# Patient Record
Sex: FEMALE | Race: WHITE | NOT HISPANIC OR LATINO | Employment: FULL TIME | URBAN - METROPOLITAN AREA
[De-identification: names, ages, dates, MRNs, and addresses within clinical notes are randomized per-mention and may not be internally consistent; named-entity substitution may affect disease eponyms.]

---

## 2017-01-11 ENCOUNTER — TELEPHONE (OUTPATIENT)
Dept: INTERNAL MEDICINE | Facility: CLINIC | Age: 67
End: 2017-01-11

## 2017-01-11 NOTE — TELEPHONE ENCOUNTER
Spoke with pt she did not want blood work 2 times in one week so all blood work was moved to her appointment on Monday with lab. Pt was also reminded her blood work is fasting, pt verbalized understanding

## 2017-01-11 NOTE — TELEPHONE ENCOUNTER
----- Message from Vicki Duffy sent at 1/11/2017  9:21 AM CST -----  Contact: pt   Pt states that she has questions about her lab appt ... [phone] unitl noon/ after noon .[phone] (home)

## 2017-01-16 ENCOUNTER — LAB VISIT (OUTPATIENT)
Dept: LAB | Facility: HOSPITAL | Age: 67
End: 2017-01-16
Attending: NURSE PRACTITIONER
Payer: MEDICARE

## 2017-01-16 DIAGNOSIS — Z29.9 PREVENTIVE MEASURE: ICD-10-CM

## 2017-01-16 DIAGNOSIS — E55.9 VITAMIN D DEFICIENCY DISEASE: ICD-10-CM

## 2017-01-16 DIAGNOSIS — B19.20 HEPATITIS C VIRUS INFECTION WITHOUT HEPATIC COMA, UNSPECIFIED CHRONICITY: ICD-10-CM

## 2017-01-16 DIAGNOSIS — E03.9 ACQUIRED HYPOTHYROIDISM: ICD-10-CM

## 2017-01-16 LAB
25(OH)D3+25(OH)D2 SERPL-MCNC: 41 NG/ML
ALBUMIN SERPL BCP-MCNC: 4.2 G/DL
ALP SERPL-CCNC: 25 U/L
ALT SERPL W/O P-5'-P-CCNC: 13 U/L
ANION GAP SERPL CALC-SCNC: 8 MMOL/L
AST SERPL-CCNC: 20 U/L
BASOPHILS # BLD AUTO: 0.03 K/UL
BASOPHILS NFR BLD: 0.7 %
BILIRUB SERPL-MCNC: 0.7 MG/DL
BUN SERPL-MCNC: 14 MG/DL
CALCIUM SERPL-MCNC: 9.8 MG/DL
CHLORIDE SERPL-SCNC: 96 MMOL/L
CHOLEST/HDLC SERPL: 2.5 {RATIO}
CO2 SERPL-SCNC: 29 MMOL/L
CREAT SERPL-MCNC: 0.9 MG/DL
DIFFERENTIAL METHOD: ABNORMAL
EOSINOPHIL # BLD AUTO: 0.1 K/UL
EOSINOPHIL NFR BLD: 2.5 %
ERYTHROCYTE [DISTWIDTH] IN BLOOD BY AUTOMATED COUNT: 13.1 %
EST. GFR  (AFRICAN AMERICAN): >60 ML/MIN/1.73 M^2
EST. GFR  (NON AFRICAN AMERICAN): >60 ML/MIN/1.73 M^2
GLUCOSE SERPL-MCNC: 79 MG/DL
HCT VFR BLD AUTO: 42.7 %
HDL/CHOLESTEROL RATIO: 40.5 %
HDLC SERPL-MCNC: 185 MG/DL
HDLC SERPL-MCNC: 75 MG/DL
HGB BLD-MCNC: 14.8 G/DL
LDLC SERPL CALC-MCNC: 98.8 MG/DL
LYMPHOCYTES # BLD AUTO: 1.8 K/UL
LYMPHOCYTES NFR BLD: 43.3 %
MCH RBC QN AUTO: 33.1 PG
MCHC RBC AUTO-ENTMCNC: 34.7 %
MCV RBC AUTO: 96 FL
MONOCYTES # BLD AUTO: 0.5 K/UL
MONOCYTES NFR BLD: 13.3 %
NEUTROPHILS # BLD AUTO: 1.6 K/UL
NEUTROPHILS NFR BLD: 40.2 %
NONHDLC SERPL-MCNC: 110 MG/DL
PLATELET # BLD AUTO: 172 K/UL
PMV BLD AUTO: 10.9 FL
POTASSIUM SERPL-SCNC: 5.5 MMOL/L
PROT SERPL-MCNC: 7.9 G/DL
RBC # BLD AUTO: 4.47 M/UL
SODIUM SERPL-SCNC: 133 MMOL/L
TRIGL SERPL-MCNC: 56 MG/DL
TSH SERPL DL<=0.005 MIU/L-ACNC: 1.82 UIU/ML
WBC # BLD AUTO: 4.06 K/UL

## 2017-01-16 PROCEDURE — 36415 COLL VENOUS BLD VENIPUNCTURE: CPT | Mod: PO

## 2017-01-16 PROCEDURE — 80061 LIPID PANEL: CPT

## 2017-01-16 PROCEDURE — 80053 COMPREHEN METABOLIC PANEL: CPT

## 2017-01-16 PROCEDURE — 85025 COMPLETE CBC W/AUTO DIFF WBC: CPT

## 2017-01-16 PROCEDURE — 84443 ASSAY THYROID STIM HORMONE: CPT

## 2017-01-16 PROCEDURE — 87522 HEPATITIS C REVRS TRNSCRPJ: CPT

## 2017-01-16 PROCEDURE — 82306 VITAMIN D 25 HYDROXY: CPT

## 2017-01-19 ENCOUNTER — PATIENT OUTREACH (OUTPATIENT)
Dept: ADMINISTRATIVE | Facility: HOSPITAL | Age: 67
End: 2017-01-19
Payer: MEDICARE

## 2017-01-19 DIAGNOSIS — M89.9 BONE DISORDER: Primary | ICD-10-CM

## 2017-01-19 LAB
HCV LOG: <1.08 LOG (10) IU/ML
HCV RNA QUANT PCR: <12 IU/ML
HCV, QUALITATIVE: NOT DETECTED IU/ML

## 2017-01-19 NOTE — LETTER
January 19, 2017    Maggie GARCIA Arjun  [address] [facility] [address]             [facility]  [address]  [facility] [address]  Phone: [phone] Dear Ms. Díaz:  [facility] is committed to your overall health.  To help you get the most out of each of your visits, we will review your information to make sure you are up to date on all of your recommended tests and/or procedures.      Adeola Jones MD has found that you may be due for    Colonoscopy     Influenza Vaccine     DEXA SCAN      If you have had any of the above done at another facility, please bring the records or information with you so that your record at [facility] will be complete.    If you are currently taking medication, please bring it with you to your appointment for review.    We will be happy to assist you with scheduling any necessary appointments or you may contact the [facility] appointment desk at [phone] to schedule at your convenience.     Thank you for choosing [facility] for your healthcare needs,      ALEXANDR Acevedo  Care Coordination Department  [facility]

## 2017-01-20 ENCOUNTER — HOSPITAL ENCOUNTER (OUTPATIENT)
Dept: RADIOLOGY | Facility: HOSPITAL | Age: 67
Discharge: HOME | End: 2017-01-20
Attending: INTERNAL MEDICINE
Payer: MEDICARE

## 2017-01-20 DIAGNOSIS — E03.9 ACQUIRED HYPOTHYROIDISM: ICD-10-CM

## 2017-01-20 DIAGNOSIS — Z29.9 PREVENTIVE MEASURE: ICD-10-CM

## 2017-01-20 DIAGNOSIS — Z12.31 ENCOUNTER FOR SCREENING MAMMOGRAM FOR MALIGNANT NEOPLASM OF BREAST: ICD-10-CM

## 2017-01-20 DIAGNOSIS — E55.9 VITAMIN D DEFICIENCY DISEASE: ICD-10-CM

## 2017-01-20 PROCEDURE — 77067 SCR MAMMO BI INCL CAD: CPT | Mod: 26,,, | Performed by: RADIOLOGY

## 2017-01-20 PROCEDURE — 77067 SCR MAMMO BI INCL CAD: CPT | Mod: TC

## 2017-01-20 PROCEDURE — 77063 BREAST TOMOSYNTHESIS BI: CPT | Mod: 26,,, | Performed by: RADIOLOGY

## 2017-01-27 ENCOUNTER — OFFICE VISIT (OUTPATIENT)
Dept: INTERNAL MEDICINE | Facility: CLINIC | Age: 67
End: 2017-01-27
Payer: MEDICARE

## 2017-01-27 VITALS
SYSTOLIC BLOOD PRESSURE: 146 MMHG | DIASTOLIC BLOOD PRESSURE: 82 MMHG | OXYGEN SATURATION: 98 % | WEIGHT: 149.94 LBS | TEMPERATURE: 97 F | BODY MASS INDEX: 22.72 KG/M2 | HEIGHT: 68 IN | HEART RATE: 84 BPM

## 2017-01-27 DIAGNOSIS — E55.9 VITAMIN D DEFICIENCY DISEASE: Primary | ICD-10-CM

## 2017-01-27 DIAGNOSIS — G25.81 RESTLESS LEGS SYNDROME (RLS): ICD-10-CM

## 2017-01-27 DIAGNOSIS — B17.10 ACUTE HEPATITIS C VIRUS INFECTION WITHOUT HEPATIC COMA: ICD-10-CM

## 2017-01-27 DIAGNOSIS — M81.0 OP (OSTEOPOROSIS): ICD-10-CM

## 2017-01-27 DIAGNOSIS — E03.9 ACQUIRED HYPOTHYROIDISM: ICD-10-CM

## 2017-01-27 DIAGNOSIS — E87.1 HYPONATREMIA: ICD-10-CM

## 2017-01-27 DIAGNOSIS — I10 ESSENTIAL HYPERTENSION: ICD-10-CM

## 2017-01-27 DIAGNOSIS — J30.1 SEASONAL ALLERGIC RHINITIS DUE TO POLLEN: ICD-10-CM

## 2017-01-27 DIAGNOSIS — Z00.00 ENCOUNTER FOR PREVENTIVE HEALTH EXAMINATION: ICD-10-CM

## 2017-01-27 PROCEDURE — 99213 OFFICE O/P EST LOW 20 MIN: CPT | Mod: PBBFAC,PO | Performed by: INTERNAL MEDICINE

## 2017-01-27 PROCEDURE — 99214 OFFICE O/P EST MOD 30 MIN: CPT | Mod: S$PBB,,, | Performed by: INTERNAL MEDICINE

## 2017-01-27 PROCEDURE — 99999 PR PBB SHADOW E&M-EST. PATIENT-LVL III: CPT | Mod: PBBFAC,,, | Performed by: INTERNAL MEDICINE

## 2017-01-27 PROCEDURE — G0008 ADMIN INFLUENZA VIRUS VAC: HCPCS | Mod: PBBFAC,PO | Performed by: INTERNAL MEDICINE

## 2017-01-27 RX ORDER — LISINOPRIL 10 MG/1
10 TABLET ORAL DAILY
Qty: 30 TABLET | Refills: 11 | Status: SHIPPED | OUTPATIENT
Start: 2017-01-27 | End: 2017-03-07

## 2017-01-27 NOTE — PROGRESS NOTES
"Subjective:       Patient ID: Maggie Díaz is a 66 y.o. female.    Chief Complaint: Annual Exam    HPI Comments: Here for f/u medical problems and preventive exam.  Energy good.  Walks daily.  6 mo ago, treated for HCV.  No f/c/sw/cough.  No cp/sob/palp.  BMs and urine normal.  Taking vit D and fosamax.    HM: 1/17 fluvax, 3/15 [id number], 2/14 [id number], 1/10 TDaP, 8/12 zoster, 2/15 BMD rep 2y, Currently refusing Cscope, 3/16 OB neg x 2, 1/17 MMG refuses yearly.        Review of Systems   Constitutional: Negative for appetite change, chills, diaphoresis and fever.   HENT: Negative for congestion, ear pain, rhinorrhea, sinus pressure and sore throat.    Respiratory: Negative for cough, chest tightness and shortness of breath.    Cardiovascular: Negative for chest pain and palpitations.   Gastrointestinal: Negative for blood in stool, constipation, diarrhea, nausea and vomiting.   Genitourinary: Negative for dysuria, frequency, hematuria, menstrual problem, urgency and vaginal discharge.   Musculoskeletal: Negative for arthralgias.   Skin: Negative for rash.   Neurological: Negative for dizziness and headaches.   Psychiatric/Behavioral: Negative for sleep disturbance. The patient is not nervous/anxious.        Objective:     Visit Vitals    BP (!) 146/82 (BP Location: Right arm)    Pulse 84    Temp 96.5 °F (35.8 °C) (Tympanic)    Ht 5' 8" (1.727 m)    Wt 68 kg (149 lb 14.6 oz)    SpO2 98%    BMI 22.79 kg/m2       Physical Exam   Constitutional: She is oriented to person, place, and time. She appears well-developed and well-nourished.   HENT:   Right Ear: External ear normal. Tympanic membrane is not injected.   Left Ear: External ear normal. Tympanic membrane is not injected.   Mouth/Throat: Oropharynx is clear and moist.   Eyes: Conjunctivae are normal.   Neck: Normal range of motion. Neck supple. No thyromegaly present.   Cardiovascular: Normal rate, regular rhythm and intact distal pulses.  Exam reveals no " gallop and no friction rub.    No murmur heard.  Pulmonary/Chest: Effort normal and breath sounds normal. She has no wheezes. She has no rales. Right breast exhibits no mass, no nipple discharge, no skin change and no tenderness. Left breast exhibits no mass, no nipple discharge, no skin change and no tenderness.   Abdominal: Soft. Bowel sounds are normal. She exhibits no mass. There is no tenderness.   Musculoskeletal: She exhibits no edema.   Lymphadenopathy:     She has no cervical adenopathy.        Right axillary: No lateral adenopathy present.        Left axillary: No lateral adenopathy present.  Neurological: She is alert and oriented to person, place, and time.   Skin: Skin is warm. No rash noted.   Psychiatric: She has a normal mood and affect.       Results for orders placed or performed in visit on 01/16/17   HEPATITIS C RNA, QUANTITATIVE, PCR   Result Value Ref Range    HCV Log <1.08 <1.08 Log (10) IU/mL    HCV, Qualitative Not detected Not detected IU/mL    HCV RNA Quant PCR <12 <12 IU/mL   Comprehensive metabolic panel   Result Value Ref Range    Sodium 133 (L) 136 - 145 mmol/L    Potassium 5.5 (H) 3.5 - 5.1 mmol/L    Chloride 96 95 - 110 mmol/L    CO2 29 23 - 29 mmol/L    Glucose 79 70 - 110 mg/dL    BUN, Bld 14 8 - 23 mg/dL    Creatinine 0.9 0.5 - 1.4 mg/dL    Calcium 9.8 8.7 - 10.5 mg/dL    Total Protein 7.9 6.0 - 8.4 g/dL    Albumin 4.2 3.5 - 5.2 g/dL    Total Bilirubin 0.7 0.1 - 1.0 mg/dL    Alkaline Phosphatase 25 (L) 55 - 135 U/L    AST 20 10 - 40 U/L    ALT 13 10 - 44 U/L    Anion Gap 8 8 - 16 mmol/L    eGFR if African American >60.0 >60 mL/min/1.73 m^2    eGFR if non African American >60.0 >60 mL/min/1.73 m^2   Lipid panel   Result Value Ref Range    Cholesterol 185 120 - 199 mg/dL    Triglycerides 56 30 - 150 mg/dL    HDL 75 40 - 75 mg/dL    LDL Cholesterol 98.8 63.0 - 159.0 mg/dL    HDL/Chol Ratio 40.5 20.0 - 50.0 %    Total Cholesterol/HDL Ratio 2.5 2.0 - 5.0    Non-HDL Cholesterol 110  mg/dL   CBC auto differential   Result Value Ref Range    WBC 4.06 3.90 - 12.70 K/uL    RBC 4.47 4.00 - 5.40 M/uL    Hemoglobin 14.8 12.0 - 16.0 g/dL    Hematocrit 42.7 37.0 - 48.5 %    MCV 96 82 - 98 fL    MCH 33.1 (H) 27.0 - 31.0 pg    MCHC 34.7 32.0 - 36.0 %    RDW 13.1 11.5 - 14.5 %    Platelets 172 150 - 350 K/uL    MPV 10.9 9.2 - 12.9 fL    Gran # 1.6 (L) 1.8 - 7.7 K/uL    Lymph # 1.8 1.0 - 4.8 K/uL    Mono # 0.5 0.3 - 1.0 K/uL    Eos # 0.1 0.0 - 0.5 K/uL    Baso # 0.03 0.00 - 0.20 K/uL    Gran% 40.2 38.0 - 73.0 %    Lymph% 43.3 18.0 - 48.0 %    Mono% 13.3 4.0 - 15.0 %    Eosinophil% 2.5 0.0 - 8.0 %    Basophil% 0.7 0.0 - 1.9 %    Differential Method Automated    TSH   Result Value Ref Range    TSH 1.820 0.400 - 4.000 uIU/mL   Vitamin D   Result Value Ref Range    Vit D, 25-Hydroxy 41 30 - 96 ng/mL       Assessment:       1. Vitamin D deficiency disease    2. Restless legs syndrome (RLS)    3. OP (osteoporosis)    4. Acute hepatitis C virus infection without hepatic coma    5. Essential hypertension    6. Seasonal allergic rhinitis due to pollen    7. Acquired hypothyroidism    8. Encounter for preventive health examination    9. Hyponatremia        Plan:       Maggie was seen today for annual exam.    Diagnoses and all orders for this visit:    Vitamin D deficiency disease-stable on supplement.    Restless legs syndrome (RLS)- stable on rx.    OP (osteoporosis)- on fosamax.  recheck in 6mo.  -     DXA Bone Density Spine And Hip_Axial Skeleton; Future    Acute hepatitis C virus infection without hepatic coma- s/p treatment.    Essential hypertension- stop hctz, check BMP in 3d and start ace-i if K normal.  RTC 2 weeks with Lilibeth with BMP prior.    Seasonal allergic rhinitis due to pollen    Acquired hypothyroidism- stable on rx.    Encounter for preventive health examination- utd.   fluvax now.    RTC 6mo with BPs and BMD prior.    WORK PHONE # [phone].

## 2017-01-27 NOTE — MR AVS SNAPSHOT
[facility]  [address] [facility] [place] [address]  Phone: [phone]  Fax: [phone]                  Maggie Díaz   2017 9:20 AM   Office Visit    Description:  Female : 1950   Provider:  Adeola Berumen MD   Department:  [facility] - Internal Medicine           Reason for Visit     Annual Exam           Diagnoses this Visit        Comments    Vitamin D deficiency disease    -  Primary     Restless legs syndrome (RLS)         OP (osteoporosis)         Acute hepatitis C virus infection without hepatic coma         Essential hypertension         Seasonal allergic rhinitis due to pollen         Acquired hypothyroidism         Encounter for preventive health examination         Hyponatremia                To Do List           Future Appointments        Provider Department Dept Phone    2017 10:15 AM LAB[facility]ATORY, [facility] [phone]    2017 10:40 AM LAB[facility]ATORY, [facility] [phone]    2017 9:00 AM SHEILA Roman [facility] [phone]    3/30/2017 3:30 PM YAN Starkey OD [facility] - Ophthalmology [phone]    2017 9:30 AM [facility] BMD1 [facility] [phone]      Goals (5 Years of Data)     NO alcohol intake during and at least 3 months after therapy.      Notes - Note created  3/3/2016  9:27 AM by Nahid Santiago MD      Discharge Instructions for Hepatitis C  You have been diagnosed with hepatitis C, which is an inflammation of the liver caused by a viral infection. Hepatitis C can get worse and damage your liver without your knowing it. Stay in regular contact with your doctor and healthcare team. They can watch your condition and tell you about any new research and types of treatment for hepatitis C. Here's what you can do to stay healthier and prevent its spread.  Home care  · Avoid putting stress on your liver.  ¨ Dont drink alcohol. Alcohol consumption increases  the risk of developing cirrhosis.  ¨ Ask your doctor about which medications, including over-the-counter ones, you should not take.  ¨ Lose weight if you are overweight, especially if you have fatty liver.  · Eat a balanced diet.   · Take medications prescribed by your doctor to try to get rid of the virus.    ¨ To help your liver work better, you may be given injections of a medication called alpha interferon, or you may be given oral medications.  ¨ In most cases, you will take ribavirin (antiviral medication) by mouth twice a day.   Prevention  · Cover all skin breaks and sores by yourself. If you need help, the person treating you should wear latex gloves.  · Use condoms during sex if you have multiple sexual partners.  · Dont donate blood, plasma, sperm, body organs, or other body tissue.  · Dont share needles.  · Dont share razors, toothbrushes, manicure tools, or other personal items.  Follow-up care  Make a follow-up appointment as directed by our staff.  When to seek medical care  Call your doctor right away if you have any of the following:  · Flulike problems (fatigue, nausea, vomiting, diarrhea, or sore muscles and joints)  · Swelling in your abdomen or tenderness in the upper right abdomen  · Yellowing of your skin or eyes (jaundice)  · Itching  · Dark urine  · Black, tarry, or red stools or vomiting blood  · Confusion or trouble concentrating  · Trouble sleeping  · Painful red rash on your legs  · Nerve damage  · Mental confusion  · Kidney problems   © 3531-7076 [employer]. [address] [place] [address], [place] [address]. All rights reserved. This information is not intended as a substitute for professional medical care. Always follow your healthcare professional's instructions.              Follow-Up and Disposition     Return in about 6 months (around 7/27/2017).    Follow-up and Disposition History       These Medications        Disp Refills Start End    lisinopril 10 MG tablet 30  tablet 11 1/27/2017 1/27/2018    Take 1 tablet (10 mg total) by mouth once daily. - Oral    Pharmacy: [facility] [address]  [place], [place] - [address]  #: [phone]         [facility]     [facility] On [facility] Nurse Care Line - 24/7 Assistance  Registered nurses in the [facility] Center provide clinical advisement, health education, appointment booking, and other advisory services.  Call for this free service at [phone].             Medications           Message regarding Medications     Verify the changes and/or additions to your medication regime listed below are the same as discussed with your clinician today.  If any of these changes or additions are incorrect, please notify your healthcare provider.        START taking these NEW medications        Refills    lisinopril 10 MG tablet 11    Sig: Take 1 tablet (10 mg total) by mouth once daily.    Class: Normal    Route: Oral      STOP taking these medications     hydrochlorothiazide (HYDRODIURIL) 25 MG tablet take 1 tablet by mouth once daily           Verify that the below list of medications is an accurate representation of the medications you are currently taking.  If none reported, the list may be blank. If incorrect, please contact your healthcare provider. Carry this list with you in case of emergency.           Current Medications     alendronate (FOSAMAX) 70 MG tablet TAKE 1 TABLET ONCE WEEKLY IN THE MORNING WITH A FULL GLASS OF WATER ON AN EMPTY STOMACH. DO NOT CONSUME FOOD OR LIE DOWN FOR AT LEAST 30 MIN    calcium carbonate 220 mg capsule Take 250 mg by mouth 2 (two) times daily with meals.    clonazePAM (KLONOPIN) 0.5 MG tablet take 1 to 2 tablets by mouth every evening if needed    levothyroxine (SYNTHROID) 50 MCG tablet take 1 tablet by mouth once daily    loratadine (CLARITIN) 10 mg tablet Take 10 mg by mouth once daily.    vitamin D 1000 units Tab Take 185 mg by mouth once daily.    lisinopril 10 MG tablet Take 1  "tablet (10 mg total) by mouth once daily.           Clinical Reference Information           Vital Signs - Last Recorded  Most recent update: 1/27/2017  9:29 AM by Omar Olson MA    BP Pulse Temp Ht Wt SpO2    (!) 146/82 (BP Location: Right arm) 84 96.5 °F (35.8 °C) (Tympanic) 5' 8" (1.727 m) 68 kg (149 lb 14.6 oz) 98%    BMI                22.79 kg/m2          Blood Pressure          Most Recent Value    BP  (!)  146/82      Allergies as of 1/27/2017     Bactrim [Sulfamethoxazole-trimethoprim]    Latex, Natural Rubber    Penicillins      Immunizations Administered on Date of Encounter - 1/27/2017     Name Date Dose VIS Date Route    Influenza - High Dose 1/27/2017 0.5 mL 8/7/2015 Intramuscular      Orders Placed During Today's Visit      Normal Orders This Visit    Influenza - High Dose (65+) (PF) (IM)     Future Labs/Procedures Expected by Expires    Basic metabolic panel  1/27/2017 1/27/2018    Basic metabolic panel  1/27/2017 1/27/2018    DXA Bone Density Spine And Hip_Axial Skeleton  1/27/2017 1/27/2018      [facility] Sign-Up     Activating your [facility] account is as easy as 1-2-3!     1) Visit [email or web address], select Sign Up Now, enter this activation code and your date of birth, then select Next.  [email or web address]  Expires: 3/13/2017  9:48 AM      2) Create a username and password to use when you visit [facility] in the future and select a security question in case you lose your password and select Next.    3) Enter your e-mail address and click Sign Up!    Additional Information  If you have questions, please e-mail [email or web address] or call [phone] to talk to our [facility] staff. Remember, [facility] is NOT to be used for urgent needs. For medical emergencies, dial 911.         "

## 2017-01-30 ENCOUNTER — TELEPHONE (OUTPATIENT)
Dept: INTERNAL MEDICINE | Facility: CLINIC | Age: 67
End: 2017-01-30

## 2017-01-30 ENCOUNTER — LAB VISIT (OUTPATIENT)
Dept: LAB | Facility: HOSPITAL | Age: 67
End: 2017-01-30
Attending: INTERNAL MEDICINE
Payer: MEDICARE

## 2017-01-30 DIAGNOSIS — I10 ESSENTIAL HYPERTENSION: ICD-10-CM

## 2017-01-30 DIAGNOSIS — E87.1 HYPONATREMIA: ICD-10-CM

## 2017-01-30 LAB
ANION GAP SERPL CALC-SCNC: 8 MMOL/L
BUN SERPL-MCNC: 10 MG/DL
CALCIUM SERPL-MCNC: 9.5 MG/DL
CHLORIDE SERPL-SCNC: 102 MMOL/L
CO2 SERPL-SCNC: 30 MMOL/L
CREAT SERPL-MCNC: 0.8 MG/DL
EST. GFR  (AFRICAN AMERICAN): >60 ML/MIN/1.73 M^2
EST. GFR  (NON AFRICAN AMERICAN): >60 ML/MIN/1.73 M^2
GLUCOSE SERPL-MCNC: 62 MG/DL
POTASSIUM SERPL-SCNC: 5.3 MMOL/L
SODIUM SERPL-SCNC: 140 MMOL/L

## 2017-01-30 PROCEDURE — 80048 BASIC METABOLIC PNL TOTAL CA: CPT

## 2017-01-30 PROCEDURE — 36415 COLL VENOUS BLD VENIPUNCTURE: CPT | Mod: PO

## 2017-01-30 NOTE — TELEPHONE ENCOUNTER
----- Message from Nelida Kim sent at 1/30/2017  3:39 PM CST -----  Contact: pt  Pt returning call to nurse...please call pt back at [phone]///thx jw

## 2017-01-30 NOTE — TELEPHONE ENCOUNTER
----- Message from Nelida Kim sent at 1/30/2017  3:39 PM CST -----  Contact: pt  Pt returning call to nurse...please call pt back at [phone]///thx jw

## 2017-01-31 ENCOUNTER — TELEPHONE (OUTPATIENT)
Dept: INTERNAL MEDICINE | Facility: CLINIC | Age: 67
End: 2017-01-31

## 2017-01-31 DIAGNOSIS — I10 ESSENTIAL HYPERTENSION: Primary | ICD-10-CM

## 2017-01-31 NOTE — TELEPHONE ENCOUNTER
Informed pt sodium level is now good, potassium is better but still a little high- may be falsely elevated.  Scheduled pt to come back tomorrow for redraw, she cannot come until tomorrow.  Pt states that she has not taken the Hctz since Saturday and has not filled the Lisinopril yet.  She would like to know when she is supposed to start that and get back on the Hctz.  Please advise./rpr

## 2017-01-31 NOTE — TELEPHONE ENCOUNTER
Please inform pt sodium level is now good, potassium is better but still a little high- may be falsely elevated.  Please come back for repeat blood draw.  Call work phone # at the end of my last note.  SM

## 2017-02-01 ENCOUNTER — LAB VISIT (OUTPATIENT)
Dept: LAB | Facility: HOSPITAL | Age: 67
End: 2017-02-01
Attending: INTERNAL MEDICINE
Payer: MEDICARE

## 2017-02-01 DIAGNOSIS — I10 ESSENTIAL HYPERTENSION: ICD-10-CM

## 2017-02-01 LAB
ANION GAP SERPL CALC-SCNC: 9 MMOL/L
BUN SERPL-MCNC: 13 MG/DL
CALCIUM SERPL-MCNC: 9.5 MG/DL
CHLORIDE SERPL-SCNC: 103 MMOL/L
CO2 SERPL-SCNC: 28 MMOL/L
CREAT SERPL-MCNC: 0.8 MG/DL
EST. GFR  (AFRICAN AMERICAN): >60 ML/MIN/1.73 M^2
EST. GFR  (NON AFRICAN AMERICAN): >60 ML/MIN/1.73 M^2
GLUCOSE SERPL-MCNC: 100 MG/DL
POTASSIUM SERPL-SCNC: 4.7 MMOL/L
SODIUM SERPL-SCNC: 140 MMOL/L

## 2017-02-01 PROCEDURE — 36415 COLL VENOUS BLD VENIPUNCTURE: CPT | Mod: PO

## 2017-02-01 PROCEDURE — 80048 BASIC METABOLIC PNL TOTAL CA: CPT

## 2017-02-02 ENCOUNTER — TELEPHONE (OUTPATIENT)
Dept: INTERNAL MEDICINE | Facility: CLINIC | Age: 67
End: 2017-02-02

## 2017-02-02 NOTE — TELEPHONE ENCOUNTER
Attempted to contact pt at work number as instructed she has already left work . Called home phone pt not there, instructed the patients  to have her give us a call at earliest convenience. Verbalized understanding.

## 2017-02-02 NOTE — TELEPHONE ENCOUNTER
----- Message from Marie Rodrigues sent at 2/2/2017  3:52 PM CST -----  Is returning nurse call. Please call back at [phone]. Thanks//cdb

## 2017-02-02 NOTE — TELEPHONE ENCOUNTER
Spoke with pt advised patient of lab results and to start Lisinopril. Pt would like to know if you wanted her to still have 2/6/17 labs drawn or to wait. Please advise.

## 2017-02-02 NOTE — TELEPHONE ENCOUNTER
Please call pt at work phone # at end of my last note, potassium level is good and she should start new BP med lisinopril.  SM

## 2017-02-07 ENCOUNTER — TELEPHONE (OUTPATIENT)
Dept: INTERNAL MEDICINE | Facility: CLINIC | Age: 67
End: 2017-02-07

## 2017-02-07 NOTE — TELEPHONE ENCOUNTER
----- Message from Raven Solo sent at 2/7/2017  4:19 PM CST -----  Call pt at  [phone]//pt call yesterday and still waiting to hear from someone//jaquelin nava

## 2017-02-07 NOTE — TELEPHONE ENCOUNTER
Pt states that she is having side effects from the Lisinopril.  She took it Sat & Sun and experienced rapid heart beats and /58.  She has not taken anything since.  Her BP this am was 123/73.  She would like to be advised if she should be taking anything./rpr

## 2017-02-07 NOTE — TELEPHONE ENCOUNTER
Instructed pt to stop taking med, monitor BP x 1 mo and f/u with SHEILA Padgett.  Notify Dr. Berumen if BPs are running high prior to 1 mo f/u.  Pt verbalized understanding./rpr

## 2017-02-21 ENCOUNTER — PATIENT OUTREACH (OUTPATIENT)
Dept: ADMINISTRATIVE | Facility: HOSPITAL | Age: 67
End: 2017-02-21
Payer: MEDICARE

## 2017-02-21 NOTE — LETTER
February 21, 2017    Maggie GARCIA Arjun   [facility] [address]             [facility]  [address]  [facility] [address]  Phone: [phone] Dear Ms. Díaz:    [facility] is committed to your overall health.  To help you get the most out of each of your visits, we will review your information to make sure you are up to date on all of your recommended tests and/or procedures.      Adeola Jones MD has found that you may be due for    Colonoscopy     DEXA SCAN         If you have had any of the above done at another facility, please bring the records or information with you so that your record at [facility] will be complete.    If you are currently taking medication, please bring it with you to your appointment for review.    We will be happy to assist you with scheduling any necessary appointments or you may contact the [facility] appointment desk at [phone] to schedule at your convenience.     Thank you for choosing [facility] for your healthcare needs,        Bobbye, LPN  Care Coordination Department  [facility]

## 2017-03-03 RX ORDER — CLONAZEPAM 0.5 MG/1
TABLET ORAL
Qty: 60 TABLET | Refills: 2 | Status: SHIPPED | OUTPATIENT
Start: 2017-03-03 | End: 2017-05-31 | Stop reason: SDUPTHER

## 2017-03-07 ENCOUNTER — OFFICE VISIT (OUTPATIENT)
Dept: INTERNAL MEDICINE | Facility: CLINIC | Age: 67
End: 2017-03-07
Payer: MEDICARE

## 2017-03-07 VITALS
HEART RATE: 99 BPM | SYSTOLIC BLOOD PRESSURE: 132 MMHG | HEIGHT: 68 IN | WEIGHT: 149.5 LBS | BODY MASS INDEX: 22.66 KG/M2 | DIASTOLIC BLOOD PRESSURE: 78 MMHG | TEMPERATURE: 96 F | OXYGEN SATURATION: 97 %

## 2017-03-07 DIAGNOSIS — I10 ESSENTIAL HYPERTENSION: Primary | ICD-10-CM

## 2017-03-07 DIAGNOSIS — J30.1 SEASONAL ALLERGIC RHINITIS DUE TO POLLEN: ICD-10-CM

## 2017-03-07 PROCEDURE — 99213 OFFICE O/P EST LOW 20 MIN: CPT | Mod: PBBFAC,PO | Performed by: PHYSICIAN ASSISTANT

## 2017-03-07 PROCEDURE — 99213 OFFICE O/P EST LOW 20 MIN: CPT | Mod: S$PBB,,, | Performed by: PHYSICIAN ASSISTANT

## 2017-03-07 PROCEDURE — 99999 PR PBB SHADOW E&M-EST. PATIENT-LVL III: CPT | Mod: PBBFAC,,, | Performed by: PHYSICIAN ASSISTANT

## 2017-03-07 RX ORDER — CETIRIZINE HYDROCHLORIDE 10 MG/1
10 TABLET ORAL DAILY
COMMUNITY
End: 2017-07-28

## 2017-03-07 NOTE — PROGRESS NOTES
Subjective:       Patient ID: Maggie Díaz is a 66 y.o. female.    Chief Complaint: Follow-up    HPI Comments: 66 year old female presents to clinic for f/u from last PCP Dr. Berumen visit (1/27/17). HCTZ was stopped and lisinopril was started for pt; however pt had low BP and tachycardia on the lisinopril. Lisinopril was then discontinued by PCP on 2/7/17 and pt has been monitoring BP since that time. Pt has checked her BP 1-2 times daily for the last month and average BP has been 117/72. She reports BP is a bit higher today due to a hectic workday. She also reports her allergies (nasal congestion) began today due to the pollen outside. She just bought Zyrtec to take for this. She reports checking her BP machine with one at a pharmacy, and they read similar. She reports no CP, SOB, exertional sxs, fever, chills, or other medical complaints.    Past Medical History:  No date: Hematuria, microscopic      Comment: negative CT  No date: Hepatitis C      Comment: slow progression/ Dr. Otoole  No date: Hypertension  No date: Migraines  No date: OP (osteoporosis)      Comment: on drug holiday.  No date: Osteoarthritis  No date: Restless legs syndrome (RLS)  No date: Vitamin D deficiency disease        Review of Systems   Constitutional: Negative for chills and fever.   HENT: Positive for congestion. Negative for ear pain and sore throat.    Respiratory: Negative for cough and shortness of breath.    Cardiovascular: Negative for chest pain, palpitations and leg swelling.   Gastrointestinal: Negative for nausea and vomiting.   Skin: Negative for rash.   Neurological: Negative for dizziness, weakness, numbness and headaches.   Psychiatric/Behavioral: Negative for confusion.       Objective:      Physical Exam   Constitutional: She is oriented to person, place, and time. She appears well-developed and well-nourished. No distress.   HENT:   Head: Normocephalic and atraumatic.   Mouth/Throat: Oropharynx is clear and  moist. No oropharyngeal exudate.   Eyes: EOM are normal. No scleral icterus.   Neck: Neck supple.   Cardiovascular: Normal rate and regular rhythm.    Pulmonary/Chest: Effort normal and breath sounds normal. No respiratory distress. She has no decreased breath sounds. She has no wheezes. She has no rhonchi. She has no rales.   Musculoskeletal: Normal range of motion. She exhibits no edema.   Lymphadenopathy:     She has no cervical adenopathy.   Neurological: She is alert and oriented to person, place, and time. No cranial nerve deficit.   Skin: Skin is warm and dry. No rash noted.   Psychiatric: She has a normal mood and affect. Her speech is normal and behavior is normal. Thought content normal.       Assessment:       1. Essential hypertension    2. Seasonal allergic rhinitis due to pollen        Plan:         1. Continue to monitor BP. Healthy diet and regular exercise.  2. Consider Flonase or Nasacort daily. Zyrtec PRN allergies. Monitor for infection.  3. F/u with PCP in 4 months as already scheduled for health management. RTC sooner if BP worsens or other medical concern. ER if severe sxs or severe BP readings occur.

## 2017-03-22 RX ORDER — LEVOTHYROXINE SODIUM 50 UG/1
TABLET ORAL
Qty: 30 TABLET | Refills: 11 | Status: SHIPPED | OUTPATIENT
Start: 2017-03-22 | End: 2018-03-15 | Stop reason: SDUPTHER

## 2017-04-04 ENCOUNTER — OFFICE VISIT (OUTPATIENT)
Dept: OPHTHALMOLOGY | Facility: CLINIC | Age: 67
End: 2017-04-04
Payer: MEDICARE

## 2017-04-04 DIAGNOSIS — Z13.5 GLAUCOMA SCREENING: ICD-10-CM

## 2017-04-04 DIAGNOSIS — H52.7 REFRACTIVE ERROR: ICD-10-CM

## 2017-04-04 PROCEDURE — 92015 DETERMINE REFRACTIVE STATE: CPT | Mod: ,,, | Performed by: OPTOMETRIST

## 2017-04-04 PROCEDURE — 99999 PR PBB SHADOW E&M-EST. PATIENT-LVL II: CPT | Mod: PBBFAC,,, | Performed by: OPTOMETRIST

## 2017-04-04 PROCEDURE — 92014 COMPRE OPH EXAM EST PT 1/>: CPT | Mod: S$PBB,,, | Performed by: OPTOMETRIST

## 2017-04-04 PROCEDURE — 99212 OFFICE O/P EST SF 10 MIN: CPT | Mod: PBBFAC,PO | Performed by: OPTOMETRIST

## 2017-04-04 NOTE — MR AVS SNAPSHOT
[facility]  [address] [facility] [address] [place] [address]  Phone: [phone]  Fax: [phone]                  Maggie Díaz   2017 3:30 PM   Office Visit    Description:  Female : 1950   Provider:  YAN Starkey OD   Department:  [facility]           Reason for Visit     Eye Exam           Diagnoses this Visit        Comments    Cataract, nuclear, bilateral    -  Primary     Glaucoma screening         Refractive error                To Do List           Future Appointments        Provider Department Dept Phone    2017 9:30 AM [facility]1 [facility] [phone]    2017 9:40 AM Adeola Berumen MD [facility] [phone]      Goals (5 Years of Data)     NO alcohol intake during and at least 3 months after therapy.      Notes - Note created  3/3/2016  9:27 AM by Nahid Santiago MD      Discharge Instructions for Hepatitis C  You have been diagnosed with hepatitis C, which is an inflammation of the liver caused by a viral infection. Hepatitis C can get worse and damage your liver without your knowing it. Stay in regular contact with your doctor and healthcare team. They can watch your condition and tell you about any new research and types of treatment for hepatitis C. Here's what you can do to stay healthier and prevent its spread.  Home care  · Avoid putting stress on your liver.  ¨ Dont drink alcohol. Alcohol consumption increases the risk of developing cirrhosis.  ¨ Ask your doctor about which medications, including over-the-counter ones, you should not take.  ¨ Lose weight if you are overweight, especially if you have fatty liver.  · Eat a balanced diet.   · Take medications prescribed by your doctor to try to get rid of the virus.    ¨ To help your liver work better, you may be given injections of a medication called alpha interferon, or you may be given oral medications.  ¨ In most cases, you will take ribavirin (antiviral  medication) by mouth twice a day.   Prevention  · Cover all skin breaks and sores by yourself. If you need help, the person treating you should wear latex gloves.  · Use condoms during sex if you have multiple sexual partners.  · Dont donate blood, plasma, sperm, body organs, or other body tissue.  · Dont share needles.  · Dont share razors, toothbrushes, manicure tools, or other personal items.  Follow-up care  Make a follow-up appointment as directed by our staff.  When to seek medical care  Call your doctor right away if you have any of the following:  · Flulike problems (fatigue, nausea, vomiting, diarrhea, or sore muscles and joints)  · Swelling in your abdomen or tenderness in the upper right abdomen  · Yellowing of your skin or eyes (jaundice)  · Itching  · Dark urine  · Black, tarry, or red stools or vomiting blood  · Confusion or trouble concentrating  · Trouble sleeping  · Painful red rash on your legs  · Nerve damage  · Mental confusion  · Kidney problems   © 8170-4868 [employer]. [address] [place] [address]. All rights reserved. This information is not intended as a substitute for professional medical care. Always follow your healthcare professional's instructions.              Follow-Up and Disposition     Return in about 1 year (around 4/4/2018).      [facility]     [facility] Nurse Care [facility] - 24/7 Assistance  Unless otherwise directed by your provider, please contact [facility], our nurse care line that is available for 24/7 assistance.     Registered nurses in the [facility] provide: appointment scheduling, clinical advisement, health education, and other advisory services.  Call: [phone] (toll free)               Medications           Message regarding Medications     Verify the changes and/or additions to your medication regime listed below are the same as discussed with your clinician today.  If any of these changes or additions  are incorrect, please notify your healthcare provider.             Verify that the below list of medications is an accurate representation of the medications you are currently taking.  If none reported, the list may be blank. If incorrect, please contact your healthcare provider. Carry this list with you in case of emergency.           Current Medications     alendronate (FOSAMAX) 70 MG tablet TAKE 1 TABLET ONCE WEEKLY IN THE MORNING WITH A FULL GLASS OF WATER ON AN EMPTY STOMACH. DO NOT CONSUME FOOD OR LIE DOWN FOR AT LEAST 30 MIN    calcium carbonate 220 mg capsule Take 250 mg by mouth 2 (two) times daily with meals.    cetirizine (ZYRTEC) 10 MG tablet Take 10 mg by mouth once daily.    clonazePAM (KLONOPIN) 0.5 MG tablet take 1-2 tablets by mouth every evening if needed    levothyroxine (SYNTHROID) 50 MCG tablet take 1 tablet by mouth once daily    vitamin D 1000 units Tab Take 185 mg by mouth once daily.           Clinical Reference Information           Allergies as of 4/4/2017     Bactrim [Sulfamethoxazole-trimethoprim]    Latex, Natural Rubber    Penicillins      Immunizations Administered on Date of Encounter - 4/4/2017     None      [facility] Sign-Up     Activating your [facility] account is as easy as 1-2-3!     1) Visit [email or web address], select Sign Up Now, enter this activation code and your date of birth, then select Next.  [email or web address]  Expires: 6/1/2017  5:40 PM      2) Create a username and password to use when you visit [facility] in the future and select a security question in case you lose your password and select Next.    3) Enter your e-mail address and click Sign Up!    Additional Information  If you have questions, please e-mail [email or web address] or call [phone] to talk to our [facility] staff. Remember, [facility] is NOT to be used for urgent needs. For medical emergencies, dial 911.         Language Assistance Services     ATTENTION: Language assistance services are available, free  of charge. Please call [phone].      ATENCIÓN: Si habla español, tiene a kraft disposición servicios gratuitos de asistencia lingüística. Llame al [phone].     CHÚ Ý: N?u b?n nói Ti?ng Vi?t, có các d?ch v? h? tr? ngôn ng? mi?n phí dành cho b?n. G?i s? [phone].         [facility] complies with applicable Federal civil rights laws and does not discriminate on the basis of race, color, national origin, age, disability, or sex.

## 2017-04-04 NOTE — PROGRESS NOTES
HPI     Last [facility] exam 03/24/2016  Cataract, nuclear, bilateral  Screening for glaucoma  RE         Last edited by Brock Duffy MA on 4/4/2017  3:30 PM.         Assessment /Plan     For exam results, see Encounter Report.    Cataract, nuclear, bilateral    Glaucoma screening    Refractive error      Mild NS cataracts OU = discussed and will follow.  OH OK OU otherwise.  Spec Rx given.  RTC one year.

## 2017-04-18 RX ORDER — ALENDRONATE SODIUM 70 MG/1
TABLET ORAL
Qty: 4 TABLET | Refills: 11 | Status: SHIPPED | OUTPATIENT
Start: 2017-04-18 | End: 2018-04-16 | Stop reason: SDUPTHER

## 2017-04-26 ENCOUNTER — OFFICE VISIT (OUTPATIENT)
Dept: INTERNAL MEDICINE | Facility: CLINIC | Age: 67
End: 2017-04-26
Payer: MEDICARE

## 2017-04-26 VITALS
DIASTOLIC BLOOD PRESSURE: 84 MMHG | TEMPERATURE: 96 F | BODY MASS INDEX: 22.82 KG/M2 | HEART RATE: 75 BPM | OXYGEN SATURATION: 98 % | WEIGHT: 150.56 LBS | HEIGHT: 68 IN | SYSTOLIC BLOOD PRESSURE: 128 MMHG

## 2017-04-26 DIAGNOSIS — R42 DIZZINESS: Primary | ICD-10-CM

## 2017-04-26 PROCEDURE — 99999 PR PBB SHADOW E&M-EST. PATIENT-LVL IV: CPT | Mod: PBBFAC,,, | Performed by: PHYSICIAN ASSISTANT

## 2017-04-26 PROCEDURE — 99213 OFFICE O/P EST LOW 20 MIN: CPT | Mod: S$PBB,,, | Performed by: PHYSICIAN ASSISTANT

## 2017-04-26 PROCEDURE — 99214 OFFICE O/P EST MOD 30 MIN: CPT | Mod: PBBFAC,PO | Performed by: PHYSICIAN ASSISTANT

## 2017-04-26 RX ORDER — MECLIZINE HYDROCHLORIDE 25 MG/1
25 TABLET ORAL 3 TIMES DAILY PRN
Qty: 15 TABLET | Refills: 0 | Status: SHIPPED | OUTPATIENT
Start: 2017-04-26 | End: 2017-07-28

## 2017-04-26 NOTE — PROGRESS NOTES
Subjective:      Patient ID: Maggie Díaz is a 66 y.o. female.    Chief Complaint: Dizziness (room spinning)    Dizziness:   Chronicity:  New  Onset:  Today  Progression since onset:  Waxing and waning  Dizziness characteristics: world is spinning.no hearing loss, no ear congestion, no ear pain, no fever, no headaches, no tinnitus, no nausea, no vomiting, no diaphoresis, no aural fullness, no weakness, no visual disturbances, no light-headedness, no syncope, no palpitations, no panic, no facial weakness, no slurred speech, no numbness in extremities and no chest pain.  Aggravated by:  Bending, getting up and position changesno strokes, no cardiac surgery, no neurologic disease, no head trauma, no balance testing, no ear trauma, no ear surgery, no head trauma, no ear infections, no anxiety, no ear tubes, no environmental allergies, no MRI head and no CT head.      Review of Systems   Constitutional: Negative for activity change, appetite change, chills, diaphoresis, fatigue, fever and unexpected weight change.   HENT: Negative.  Negative for congestion, ear pain, hearing loss, postnasal drip, rhinorrhea, sore throat, tinnitus, trouble swallowing and voice change.    Eyes: Negative.  Negative for visual disturbance.   Respiratory: Negative.  Negative for cough, choking, chest tightness and shortness of breath.    Cardiovascular: Negative for chest pain, palpitations, leg swelling and syncope.   Gastrointestinal: Negative for abdominal distention, abdominal pain, blood in stool, constipation, diarrhea, nausea and vomiting.   Endocrine: Negative for cold intolerance, heat intolerance, polydipsia and polyuria.   Genitourinary: Negative.  Negative for difficulty urinating and frequency.   Musculoskeletal: Negative for arthralgias, back pain, gait problem, joint swelling and myalgias.   Skin: Negative for color change, pallor, rash and wound.   Allergic/Immunologic: Negative for environmental allergies.  "  Neurological: Positive for dizziness. Negative for tremors, weakness, light-headedness, numbness and headaches.   Hematological: Negative for adenopathy.   Psychiatric/Behavioral: Negative for behavioral problems, confusion, self-injury, sleep disturbance and suicidal ideas. The patient is not nervous/anxious.      Objective:   /84 (BP Location: Left arm, Patient Position: Sitting)  Pulse 75  Temp 96.2 °F (35.7 °C) (Tympanic)   Ht 5' 8" (1.727 m)  Wt 68.3 kg (150 lb 9.2 oz)  SpO2 98%  BMI 22.89 kg/m2    Physical Exam   Constitutional: She is oriented to person, place, and time. She appears well-developed and well-nourished.   HENT:   Head: Normocephalic and atraumatic.   Right Ear: Hearing, tympanic membrane, external ear and ear canal normal.   Left Ear: Hearing, tympanic membrane, external ear and ear canal normal.   Nose: Nose normal. No mucosal edema or rhinorrhea. Right sinus exhibits no maxillary sinus tenderness and no frontal sinus tenderness. Left sinus exhibits no maxillary sinus tenderness and no frontal sinus tenderness.   Mouth/Throat: Uvula is midline, oropharynx is clear and moist and mucous membranes are normal. No tonsillar exudate.   Eyes: Conjunctivae and EOM are normal. Pupils are equal, round, and reactive to light.   Neck: Normal range of motion. Neck supple.   Cardiovascular: Normal rate, regular rhythm and normal heart sounds.  Exam reveals no gallop and no friction rub.    No murmur heard.  Pulmonary/Chest: Effort normal and breath sounds normal. No respiratory distress. She has no wheezes. She has no rales. She exhibits no tenderness.   Abdominal: Soft. She exhibits no distension. There is no tenderness.   Musculoskeletal: Normal range of motion.   Lymphadenopathy:     She has no cervical adenopathy.   Neurological: She is alert and oriented to person, place, and time. She has normal strength. She exhibits normal muscle tone. She displays a negative Romberg sign. Coordination " and gait normal.   Skin: Skin is warm and dry.   Psychiatric: She has a normal mood and affect. Her speech is normal and behavior is normal. Judgment and thought content normal. She is not actively hallucinating. Cognition and memory are normal. She is attentive.   Vitals reviewed.      Assessment:     1. Dizziness      Plan:   Dizziness  -     meclizine (ANTIVERT) 25 mg tablet; Take 1 tablet (25 mg total) by mouth 3 (three) times daily as needed.  Dispense: 15 tablet; Refill: 0  -     Ambulatory referral to ENT    -Educational handout on dizziness was handed to the patient and discussed in detail.  -reduce salt  -discontinue meclizine 3 days prior to appointment with ENT    Return if symptoms worsen or fail to improve.

## 2017-04-26 NOTE — PATIENT INSTRUCTIONS
Dizziness (Vertigo) and Balance Problems: Staying Safe     Replace burned-out lightbulbs to keep your home safe and well lit.   Falls or accidents can lead to pain, broken bones, and fear of future falls. Protect yourself and others by preparing for episodes. Simple steps can help you stay safe at home and wherever you go.  Lighting  Keep all areas well lit. This helps your eyes send the right signals to the brain. It also makes you less likely to trip and fall. If bright lights make symptoms worse, dim the lights or lie in a dark room until the dizziness passes. Then turn the lights back to their normal level.  Tips:  · Keep a flashlight by the bed.  · Place nightlights in bathrooms and hallways.  · Replace burned-out bulbs, or have someone replace them for you.  Preventing falls  To reduce your risk of falling:  · Get out of bed or up from a chair slowly.  · Wear low-heeled shoes that fit properly and have slip-resistant soles.  · Remove throw rugs. Clear clutter from walkways.  · Use handrails on stairs. Have handrails installed or adjusted if needed.  · Install grab bars in the bathroom. Don't use towel racks for balance.  · Use a shower stool. Also put adhesive strips in the shower or on the tub floor.  Going out  With a little time and preparation, you can get around safely.  Tips:  · Bring a cane or walking aid if needed.  · Give yourself plenty of time in case you start to get dizzy.  · Ask your healthcare provider what type of exercise is safe for your condition.  · Be patient. If an activity such as walking through a crowded shop causes you stress, you may not be ready for it yet.  Driving  If you become dizzy or disoriented while driving, you could hurt yourself and others. That's why it's best to not drive until symptoms have gone away. In some cases, your license may be temporarily held until it's safe for you to drive again.  For safety:  · Ask a friend to drive for you.  · Take public  transportation.  · Walk to stores and other places when you can.  Asking for help  Don't be afraid to ask for help running errands, cooking meals, and doing exercise. Whether it's a friend, loved one, neighbor, or stranger on the street, a little help can make a world of difference.   Date Last Reviewed: 11/1/2016  © 1780-7806 "[employer]. [address] [place] [address]. All rights reserved. This information is not intended as a substitute for professional medical care. Always follow your healthcare professional's instructions.        Managing Dizziness (Vertigo) with Medicines    Although medicines can't cure your problem, they can help control symptoms. Your doctor may prescribe medicines for a few weeks and then taper them off. Always take your medicine as prescribed. Never share your medicine with others.  Contact your healthcare provider right away if you have side effects from your medicines.   How medicines can help  · Treat infection or inflammation. If you have an infection caused by bacteria, your doctor can prescribe antibiotics.  · Limit conflicting balance signals. These medicines are often in pill form.  · Ease nausea. Suppositories, pills, or shots can reduce vomiting.  · Reduce pressure in the canals. Diuretics can be used to treat Meniere's disease. These medicines help your body get rid of extra fluid.  · Ease other symptoms. Other medicines can help ease depression and anxiety caused by living with dizziness or fainting.  Date Last Reviewed: 11/1/2016 © 2000-2016 "[employer]. [address] [place] [address]. All rights reserved. This information is not intended as a substitute for professional medical care. Always follow your healthcare professional's instructions.        Inner Ear Problems: Causes of Dizziness (Vertigo)  Benign positional vertigo (BPV)    This is the most common cause of vertigo. BPV is also called benign positional paroxysmal  vertigo (BPPV). It happens when crystals in the ear canals shift into the wrong place. Vertigo usually occurs when you move your head in a certain way. This can happen when turning in bed, bending, or looking up. Because BPV comes on quickly, you should think about if you are safe to drive or do other tasks that need your full attention.  BPV:  · Causes vertigo that last for seconds. Vertigo can occur several times a day, depending on body position.  · Doesnt cause hearing loss  · Often goes away on its own. But it but may go away sooner with treatment.  Infection or inflammation    Sometimes the semicircular canals swell and send incorrect balance signals. This problem may be caused by a viral infection. Depending on the cause, your hearing can be affected (labyrinthitis). Or your hearing can remain normal (neuronitis).  Infection or inflammation:  · Causes vertigo that lasts for hours or days. The first episode is usually the worst.  · Can cause hearing loss  · Often goes away on its own. But it may go away sooner with treatment.  You may need vestibular rehabilitation if you have balance problems that don't go away.  Menieres disease    This condition is uncommon. It happens when there is too much fluid in the ear canals. This causes increased pressure and swelling. It affects balance and hearing signals.  Menieres disease may:  · Cause vertigo that last for hours  · Cause hearing problems that come and go. The problems are usually in one ear and get worse over time.  · Cause buzzing or ringing in the ears (tinnitus)  · Cause a feeling of fullness or pressure in the ear  · Cause any of these symptoms: vertigo, hearing loss, tinnitus, or ear fullness to last a lifetime  Date Last Reviewed: 11/1/2016  © 8070-6391 [employer]. [address] [place] [address], [place] [address]. All rights reserved. This information is not intended as a substitute for professional medical care. Always follow your healthcare  professional's instructions.

## 2017-04-26 NOTE — MR AVS SNAPSHOT
[facility]  [address] [facility] [place] [address]  Phone: [phone]  Fax: [phone]                  Maggie Díaz   2017 11:20 AM   Office Visit    Description:  Female : 1950   Provider:  Elda Cr PA-C   Department:  [facility] - Internal Medicine           Reason for Visit     Dizziness           Diagnoses this Visit        Comments    Dizziness    -  Primary            To Do List           Future Appointments        Provider Department Dept Phone    2017 12:00 PM AKIRA Gonzalez [facility] - Audiology [phone]    2017 1:00 PM Makayla Baeza PA-C [facility] - [phone]    2017 9:30 AM [facility] BMD1 [facility] [phone]    2017 9:40 AM Adeola Berumen MD [facility] [phone]      Goals (5 Years of Data)     NO alcohol intake during and at least 3 months after therapy.      Notes - Note created  3/3/2016  9:27 AM by Nahid Santiago MD      Discharge Instructions for Hepatitis C  You have been diagnosed with hepatitis C, which is an inflammation of the liver caused by a viral infection. Hepatitis C can get worse and damage your liver without your knowing it. Stay in regular contact with your doctor and healthcare team. They can watch your condition and tell you about any new research and types of treatment for hepatitis C. Here's what you can do to stay healthier and prevent its spread.  Home care  · Avoid putting stress on your liver.  ¨ Dont drink alcohol. Alcohol consumption increases the risk of developing cirrhosis.  ¨ Ask your doctor about which medications, including over-the-counter ones, you should not take.  ¨ Lose weight if you are overweight, especially if you have fatty liver.  · Eat a balanced diet.   · Take medications prescribed by your doctor to try to get rid of the virus.    ¨ To help your liver work better, you may be given injections of a medication called alpha interferon,  or you may be given oral medications.  ¨ In most cases, you will take ribavirin (antiviral medication) by mouth twice a day.   Prevention  · Cover all skin breaks and sores by yourself. If you need help, the person treating you should wear latex gloves.  · Use condoms during sex if you have multiple sexual partners.  · Dont donate blood, plasma, sperm, body organs, or other body tissue.  · Dont share needles.  · Dont share razors, toothbrushes, manicure tools, or other personal items.  Follow-up care  Make a follow-up appointment as directed by our staff.  When to seek medical care  Call your doctor right away if you have any of the following:  · Flulike problems (fatigue, nausea, vomiting, diarrhea, or sore muscles and joints)  · Swelling in your abdomen or tenderness in the upper right abdomen  · Yellowing of your skin or eyes (jaundice)  · Itching  · Dark urine  · Black, tarry, or red stools or vomiting blood  · Confusion or trouble concentrating  · Trouble sleeping  · Painful red rash on your legs  · Nerve damage  · Mental confusion  · Kidney problems   © 5890-5527 [employer]. [address] [place] [address]. All rights reserved. This information is not intended as a substitute for professional medical care. Always follow your healthcare professional's instructions.              Follow-Up and Disposition     Return if symptoms worsen or fail to improve.       These Medications        Disp Refills Start End    meclizine (ANTIVERT) 25 mg tablet 15 tablet 0 4/26/2017     Take 1 tablet (25 mg total) by mouth 3 (three) times daily as needed. - Oral    Pharmacy: [id number]  [place] - [address]  #: [phone]         [facility]     [facility] Nurse Care [facility] - 24/7 Assistance  Unless otherwise directed by your provider, please contact [facility], our nurse care line that is available for 24/7 assistance.     Registered nurses in the  "[facility] provide: appointment scheduling, clinical advisement, health education, and other advisory services.  Call: [phone] (toll free)               Medications           Message regarding Medications     Verify the changes and/or additions to your medication regime listed below are the same as discussed with your clinician today.  If any of these changes or additions are incorrect, please notify your healthcare provider.        START taking these NEW medications        Refills    meclizine (ANTIVERT) 25 mg tablet 0    Sig: Take 1 tablet (25 mg total) by mouth 3 (three) times daily as needed.    Class: Normal    Route: Oral           Verify that the below list of medications is an accurate representation of the medications you are currently taking.  If none reported, the list may be blank. If incorrect, please contact your healthcare provider. Carry this list with you in case of emergency.           Current Medications     alendronate (FOSAMAX) 70 MG tablet take 1 tablet by mouth every morning every week with A FULL GLASS OF WATER ON AN EMPTY STOMACH, DO NOT CONSUME FOOD OR LIE DOWN FOR AT LEAST    calcium carbonate 220 mg capsule Take 250 mg by mouth 2 (two) times daily with meals.    cetirizine (ZYRTEC) 10 MG tablet Take 10 mg by mouth once daily.    clonazePAM (KLONOPIN) 0.5 MG tablet take 1-2 tablets by mouth every evening if needed    levothyroxine (SYNTHROID) 50 MCG tablet take 1 tablet by mouth once daily    vitamin D 1000 units Tab Take 185 mg by mouth once daily.    meclizine (ANTIVERT) 25 mg tablet Take 1 tablet (25 mg total) by mouth 3 (three) times daily as needed.           Clinical Reference Information           Your Vitals Were     BP Pulse Temp Height Weight SpO2    128/84 (BP Location: Left arm, Patient Position: Sitting) 75 96.2 °F (35.7 °C) (Tympanic) 5' 8" (1.727 m) 68.3 kg (150 lb 9.2 oz) 98%    BMI                22.89 kg/m2          Blood Pressure          Most Recent " Value    BP  128/84      Allergies as of 4/26/2017     Bactrim [Sulfamethoxazole-trimethoprim]    Latex, Natural Rubber    Penicillins      Immunizations Administered on Date of Encounter - 4/26/2017     None      Orders Placed During Today's Visit      Normal Orders This Visit    Ambulatory referral to ENT       [facility] Sign-Up     Activating your [facility] account is as easy as 1-2-3!     1) Visit [email or web address], select Sign Up Now, enter this activation code and your date of birth, then select Next.  [email or web address]  Expires: 6/1/2017  5:40 PM      2) Create a username and password to use when you visit [facility] in the future and select a security question in case you lose your password and select Next.    3) Enter your e-mail address and click Sign Up!    Additional Information  If you have questions, please e-mail [email or web address] or call [phone] to talk to our [facility] staff. Remember, [facility] is NOT to be used for urgent needs. For medical emergencies, dial 911.         Instructions      Dizziness (Vertigo) and Balance Problems: Staying Safe     Replace burned-out lightbulbs to keep your home safe and well lit.   Falls or accidents can lead to pain, broken bones, and fear of future falls. Protect yourself and others by preparing for episodes. Simple steps can help you stay safe at home and wherever you go.  Lighting  Keep all areas well lit. This helps your eyes send the right signals to the brain. It also makes you less likely to trip and fall. If bright lights make symptoms worse, dim the lights or lie in a dark room until the dizziness passes. Then turn the lights back to their normal level.  Tips:  · Keep a flashlight by the bed.  · Place nightlights in bathrooms and hallways.  · Replace burned-out bulbs, or have someone replace them for you.  Preventing falls  To reduce your risk of falling:  · Get out of bed or up from a chair slowly.  · Wear low-heeled shoes that fit properly and have  slip-resistant soles.  · Remove throw rugs. Clear clutter from walkways.  · Use handrails on stairs. Have handrails installed or adjusted if needed.  · Install grab bars in the bathroom. Don't use towel racks for balance.  · Use a shower stool. Also put adhesive strips in the shower or on the tub floor.  Going out  With a little time and preparation, you can get around safely.  Tips:  · Bring a cane or walking aid if needed.  · Give yourself plenty of time in case you start to get dizzy.  · Ask your healthcare provider what type of exercise is safe for your condition.  · Be patient. If an activity such as walking through a crowded shop causes you stress, you may not be ready for it yet.  Driving  If you become dizzy or disoriented while driving, you could hurt yourself and others. That's why it's best to not drive until symptoms have gone away. In some cases, your license may be temporarily held until it's safe for you to drive again.  For safety:  · Ask a friend to drive for you.  · Take public transportation.  · Walk to stores and other places when you can.  Asking for help  Don't be afraid to ask for help running errands, cooking meals, and doing exercise. Whether it's a friend, loved one, neighbor, or stranger on the street, a little help can make a world of difference.   Date Last Reviewed: 11/1/2016  © 5286-8858 [employer]. [address] [place] [address], [place] [id number]. All rights reserved. This information is not intended as a substitute for professional medical care. Always follow your healthcare professional's instructions.        Managing Dizziness (Vertigo) with Medicines    Although medicines can't cure your problem, they can help control symptoms. Your doctor may prescribe medicines for a few weeks and then taper them off. Always take your medicine as prescribed. Never share your medicine with others.  Contact your healthcare provider right away if you have side effects from your medicines.    How medicines can help  · Treat infection or inflammation. If you have an infection caused by bacteria, your doctor can prescribe antibiotics.  · Limit conflicting balance signals. These medicines are often in pill form.  · Ease nausea. Suppositories, pills, or shots can reduce vomiting.  · Reduce pressure in the canals. Diuretics can be used to treat Meniere's disease. These medicines help your body get rid of extra fluid.  · Ease other symptoms. Other medicines can help ease depression and anxiety caused by living with dizziness or fainting.  Date Last Reviewed: 11/1/2016 © 2000-2016 [employer]. [address] [place] [address]. All rights reserved. This information is not intended as a substitute for professional medical care. Always follow your healthcare professional's instructions.        Inner Ear Problems: Causes of Dizziness (Vertigo)  Benign positional vertigo (BPV)    This is the most common cause of vertigo. BPV is also called benign positional paroxysmal vertigo (BPPV). It happens when crystals in the ear canals shift into the wrong place. Vertigo usually occurs when you move your head in a certain way. This can happen when turning in bed, bending, or looking up. Because BPV comes on quickly, you should think about if you are safe to drive or do other tasks that need your full attention.  BPV:  · Causes vertigo that last for seconds. Vertigo can occur several times a day, depending on body position.  · Doesnt cause hearing loss  · Often goes away on its own. But it but may go away sooner with treatment.  Infection or inflammation    Sometimes the semicircular canals swell and send incorrect balance signals. This problem may be caused by a viral infection. Depending on the cause, your hearing can be affected (labyrinthitis). Or your hearing can remain normal (neuronitis).  Infection or inflammation:  · Causes vertigo that lasts for hours or days. The first episode is usually  the worst.  · Can cause hearing loss  · Often goes away on its own. But it may go away sooner with treatment.  You may need vestibular rehabilitation if you have balance problems that don't go away.  Menieres disease    This condition is uncommon. It happens when there is too much fluid in the ear canals. This causes increased pressure and swelling. It affects balance and hearing signals.  Menieres disease may:  · Cause vertigo that last for hours  · Cause hearing problems that come and go. The problems are usually in one ear and get worse over time.  · Cause buzzing or ringing in the ears (tinnitus)  · Cause a feeling of fullness or pressure in the ear  · Cause any of these symptoms: vertigo, hearing loss, tinnitus, or ear fullness to last a lifetime  Date Last Reviewed: 11/1/2016  © 2551-9574 [employer]. [address] [place] [address]. All rights reserved. This information is not intended as a substitute for professional medical care. Always follow your healthcare professional's instructions.             Language Assistance Services     ATTENTION: Language assistance services are available, free of charge. Please call [phone].      ATENCIÓN: Si habla joseph, tiene a kraft disposición servicios gratuitos de asistencia lingüística. Llame al [phone].     HOWARD Ý: N?u b?n nói Ti?ng Vi?t, có các d?ch v? h? tr? ngôn ng? mi?n phí dành cho b?n. G?i s? [phone].         [facility] complies with applicable Federal civil rights laws and does not discriminate on the basis of race, color, national origin, age, disability, or sex.

## 2017-05-31 RX ORDER — CLONAZEPAM 0.5 MG/1
TABLET ORAL
Qty: 60 TABLET | Refills: 2 | Status: SHIPPED | OUTPATIENT
Start: 2017-05-31 | End: 2017-07-28 | Stop reason: SDUPTHER

## 2017-06-30 ENCOUNTER — APPOINTMENT (OUTPATIENT)
Dept: RADIOLOGY | Facility: CLINIC | Age: 67
End: 2017-06-30
Attending: INTERNAL MEDICINE
Payer: MEDICARE

## 2017-06-30 DIAGNOSIS — M81.0 OP (OSTEOPOROSIS): ICD-10-CM

## 2017-06-30 PROCEDURE — 77080 DXA BONE DENSITY AXIAL: CPT | Mod: TC,PO

## 2017-06-30 PROCEDURE — 77080 DXA BONE DENSITY AXIAL: CPT | Mod: 26,,, | Performed by: INTERNAL MEDICINE

## 2017-07-28 ENCOUNTER — OFFICE VISIT (OUTPATIENT)
Dept: INTERNAL MEDICINE | Facility: CLINIC | Age: 67
End: 2017-07-28
Payer: MEDICARE

## 2017-07-28 VITALS
WEIGHT: 146.81 LBS | SYSTOLIC BLOOD PRESSURE: 135 MMHG | TEMPERATURE: 96 F | DIASTOLIC BLOOD PRESSURE: 80 MMHG | BODY MASS INDEX: 22.25 KG/M2 | HEART RATE: 80 BPM | HEIGHT: 68 IN | OXYGEN SATURATION: 97 %

## 2017-07-28 DIAGNOSIS — E03.9 ACQUIRED HYPOTHYROIDISM: Primary | ICD-10-CM

## 2017-07-28 DIAGNOSIS — E55.9 VITAMIN D DEFICIENCY DISEASE: ICD-10-CM

## 2017-07-28 DIAGNOSIS — G25.81 RESTLESS LEGS SYNDROME (RLS): ICD-10-CM

## 2017-07-28 DIAGNOSIS — Z29.9 PREVENTIVE MEASURE: ICD-10-CM

## 2017-07-28 DIAGNOSIS — Z12.31 ENCOUNTER FOR SCREENING MAMMOGRAM FOR MALIGNANT NEOPLASM OF BREAST: ICD-10-CM

## 2017-07-28 DIAGNOSIS — I10 ESSENTIAL HYPERTENSION: ICD-10-CM

## 2017-07-28 DIAGNOSIS — B17.10 ACUTE HEPATITIS C VIRUS INFECTION WITHOUT HEPATIC COMA: ICD-10-CM

## 2017-07-28 DIAGNOSIS — M81.0 AGE-RELATED OSTEOPOROSIS WITHOUT CURRENT PATHOLOGICAL FRACTURE: ICD-10-CM

## 2017-07-28 PROCEDURE — 1159F MED LIST DOCD IN RCRD: CPT | Mod: ,,, | Performed by: INTERNAL MEDICINE

## 2017-07-28 PROCEDURE — 99214 OFFICE O/P EST MOD 30 MIN: CPT | Mod: S$PBB,,, | Performed by: INTERNAL MEDICINE

## 2017-07-28 PROCEDURE — 99213 OFFICE O/P EST LOW 20 MIN: CPT | Mod: PBBFAC,PO | Performed by: INTERNAL MEDICINE

## 2017-07-28 PROCEDURE — 1126F AMNT PAIN NOTED NONE PRSNT: CPT | Mod: ,,, | Performed by: INTERNAL MEDICINE

## 2017-07-28 PROCEDURE — 99999 PR PBB SHADOW E&M-EST. PATIENT-LVL III: CPT | Mod: PBBFAC,,, | Performed by: INTERNAL MEDICINE

## 2017-07-28 RX ORDER — CLONAZEPAM 0.5 MG/1
TABLET ORAL
Qty: 60 TABLET | Refills: 5 | Status: SHIPPED | OUTPATIENT
Start: 2017-07-28 | End: 2018-01-28 | Stop reason: SDUPTHER

## 2017-07-28 NOTE — PROGRESS NOTES
"Subjective:       Patient ID: Maggie Díaz is a 67 y.o. female.    Chief Complaint: No chief complaint on file.    Here for follow up of medical problems.  Taking vit D and fosamax regularly.  Walking daily for exercise.  No f/c/sw/cough.  Sleeping well, RLS doing ok.  Occas restless leg sx during the day.  No cp/sob/palp.  BMs normal.  6/17 BMD stable to improved, rec cont rx.     Updated/ annual due 1/18:  HM: 1/17 fluvax, 3/15 [id number], 2/14 [id number], 1/10 TDaP, 8/12 zoster, 6/17 BMD rep 2y, Currently refusing Cscope, 3/16 OB neg x 2, 1/17 MMG.          Review of Systems   Constitutional: Negative for chills, diaphoresis and fever.   Respiratory: Negative for cough and shortness of breath.    Cardiovascular: Negative for chest pain, palpitations and leg swelling.   Gastrointestinal: Negative for blood in stool, constipation, diarrhea, nausea and vomiting.   Genitourinary: Negative for dysuria, frequency and hematuria.   Psychiatric/Behavioral: The patient is not nervous/anxious.        Objective:   /80   Pulse 80   Temp 96.1 °F (35.6 °C) (Tympanic)   Ht 5' 8" (1.727 m)   Wt 66.6 kg (146 lb 13.2 oz)   SpO2 97%   BMI 22.32 kg/m²     Physical Exam   Constitutional: She is oriented to person, place, and time. She appears well-developed.   HENT:   Mouth/Throat: Oropharynx is clear and moist.   Neck: Neck supple. Carotid bruit is not present. No thyroid mass present.   Cardiovascular: Normal rate, regular rhythm and intact distal pulses.  Exam reveals no gallop and no friction rub.    No murmur heard.  Pulmonary/Chest: Effort normal and breath sounds normal. She has no wheezes. She has no rales.   Abdominal: Soft. Bowel sounds are normal. She exhibits no mass. There is no hepatosplenomegaly. There is no tenderness.   Musculoskeletal: She exhibits no edema.   Lymphadenopathy:     She has no cervical adenopathy.   Neurological: She is alert and oriented to person, place, and time.   Psychiatric: She has a " normal mood and affect.       Assessment:       1. Acquired hypothyroidism    2. Essential hypertension    3. Acute hepatitis C virus infection without hepatic coma    4. Age-related osteoporosis without current pathological fracture    5. Restless legs syndrome (RLS)    6. Vitamin D deficiency disease    7. Preventive measure    8. Encounter for screening mammogram for malignant neoplasm of breast         Plan:       Diagnoses and all orders for this visit:    Acquired hypothyroidism- clin stable, recheck 6mo.  -     TSH; Future    Essential hypertension- stable, cont to monitor.    Acute hepatitis C virus infection without hepatic coma    Age-related osteoporosis without current pathological fracture- improving, cont bisphos.    Restless legs syndrome (RLS)- doing well on rx, cont.  -     clonazePAM (KLONOPIN) 0.5 MG tablet; take 1 to 2 tablets by mouth every evening if needed    Vitamin D deficiency disease- cont, check lab.  -     Vitamin D; Future    Preventive measure- due 1/18:  -     Comprehensive metabolic panel; Future  -     Lipid panel; Future  -     CBC auto differential; Future  -     TSH; Future  -     Vitamin D; Future  -     Mammo Digital Screening Bilat with CAD; Future  -     Occult Blood Stool, CA Screen; Future    RTC 6 mo.

## 2017-08-08 ENCOUNTER — OFFICE VISIT (OUTPATIENT)
Dept: INTERNAL MEDICINE | Facility: CLINIC | Age: 67
End: 2017-08-08
Payer: MEDICARE

## 2017-08-08 VITALS
BODY MASS INDEX: 22.22 KG/M2 | WEIGHT: 146.63 LBS | SYSTOLIC BLOOD PRESSURE: 138 MMHG | DIASTOLIC BLOOD PRESSURE: 78 MMHG | HEART RATE: 84 BPM | OXYGEN SATURATION: 96 % | HEIGHT: 68 IN

## 2017-08-08 DIAGNOSIS — Z00.00 ENCOUNTER FOR PREVENTIVE HEALTH EXAMINATION: Primary | ICD-10-CM

## 2017-08-08 PROCEDURE — 99999 PR PBB SHADOW E&M-EST. PATIENT-LVL IV: CPT | Mod: PBBFAC,,, | Performed by: PHYSICIAN ASSISTANT

## 2017-08-08 PROCEDURE — G0439 PPPS, SUBSEQ VISIT: HCPCS | Mod: ,,, | Performed by: PHYSICIAN ASSISTANT

## 2017-08-08 PROCEDURE — 99214 OFFICE O/P EST MOD 30 MIN: CPT | Mod: PBBFAC,PO | Performed by: PHYSICIAN ASSISTANT

## 2017-08-08 NOTE — PROGRESS NOTES
"Maggie Díaz presented for an initial Medicare AWV today. The following components were reviewed and updated:    · Medical history  · Family History  · Social history  · Allergies and Current Medications  · Health Risk Assessment  · Health Maintenance  · Care Team    **See Completed Assessments for Annual Wellness visit with in the encounter summary    The following assessments were completed:  · Depression Screening  · Cognitive function Screening  · Timed Get Up Test  · Whisper Test    Vitals:    08/08/17 1538   BP: 138/78   BP Location: Left arm   Patient Position: Sitting   BP Method: Manual   Pulse: 84   SpO2: 96%   Weight: 66.5 kg (146 lb 9.7 oz)   Height: 5' 8" (1.727 m)     Body mass index is 22.29 kg/m².  Waist Measurements: 35 in]        Diagnoses and health risks identified today and associated recommendations/orders:  1. Encounter for preventive health examination  Completed today. Pt reports doing well overall. No specific questions or concerns today. Continue follow PCP and subspecialties.    Provided Maggie with a 5-10 year written screening schedule and personal prevention plan. Recommendations were developed using the USPSTF age appropriate recommendations. Education, counseling, and referrals were provided as needed.  After Visit Summary printed and given to patient which includes a list of additional screenings\tests needed.  Continue to follow with your PCP as scheduled 2/2/18 or sooner if necessary.      Clarke Roth PA-C  "

## 2017-08-08 NOTE — PATIENT INSTRUCTIONS
Counseling and Referral of Other Preventative  (Italic type indicates deductible and co-insurance are waived)    Patient Name: Maggie Díaz  Today's Date: 8/8/2017      SERVICE LIMITATIONS RECOMMENDATION    Vaccines    · Pneumococcal (once after 65)    · Influenza (annually)    · Hepatitis B (if medium/high risk)    · Prevnar 13      Hepatitis B medium/high risk factors:       - End-stage renal disease       - Hemophiliacs who received Factor VII or         IX concentrates       - Clients of institutions for the mentally             retarded       - Persons who live in the same house as          a HepB carrier       - Homosexual men       - Illicit injectable drug abusers     Pneumococcal: done 2/2014     Influenza: done 1/2017     Hepatitis B: follow PCP     Prevnar 13: done 3/2015    Mammogram (biennial age 50-74)  Annually (age 40 or over)  Scheduled, see appointments    Pap (up to age 70 and after 70 if unknown history or abnormal study last 10 years)    N/A     The USPSTF recommends screening for cervical cancer in women age 21 to 65 years with cytology (Pap smear) every 3 years.     Colorectal cancer screening (to age 75)    · Fecal occult blood test (annual)  · Flexible sigmoidoscopy (5y)  · Screening colonoscopy (10y)  · Barium enema   Pt reports never had. Has had stool sample. Follow PCP    Diabetes self-management training (no USPSTF recommendations)  Requires referral by treating physician for patient with diabetes or renal disease. 10 hours of initial DSMT sessions of no less than 30 minutes each in a continuous 12-month period. 2 hours of follow-up DSMT in subsequent years.  N/A    Bone mass measurements (age 65 & older, biennial)  Requires diagnosis related to osteoporosis or estrogen deficiency. Biennial benefit unless patient has history of long-term glucocorticoid  Last done 6/2017, recommend to repeat every 2  years    Glaucoma screening (no USPSTF recommendation)  Diabetes mellitus, family  history   , age 50 or over    American, age 65 or over Done April 2017.MENDY Adam.    Medical nutrition therapy for diabetes or renal disease (no recommended schedule)  Requires referral by treating physician for patient with diabetes or renal disease or kidney transplant within the past 3 years.  Can be provided in same year as diabetes self-management training (DSMT), and CMS recommends medical nutrition therapy take place after DSMT. Up to 3 hours for initial year and 2 hours in subsequent years.  N/A    Cardiovascular screening blood tests (every 5 years)  · Fasting lipid panel  Order as a panel if possible  Done this year, repeat every year    Diabetes screening tests (at least every 3 years, Medicare covers annually or at 6-month intervals for prediabetic patients)  · Fasting blood sugar (FBS) or glucose tolerance test (GTT)  Patient must be diagnosed with one of the following:       - Hypertension       - Dyslipidemia       - Obesity (BMI 30kg/m2)       - Previous elevated impaired FBS or GTT       ... or any two of the following:       - Overweight (BMI 25 but <30)       - Family history of diabetes       - Age 65 or older       - History of gestational diabetes or birth of baby weighing more than 9 pounds  follow PCP    HIV screening (annually for increased risk patients)  · HIV-1 and HIV-2 by EIA, or ROSA, rapid antibody test or oral mucosa transudate  Patients must be at increased risk for HIV infection per USPSTF guidelines or pregnant. Tests covered annually for patient at increased risk or as requested by the patient. Pregnant patients may receive up to 3 tests during pregnancy.  Risks discussed, screening is not recommended    Smoking cessation counseling (up to 8 sessions per year)  Patients must be asymptomatic of tobacco-related conditions to receive as a preventative service.  Non-smoker    Subsequent annual wellness visit  At least 12 months since last AWV  Return in  one year     The following information is provided to all patients.  This information is to help you find resources for any of the problems found today that may be affecting your health:                Living healthy guide: www.[facility].[place].[facility]      Understanding Diabetes: www.diabetes.org      Eating healthy: www.cdc.gov/healthyweight      CDC home safety checklist: www.cdc.gov/steadi/patient.html      Agency on Aging: www.goea.[place].[facility]      Alcoholics anonymous (AA): www.aa.org      Physical Activity: www.parth.nih.gov/ig3grwd      Tobacco use: www.quitwithusla.org

## 2017-12-02 ENCOUNTER — OFFICE VISIT (OUTPATIENT)
Dept: URGENT CARE | Facility: CLINIC | Age: 67
End: 2017-12-02
Payer: MEDICARE

## 2017-12-02 VITALS
DIASTOLIC BLOOD PRESSURE: 66 MMHG | WEIGHT: 138.88 LBS | HEIGHT: 68 IN | SYSTOLIC BLOOD PRESSURE: 127 MMHG | BODY MASS INDEX: 21.05 KG/M2 | TEMPERATURE: 99 F

## 2017-12-02 DIAGNOSIS — S29.011A MUSCLE STRAIN OF CHEST WALL, INITIAL ENCOUNTER: Primary | ICD-10-CM

## 2017-12-02 PROCEDURE — 1125F AMNT PAIN NOTED PAIN PRSNT: CPT | Mod: ,,, | Performed by: PHYSICIAN ASSISTANT

## 2017-12-02 PROCEDURE — 99999 PR PBB SHADOW E&M-EST. PATIENT-LVL III: CPT | Mod: PBBFAC,,, | Performed by: PHYSICIAN ASSISTANT

## 2017-12-02 PROCEDURE — 1159F MED LIST DOCD IN RCRD: CPT | Mod: ,,, | Performed by: PHYSICIAN ASSISTANT

## 2017-12-02 PROCEDURE — 99213 OFFICE O/P EST LOW 20 MIN: CPT | Mod: PBBFAC,PO | Performed by: PHYSICIAN ASSISTANT

## 2017-12-02 PROCEDURE — 99213 OFFICE O/P EST LOW 20 MIN: CPT | Mod: S$PBB,,, | Performed by: PHYSICIAN ASSISTANT

## 2017-12-02 RX ORDER — TIZANIDINE 4 MG/1
4 TABLET ORAL EVERY 6 HOURS PRN
Qty: 40 TABLET | Refills: 0 | Status: SHIPPED | OUTPATIENT
Start: 2017-12-02 | End: 2017-12-12

## 2017-12-02 RX ORDER — IBUPROFEN 800 MG/1
800 TABLET ORAL 3 TIMES DAILY
Qty: 90 TABLET | Refills: 0 | Status: SHIPPED | OUTPATIENT
Start: 2017-12-02 | End: 2018-07-20

## 2017-12-02 NOTE — PROGRESS NOTES
Subjective:      Patient ID: Maggie Díaz is a 67 y.o. female.    Chief Complaint: Shoulder Pain    Mrs. Boston is a very pleasant 67-year-old  female presenting to the clinic with complaint of right upper chest wall pain and discomfort following an injury.    History of present illness reveals the patient was in her usual state of health until walking her dog approximately 5 days ago when her dog weighing 72 pounds pulled and jerked trying to get away and it was a repetitive injury trying to walk her dog.  Subsequently she started feeling pain in the shoulder which dissipated shortly thereafter.  However the pain in her upper chest progressively got worse.  She became concerned and made an appointment for evaluation.    The pain is located in the pectoralis muscle on the right side and extends out to the armpit.  She does not have any discoloration bruise or lesion.  The pain is associated with range of motion, deep breathing, palpation of the chest, and laughing.        Review of Systems   Constitutional: Negative.    HENT: Negative.    Eyes: Negative.    Respiratory: Negative.    Cardiovascular: Negative.    Gastrointestinal: Negative.    Endocrine: Negative.    Genitourinary: Negative.    Musculoskeletal: Negative.    Skin: Negative.    Allergic/Immunologic: Negative.    Neurological: Negative.    Hematological: Negative.    Psychiatric/Behavioral: Negative.         Review of patient's allergies indicates:   Allergen Reactions    Bactrim [sulfamethoxazole-trimethoprim]      hallucination    Latex, natural rubber Swelling    Penicillins Shortness Of Breath       Past Medical History:   Diagnosis Date    Hematuria, microscopic     negative CT    Hepatitis C     slow progression/ Dr. Otoole    Hypertension     Migraines     OP (osteoporosis)     on drug holiday.    Osteoarthritis     Restless legs syndrome (RLS)     Vitamin D deficiency disease        Objective:   /66   Temp 98.5 °F  "(36.9 °C)   Ht 5' 8" (1.727 m)   Wt 63 kg (138 lb 14.2 oz)   BMI 21.12 kg/m²     Physical Exam   Constitutional: She is oriented to person, place, and time.   Musculoskeletal: Normal range of motion. She exhibits tenderness (to palpation of the right pectoralis mus). She exhibits no edema or deformity.   Neurological: She is alert and oriented to person, place, and time.   Psychiatric: She has a normal mood and affect.     Assessment:     1. Muscle strain of chest wall, initial encounter      Plan:     Maggie Díaz is seen today for   1. Muscle strain of chest wall, initial encounter      We have discussed the etiology and treatment options associated with the diagnosis as well as alternatives. She has elected the following treatments.     Muscle strain of chest wall, initial encounter  -     ibuprofen (ADVIL,MOTRIN) 800 MG tablet; Take 1 tablet (800 mg total) by mouth 3 (three) times daily.  Dispense: 90 tablet; Refill: 0  -     tiZANidine (ZANAFLEX) 4 MG tablet; Take 1 tablet (4 mg total) by mouth every 6 (six) hours as needed (for muscle spasms).  Dispense: 40 tablet; Refill: 0      The patient was explained the above plan and given opportunity to ask questions. She understands, chooses and consents to this plan and accepts all the risks, which include but are not limited to the risks mentioned above. She understands the alternative of having no testing, interventions or treatments at this time. She left content and without further questions.     "

## 2018-01-26 ENCOUNTER — LAB VISIT (OUTPATIENT)
Dept: LAB | Facility: HOSPITAL | Age: 68
End: 2018-01-26
Attending: INTERNAL MEDICINE
Payer: MEDICARE

## 2018-01-26 DIAGNOSIS — E55.9 VITAMIN D DEFICIENCY DISEASE: ICD-10-CM

## 2018-01-26 DIAGNOSIS — E03.9 ACQUIRED HYPOTHYROIDISM: ICD-10-CM

## 2018-01-26 DIAGNOSIS — Z29.9 PREVENTIVE MEASURE: ICD-10-CM

## 2018-01-26 DIAGNOSIS — I10 ESSENTIAL HYPERTENSION: ICD-10-CM

## 2018-01-26 LAB
25(OH)D3+25(OH)D2 SERPL-MCNC: 34 NG/ML
ALBUMIN SERPL BCP-MCNC: 4.2 G/DL
ALP SERPL-CCNC: 30 U/L
ALT SERPL W/O P-5'-P-CCNC: 9 U/L
ANION GAP SERPL CALC-SCNC: 9 MMOL/L
ANION GAP SERPL CALC-SCNC: 9 MMOL/L
AST SERPL-CCNC: 17 U/L
BASOPHILS # BLD AUTO: 0.05 K/UL
BASOPHILS NFR BLD: 1.2 %
BILIRUB SERPL-MCNC: 0.8 MG/DL
BUN SERPL-MCNC: 8 MG/DL
BUN SERPL-MCNC: 8 MG/DL
CALCIUM SERPL-MCNC: 9.8 MG/DL
CALCIUM SERPL-MCNC: 9.8 MG/DL
CHLORIDE SERPL-SCNC: 103 MMOL/L
CHLORIDE SERPL-SCNC: 103 MMOL/L
CHOLEST SERPL-MCNC: 183 MG/DL
CHOLEST/HDLC SERPL: 2.8 {RATIO}
CO2 SERPL-SCNC: 30 MMOL/L
CO2 SERPL-SCNC: 30 MMOL/L
CREAT SERPL-MCNC: 0.9 MG/DL
CREAT SERPL-MCNC: 0.9 MG/DL
DIFFERENTIAL METHOD: ABNORMAL
EOSINOPHIL # BLD AUTO: 0.1 K/UL
EOSINOPHIL NFR BLD: 2.9 %
ERYTHROCYTE [DISTWIDTH] IN BLOOD BY AUTOMATED COUNT: 13 %
EST. GFR  (AFRICAN AMERICAN): >60 ML/MIN/1.73 M^2
EST. GFR  (AFRICAN AMERICAN): >60 ML/MIN/1.73 M^2
EST. GFR  (NON AFRICAN AMERICAN): >60 ML/MIN/1.73 M^2
EST. GFR  (NON AFRICAN AMERICAN): >60 ML/MIN/1.73 M^2
GLUCOSE SERPL-MCNC: 73 MG/DL
GLUCOSE SERPL-MCNC: 73 MG/DL
HCT VFR BLD AUTO: 43.2 %
HDLC SERPL-MCNC: 66 MG/DL
HDLC SERPL: 36.1 %
HGB BLD-MCNC: 14.4 G/DL
IMM GRANULOCYTES # BLD AUTO: 0.01 K/UL
IMM GRANULOCYTES NFR BLD AUTO: 0.2 %
LDLC SERPL CALC-MCNC: 102.6 MG/DL
LYMPHOCYTES # BLD AUTO: 1.8 K/UL
LYMPHOCYTES NFR BLD: 44.9 %
MCH RBC QN AUTO: 32.5 PG
MCHC RBC AUTO-ENTMCNC: 33.3 G/DL
MCV RBC AUTO: 98 FL
MONOCYTES # BLD AUTO: 0.4 K/UL
MONOCYTES NFR BLD: 10.5 %
NEUTROPHILS # BLD AUTO: 1.6 K/UL
NEUTROPHILS NFR BLD: 40.3 %
NONHDLC SERPL-MCNC: 117 MG/DL
NRBC BLD-RTO: 0 /100 WBC
PLATELET # BLD AUTO: 152 K/UL
PMV BLD AUTO: 9.8 FL
POTASSIUM SERPL-SCNC: 4.4 MMOL/L
POTASSIUM SERPL-SCNC: 4.4 MMOL/L
PROT SERPL-MCNC: 7.8 G/DL
RBC # BLD AUTO: 4.43 M/UL
SODIUM SERPL-SCNC: 142 MMOL/L
SODIUM SERPL-SCNC: 142 MMOL/L
TRIGL SERPL-MCNC: 72 MG/DL
TSH SERPL DL<=0.005 MIU/L-ACNC: 2.25 UIU/ML
WBC # BLD AUTO: 4.08 K/UL

## 2018-01-26 PROCEDURE — 85025 COMPLETE CBC W/AUTO DIFF WBC: CPT

## 2018-01-26 PROCEDURE — 84443 ASSAY THYROID STIM HORMONE: CPT

## 2018-01-26 PROCEDURE — 80053 COMPREHEN METABOLIC PANEL: CPT

## 2018-01-26 PROCEDURE — 82306 VITAMIN D 25 HYDROXY: CPT

## 2018-01-26 PROCEDURE — 36415 COLL VENOUS BLD VENIPUNCTURE: CPT | Mod: PO

## 2018-01-26 PROCEDURE — 80061 LIPID PANEL: CPT

## 2018-01-28 DIAGNOSIS — G25.81 RESTLESS LEGS SYNDROME (RLS): ICD-10-CM

## 2018-01-30 RX ORDER — CLONAZEPAM 0.5 MG/1
TABLET ORAL
Qty: 60 TABLET | Refills: 5 | Status: SHIPPED | OUTPATIENT
Start: 2018-01-30 | End: 2018-07-22 | Stop reason: SDUPTHER

## 2018-02-02 ENCOUNTER — OFFICE VISIT (OUTPATIENT)
Dept: INTERNAL MEDICINE | Facility: CLINIC | Age: 68
End: 2018-02-02
Payer: MEDICARE

## 2018-02-02 VITALS
HEART RATE: 58 BPM | SYSTOLIC BLOOD PRESSURE: 130 MMHG | WEIGHT: 132.25 LBS | HEIGHT: 68 IN | DIASTOLIC BLOOD PRESSURE: 84 MMHG | OXYGEN SATURATION: 97 % | BODY MASS INDEX: 20.04 KG/M2 | TEMPERATURE: 97 F

## 2018-02-02 DIAGNOSIS — E03.9 ACQUIRED HYPOTHYROIDISM: Primary | ICD-10-CM

## 2018-02-02 DIAGNOSIS — I10 ESSENTIAL HYPERTENSION: ICD-10-CM

## 2018-02-02 DIAGNOSIS — Z00.00 ENCOUNTER FOR PREVENTIVE HEALTH EXAMINATION: ICD-10-CM

## 2018-02-02 DIAGNOSIS — M81.0 AGE-RELATED OSTEOPOROSIS WITHOUT CURRENT PATHOLOGICAL FRACTURE: ICD-10-CM

## 2018-02-02 DIAGNOSIS — Z86.19 HISTORY OF HEPATITIS C: ICD-10-CM

## 2018-02-02 DIAGNOSIS — E55.9 VITAMIN D DEFICIENCY DISEASE: ICD-10-CM

## 2018-02-02 DIAGNOSIS — G25.81 RESTLESS LEGS SYNDROME (RLS): ICD-10-CM

## 2018-02-02 PROCEDURE — 99999 PR PBB SHADOW E&M-EST. PATIENT-LVL III: CPT | Mod: PBBFAC,,, | Performed by: INTERNAL MEDICINE

## 2018-02-02 PROCEDURE — 99213 OFFICE O/P EST LOW 20 MIN: CPT | Mod: PBBFAC,PO | Performed by: INTERNAL MEDICINE

## 2018-02-02 PROCEDURE — 1126F AMNT PAIN NOTED NONE PRSNT: CPT | Mod: ,,, | Performed by: INTERNAL MEDICINE

## 2018-02-02 PROCEDURE — 1159F MED LIST DOCD IN RCRD: CPT | Mod: ,,, | Performed by: INTERNAL MEDICINE

## 2018-02-02 PROCEDURE — 99214 OFFICE O/P EST MOD 30 MIN: CPT | Mod: S$PBB,,, | Performed by: INTERNAL MEDICINE

## 2018-02-02 NOTE — PROGRESS NOTES
"Subjective:       Patient ID: Maggie Díaz is a 67 y.o. female.    Chief Complaint: Annual Exam    Here for f/u medical problems and preventive exam.  No f/c/sw/cough.  No cp/sob/palp.  BMs normal.  Urine normal.  Exercising regularly with walking.  Taking vit D.    HM: 2/18 today fluvax, 3/15 [id number], 2/14 [id number], 1/10 TDaP, 8/12 zoster, 6/17 BMD rep 2y, Currently refusing Cscope, 3/16 OB neg x 2, 2/18 sched MMG, will do pelvic in 6mo.          Review of Systems   Constitutional: Negative for appetite change, chills, diaphoresis and fever.   HENT: Negative for congestion, ear pain, rhinorrhea, sinus pressure and sore throat.    Respiratory: Negative for cough, chest tightness and shortness of breath.    Cardiovascular: Negative for chest pain and palpitations.   Gastrointestinal: Negative for blood in stool, constipation, diarrhea, nausea and vomiting.   Genitourinary: Negative for dysuria, frequency, hematuria, menstrual problem, urgency and vaginal discharge.   Musculoskeletal: Negative for arthralgias.   Skin: Negative for rash.   Neurological: Negative for dizziness and headaches.   Psychiatric/Behavioral: Negative for sleep disturbance. The patient is not nervous/anxious.        Objective:   /84 (BP Location: Right arm, Patient Position: Sitting)   Pulse (!) 58   Temp 96.7 °F (35.9 °C) (Tympanic)   Ht 5' 8" (1.727 m)   Wt 60 kg (132 lb 4.4 oz)   SpO2 97%   BMI 20.11 kg/m²     Physical Exam   Constitutional: She is oriented to person, place, and time. She appears well-developed and well-nourished.   HENT:   Right Ear: External ear normal. Tympanic membrane is not injected.   Left Ear: External ear normal. Tympanic membrane is not injected.   Mouth/Throat: Oropharynx is clear and moist.   Eyes: Conjunctivae are normal.   Neck: Normal range of motion. Neck supple. No thyromegaly present.   Cardiovascular: Normal rate, regular rhythm and intact distal pulses.  Exam reveals no gallop and no " friction rub.    No murmur heard.  Pulmonary/Chest: Effort normal and breath sounds normal. She has no wheezes. She has no rales.   Abdominal: Soft. Bowel sounds are normal. She exhibits no mass. There is no tenderness.   Musculoskeletal: She exhibits no edema.   Lymphadenopathy:     She has no cervical adenopathy.   Neurological: She is alert and oriented to person, place, and time.   Skin: Skin is warm. No rash noted.   Psychiatric: She has a normal mood and affect.           Results for orders placed or performed in visit on 01/26/18   Basic metabolic panel   Result Value Ref Range    Sodium 142 136 - 145 mmol/L    Potassium 4.4 3.5 - 5.1 mmol/L    Chloride 103 95 - 110 mmol/L    CO2 30 (H) 23 - 29 mmol/L    Glucose 73 70 - 110 mg/dL    BUN, Bld 8 8 - 23 mg/dL    Creatinine 0.9 0.5 - 1.4 mg/dL    Calcium 9.8 8.7 - 10.5 mg/dL    Anion Gap 9 8 - 16 mmol/L    eGFR if African American >60.0 >60 mL/min/1.73 m^2    eGFR if non African American >60.0 >60 mL/min/1.73 m^2   Comprehensive metabolic panel   Result Value Ref Range    Sodium 142 136 - 145 mmol/L    Potassium 4.4 3.5 - 5.1 mmol/L    Chloride 103 95 - 110 mmol/L    CO2 30 (H) 23 - 29 mmol/L    Glucose 73 70 - 110 mg/dL    BUN, Bld 8 8 - 23 mg/dL    Creatinine 0.9 0.5 - 1.4 mg/dL    Calcium 9.8 8.7 - 10.5 mg/dL    Total Protein 7.8 6.0 - 8.4 g/dL    Albumin 4.2 3.5 - 5.2 g/dL    Total Bilirubin 0.8 0.1 - 1.0 mg/dL    Alkaline Phosphatase 30 (L) 55 - 135 U/L    AST 17 10 - 40 U/L    ALT 9 (L) 10 - 44 U/L    Anion Gap 9 8 - 16 mmol/L    eGFR if African American >60.0 >60 mL/min/1.73 m^2    eGFR if non African American >60.0 >60 mL/min/1.73 m^2   Lipid panel   Result Value Ref Range    Cholesterol 183 120 - 199 mg/dL    Triglycerides 72 30 - 150 mg/dL    HDL 66 40 - 75 mg/dL    LDL Cholesterol 102.6 63.0 - 159.0 mg/dL    HDL/Chol Ratio 36.1 20.0 - 50.0 %    Total Cholesterol/HDL Ratio 2.8 2.0 - 5.0    Non-HDL Cholesterol 117 mg/dL   CBC auto differential   Result  Value Ref Range    WBC 4.08 3.90 - 12.70 K/uL    RBC 4.43 4.00 - 5.40 M/uL    Hemoglobin 14.4 12.0 - 16.0 g/dL    Hematocrit 43.2 37.0 - 48.5 %    MCV 98 82 - 98 fL    MCH 32.5 (H) 27.0 - 31.0 pg    MCHC 33.3 32.0 - 36.0 g/dL    RDW 13.0 11.5 - 14.5 %    Platelets 152 150 - 350 K/uL    MPV 9.8 9.2 - 12.9 fL    Immature Granulocytes 0.2 0.0 - 0.5 %    Gran # (ANC) 1.6 (L) 1.8 - 7.7 K/uL    Immature Grans (Abs) 0.01 0.00 - 0.04 K/uL    Lymph # 1.8 1.0 - 4.8 K/uL    Mono # 0.4 0.3 - 1.0 K/uL    Eos # 0.1 0.0 - 0.5 K/uL    Baso # 0.05 0.00 - 0.20 K/uL    nRBC 0 0 /100 WBC    Gran% 40.3 38.0 - 73.0 %    Lymph% 44.9 18.0 - 48.0 %    Mono% 10.5 4.0 - 15.0 %    Eosinophil% 2.9 0.0 - 8.0 %    Basophil% 1.2 0.0 - 1.9 %    Differential Method Automated    TSH   Result Value Ref Range    TSH 2.248 0.400 - 4.000 uIU/mL   Vitamin D   Result Value Ref Range    Vit D, 25-Hydroxy 34 30 - 96 ng/mL       Assessment:       1. Acquired hypothyroidism    2. Age-related osteoporosis without current pathological fracture    3. Essential hypertension    4. History of hepatitis C    5. Restless legs syndrome (RLS)    6. Vitamin D deficiency disease    7. Encounter for preventive health examination        Plan:       Maggie was seen today for annual exam.    Diagnoses and all orders for this visit:    Acquired hypothyroidism- Clinically stable, continue present treatment.    Age-related osteoporosis without current pathological fracture- on bisphos.    Essential hypertension- Clinically stable, continue present treatment.    History of hepatitis C    Restless legs syndrome (RLS)- cont rx.    Vitamin D deficiency disease- stable on supplement.    Encounter for preventive health examination- utd.  Pelvic/female next visit.  RTC 6 mo.  mmg next mo.  Fluvax, FITKIT.  10yr vasc risk 7.8%, cont exercise.  -     Fecal Immunochemical Test (iFOBT); Future

## 2018-02-14 ENCOUNTER — HOSPITAL ENCOUNTER (OUTPATIENT)
Dept: RADIOLOGY | Facility: HOSPITAL | Age: 68
Discharge: HOME | End: 2018-02-14
Attending: INTERNAL MEDICINE
Payer: MEDICARE

## 2018-02-14 VITALS — HEIGHT: 68 IN | BODY MASS INDEX: 20 KG/M2 | WEIGHT: 132 LBS

## 2018-02-14 DIAGNOSIS — Z12.31 ENCOUNTER FOR SCREENING MAMMOGRAM FOR MALIGNANT NEOPLASM OF BREAST: ICD-10-CM

## 2018-02-14 DIAGNOSIS — Z29.9 PREVENTIVE MEASURE: ICD-10-CM

## 2018-02-14 PROCEDURE — 77067 SCR MAMMO BI INCL CAD: CPT | Mod: TC,PO

## 2018-02-14 PROCEDURE — 77063 BREAST TOMOSYNTHESIS BI: CPT | Mod: 26,,, | Performed by: RADIOLOGY

## 2018-02-14 PROCEDURE — 77067 SCR MAMMO BI INCL CAD: CPT | Mod: 26,,, | Performed by: RADIOLOGY

## 2018-02-26 ENCOUNTER — LAB VISIT (OUTPATIENT)
Dept: LAB | Facility: HOSPITAL | Age: 68
End: 2018-02-26
Attending: INTERNAL MEDICINE
Payer: MEDICARE

## 2018-02-26 DIAGNOSIS — Z00.00 ENCOUNTER FOR PREVENTIVE HEALTH EXAMINATION: ICD-10-CM

## 2018-02-26 LAB — HEMOCCULT STL QL IA: NEGATIVE

## 2018-02-26 PROCEDURE — 82274 ASSAY TEST FOR BLOOD FECAL: CPT

## 2018-03-15 RX ORDER — LEVOTHYROXINE SODIUM 50 UG/1
TABLET ORAL
Qty: 30 TABLET | Refills: 11 | Status: SHIPPED | OUTPATIENT
Start: 2018-03-15 | End: 2019-03-28 | Stop reason: SDUPTHER

## 2018-04-16 RX ORDER — ALENDRONATE SODIUM 70 MG/1
TABLET ORAL
Qty: 4 TABLET | Refills: 11 | Status: SHIPPED | OUTPATIENT
Start: 2018-04-16 | End: 2019-04-23

## 2018-05-07 NOTE — PROGRESS NOTES
"Subjective:      Patient ID: Maggie Díaz is a 67 y.o. female.    Chief Complaint: Abdominal Pain and Constipation      HPI  Here for 2 weeks constipation and abdominal pressure "perianal".  Lots of stress x 2 months.  Now says will allow a cscope.  No f/c/sw/cough.  No n/v.  No cp/sob/palp.  BM today formed, but "incomplete", sometimes "rabbit pellets."  No blood.  Has tried taking stool softener a couple times, not signif help..  Has lost 6#, says due to scared to eat much and "clog herself up since her bowels aren't moving."    Updated/ annual due 2/19:  HM: 2/18 fluvax, 3/15 [id number], 2/14 [id number], 1/10 TDaP, 8/12 zoster, 6/17 BMD rep 2y, has been refusing Cscope, 3/16 OB neg x 2, 2/18 sched MMG, will do pelvic in 6mo.       Review of Systems   Constitutional: Negative for chills, diaphoresis and fever.   Respiratory: Negative for cough and shortness of breath.    Cardiovascular: Negative for chest pain, palpitations and leg swelling.   Gastrointestinal: Negative for blood in stool, constipation, diarrhea, nausea and vomiting.   Genitourinary: Negative for dysuria, frequency and hematuria.   Psychiatric/Behavioral: The patient is not nervous/anxious.          Objective:   /80 (BP Location: Right arm, Patient Position: Sitting)   Pulse 100   Temp 97.5 °F (36.4 °C) (Tympanic)   Ht 5' 8" (1.727 m)   Wt 57.4 kg (126 lb 8.7 oz)   SpO2 96%   BMI 19.24 kg/m²     Physical Exam   Constitutional: She is oriented to person, place, and time. She appears well-developed.   HENT:   Mouth/Throat: Oropharynx is clear and moist.   Neck: Neck supple. Carotid bruit is not present. No thyroid mass present.   Cardiovascular: Normal rate, regular rhythm and intact distal pulses.  Exam reveals no gallop and no friction rub.    No murmur heard.  Pulmonary/Chest: Effort normal and breath sounds normal. She has no wheezes. She has no rales.   Abdominal: Soft. Bowel sounds are normal. She exhibits no mass. There is no " hepatosplenomegaly. There is no tenderness.   Musculoskeletal: She exhibits no edema.   Lymphadenopathy:     She has no cervical adenopathy.   Neurological: She is alert and oriented to person, place, and time.   Psychiatric: She has a normal mood and affect.           Assessment:       1. Constipation, unspecified constipation type    2. Acquired hypothyroidism    3. Essential hypertension    4. Weight loss          Plan:     Constipation x 2 weeks- if not better may CT abd/pelvis.  RTC 2 weeks.  -     X-Ray Abdomen AP 1 View; Future; Expected date: 05/10/2018  -     TSH; Future; Expected date: 05/10/2018  -     CBC auto differential; Future; Expected date: 05/10/2018  -     Comprehensive metabolic panel; Future; Expected date: 05/10/2018  -     Case request GI: COLONOSCOPY  -     sodium,potassium,mag sulfates (SUPREP BOWEL PREP KIT) 17.5-3.13-1.6 gram SolR; Take 1 kit by mouth daily 2 hours after breakfast.  Dispense: 1 Bottle; Refill: 0    Acquired hypothyroidism- r/o hypothyroid.    Essential hypertension- stable on rx.    Weight loss- will follow.

## 2018-05-10 ENCOUNTER — OFFICE VISIT (OUTPATIENT)
Dept: INTERNAL MEDICINE | Facility: CLINIC | Age: 68
End: 2018-05-10
Payer: MEDICARE

## 2018-05-10 ENCOUNTER — HOSPITAL ENCOUNTER (OUTPATIENT)
Dept: RADIOLOGY | Facility: HOSPITAL | Age: 68
Discharge: HOME | End: 2018-05-10
Attending: INTERNAL MEDICINE
Payer: MEDICARE

## 2018-05-10 VITALS
DIASTOLIC BLOOD PRESSURE: 80 MMHG | WEIGHT: 126.56 LBS | OXYGEN SATURATION: 96 % | BODY MASS INDEX: 19.18 KG/M2 | TEMPERATURE: 98 F | HEIGHT: 68 IN | HEART RATE: 100 BPM | SYSTOLIC BLOOD PRESSURE: 126 MMHG

## 2018-05-10 DIAGNOSIS — I10 ESSENTIAL HYPERTENSION: ICD-10-CM

## 2018-05-10 DIAGNOSIS — R63.4 WEIGHT LOSS: ICD-10-CM

## 2018-05-10 DIAGNOSIS — R10.84 GENERALIZED ABDOMINAL PAIN: ICD-10-CM

## 2018-05-10 DIAGNOSIS — K59.00 CONSTIPATION, UNSPECIFIED CONSTIPATION TYPE: Primary | ICD-10-CM

## 2018-05-10 DIAGNOSIS — K59.00 CONSTIPATION, UNSPECIFIED CONSTIPATION TYPE: ICD-10-CM

## 2018-05-10 DIAGNOSIS — R63.4 UNINTENTIONAL WEIGHT LOSS: ICD-10-CM

## 2018-05-10 DIAGNOSIS — E03.9 ACQUIRED HYPOTHYROIDISM: ICD-10-CM

## 2018-05-10 PROCEDURE — 99999 PR PBB SHADOW E&M-EST. PATIENT-LVL III: CPT | Mod: PBBFAC,,, | Performed by: INTERNAL MEDICINE

## 2018-05-10 PROCEDURE — 99214 OFFICE O/P EST MOD 30 MIN: CPT | Mod: S$PBB,,, | Performed by: INTERNAL MEDICINE

## 2018-05-10 PROCEDURE — 99213 OFFICE O/P EST LOW 20 MIN: CPT | Mod: PBBFAC,PO,25 | Performed by: INTERNAL MEDICINE

## 2018-05-10 PROCEDURE — 74018 RADEX ABDOMEN 1 VIEW: CPT | Mod: 26,,, | Performed by: RADIOLOGY

## 2018-05-10 PROCEDURE — 74018 RADEX ABDOMEN 1 VIEW: CPT | Mod: TC,FY,PO

## 2018-05-10 RX ORDER — SODIUM, POTASSIUM,MAG SULFATES 17.5-3.13G
1 SOLUTION, RECONSTITUTED, ORAL ORAL
Qty: 1 BOTTLE | Refills: 0 | Status: SHIPPED | OUTPATIENT
Start: 2018-05-10 | End: 2018-07-20

## 2018-05-10 RX ORDER — LISINOPRIL 10 MG/1
TABLET ORAL
COMMUNITY
End: 2018-05-10

## 2018-05-11 ENCOUNTER — TELEPHONE (OUTPATIENT)
Dept: INTERNAL MEDICINE | Facility: CLINIC | Age: 68
End: 2018-05-11

## 2018-05-11 DIAGNOSIS — K59.00 CONSTIPATION, UNSPECIFIED CONSTIPATION TYPE: Primary | ICD-10-CM

## 2018-05-11 DIAGNOSIS — R10.32 LLQ PAIN: ICD-10-CM

## 2018-05-11 NOTE — TELEPHONE ENCOUNTER
----- Message from Vangie Ferrell sent at 5/11/2018  2:52 PM CDT -----  Contact: pt  She's calling in regards to missed call pls call pt back at [phone] (home) [phone] (work)

## 2018-05-11 NOTE — TELEPHONE ENCOUNTER
Scheduled CT.  Does pt still need to buy laxative that was suggested if she had a lot of stool?  Please advise./rpr

## 2018-05-11 NOTE — TELEPHONE ENCOUNTER
----- Message from Adeola Berumen MD sent at 5/10/2018  2:51 PM CDT -----  Please inform pt xray is ok, she is not full of stool.  I would like her to have a CT of the abdomen.  Please schedule in next couple of days.  SM

## 2018-05-14 ENCOUNTER — DOCUMENTATION ONLY (OUTPATIENT)
Dept: ENDOSCOPY | Facility: HOSPITAL | Age: 68
End: 2018-05-14

## 2018-05-14 NOTE — PROGRESS NOTES
05/14/18 Per [employer], Person pressed touch tone key to speak with an endoscopy .Spoke with pt , stated he will have her call us back to schedule.

## 2018-05-16 ENCOUNTER — TELEPHONE (OUTPATIENT)
Dept: RADIOLOGY | Facility: HOSPITAL | Age: 68
End: 2018-05-16

## 2018-05-17 ENCOUNTER — HOSPITAL ENCOUNTER (OUTPATIENT)
Dept: RADIOLOGY | Facility: HOSPITAL | Age: 68
Discharge: HOME | End: 2018-05-17
Attending: INTERNAL MEDICINE
Payer: MEDICARE

## 2018-05-17 DIAGNOSIS — N83.8 OVARIAN MASS, RIGHT: Primary | ICD-10-CM

## 2018-05-17 DIAGNOSIS — R10.32 LLQ PAIN: ICD-10-CM

## 2018-05-17 DIAGNOSIS — K59.00 CONSTIPATION, UNSPECIFIED CONSTIPATION TYPE: ICD-10-CM

## 2018-05-17 PROCEDURE — 25500020 PHARM REV CODE 255: Mod: PO | Performed by: INTERNAL MEDICINE

## 2018-05-17 PROCEDURE — 74178 CT ABD&PLV WO CNTR FLWD CNTR: CPT | Mod: 26,,, | Performed by: RADIOLOGY

## 2018-05-17 PROCEDURE — 74178 CT ABD&PLV WO CNTR FLWD CNTR: CPT | Mod: TC,PO

## 2018-05-17 RX ADMIN — IOHEXOL 30 ML: 350 INJECTION, SOLUTION INTRAVENOUS at 08:05

## 2018-05-17 RX ADMIN — IOHEXOL 75 ML: 350 INJECTION, SOLUTION INTRAVENOUS at 10:05

## 2018-05-18 ENCOUNTER — TELEPHONE (OUTPATIENT)
Dept: INTERNAL MEDICINE | Facility: CLINIC | Age: 68
End: 2018-05-18

## 2018-05-18 NOTE — TELEPHONE ENCOUNTER
----- Message from Adeola Berumen MD sent at 5/17/2018  3:18 PM CDT -----  Please try to contact pt to inform CT shows nothing worrisome but there is a cyst on the right ovary, so I want to order an u/s of the pelvic to make sure all is normal.  Please schedule.  SM

## 2018-05-21 ENCOUNTER — TELEPHONE (OUTPATIENT)
Dept: INTERNAL MEDICINE | Facility: CLINIC | Age: 68
End: 2018-05-21

## 2018-05-21 DIAGNOSIS — N83.209 CYST OF OVARY, UNSPECIFIED LATERALITY: Primary | ICD-10-CM

## 2018-05-21 NOTE — TELEPHONE ENCOUNTER
Informed pt CT shows nothing worrisome but there is a right ovary cyst so Dr. Berumen would like to get a pelvic u/s to make sure all is normal.  Scheduled u/s.  Instructed pt to drink 4 8oz glasses of water 1 hour prior to exam and not to urinate.  Pt verbalized understanding./rpr

## 2018-05-28 ENCOUNTER — TELEPHONE (OUTPATIENT)
Dept: RADIOLOGY | Facility: HOSPITAL | Age: 68
End: 2018-05-28

## 2018-05-29 ENCOUNTER — HOSPITAL ENCOUNTER (OUTPATIENT)
Dept: RADIOLOGY | Facility: HOSPITAL | Age: 68
Discharge: HOME | End: 2018-05-29
Attending: INTERNAL MEDICINE
Payer: MEDICARE

## 2018-05-29 ENCOUNTER — TELEPHONE (OUTPATIENT)
Dept: INTERNAL MEDICINE | Facility: CLINIC | Age: 68
End: 2018-05-29

## 2018-05-29 DIAGNOSIS — N83.209 CYST OF OVARY, UNSPECIFIED LATERALITY: ICD-10-CM

## 2018-05-29 PROCEDURE — 76830 TRANSVAGINAL US NON-OB: CPT | Mod: TC,PO

## 2018-05-29 PROCEDURE — 76830 TRANSVAGINAL US NON-OB: CPT | Mod: 26,,, | Performed by: RADIOLOGY

## 2018-05-29 PROCEDURE — 76856 US EXAM PELVIC COMPLETE: CPT | Mod: 26,,, | Performed by: RADIOLOGY

## 2018-05-29 NOTE — TELEPHONE ENCOUNTER
----- Message from Adeola Berumen MD sent at 5/29/2018  2:58 PM CDT -----  Please inform pt or  that the u/s shows a simple cyst with no worrisome findings.  SM

## 2018-06-04 ENCOUNTER — TELEPHONE (OUTPATIENT)
Dept: INTERNAL MEDICINE | Facility: CLINIC | Age: 68
End: 2018-06-04

## 2018-06-04 NOTE — TELEPHONE ENCOUNTER
Reviewed u/s results w/ pt, pt voiced understanding. Pt c/o feeling like her body is not yet 100%, approx at 75% not, having at least 1 BM daily. Pt denies N/V/D. Pt wanting to know if there is anything further to be done, and does she need to see Dr. Berumen sooner than 08/03/18?

## 2018-06-04 NOTE — TELEPHONE ENCOUNTER
----- Message from Dina Brower sent at 6/4/2018  8:33 AM CDT -----  Contact: Patient  Patient called to speak with the nurse; she has been having an ongoing problem. Also, she is looking for her test results. She can be contacted at [phone].    Thanks,  Dina

## 2018-06-06 ENCOUNTER — TELEPHONE (OUTPATIENT)
Dept: INTERNAL MEDICINE | Facility: CLINIC | Age: 68
End: 2018-06-06

## 2018-06-06 NOTE — TELEPHONE ENCOUNTER
Called pt to schedule colonoscopy.  Pt wants to know if it is still necessary since she is feeling about 75% better and FIT was negative.  Next available slot for colonoscopy will not be until at least late July.  Please advise./rpr

## 2018-06-06 NOTE — TELEPHONE ENCOUNTER
----- Message from Cony Tam sent at 6/6/2018  9:31 AM CDT -----  Contact: Pt  Pt stated she's returning nurses call and is requesting a callback.....[phone].

## 2018-06-06 NOTE — TELEPHONE ENCOUNTER
Attempted to contact pt.  The only numbers provided are work numbers.  I was informed that she is off for the summer.  Her coworker is going to contact her and tell her to contact Dr. Berumen's office./sandrar

## 2018-06-11 ENCOUNTER — TELEPHONE (OUTPATIENT)
Dept: INTERNAL MEDICINE | Facility: CLINIC | Age: 68
End: 2018-06-11

## 2018-06-11 NOTE — TELEPHONE ENCOUNTER
----- Message from Héctor Fernandez sent at 6/11/2018 10:48 AM CDT -----  Contact: pt  Call pt at [phone]   Pt was returning a missed call

## 2018-06-14 ENCOUNTER — TELEPHONE (OUTPATIENT)
Dept: INTERNAL MEDICINE | Facility: CLINIC | Age: 68
End: 2018-06-14

## 2018-06-14 NOTE — TELEPHONE ENCOUNTER
----- Message from Alejandra Akhtar sent at 6/14/2018 10:31 AM CDT -----  Contact: Maggie [phone]  Patient is requesting a returned call from Ines.

## 2018-06-29 ENCOUNTER — TELEPHONE (OUTPATIENT)
Dept: INTERNAL MEDICINE | Facility: CLINIC | Age: 68
End: 2018-06-29

## 2018-06-29 DIAGNOSIS — Z12.11 SCREEN FOR COLON CANCER: Primary | ICD-10-CM

## 2018-06-29 NOTE — TELEPHONE ENCOUNTER
Pt called to reschedule colonoscopy.  When I cancelled it, it removed the order.  Would you please reorder and send back to me?/rpr

## 2018-06-29 NOTE — TELEPHONE ENCOUNTER
----- Message from Deandra Red sent at 6/29/2018  9:11 AM CDT -----  Contact: pt  Please call pt @ [phone] regarding procedure on 8/3, pt have some questions for nurse before procedure.

## 2018-07-17 ENCOUNTER — OFFICE VISIT (OUTPATIENT)
Dept: OPHTHALMOLOGY | Facility: CLINIC | Age: 68
End: 2018-07-17
Payer: MEDICARE

## 2018-07-17 DIAGNOSIS — H25.13 CATARACT, NUCLEAR SCLEROTIC SENILE, BILATERAL: Primary | ICD-10-CM

## 2018-07-17 DIAGNOSIS — H52.4 PRESBYOPIA: ICD-10-CM

## 2018-07-17 DIAGNOSIS — Z13.5 GLAUCOMA SCREENING: ICD-10-CM

## 2018-07-17 DIAGNOSIS — H52.203 HYPEROPIC ASTIGMATISM OF BOTH EYES: ICD-10-CM

## 2018-07-17 PROCEDURE — 99999 PR PBB SHADOW E&M-EST. PATIENT-LVL II: CPT | Mod: PBBFAC,,, | Performed by: OPTOMETRIST

## 2018-07-17 PROCEDURE — 92015 DETERMINE REFRACTIVE STATE: CPT | Mod: ,,, | Performed by: OPTOMETRIST

## 2018-07-17 PROCEDURE — 92014 COMPRE OPH EXAM EST PT 1/>: CPT | Mod: S$PBB,,, | Performed by: OPTOMETRIST

## 2018-07-17 PROCEDURE — 99212 OFFICE O/P EST SF 10 MIN: CPT | Mod: PBBFAC,PO | Performed by: OPTOMETRIST

## 2018-07-17 NOTE — PROGRESS NOTES
HPI     Last [facility] exam 04/04/2017  Cataract, nuclear, bilateral  Screening for glaucoma  RE    Last edited by Brock Duffy MA on 7/17/2018  1:15 PM. (History)            Assessment /Plan     For exam results, see Encounter Report.    Cataract, nuclear sclerotic senile, bilateral    Glaucoma screening    Hyperopic astigmatism of both eyes    Presbyopia      Mild to moderate NS cataracts OU = discussed and will follow.  OH OK OU otherwise.  Spec Rx given.  RTC one year or prn.

## 2018-07-19 ENCOUNTER — TELEPHONE (OUTPATIENT)
Dept: INTERNAL MEDICINE | Facility: CLINIC | Age: 68
End: 2018-07-19

## 2018-07-19 NOTE — TELEPHONE ENCOUNTER
----- Message from Ena Baeza sent at 7/19/2018  3:38 PM CDT -----  Contact: pt  The pt request a call, states it is urgent but no additional info given, the pt can be reached at [phone]///[id number]

## 2018-07-20 ENCOUNTER — OFFICE VISIT (OUTPATIENT)
Dept: INTERNAL MEDICINE | Facility: CLINIC | Age: 68
End: 2018-07-20
Payer: MEDICARE

## 2018-07-20 ENCOUNTER — HOSPITAL ENCOUNTER (OUTPATIENT)
Dept: RADIOLOGY | Facility: HOSPITAL | Age: 68
Discharge: HOME | End: 2018-07-20
Attending: PHYSICIAN ASSISTANT
Payer: MEDICARE

## 2018-07-20 ENCOUNTER — PATIENT OUTREACH (OUTPATIENT)
Dept: ADMINISTRATIVE | Facility: HOSPITAL | Age: 68
End: 2018-07-20

## 2018-07-20 ENCOUNTER — DOCUMENTATION ONLY (OUTPATIENT)
Dept: INTERNAL MEDICINE | Facility: CLINIC | Age: 68
End: 2018-07-20

## 2018-07-20 ENCOUNTER — CLINICAL SUPPORT (OUTPATIENT)
Dept: CARDIOLOGY | Facility: CLINIC | Age: 68
End: 2018-07-20
Payer: MEDICARE

## 2018-07-20 VITALS
HEIGHT: 68 IN | HEART RATE: 84 BPM | TEMPERATURE: 99 F | SYSTOLIC BLOOD PRESSURE: 138 MMHG | WEIGHT: 121.5 LBS | BODY MASS INDEX: 18.41 KG/M2 | DIASTOLIC BLOOD PRESSURE: 80 MMHG | OXYGEN SATURATION: 97 %

## 2018-07-20 DIAGNOSIS — S52.502A CLOSED FRACTURE OF DISTAL END OF LEFT RADIUS, UNSPECIFIED FRACTURE MORPHOLOGY, INITIAL ENCOUNTER: Primary | ICD-10-CM

## 2018-07-20 DIAGNOSIS — S52.502A CLOSED FRACTURE OF DISTAL END OF LEFT RADIUS, UNSPECIFIED FRACTURE MORPHOLOGY, INITIAL ENCOUNTER: ICD-10-CM

## 2018-07-20 DIAGNOSIS — S63.005A: ICD-10-CM

## 2018-07-20 DIAGNOSIS — Z01.818 PREOP EXAM FOR INTERNAL MEDICINE: ICD-10-CM

## 2018-07-20 DIAGNOSIS — G25.81 RESTLESS LEGS SYNDROME (RLS): ICD-10-CM

## 2018-07-20 DIAGNOSIS — M81.0 AGE-RELATED OSTEOPOROSIS WITHOUT CURRENT PATHOLOGICAL FRACTURE: ICD-10-CM

## 2018-07-20 DIAGNOSIS — E03.9 ACQUIRED HYPOTHYROIDISM: ICD-10-CM

## 2018-07-20 DIAGNOSIS — I10 ESSENTIAL HYPERTENSION: ICD-10-CM

## 2018-07-20 DIAGNOSIS — Z86.19 HISTORY OF HEPATITIS C: ICD-10-CM

## 2018-07-20 DIAGNOSIS — E55.9 VITAMIN D DEFICIENCY DISEASE: ICD-10-CM

## 2018-07-20 PROCEDURE — 99999 PR PBB SHADOW E&M-EST. PATIENT-LVL IV: CPT | Mod: PBBFAC,,, | Performed by: PHYSICIAN ASSISTANT

## 2018-07-20 PROCEDURE — 93010 ELECTROCARDIOGRAM REPORT: CPT | Mod: S$PBB,,, | Performed by: INTERNAL MEDICINE

## 2018-07-20 PROCEDURE — 99214 OFFICE O/P EST MOD 30 MIN: CPT | Mod: PBBFAC,25,PO | Performed by: PHYSICIAN ASSISTANT

## 2018-07-20 PROCEDURE — 99214 OFFICE O/P EST MOD 30 MIN: CPT | Mod: S$PBB,,, | Performed by: PHYSICIAN ASSISTANT

## 2018-07-20 PROCEDURE — 93005 ELECTROCARDIOGRAM TRACING: CPT | Mod: PBBFAC,PO | Performed by: INTERNAL MEDICINE

## 2018-07-20 PROCEDURE — 71046 X-RAY EXAM CHEST 2 VIEWS: CPT | Mod: TC,FY,PO

## 2018-07-20 PROCEDURE — 71046 X-RAY EXAM CHEST 2 VIEWS: CPT | Mod: 26,,, | Performed by: RADIOLOGY

## 2018-07-20 RX ORDER — CLOBETASOL PROPIONATE 0.5 MG/G
CREAM TOPICAL
Refills: 0 | COMMUNITY
Start: 2018-07-12 | End: 2018-07-20

## 2018-07-20 RX ORDER — IBUPROFEN 600 MG/1
TABLET ORAL
Refills: 0 | COMMUNITY
Start: 2018-07-18 | End: 2018-08-20

## 2018-07-20 RX ORDER — HYDROCODONE BITARTRATE AND ACETAMINOPHEN 10; 325 MG/1; MG/1
1 TABLET ORAL EVERY 6 HOURS PRN
Refills: 0 | COMMUNITY
Start: 2018-07-18 | End: 2018-08-20

## 2018-07-20 RX ORDER — HYDROCORTISONE, IODOQUINOL 10; 10 MG/G; MG/G
CREAM TOPICAL
Refills: 0 | COMMUNITY
Start: 2018-05-18 | End: 2018-08-20

## 2018-07-20 NOTE — PROGRESS NOTES
"Subjective:       Patient ID: Maggie Díaz is a 67 y.o. female.    Chief Complaint: Pre-op Exam    67 year old female presents to clinic for pre-op exam. She is to have L wrist ORIF by Dr. Bunch at [facility] due to fractured radius. She reports feeling well otherwise and reports no CP, SOB, exertional sxs, fever, chills, sxs of infection, or other medical complaints. She denies any prior complications with anesthesia.      PCP: Dr. Berumen    Patient Active Problem List:     Vitamin D deficiency disease     Essential hypertension     History of hepatitis C     Age-related osteoporosis without current pathological fracture     Restless legs syndrome (RLS)     Allergic rhinitis     Acquired hypothyroidism      Review of Systems   Constitutional: Negative for chills and fever.   HENT: Negative for sore throat.    Respiratory: Negative for cough and shortness of breath.    Cardiovascular: Negative for chest pain, palpitations and leg swelling.   Gastrointestinal: Negative for abdominal pain, nausea and vomiting.   Skin: Negative for rash.   Neurological: Negative for dizziness, weakness, numbness and headaches.   Psychiatric/Behavioral: Negative for confusion.       Objective:       Vitals:    07/20/18 0832   BP: 138/80   BP Location: Right arm   Patient Position: Sitting   BP Method: Small (Manual)   Pulse: 84   Temp: 98.5 °F (36.9 °C)   TempSrc: Tympanic   SpO2: 97%   Weight: 55.1 kg (121 lb 7.6 oz)   Height: 5' 8" (1.727 m)     Physical Exam   Constitutional: She is oriented to person, place, and time. She appears well-developed and well-nourished. No distress.   HENT:   Head: Normocephalic and atraumatic.   Mouth/Throat: Oropharynx is clear and moist. No oropharyngeal exudate.   Eyes: EOM are normal. No scleral icterus.   Neck: Neck supple.   Cardiovascular: Normal rate and regular rhythm.    Pulmonary/Chest: Effort normal and breath sounds normal. No respiratory distress. She has no decreased breath " sounds. She has no wheezes. She has no rhonchi. She has no rales.   Musculoskeletal: Normal range of motion. She exhibits no edema.   L arm is in splint and sling    Neurological: She is alert and oriented to person, place, and time. No cranial nerve deficit.   Skin: Skin is warm and dry. No rash noted.   Psychiatric: She has a normal mood and affect. Her speech is normal and behavior is normal. Thought content normal.       Assessment:       1. Closed fracture of distal end of left radius, unspecified fracture morphology, initial encounter    2. Dislocation of distal end of left radius, initial encounter    3. Preop exam for internal medicine    4. Essential hypertension    5. Vitamin D deficiency disease    6. Acquired hypothyroidism    7. Age-related osteoporosis without current pathological fracture    8. History of hepatitis C    9. Restless legs syndrome (RLS)        Plan:         Maggie was seen today for pre-op exam.    Diagnoses and all orders for this visit:    Closed fracture of distal end of left radius, unspecified fracture morphology, initial encounter  -     EKG 12-lead; Future  -     X-Ray Chest PA And Lateral; Future  -     CBC auto differential; Future  -     Basic metabolic panel; Future    Dislocation of distal end of left radius, initial encounter; Preop exam for internal medicine  -     EKG 12-lead; Future  -     X-Ray Chest PA And Lateral; Future  -     CBC auto differential; Future  -     Basic metabolic panel; Future  Pre-op testing as above today with result review following. Will complete pre-op clearance after review of pending results.    Essential hypertension  Controlled. Monitor BP.    Vitamin D deficiency disease  Controlled. Pt taking vit D suppl.    Acquired hypothyroidism  Controlled. Pt taking Synthroid.     Age-related osteoporosis without current pathological fracture  Pt taking Fosamax and vit D suppl.    History of hepatitis C    Restless legs syndrome (RLS)    Follow-up  with your PCP as scheduled in 2 weeks for health management.    Addendum 7/20/18, 1:05PM:  Component      Latest Ref Rng & Units 7/20/2018   WBC      3.90 - 12.70 K/uL 5.66   RBC      4.00 - 5.40 M/uL 4.30   Hemoglobin      12.0 - 16.0 g/dL 14.4   Hematocrit      37.0 - 48.5 % 41.8   MCV      82 - 98 fL 97   MCH      27.0 - 31.0 pg 33.5 (H)   MCHC      32.0 - 36.0 g/dL 34.4   RDW      11.5 - 14.5 % 13.1   Platelets      150 - 350 K/uL 124 (L)   MPV      9.2 - 12.9 fL 9.9   Gran # (ANC)      1.8 - 7.7 K/uL 3.4   Lymph #      1.0 - 4.8 K/uL 1.5   Mono #      0.3 - 1.0 K/uL 0.7   Eos #      0.0 - 0.5 K/uL 0.1   Baso #      0.00 - 0.20 K/uL 0.03   Gran%      38.0 - 73.0 % 60.6   Lymph%      18.0 - 48.0 % 26.5   Mono%      4.0 - 15.0 % 11.5   Eosinophil%      0.0 - 8.0 % 0.9   Basophil%      0.0 - 1.9 % 0.5   Differential Method       Automated   Sodium      136 - 145 mmol/L 138   Potassium      3.5 - 5.1 mmol/L 4.2   Chloride      95 - 110 mmol/L 102   CO2      23 - 29 mmol/L 27   Glucose      70 - 110 mg/dL 94   BUN, Bld      8 - 23 mg/dL 6 (L)   Creatinine      0.5 - 1.4 mg/dL 0.7   Calcium      8.7 - 10.5 mg/dL 9.8   Anion Gap      8 - 16 mmol/L 9   eGFR if African American      >60 mL/min/1.73 m:2 >60   eGFR if non African American      >60 mL/min/1.73 m:2 >60     CXR 7/20/18:  COMPARISON: Report only January 31, 2012  FINDINGS:  Heart and pulmonary vasculature within normal limits.  Minimal hyperinflation without consolidation or effusion.  Trachea is midline and CP angles sharp.  Mild osteopenia and minimal spondylosis. IMPRESSION: No acute infiltrate.    EKG 7/20/18:  Vent. Rate : 060 BPM     Atrial Rate : 060 BPM     P-R Int : 136 ms          QRS Dur : 088 ms      QT Int : 400 ms       P-R-T Axes : 019 078 065 degrees     QTc Int : 400 ms  Normal sinus rhythm  Normal ECG  No previous ECGs available  Confirmed by MARILEE RAIN ([id number]) on 7/20/2018 1:07:32 PM    Case discussed with PCP Dr. Berumen. Pt is cleared for  upcoming surgery from general medical standpoint.

## 2018-07-22 DIAGNOSIS — G25.81 RESTLESS LEGS SYNDROME (RLS): ICD-10-CM

## 2018-07-23 NOTE — TELEPHONE ENCOUNTER
----- Message from Libby Keyes sent at 7/23/2018  8:10 AM CDT -----  Contact: Pt.   Pt is calling regarding getting clearance [facility]. Pt is stated that she is in pain.  ..[phone] (home)

## 2018-07-24 RX ORDER — CLONAZEPAM 0.5 MG/1
TABLET ORAL
Qty: 60 TABLET | Refills: 5 | Status: SHIPPED | OUTPATIENT
Start: 2018-07-24 | End: 2019-01-28 | Stop reason: SDUPTHER

## 2018-07-26 NOTE — PROGRESS NOTES
"Subjective:      Patient ID: Maggie Díaz is a 68 y.o. female.    Chief Complaint: Follow-up (6 month)      HPI  Here for follow up of medical problems.  Has lost 30# in past year, 10# in past 2 months.  Avoiding food "because I don't want to get constipated."  CT abd unremarkable.  Cscope scheduled in 3 weeks.  No f/c/sw/cough.  No cp/sob/palp.    3 weeks ago was walking dog and fell and fractured left radius, s/p plate placement and fracture repair with Dr. Bunch.  Now constipation is worse due to the narcotics needed for pain.  Max one HC per day.  Miralax does help some.  Had a normal BM this morning.  7/18 CXR negative.    Updated/ annual due 2/19:  HM: 2/18 fluvax, 3/15 awezbv42, 2/14 zfglof10, 7/18 TD, 1/10 TDaP, 8/12 zoster, 6/17 BMD rep 2y, has been refusing Cscope, 3/16 OB neg x 2, 2/18 MMG, 5/18 pelvic u/s neg.     Review of Systems   Constitutional: Negative for chills, diaphoresis and fever.   Respiratory: Negative for cough and shortness of breath.    Cardiovascular: Negative for chest pain, palpitations and leg swelling.   Gastrointestinal: Negative for blood in stool, constipation, diarrhea, nausea and vomiting.   Genitourinary: Negative for dysuria, frequency and hematuria.   Psychiatric/Behavioral: The patient is not nervous/anxious.          Objective:   /74 (BP Location: Right arm, Patient Position: Sitting)   Pulse 102   Temp 98.2 °F (36.8 °C) (Tympanic)   Ht 5' 8" (1.727 m)   Wt 53.4 kg (117 lb 11.6 oz)   SpO2 98%   BMI 17.90 kg/m²     Physical Exam   Constitutional: She is oriented to person, place, and time. She appears well-developed and well-nourished.   HENT:   Right Ear: External ear normal. Tympanic membrane is not injected.   Left Ear: External ear normal. Tympanic membrane is not injected.   Mouth/Throat: Oropharynx is clear and moist.   Eyes: Conjunctivae are normal.   Neck: Normal range of motion. Neck supple. No thyromegaly present.   Cardiovascular: Normal rate, " regular rhythm and intact distal pulses.  Exam reveals no gallop.    No murmur heard.  Pulmonary/Chest: Effort normal and breath sounds normal. She has no wheezes. She has no rales. Right breast exhibits no mass, no nipple discharge, no skin change and no tenderness. Left breast exhibits no mass, no nipple discharge, no skin change and no tenderness.   Abdominal: Soft. Bowel sounds are normal. She exhibits no mass. There is no tenderness.   Musculoskeletal: She exhibits no edema.   Lymphadenopathy:     She has no cervical adenopathy.     She has no axillary adenopathy.   Neurological: She is alert and oriented to person, place, and time.   Skin: Skin is warm. No rash noted.   Psychiatric: She has a normal mood and affect.     Results for DESIREE TAFOYA (MRN [id number]) as of 8/3/2018 09:23   Ref. Range 5/10/2018 12:00   TSH Latest Ref Range: 0.400 - 4.000 uIU/mL 1.165     Results for orders placed or performed in visit on 07/20/18   CBC auto differential   Result Value Ref Range    WBC 5.66 3.90 - 12.70 K/uL    RBC 4.30 4.00 - 5.40 M/uL    Hemoglobin 14.4 12.0 - 16.0 g/dL    Hematocrit 41.8 37.0 - 48.5 %    MCV 97 82 - 98 fL    MCH 33.5 (H) 27.0 - 31.0 pg    MCHC 34.4 32.0 - 36.0 g/dL    RDW 13.1 11.5 - 14.5 %    Platelets 124 (L) 150 - 350 K/uL    MPV 9.9 9.2 - 12.9 fL    Gran # (ANC) 3.4 1.8 - 7.7 K/uL    Lymph # 1.5 1.0 - 4.8 K/uL    Mono # 0.7 0.3 - 1.0 K/uL    Eos # 0.1 0.0 - 0.5 K/uL    Baso # 0.03 0.00 - 0.20 K/uL    Gran% 60.6 38.0 - 73.0 %    Lymph% 26.5 18.0 - 48.0 %    Mono% 11.5 4.0 - 15.0 %    Eosinophil% 0.9 0.0 - 8.0 %    Basophil% 0.5 0.0 - 1.9 %    Differential Method Automated    Basic metabolic panel   Result Value Ref Range    Sodium 138 136 - 145 mmol/L    Potassium 4.2 3.5 - 5.1 mmol/L    Chloride 102 95 - 110 mmol/L    CO2 27 23 - 29 mmol/L    Glucose 94 70 - 110 mg/dL    BUN, Bld 6 (L) 8 - 23 mg/dL    Creatinine 0.7 0.5 - 1.4 mg/dL    Calcium 9.8 8.7 - 10.5 mg/dL    Anion Gap 9 8 - 16 mmol/L     eGFR if African American >60 >60 mL/min/1.73 m^2    eGFR if non African American >60 >60 mL/min/1.73 m^2         Assessment:       1. Acquired hypothyroidism    2. Essential hypertension    3. Age-related osteoporosis without current pathological fracture    4. Constipation, unspecified constipation type    5. Weight loss    6. Closed displaced fracture of head of left radius, initial encounter    7. Unintentional weight loss          Plan:     Acquired hypothyroidism- recent lab normal.    Essential hypertension- off meds, resolved now.    Constipation, unspecified constipation type  -     Ambulatory referral to Gastroenterology  -     CT Abdomen Pelvis W Wo Contrast; Future; Expected date: 08/03/2018    Closed displaced fracture of head of left radius, initial encounter, Age-related osteoporosis without current pathological fracture- on fosamax.    Unintentional weight loss  -     CT Abdomen Pelvis W Wo Contrast; Future; Expected date: 08/03/2018  -     Ambulatory referral to Gastroenterology    RTC 6wk.

## 2018-08-03 ENCOUNTER — OFFICE VISIT (OUTPATIENT)
Dept: INTERNAL MEDICINE | Facility: CLINIC | Age: 68
End: 2018-08-03
Payer: MEDICARE

## 2018-08-03 VITALS
WEIGHT: 117.75 LBS | DIASTOLIC BLOOD PRESSURE: 74 MMHG | HEIGHT: 68 IN | TEMPERATURE: 98 F | SYSTOLIC BLOOD PRESSURE: 128 MMHG | HEART RATE: 102 BPM | OXYGEN SATURATION: 98 % | BODY MASS INDEX: 17.85 KG/M2

## 2018-08-03 DIAGNOSIS — M81.0 AGE-RELATED OSTEOPOROSIS WITHOUT CURRENT PATHOLOGICAL FRACTURE: ICD-10-CM

## 2018-08-03 DIAGNOSIS — S52.122A CLOSED DISPLACED FRACTURE OF HEAD OF LEFT RADIUS, INITIAL ENCOUNTER: ICD-10-CM

## 2018-08-03 DIAGNOSIS — R63.4 UNINTENTIONAL WEIGHT LOSS: ICD-10-CM

## 2018-08-03 DIAGNOSIS — R63.4 WEIGHT LOSS: ICD-10-CM

## 2018-08-03 DIAGNOSIS — E03.9 ACQUIRED HYPOTHYROIDISM: Primary | ICD-10-CM

## 2018-08-03 DIAGNOSIS — K59.00 CONSTIPATION, UNSPECIFIED CONSTIPATION TYPE: ICD-10-CM

## 2018-08-03 PROCEDURE — 99214 OFFICE O/P EST MOD 30 MIN: CPT | Mod: S$PBB,,, | Performed by: INTERNAL MEDICINE

## 2018-08-03 PROCEDURE — 99213 OFFICE O/P EST LOW 20 MIN: CPT | Mod: PBBFAC,PO | Performed by: INTERNAL MEDICINE

## 2018-08-03 PROCEDURE — 99999 PR PBB SHADOW E&M-EST. PATIENT-LVL III: CPT | Mod: PBBFAC,,, | Performed by: INTERNAL MEDICINE

## 2018-08-03 RX ORDER — OXYCODONE AND ACETAMINOPHEN 2.5; 325 MG/1; MG/1
1 TABLET ORAL EVERY 4 HOURS PRN
COMMUNITY
End: 2018-09-07

## 2018-08-06 ENCOUNTER — TELEPHONE (OUTPATIENT)
Dept: INTERNAL MEDICINE | Facility: CLINIC | Age: 68
End: 2018-08-06

## 2018-08-06 NOTE — TELEPHONE ENCOUNTER
Pt decided to just f/w w/Lilibeth in 1 mo and then decide if CT is still necessary.  Appt scheduled./rpr

## 2018-08-06 NOTE — TELEPHONE ENCOUNTER
It is due to the weight loss and abdominal pain prior to this fracture pain.  I would like the test.  If it is costly to her, she can delay and f/u with me or Lilibeth in one month to check weight.  SM

## 2018-08-06 NOTE — TELEPHONE ENCOUNTER
----- Message from Alta Anguiano sent at 8/6/2018 10:17 AM CDT -----  Contact: pt  Calling in regards to with questions about last appointment and please advise [phone]

## 2018-08-06 NOTE — TELEPHONE ENCOUNTER
Pt wanted to let you know that she is no longer experiencing constipation after using the Miralax.  She is currently having 2-3 BMs daily.  No pain or bloating.  She thinks the problem was from the pain meds and other meds she was taking.  She would like to know if she still needs to have abd CT this week.  She stated she will keep her GI and colonoscopy appt.  Please advise./sandrar

## 2018-08-07 ENCOUNTER — TELEPHONE (OUTPATIENT)
Dept: RADIOLOGY | Facility: HOSPITAL | Age: 68
End: 2018-08-07

## 2018-08-20 ENCOUNTER — OFFICE VISIT (OUTPATIENT)
Dept: GASTROENTEROLOGY | Facility: CLINIC | Age: 68
End: 2018-08-20
Payer: MEDICARE

## 2018-08-20 ENCOUNTER — LAB VISIT (OUTPATIENT)
Dept: LAB | Facility: HOSPITAL | Age: 68
End: 2018-08-20
Attending: NURSE PRACTITIONER
Payer: MEDICARE

## 2018-08-20 VITALS
SYSTOLIC BLOOD PRESSURE: 120 MMHG | BODY MASS INDEX: 17.72 KG/M2 | HEART RATE: 84 BPM | DIASTOLIC BLOOD PRESSURE: 84 MMHG | WEIGHT: 116.88 LBS | HEIGHT: 68 IN

## 2018-08-20 DIAGNOSIS — E03.9 HYPOTHYROIDISM, UNSPECIFIED TYPE: ICD-10-CM

## 2018-08-20 DIAGNOSIS — Z86.19 HX OF HEPATITIS C: Primary | ICD-10-CM

## 2018-08-20 DIAGNOSIS — R63.4 WEIGHT LOSS: ICD-10-CM

## 2018-08-20 DIAGNOSIS — K59.00 CONSTIPATION, UNSPECIFIED CONSTIPATION TYPE: ICD-10-CM

## 2018-08-20 DIAGNOSIS — Z86.19 HX OF HEPATITIS C: ICD-10-CM

## 2018-08-20 PROCEDURE — 84443 ASSAY THYROID STIM HORMONE: CPT

## 2018-08-20 PROCEDURE — 84481 FREE ASSAY (FT-3): CPT

## 2018-08-20 PROCEDURE — 99213 OFFICE O/P EST LOW 20 MIN: CPT | Mod: PBBFAC,PO | Performed by: NURSE PRACTITIONER

## 2018-08-20 PROCEDURE — 99999 PR PBB SHADOW E&M-EST. PATIENT-LVL III: CPT | Mod: PBBFAC,,, | Performed by: NURSE PRACTITIONER

## 2018-08-20 PROCEDURE — 99214 OFFICE O/P EST MOD 30 MIN: CPT | Mod: S$PBB,,, | Performed by: NURSE PRACTITIONER

## 2018-08-20 PROCEDURE — 87522 HEPATITIS C REVRS TRNSCRPJ: CPT

## 2018-08-20 PROCEDURE — 84439 ASSAY OF FREE THYROXINE: CPT

## 2018-08-20 RX ORDER — POLYETHYLENE GLYCOL 3350 17 G/17G
17 POWDER, FOR SOLUTION ORAL
COMMUNITY
End: 2021-12-07

## 2018-08-20 NOTE — LETTER
August 21, 2018      Adeola Berumen MD  [address] [place] [address]           [facility]  [address] [place] [address]  Phone: [phone]  Fax: [phone]          Patient: Maggie Díaz   MR Number: [id number]   YOB: 1950   Date of Visit: 8/20/2018       Dear Dr. Adeola Berumen:    Thank you for referring Maggie Díaz to me for evaluation. Attached you will find relevant portions of my assessment and plan of care.    If you have questions, please do not hesitate to call me. I look forward to following Maggie Díaz along with you.    Sincerely,    Bing Garza, [facility]    Enclosure  CC:  No Recipients    If you would like to receive this communication electronically, please contact [email or web address] or [phone] to request more information on [employer] Link access.    For providers and/or their staff who would like to refer a patient to [facility], please contact us through our one-stop-shop provider referral line, [facility], at [phone].    If you feel you have received this communication in error or would no longer like to receive these types of communications, please e-mail [email or web address]

## 2018-08-21 ENCOUNTER — ANESTHESIA EVENT (OUTPATIENT)
Dept: ENDOSCOPY | Facility: HOSPITAL | Age: 68
End: 2018-08-21
Payer: MEDICARE

## 2018-08-21 LAB
T3FREE SERPL-MCNC: 2.7 PG/ML
T4 FREE SERPL-MCNC: 1.32 NG/DL
TSH SERPL DL<=0.005 MIU/L-ACNC: 0.55 UIU/ML

## 2018-08-21 RX ORDER — SODIUM CHLORIDE 0.9 % (FLUSH) 0.9 %
3 SYRINGE (ML) INJECTION
Status: CANCELLED | OUTPATIENT
Start: 2018-08-21

## 2018-08-21 RX ORDER — SODIUM CHLORIDE, SODIUM LACTATE, POTASSIUM CHLORIDE, CALCIUM CHLORIDE 600; 310; 30; 20 MG/100ML; MG/100ML; MG/100ML; MG/100ML
INJECTION, SOLUTION INTRAVENOUS CONTINUOUS
Status: CANCELLED | OUTPATIENT
Start: 2018-08-21

## 2018-08-21 NOTE — PROGRESS NOTES
Clinic Follow Up:  [facility] Follow Up Note    Reason for Follow Up:  The primary encounter diagnosis was Hx of hepatitis C. Diagnoses of Hypothyroidism, unspecified type, Weight loss, and Constipation, unspecified constipation type were also pertinent to this visit.    PCP: Adeola Jones   [address] / [place] [address]    HPI:  This is a 68 y.o. female here with the above complaints.   Pt was recently seen by PCP for further evaluation of weight loss.   Workup thus far has included CT abd/pelvis and pelvic US, all of which are unremarkable.   During this period of weight loss, she has had a new onset of worsening constipation.  She has had intermittent issues in the past, but this is more severe.  She was started on Miralax daily and has had improvement.   She denies any nausea or vomiting.  No heartburn or indigestion.   She is scheduled for a colonoscopy   PMH includes HCV which was treated. Also hypothyroidism.  Last TSH was unremarkable.       Review of Systems   Constitutional: Positive for weight loss. Negative for chills, fever and malaise/fatigue.   Respiratory: Negative for cough.    Cardiovascular: Negative for chest pain.   Gastrointestinal:        Per HPI   Musculoskeletal: Negative for myalgias.   Skin: Negative for itching and rash.   Neurological: Negative for headaches.   Psychiatric/Behavioral: The patient is nervous/anxious.        Medical History:  Past Medical History:   Diagnosis Date    Hematuria, microscopic     negative CT    Hepatitis C     slow progression/ Dr. Otoole    Hypertension     Migraines     OP (osteoporosis)     on drug holiday.    Osteoarthritis     Radius fracture     Restless legs syndrome (RLS)     Vitamin D deficiency disease        Surgical History:   Past Surgical History:   Procedure Laterality Date     SECTION, CLASSIC      WRIST FRACTURE SURGERY      , Dr. Bunch.       Family History:   Family History  "  Adopted: Yes   Family history unknown: Yes       Social History:   Social History     Tobacco Use    Smoking status: Former Smoker     Packs/day: 0.50     Years: 10.00     Pack years: 5.00     Last attempt to quit: 3/3/1979     Years since quittin.4    Smokeless tobacco: Never Used   Substance Use Topics    Alcohol use: Yes     Alcohol/week: 0.0 oz     Comment: occasionally    Drug use: No       Allergies: Reviewed    Home Medications:  Current Outpatient Medications on File Prior to Visit   Medication Sig Dispense Refill    alendronate (FOSAMAX) 70 MG tablet TAKE 1 TABLET BY MOUTH EVERY MORNING EVERY WEEK WITH A FULL 4 tablet 11    calcium carbonate 220 mg capsule Take 250 mg by mouth 2 (two) times daily with meals.      clonazePAM (KLONOPIN) 0.5 MG tablet take 1 to 2 tablets by mouth every evening if needed 60 tablet 5    levothyroxine (SYNTHROID) 50 MCG tablet take 1 tablet by mouth once daily 30 tablet 11    oxyCODONE-acetaminophen (PERCOCET) 2.5-325 mg per tablet Take 1 tablet by mouth every 4 (four) hours as needed for Pain.      polyethylene glycol (GLYCOLAX) 17 gram/dose powder Take 17 g by mouth once daily.      vitamin D 1000 units Tab Take 185 mg by mouth once daily.       No current facility-administered medications on file prior to visit.        Physical Exam:  Vital Signs:  /84   Pulse 84   Ht 5' 8" (1.727 m)   Wt 53 kg (116 lb 13.5 oz)   BMI 17.77 kg/m²   Body mass index is 17.77 kg/m².  Physical Exam   Constitutional: She is oriented to person, place, and time. She appears well-developed and well-nourished.   HENT:   Head: Normocephalic.   Eyes: No scleral icterus.   Neck: Normal range of motion.   Cardiovascular: Normal rate and regular rhythm.   Pulmonary/Chest: Effort normal and breath sounds normal.   Abdominal: Soft. Bowel sounds are normal. She exhibits no distension. There is no tenderness.   Musculoskeletal: Normal range of motion.   Neurological: She is alert and " oriented to person, place, and time.   Skin: Skin is warm and dry.   Psychiatric: She has a normal mood and affect. Her speech is rapid and/or pressured.   Vitals reviewed.      Labs: Pertinent labs reviewed.      Assessment:  1. Hx of hepatitis C    2. Hypothyroidism, unspecified type    3. Weight loss    4. Constipation, unspecified constipation type        Recommendations:  - Unclear cause of new onset of constipation and weight loss  - Continue miralax daily  - Continue with plan for colonoscopy  - Will recheck thryoid function  - HCV quant to ensure continued SVR.     Hx of hepatitis C  -     HEPATITIS C RNA, QUANTITATIVE, PCR; Future; Expected date: 08/20/2018    Hypothyroidism, unspecified type  -     TSH; Future; Expected date: 08/20/2018  -     T4, free; Future; Expected date: 08/20/2018  -     T3, free; Future; Expected date: 08/20/2018    Weight loss  -     TSH; Future; Expected date: 08/20/2018  -     T4, free; Future; Expected date: 08/20/2018  -     T3, free; Future; Expected date: 08/20/2018    Constipation, unspecified constipation type          Return to Clinic:    Follow up to be determined by results of above.

## 2018-08-22 ENCOUNTER — ANESTHESIA (OUTPATIENT)
Dept: ENDOSCOPY | Facility: HOSPITAL | Age: 68
End: 2018-08-22
Payer: MEDICARE

## 2018-08-22 ENCOUNTER — HOSPITAL ENCOUNTER (OUTPATIENT)
Facility: HOSPITAL | Age: 68
Discharge: HOME | End: 2018-08-22
Attending: SURGERY | Admitting: SURGERY
Payer: MEDICARE

## 2018-08-22 DIAGNOSIS — Z12.11 SPECIAL SCREENING FOR MALIGNANT NEOPLASMS, COLON: Primary | ICD-10-CM

## 2018-08-22 PROCEDURE — 45378 DIAGNOSTIC COLONOSCOPY: CPT | Performed by: SURGERY

## 2018-08-22 PROCEDURE — 37000008 HC ANESTHESIA 1ST 15 MINUTES: Performed by: SURGERY

## 2018-08-22 PROCEDURE — 45378 DIAGNOSTIC COLONOSCOPY: CPT | Mod: ,,, | Performed by: SURGERY

## 2018-08-22 PROCEDURE — 25000003 PHARM REV CODE 250: Performed by: ANESTHESIOLOGY

## 2018-08-22 PROCEDURE — 63600175 PHARM REV CODE 636 W HCPCS: Performed by: NURSE ANESTHETIST, CERTIFIED REGISTERED

## 2018-08-22 PROCEDURE — 25000003 PHARM REV CODE 250: Performed by: NURSE ANESTHETIST, CERTIFIED REGISTERED

## 2018-08-22 PROCEDURE — 37000009 HC ANESTHESIA EA ADD 15 MINS: Performed by: SURGERY

## 2018-08-22 RX ORDER — PROPOFOL 10 MG/ML
VIAL (ML) INTRAVENOUS
Status: DISCONTINUED | OUTPATIENT
Start: 2018-08-22 | End: 2018-08-22

## 2018-08-22 RX ORDER — SODIUM CHLORIDE, SODIUM LACTATE, POTASSIUM CHLORIDE, CALCIUM CHLORIDE 600; 310; 30; 20 MG/100ML; MG/100ML; MG/100ML; MG/100ML
INJECTION, SOLUTION INTRAVENOUS CONTINUOUS
Status: DISCONTINUED | OUTPATIENT
Start: 2018-08-22 | End: 2018-08-22 | Stop reason: HOSPADM

## 2018-08-22 RX ORDER — SODIUM CHLORIDE, SODIUM LACTATE, POTASSIUM CHLORIDE, CALCIUM CHLORIDE 600; 310; 30; 20 MG/100ML; MG/100ML; MG/100ML; MG/100ML
INJECTION, SOLUTION INTRAVENOUS CONTINUOUS PRN
Status: DISCONTINUED | OUTPATIENT
Start: 2018-08-22 | End: 2018-08-22

## 2018-08-22 RX ORDER — LIDOCAINE HYDROCHLORIDE 20 MG/ML
INJECTION, SOLUTION EPIDURAL; INFILTRATION; INTRACAUDAL; PERINEURAL
Status: DISCONTINUED | OUTPATIENT
Start: 2018-08-22 | End: 2018-08-22

## 2018-08-22 RX ADMIN — SODIUM CHLORIDE, SODIUM LACTATE, POTASSIUM CHLORIDE, AND CALCIUM CHLORIDE: 600; 310; 30; 20 INJECTION, SOLUTION INTRAVENOUS at 09:08

## 2018-08-22 RX ADMIN — SODIUM CHLORIDE, SODIUM LACTATE, POTASSIUM CHLORIDE, AND CALCIUM CHLORIDE: .6; .31; .03; .02 INJECTION, SOLUTION INTRAVENOUS at 09:08

## 2018-08-22 RX ADMIN — PROPOFOL 20 MG: 10 INJECTION, EMULSION INTRAVENOUS at 10:08

## 2018-08-22 RX ADMIN — PROPOFOL 30 MG: 10 INJECTION, EMULSION INTRAVENOUS at 10:08

## 2018-08-22 RX ADMIN — PROPOFOL 70 MG: 10 INJECTION, EMULSION INTRAVENOUS at 09:08

## 2018-08-22 RX ADMIN — PROPOFOL 40 MG: 10 INJECTION, EMULSION INTRAVENOUS at 10:08

## 2018-08-22 RX ADMIN — LIDOCAINE HYDROCHLORIDE 40 MG: 20 INJECTION, SOLUTION EPIDURAL; INFILTRATION; INTRACAUDAL; PERINEURAL at 09:08

## 2018-08-22 NOTE — [employer] PATIENT INSTRUCTIONS
Discharge Summary/Instructions after an Endoscopic Procedure  Patient Name: Maggie Díaz  Patient MRN: [id number]  Patient YOB: 1950 Wednesday, August 22, 2018 Jose Royal MD  RESTRICTIONS:  During your procedure today, you received medications for sedation.  These   medications may affect your judgment, balance and coordination.  Therefore,   for 24 hours, you have the following restrictions:   - DO NOT drive a car, operate machinery, make legal/financial decisions,   sign important papers or drink alcohol.    ACTIVITY:  Today: no heavy lifting, straining or running due to procedural   sedation/anesthesia.  The following day: return to full activity including work.  DIET:  Eat and drink normally unless instructed otherwise.     TREATMENT FOR COMMON SIDE EFFECTS:  - Mild abdominal pain, nausea, belching, bloating or excessive gas:  rest,   eat lightly and use a heating pad.  - Sore Throat: treat with throat lozenges and/or gargle with warm salt   water.  - Because air was used during the procedure, expelling large amounts of air   from your rectum or belching is normal.  - If a bowel prep was taken, you may not have a bowel movement for 1-3 days.    This is normal.  SYMPTOMS TO WATCH FOR AND REPORT TO YOUR PHYSICIAN:  1. Abdominal pain or bloating, other than gas cramps.  2. Chest pain.  3. Back pain.  4. Signs of infection such as: chills or fever occurring within 24 hours   after the procedure.  5. Rectal bleeding, which would show as bright red, maroon, or black stools.   (A tablespoon of blood from the rectum is not serious, especially if   hemorrhoids are present.)  6. Vomiting.  7. Weakness or dizziness.  GO DIRECTLY TO THE NEAREST EMERGENCY ROOM IF YOU HAVE ANY OF THE FOLLOWING:      Difficulty breathing              Chills and/or fever over 101 F   Persistent vomiting and/or vomiting blood   Severe abdominal pain   Severe chest pain   Black, tarry stools   Bleeding- more than one  tablespoon   Any other symptom or condition that you feel may need urgent attention  Your doctor recommends these additional instructions:  If any biopsies were taken, your doctors clinic will contact you in 1 to 2   weeks with any results.  - Discharge patient to home (ambulatory).   - Patient has a contact number available for emergencies.  The signs and   symptoms of potential delayed complications were discussed with the   patient.  Return to normal activities tomorrow.  Written discharge   instructions were provided to the patient.   - Repeat colonoscopy in 10 years for surveillance.   - Return to primary care physician PRN.  For questions, problems or results please call your physician Jose Royal MD   at Work:  [phone]  If you have any questions about the above instructions, call the GI   department at [phone] or call the endoscopy unit at [phone]   from 7am until 3 pm.  [facility], EMERGENCY ROOM PHONE NUMBER:   [phone]  IF A COMPLICATION OR EMERGENCY SITUATION ARISES AND YOU ARE UNABLE TO REACH   YOUR PHYSICIAN - GO DIRECTLY TO THE EMERGENCY ROOM.  I have read or have had read to me these discharge instructions for my   procedure and have received a written copy.  I understand these   instructions and will follow-up with my physician if I have any questions.     __________________________________       _____________________________________  Nurse Signature                                          Patient/Designated   Responsible Party Signature  Jose Royal MD  8/22/2018 10:42:54 AM  This report has been verified and signed electronically.  [employer]

## 2018-08-22 NOTE — DISCHARGE SUMMARY
[facility]  Short Stay  Discharge Summary    Admit Date: 8/22/2018    Discharge Date and Time: 8/22/2018      Discharge Attending Physician: Jose Royal MD     Reason for Admission: Screening colonoscopy     Hospital Course (synopsis of major diagnoses, care, treatment, and services provided during the course of the hospital stay): Uneventful and full recovery    Final Diagnoses:    Principal Problem: Special screening for malignant neoplasms, colon   Secondary Diagnoses: Special screening for malignant neoplasms, colon    Procedures Performed: Procedure(s) (LRB):  COLONOSCOPY (N/A)      Discharged Condition: good    Disposition: Home or Self Care     Diet: regular.    Discharge Condition: Stable    Follow up/Patient Instructions:    Follow-up Information     Adeola Jones MD.    Specialty:  Internal Medicine  Why:  As needed  Contact information:  [address]  [place] [address] [phone]                   Medications:      Medication List      CONTINUE taking these medications    alendronate 70 MG tablet  Commonly known as:  FOSAMAX  TAKE 1 TABLET BY MOUTH EVERY MORNING EVERY WEEK WITH A FULL     calcium carbonate 220 mg capsule     clonazePAM 0.5 MG tablet  Commonly known as:  KLONOPIN  take 1 to 2 tablets by mouth every evening if needed     levothyroxine 50 MCG tablet  Commonly known as:  SYNTHROID  take 1 tablet by mouth once daily     oxyCODONE-acetaminophen 2.5-325 mg per tablet  Commonly known as:  PERCOCET     polyethylene glycol 17 gram/dose powder  Commonly known as:  GLYCOLAX     vitamin D 1000 units Tab          Discharge Procedure Orders   Diet general     Call MD for:  temperature >100.4     Call MD for:  persistent nausea and vomiting     Call MD for:  severe uncontrolled pain     Call MD for:  difficulty breathing, headache or visual disturbances     Call MD for:  redness, tenderness, or signs of infection (pain, swelling, redness, odor or green/yellow discharge around  incision site)     Call MD for:  hives     Call MD for:  persistent dizziness or light-headedness     No dressing needed       Jose Royal

## 2018-08-22 NOTE — H&P
Short Stay Endoscopy History and Physical    PCP - Adeola Jones MD    Procedure - screening colonoscopy  ASA - 2  Mallampati - per anesthesia  History of Anesthesia problems - no  Family history Anesthesia problems -  no     HPI:  This is a 68 y.o.female here for evaluation of : screening colonoscopy for significant weight loss.    Reflux - no  Dysphagia - no  Abdominal pain - no  Diarrhea - no  Anemia - no  GI bleeding - no  Nausea and vomiting-no  Early satiety-no  aversion to sight or smell of food-no    ROS:  Constitutional: No fevers, chills, No weight loss  ENT: No allergies  CV: No chest pain  Pulm: No cough, No shortness of breath  Ophtho: No vision changes  GI: see HPI  Derm: No rash  Heme: No lymphadenopathy, No bruising  MSK: No arthritis  : No dysuria, No hematuria  Endo: No hot or cold intolerance  Neuro: No syncope, No seizure  Psych: No anxiety, No depression    Medical History:  Past Medical History:   Diagnosis Date    Hematuria, microscopic     negative CT    Hepatitis C     slow progression/ Dr. Otoole    Hypertension     Migraines     OP (osteoporosis)     on drug holiday.    Osteoarthritis     Radius fracture     Restless legs syndrome (RLS)     Vitamin D deficiency disease        Surgical History:  Past Surgical History:   Procedure Laterality Date     SECTION, CLASSIC      WRIST FRACTURE SURGERY      , Dr. Bunch.       Family History:  Family History   Adopted: Yes   Family history unknown: Yes       Social History:  Social History     Socioeconomic History    Marital status:      Spouse name: Not on file    Number of children: Not on file    Years of education: Not on file    Highest education level: Not on file   Social Needs    Financial resource strain: Not on file    Food insecurity - worry: Not on file    Food insecurity - inability: Not on file    Transportation needs - medical: Not on file    Transportation needs - non-medical: Not  on file   Occupational History     Employer: [employer]   Tobacco Use    Smoking status: Current Some Day Smoker     Packs/day: 0.50     Years: 10.00     Pack years: 5.00     Last attempt to quit: 3/3/1979     Years since quittin.4    Smokeless tobacco: Never Used   Substance and Sexual Activity    Alcohol use: Yes     Alcohol/week: 0.0 oz     Comment: occasionally    Drug use: No    Sexual activity: Yes   Other Topics Concern    Not on file   Social History Narrative    Live with        Allergies:   Review of patient's allergies indicates:   Allergen Reactions    Bactrim [sulfamethoxazole-trimethoprim]      hallucination    Latex, natural rubber Swelling    Penicillins Shortness Of Breath       Medications:   No current facility-administered medications on file prior to encounter.      Current Outpatient Medications on File Prior to Encounter   Medication Sig Dispense Refill    alendronate (FOSAMAX) 70 MG tablet TAKE 1 TABLET BY MOUTH EVERY MORNING EVERY WEEK WITH A FULL 4 tablet 11    calcium carbonate 220 mg capsule Take 250 mg by mouth 2 (two) times daily with meals.      levothyroxine (SYNTHROID) 50 MCG tablet take 1 tablet by mouth once daily 30 tablet 11    vitamin D 1000 units Tab Take 185 mg by mouth once daily.         Objective Findings:    Vital Signs:  Vitals:    18 0927   BP: (!) 177/66   Pulse: 64   Resp: 16   Temp: 98.2 °F (36.8 °C)           Physical Exam:  General Appearance: Well appearing in no acute distress  Eyes:    No scleral icterus  ENT: Neck supple, Lips, mucosa, and tongue normal; teeth and gums normal  Lungs: CTA bilaterally in anterior and posterior fields, no wheezes, no crackles.  Heart:  Regular rate, S1, S2 normal, no murmurs heard.  Abdomen: Soft, non tender, non distended with normal bowel sounds. No hepatosplenomegaly, ascites, or mass.  Extremities: No clubbing, cyanosis or edema  Skin: No rash    Labs:  Reviewed    Plan:  I have explained  the risks and benefits of endoscopy procedures to the patient including but not limited to bleeding, perforation, infection, and death. The patient wishes to proceed.     Jose Royal

## 2018-08-22 NOTE — ANESTHESIA RELEASE NOTE
"Anesthesia Release from PACU Note    Patient: Maggie Díaz    Procedure(s) Performed: Procedure(s) (LRB):  COLONOSCOPY (N/A)    Anesthesia type: MAC    Post pain: Adequate analgesia    Post assessment: no apparent anesthetic complications and tolerated procedure well    Last Vitals:   Visit Vitals  /67 (BP Location: Left arm, Patient Position: Lying)   Pulse 65   Temp 36.8 °C (98.2 °F) (Oral)   Resp 16   Ht 5' 8" (1.727 m)   Wt 51.3 kg (113 lb)   SpO2 99%   Breastfeeding? No   BMI 17.18 kg/m²       Post vital signs: stable    Level of consciousness: awake, alert  and oriented    Nausea/Vomiting: no nausea/no vomiting    Complications: none    Airway Patency: patent    Respiratory: unassisted, spontaneous ventilation, room air    Cardiovascular: stable and blood pressure at baseline    Hydration: euvolemic  "

## 2018-08-22 NOTE — DISCHARGE INSTRUCTIONS
Hemorrhoids    Hemorrhoids are swollen and inflamed veins inside the rectum and near the anus. The rectum is the last several inches of the colon. The anus is the passage between the rectum and the outside of the body.  Causes  The veins can become swollen due to increased pressure in them. This is most often caused by:  · Chronic constipation or diarrhea  · Straining when having a bowel movement  · Sitting too long on the toilet  · A low-fiber diet  · Pregnancy  Symptoms  · Bleeding from the rectum (this may be noticeable after bowel movements)  · Lump near the anus  · Itching around the anus  · Pain around the anus  There are different types of hemorrhoids. Depending on the type you have and the severity, you may be able to treat yourself at home. In some cases, a procedure may be the best treatment option. Your healthcare provider can tell you more about this, if needed.  Home care  General care  · To get relief from pain or itching, try:  ¨ Topical products. Your healthcare provider may prescribe or recommend creams, ointments, or pads that can be applied to the hemorrhoid. Use these exactly as directed.  ¨ Medicines. Your healthcare provider may recommend stool softeners, suppositories, or laxatives to help manage constipation. Use these exactly as directed.  ¨ Sitz baths. A sitz bath involves sitting in a few inches of warm bath water. Be careful not to make the water so hot that you burn yourself--test it before sitting in it. Soak for about 10 to 15 minutes a few times a day. This may help relieve pain.  Tips to help prevent hemorrhoids  · Eat more fiber. Fiber adds bulk to stool and absorbs water as it moves through your colon. This makes stool softer and easier to pass.  ¨ Increase the fiber in your diet with more fiber-rich foods. These include fresh fruit, vegetables, and whole grains.  ¨ Take a fiber supplement or bulking agent, if advised to by your provider. These include products such as psyllium  or methylcellulose.  · Drink plenty of water, if directed to by your provider. This can help keep stool soft.  · Be more active. Frequent exercise aids digestion and helps prevent constipation. It may also help make bowel movements more regular.  · Dont strain during bowel movements. This can make hemorrhoids more likely. Also, dont sit on the toilet for long periods of time.  Follow-up care  Follow up with your healthcare provider, or as advised. If a culture or imaging tests were done, you will be notified of the results when they are ready. This may take a few days or longer.  When to seek medical advice  Call your healthcare provider right away if any of these occur:  · Increased bleeding from the rectum  · Increased pain around the rectum or anus  · Weakness or dizziness  Call 911  Call 911 or return to the emergency department right away if any of these occur:  · Trouble breathing or swallowing  · Fainting or loss of consciousness  · Unusually fast heart rate  · Vomiting blood  · Large amounts of blood in stool  Date Last Reviewed: 6/22/2015 © 2000-2017 [employer]. [address] [place] [address]. All rights reserved. This information is not intended as a substitute for professional medical care. Always follow your healthcare professional's instructions.          Diverticulosis    Diverticulosis means that small pouches have formed in the wall of your large intestine (colon). Most often, this problem causes no symptoms and is common as people age. But the pouches in the colon are at risk of becoming infected. When this happens, the condition is called diverticulitis. Although most people with diverticulosis never develop diverticulitis, it is still not uncommon. Rectal bleeding can also occur and in less common situations, a type of colon inflammation called colitis.  While most people do not have symptoms, some people with diverticulosis may have:  · Abdominal cramps and  pain  · Bloating  · Constipation  · Change in bowel habits  Causes  The exact cause of diverticulosis (and diverticulitis) has not been proved, but a few things are associated with the condition:  · Low-fiber diet  · Constipation  · Lack of exercise  Your healthcare provider will talk with you about how to manage your condition. Diet changes may be all that are needed to help control diverticulosis and prevent progression to diverticulitis. If you develop diverticulitis, you will likely need other treatments.  Home care  You may be told to take fiber supplements daily. Fiber adds bulk to the stool so that it passes through the colon more easily. Stool softeners may be recommended. You may also be given medications for pain relief. Be sure to take all medications as directed.  In the past, people were told to avoid corn, nuts, and seeds. This is no longer necessary.  Follow these guidelines when caring for yourself at home:  · Eat unprocessed foods that are high in fiber. Whole grains, fruits, and vegetables are good choices.  · Drink 6 to 8 glasses of water every day unless your healthcare provider has you limit how much fluid you should have.  · Watch for changes in your bowel movements. Tell your provider if you notice any changes.  · Begin an exercise program. Ask your provider how to get started. Generally, walking is the best.  · Get plenty of rest and sleep.  Follow-up care  Follow up with your healthcare provider, or as advised. Regular visits may be needed to check on your health. Sometimes special procedures such as colonoscopy, are needed after an episode of diverticulitis or blooding. Be sure to keep all your appointments.  If a stool sample was taken, or cultures were done, you should be told if they are positive, or if your treatment needs to be changed. You can call as directed for the results.  If X-rays were done, a radiologist will look at them. You will be told if there is a change in your  treatment.  If antibiotics were prescribed, be sure to finish them all.  When to seek medical advice  Call your healthcare provider right away if any of these occur:  · Fever of 100.4°F (38°C) or higher, or as directed by your healthcare provider  · Severe cramps in the lower left side of the abdomen or pain that is getting worse  · Tenderness in the lower left side of the abdomen or worsening pain throughout the abdomen  · Diarrhea or constipation that doesn't get better within 24 hours  · Nausea and vomiting  · Bleeding from the rectum  Call 911  Call emergency services if any of the following occur:  · Trouble breathing  · Confusion  · Very drowsy or trouble awakening  · Fainting or loss of consciousness  · Rapid heart rate  · Chest pain  Date Last Reviewed: 12/30/2015 © 2000-2017 [employer]. [address] [place] [address], [place] [address]. All rights reserved. This information is not intended as a substitute for professional medical care. Always follow your healthcare professional's instructions.

## 2018-08-22 NOTE — TRANSFER OF CARE
"Anesthesia Transfer of Care Note    Patient: Maggie Díaz    Procedure(s) Performed: Procedure(s) (LRB):  COLONOSCOPY (N/A)    Patient location: GI    Anesthesia Type: MAC    Transport from OR: Transported from OR on room air with adequate spontaneous ventilation    Post pain: adequate analgesia    Post assessment: no apparent anesthetic complications and tolerated procedure well    Post vital signs: stable    Level of consciousness: awake, alert and oriented    Nausea/Vomiting: no nausea/vomiting    Complications: none    Transfer of care protocol was followed      Last vitals:   Visit Vitals  /67 (BP Location: Left arm, Patient Position: Lying)   Pulse 65   Temp 36.8 °C (98.2 °F) (Oral)   Resp 16   Ht 5' 8" (1.727 m)   Wt 51.3 kg (113 lb)   SpO2 99%   Breastfeeding? No   BMI 17.18 kg/m²     "

## 2018-08-22 NOTE — ANESTHESIA POSTPROCEDURE EVALUATION
"Anesthesia Post Evaluation    Patient: Maggie Díaz    Procedure(s) Performed: Procedure(s) (LRB):  COLONOSCOPY (N/A)    Final Anesthesia Type: MAC  Patient location during evaluation: GI PACU  Patient participation: Yes- Able to Participate  Level of consciousness: awake and alert and oriented  Post-procedure vital signs: reviewed and stable  Pain management: adequate  Airway patency: patent  PONV status at discharge: No PONV  Anesthetic complications: no      Cardiovascular status: hemodynamically stable  Respiratory status: unassisted, spontaneous ventilation and room air  Hydration status: euvolemic  Follow-up not needed.        Visit Vitals  /67 (BP Location: Left arm, Patient Position: Lying)   Pulse 65   Temp 36.8 °C (98.2 °F) (Oral)   Resp 16   Ht 5' 8" (1.727 m)   Wt 51.3 kg (113 lb)   SpO2 99%   Breastfeeding? No   BMI 17.18 kg/m²       Pain/Roosevelt Score: Pain Assessment Performed: Yes (8/22/2018  9:26 AM)  Presence of Pain: denies (8/22/2018  9:26 AM)        "

## 2018-08-22 NOTE — ANESTHESIA PREPROCEDURE EVALUATION
08/22/2018  Maggie Díaz is a 68 y.o., female.    Anesthesia Evaluation    I have reviewed the Patient Summary Reports.    I have reviewed the Nursing Notes.   I have reviewed the Medications.     Review of Systems  Anesthesia Hx:  No problems with previous Anesthesia    Social:  Smoker, Social Alcohol Use 1-2 cigs per day   Hematology/Oncology:  Hematology Normal   Oncology Normal     EENT/Dental:EENT/Dental Normal   Cardiovascular:   Exercise tolerance: good Hypertension, well controlled    Pulmonary:  Pulmonary Normal    Renal/:  Renal/ Normal     Hepatic/GI:   Liver Disease, Hepatitis, C    Musculoskeletal:   Arthritis     Neurological:   Headaches    Endocrine:   Hypothyroidism    Dermatological:  Skin Normal    Psych:  Psychiatric Normal           Physical Exam  General:  Well nourished    Airway/Jaw/Neck:  Airway Findings: Mouth Opening: Normal Tongue: Normal  General Airway Assessment: Adult  Mallampati: I  TM Distance: Normal, at least 6 cm  Jaw/Neck Findings:  Neck ROM: Normal ROM      Dental:  Dental Findings: Upper Dentures, lower partial dentures   Chest/Lungs:  Chest/Lungs Findings: Clear to auscultation, Normal Respiratory Rate     Heart/Vascular:  Heart Findings: Rate: Normal  Rhythm: Regular Rhythm  Sounds: Normal     Abdomen:  Abdomen Findings:  Normal       Mental Status:  Mental Status Findings:  Cooperative, Alert and Oriented         Anesthesia Plan  Type of Anesthesia, risks & benefits discussed:  Anesthesia Type:  MAC  Patient's Preference:   Intra-op Monitoring Plan: standard ASA monitors  Intra-op Monitoring Plan Comments:   Post Op Pain Control Plan:   Post Op Pain Control Plan Comments:   Induction:   IV  Beta Blocker:  Patient is not currently on a Beta-Blocker (No further documentation required).       Informed Consent: Patient understands risks and agrees with Anesthesia  plan.  Questions answered. Anesthesia consent signed with patient.  ASA Score: 2     Day of Surgery Review of History & Physical: I have interviewed and examined the patient. I have reviewed the patient's H&P dated:  There are no significant changes.          Ready For Surgery From Anesthesia Perspective.

## 2018-08-23 VITALS
WEIGHT: 113 LBS | TEMPERATURE: 98 F | DIASTOLIC BLOOD PRESSURE: 65 MMHG | OXYGEN SATURATION: 98 % | HEIGHT: 68 IN | RESPIRATION RATE: 18 BRPM | HEART RATE: 75 BPM | SYSTOLIC BLOOD PRESSURE: 110 MMHG | BODY MASS INDEX: 17.13 KG/M2

## 2018-08-23 LAB
HCV LOG: <1.08 LOG (10) IU/ML
HCV RNA QUANT PCR: <12 IU/ML
HCV, QUALITATIVE: NOT DETECTED IU/ML

## 2018-08-27 ENCOUNTER — TELEPHONE (OUTPATIENT)
Dept: GASTROENTEROLOGY | Facility: CLINIC | Age: 68
End: 2018-08-27

## 2018-08-27 NOTE — TELEPHONE ENCOUNTER
----- Message from Ivy Hooker sent at 8/27/2018  2:58 PM CDT -----  Patient needs call back rg test results..[phone] (home) [phone] (work)

## 2018-09-07 ENCOUNTER — OFFICE VISIT (OUTPATIENT)
Dept: INTERNAL MEDICINE | Facility: CLINIC | Age: 68
End: 2018-09-07
Payer: MEDICARE

## 2018-09-07 VITALS
TEMPERATURE: 98 F | WEIGHT: 117.75 LBS | HEART RATE: 101 BPM | OXYGEN SATURATION: 95 % | SYSTOLIC BLOOD PRESSURE: 118 MMHG | BODY MASS INDEX: 17.85 KG/M2 | DIASTOLIC BLOOD PRESSURE: 68 MMHG | HEIGHT: 68 IN

## 2018-09-07 DIAGNOSIS — K59.00 CONSTIPATION, UNSPECIFIED CONSTIPATION TYPE: ICD-10-CM

## 2018-09-07 DIAGNOSIS — M81.0 AGE-RELATED OSTEOPOROSIS WITHOUT CURRENT PATHOLOGICAL FRACTURE: ICD-10-CM

## 2018-09-07 DIAGNOSIS — R63.4 WEIGHT LOSS: Primary | ICD-10-CM

## 2018-09-07 DIAGNOSIS — E03.9 ACQUIRED HYPOTHYROIDISM: ICD-10-CM

## 2018-09-07 DIAGNOSIS — Z86.19 HISTORY OF HEPATITIS C: ICD-10-CM

## 2018-09-07 DIAGNOSIS — E55.9 VITAMIN D DEFICIENCY DISEASE: ICD-10-CM

## 2018-09-07 PROCEDURE — 99213 OFFICE O/P EST LOW 20 MIN: CPT | Mod: S$PBB,,, | Performed by: PHYSICIAN ASSISTANT

## 2018-09-07 PROCEDURE — 99214 OFFICE O/P EST MOD 30 MIN: CPT | Mod: PBBFAC,PO | Performed by: PHYSICIAN ASSISTANT

## 2018-09-07 PROCEDURE — 99999 PR PBB SHADOW E&M-EST. PATIENT-LVL IV: CPT | Mod: PBBFAC,,, | Performed by: PHYSICIAN ASSISTANT

## 2018-09-07 NOTE — PROGRESS NOTES
Subjective:       Patient ID: Maggie Díaz is a 68 y.o. female.    Chief Complaint: Follow-up    68 year old female presents to clinic for one month f/u from last PCP visit for recheck of weight loss. As noted per last PCP note, pt has lost 30# in the past year. Pt reports she was not eating as much due to constipation. Pt was evaluated by GI and had Cscope 8/22/18 (hemorrhoids and diverticulosis, otherwise normal). Pt reports constipation has resolved with Miralax and pt is now eating more varieties of food. Pt postponed the abd/pelv CT scan recommended by PCP due to wanting to recheck weight today since constipation has resolved and appetite improved. Pt also had abd/pelv CT in May 2018. Weight is stable at 117 pounds since PCP visit last month. Pt reports going through a weight loss period years ago similar to this one and reports she does not know why it occurred but it resolved on its own. She reports having normal workup at that time as well. She reports no fever, chills, CP, SOB, urinary sxs, resp sxs, or other medical complaints.    PCP: Dr. Berumen    Patient Active Problem List:     Vitamin D deficiency disease     History of hepatitis C     Age-related osteoporosis without current pathological fracture     Restless legs syndrome (RLS)     Allergic rhinitis     Acquired hypothyroidism     Radius fracture     Special screening for malignant neoplasms, colon      Review of Systems   Constitutional: Positive for unexpected weight change. Negative for chills and fever.   HENT: Negative for trouble swallowing.    Respiratory: Negative for cough and shortness of breath.    Cardiovascular: Negative for chest pain, palpitations and leg swelling.   Gastrointestinal: Negative for abdominal pain, blood in stool, diarrhea, nausea and vomiting.   Genitourinary: Negative for difficulty urinating, dysuria, flank pain, frequency, hematuria and urgency.   Musculoskeletal: Negative for back pain.   Skin: Negative for  "rash.   Neurological: Negative for dizziness, weakness, numbness and headaches.   Psychiatric/Behavioral: Negative for confusion.       Objective:       Vitals:    09/07/18 0807   BP: 118/68   BP Location: Right arm   Patient Position: Sitting   BP Method: Small (Manual)   Pulse: 101   Temp: 97.5 °F (36.4 °C)   TempSrc: Tympanic   SpO2: 95%   Weight: 53.4 kg (117 lb 11.6 oz)   Height: 5' 8" (1.727 m)     Physical Exam   Constitutional: She is oriented to person, place, and time. She appears well-developed and well-nourished. No distress.   HENT:   Head: Normocephalic and atraumatic.   Eyes: EOM are normal. No scleral icterus.   Neck: Neck supple.   Cardiovascular: Normal rate and regular rhythm.   Pulmonary/Chest: Effort normal and breath sounds normal. No respiratory distress. She has no decreased breath sounds. She has no wheezes. She has no rhonchi. She has no rales.   Abdominal: Soft. Bowel sounds are normal. She exhibits no distension and no mass. There is no tenderness. There is no rigidity, no rebound, no guarding and no CVA tenderness.   Musculoskeletal: Normal range of motion. She exhibits no edema.   Neurological: She is alert and oriented to person, place, and time. No cranial nerve deficit.   Skin: Skin is warm and dry. No rash noted.   Psychiatric: She has a normal mood and affect. Her speech is normal and behavior is normal. Thought content normal.       Component      Latest Ref Rng & Units 8/20/2018 7/20/2018 5/10/2018   WBC      3.90 - 12.70 K/uL  5.66 4.76   RBC      4.00 - 5.40 M/uL  4.30 4.42   Hemoglobin      12.0 - 16.0 g/dL  14.4 14.3   Hematocrit      37.0 - 48.5 %  41.8 43.3   MCV      82 - 98 fL  97 98   MCH      27.0 - 31.0 pg  33.5 (H) 32.4 (H)   MCHC      32.0 - 36.0 g/dL  34.4 33.0   RDW      11.5 - 14.5 %  13.1 13.1   Platelets      150 - 350 K/uL  124 (L) 139 (L)   MPV      9.2 - 12.9 fL  9.9 10.3   Immature Granulocytes      0.0 - 0.5 %   0.2   Gran # (ANC)      1.8 - 7.7 K/uL  3.4 " "2.5   Immature Grans (Abs)      0.00 - 0.04 K/uL   0.01   Lymph #      1.0 - 4.8 K/uL  1.5 1.7   Mono #      0.3 - 1.0 K/uL  0.7 0.5   Eos #      0.0 - 0.5 K/uL  0.1 0.1   Baso #      0.00 - 0.20 K/uL  0.03 0.04   nRBC      0 /100 WBC   0   Gran%      38.0 - 73.0 %  60.6 51.9   Lymph%      18.0 - 48.0 %  26.5 34.9   Mono%      4.0 - 15.0 %  11.5 11.1   Eosinophil%      0.0 - 8.0 %  0.9 1.1   Basophil%      0.0 - 1.9 %  0.5 0.8   Differential Method        Automated Automated   Sodium      136 - 145 mmol/L  138 138   Potassium      3.5 - 5.1 mmol/L  4.2 3.8   Chloride      95 - 110 mmol/L  102 101   CO2      23 - 29 mmol/L  27 29   Glucose      70 - 110 mg/dL  94 104   BUN, Bld      8 - 23 mg/dL  6 (L) 10   Creatinine      0.5 - 1.4 mg/dL  0.7 0.9   Calcium      8.7 - 10.5 mg/dL  9.8 9.8   Total Protein      6.0 - 8.4 g/dL   7.7   Albumin      3.5 - 5.2 g/dL   4.6   Total Bilirubin      0.1 - 1.0 mg/dL   0.8   Alkaline Phosphatase      55 - 135 U/L   28 (L)   AST      10 - 40 U/L   21   ALT      10 - 44 U/L   12   Anion Gap      8 - 16 mmol/L  9 8   eGFR if African American      >60 mL/min/1.73 m:2  >60 >60.0   eGFR if non African American      >60 mL/min/1.73 m:2  >60 >60.0   HCV Log      <1.08 Log (10) IU/mL <1.08     HCV, Qualitative      Not detected IU/mL Not detected     HCV RNA Quant PCR      <12 IU/mL <12     TSH      0.400 - 4.000 uIU/mL 0.546     Free T4      0.71 - 1.51 ng/dL 1.32     T3, Free      2.3 - 4.2 pg/mL 2.7       Abd/pelv CT 5/17/18: "1. Diverticulosis without definite evidence of acute diverticulitis. 2. Possible 2.8 cm right adnexal cystic structure versus asymmetric enlargement of the right ovary. Consider further evaluation with pelvic ultrasound. 3. Otherwise no acute findings."    Pelvic U/S 5/29/18: "Simple right ovarian cyst.  No other significant findings."    CXR 7/20/18: "Heart and pulmonary vasculature within normal limits.  Minimal hyperinflation without consolidation or effusion. " " Trachea is midline and CP angles sharp.  Mild osteopenia and minimal spondylosis. IMPRESSION: No acute infiltrate."  Assessment:       1. Weight loss    2. Constipation, unspecified constipation type    3. Acquired hypothyroidism    4. History of hepatitis C    5. Age-related osteoporosis without current pathological fracture    6. Vitamin D deficiency disease        Plan:         Maggie was seen today for follow-up.    Diagnoses and all orders for this visit:    Weight loss  Stable. Pt declines repeat abd/pelv CT at this time. F/u with PCP as scheduled in one month for recheck. RTC sooner if new/worse sxs / weight change before then.    Constipation, unspecified constipation type  Well-controlled with Miralax. Continue and monitor sxs. Pt is UTD with colonoscopy.     Acquired hypothyroidism  Controlled on Synthroid.    History of hepatitis C  Recent labs by GI shows hep C virus not detected. F/u with GI as recommended.    Age-related osteoporosis without current pathological fracture  Pt taking Fosamax and vit D. F/u with PCP.    Vitamin D deficiency disease  Controlled on vit D suppl. Continue.    Follow-up with your PCP as scheduled in one month for health management.  "

## 2018-09-27 ENCOUNTER — OFFICE VISIT (OUTPATIENT)
Dept: INTERNAL MEDICINE | Facility: CLINIC | Age: 68
End: 2018-09-27
Payer: MEDICARE

## 2018-09-27 ENCOUNTER — HOSPITAL ENCOUNTER (OUTPATIENT)
Dept: RADIOLOGY | Facility: HOSPITAL | Age: 68
Discharge: HOME | End: 2018-09-27
Attending: PHYSICIAN ASSISTANT
Payer: MEDICARE

## 2018-09-27 VITALS
TEMPERATURE: 100 F | SYSTOLIC BLOOD PRESSURE: 102 MMHG | OXYGEN SATURATION: 94 % | WEIGHT: 117.06 LBS | HEIGHT: 68 IN | HEART RATE: 116 BPM | DIASTOLIC BLOOD PRESSURE: 56 MMHG | BODY MASS INDEX: 17.74 KG/M2

## 2018-09-27 DIAGNOSIS — M81.0 AGE-RELATED OSTEOPOROSIS WITHOUT CURRENT PATHOLOGICAL FRACTURE: ICD-10-CM

## 2018-09-27 DIAGNOSIS — R05.9 COUGH: ICD-10-CM

## 2018-09-27 DIAGNOSIS — Z86.19 HISTORY OF HEPATITIS C: ICD-10-CM

## 2018-09-27 DIAGNOSIS — R50.9 FEVER, UNSPECIFIED FEVER CAUSE: Primary | ICD-10-CM

## 2018-09-27 DIAGNOSIS — J01.90 ACUTE SINUSITIS, RECURRENCE NOT SPECIFIED, UNSPECIFIED LOCATION: ICD-10-CM

## 2018-09-27 DIAGNOSIS — E55.9 VITAMIN D DEFICIENCY DISEASE: ICD-10-CM

## 2018-09-27 DIAGNOSIS — R50.9 FEVER, UNSPECIFIED FEVER CAUSE: ICD-10-CM

## 2018-09-27 DIAGNOSIS — J18.9 PNEUMONIA DUE TO INFECTIOUS ORGANISM, UNSPECIFIED LATERALITY, UNSPECIFIED PART OF LUNG: Primary | ICD-10-CM

## 2018-09-27 DIAGNOSIS — F17.200 SMOKER: ICD-10-CM

## 2018-09-27 DIAGNOSIS — E03.9 ACQUIRED HYPOTHYROIDISM: ICD-10-CM

## 2018-09-27 PROCEDURE — 99214 OFFICE O/P EST MOD 30 MIN: CPT | Mod: PBBFAC,PO,25 | Performed by: PHYSICIAN ASSISTANT

## 2018-09-27 PROCEDURE — 99999 PR PBB SHADOW E&M-EST. PATIENT-LVL IV: CPT | Mod: PBBFAC,,, | Performed by: PHYSICIAN ASSISTANT

## 2018-09-27 PROCEDURE — 99214 OFFICE O/P EST MOD 30 MIN: CPT | Mod: S$PBB,,, | Performed by: PHYSICIAN ASSISTANT

## 2018-09-27 PROCEDURE — 71046 X-RAY EXAM CHEST 2 VIEWS: CPT | Mod: 26,,, | Performed by: RADIOLOGY

## 2018-09-27 PROCEDURE — 71046 X-RAY EXAM CHEST 2 VIEWS: CPT | Mod: TC,FY,PO

## 2018-09-27 RX ORDER — LEVOFLOXACIN 500 MG/1
500 TABLET, FILM COATED ORAL DAILY
Qty: 10 TABLET | Refills: 0 | Status: SHIPPED | OUTPATIENT
Start: 2018-09-27 | End: 2018-10-07

## 2018-09-27 NOTE — PROGRESS NOTES
Subjective:       Patient ID: Maggie Díaz is a 68 y.o. female.    Chief Complaint: No chief complaint on file.    68 year old female c/o onset of mild nasal congestion, rhinorrhea, and cough approx. 6 days ago. She reports taking Robitussin and Flonase with some relief. She reports feeling better 2 days ago, then sxs greatly worsened yesterday. She reports onset of subj fever with chills and body aches and more productive cough with green sputum with possibly trace of blood. She reports some sinus pressure at frontal region as well. She has taken ibuprofen and also a left-over Norco (from a surgery) last night with some relief of sxs. She reports no ear pain, sore throat, N/V, CP SOB, edema, rash, or other medical complaints. 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 She reports she smokes occasionally and  smokes around her as well.    PCP: Dr. Berumen    Patient Active Problem List:     Vitamin D deficiency disease     History of hepatitis C     Age-related osteoporosis without current pathological fracture     Restless legs syndrome (RLS)     Allergic rhinitis     Acquired hypothyroidism     Radius fracture     Special screening for malignant neoplasms, colon      Review of Systems   Constitutional: Positive for chills and fever.   HENT: Positive for rhinorrhea and sinus pressure. Negative for ear pain, sore throat and trouble swallowing.    Respiratory: Positive for cough. Negative for chest tightness, shortness of breath and wheezing.    Cardiovascular: Negative for chest pain, palpitations and leg swelling.   Gastrointestinal: Negative for abdominal pain, nausea and vomiting.   Musculoskeletal: Positive for myalgias.   Skin: Negative for rash.   Neurological: Positive for headaches. Negative for dizziness, weakness and numbness.   Psychiatric/Behavioral: Negative for confusion.       Objective:       Vitals:    09/27/18  "0829   BP: (!) 102/56   BP Location: Right arm   Patient Position: Sitting   BP Method: Small (Manual)   Pulse: (!) 116   Temp: (!) 100.4 °F (38 °C)   TempSrc: Tympanic   SpO2: (!) 94%   Weight: 53.1 kg (117 lb 1 oz)   Height: 5' 8" (1.727 m)     Physical Exam   Constitutional: She is oriented to person, place, and time. She appears well-developed and well-nourished. No distress.   Pt appears tired   HENT:   Head: Normocephalic and atraumatic.   Right Ear: Tympanic membrane and ear canal normal.   Left Ear: Tympanic membrane and ear canal normal.   Nose: Right sinus exhibits frontal sinus tenderness. Left sinus exhibits frontal sinus tenderness.   Mouth/Throat: Oropharynx is clear and moist. No oropharyngeal exudate.   Mild soft palate erythema   Eyes: EOM are normal. No scleral icterus.   Neck: Neck supple.   Cardiovascular: Normal rate and regular rhythm.   Pulmonary/Chest: Effort normal and breath sounds normal. No respiratory distress. She has no decreased breath sounds. She has no wheezes. She has no rhonchi. She has no rales.   Musculoskeletal: Normal range of motion. She exhibits no edema.   Lymphadenopathy:     She has no cervical adenopathy.   Neurological: She is alert and oriented to person, place, and time. No cranial nerve deficit.   Skin: Skin is warm and dry. No rash noted.   Psychiatric: She has a normal mood and affect. Her speech is normal and behavior is normal. Thought content normal.       Component      Latest Ref Rng & Units 8/20/2018 7/20/2018   WBC      3.90 - 12.70 K/uL  5.66   RBC      4.00 - 5.40 M/uL  4.30   Hemoglobin      12.0 - 16.0 g/dL  14.4   Hematocrit      37.0 - 48.5 %  41.8   MCV      82 - 98 fL  97   MCH      27.0 - 31.0 pg  33.5 (H)   MCHC      32.0 - 36.0 g/dL  34.4   RDW      11.5 - 14.5 %  13.1   Platelets      150 - 350 K/uL  124 (L)   MPV      9.2 - 12.9 fL  9.9   Gran # (ANC)      1.8 - 7.7 K/uL  3.4   Lymph #      1.0 - 4.8 K/uL  1.5   Mono #      0.3 - 1.0 K/uL  0.7 "   Eos #      0.0 - 0.5 K/uL  0.1   Baso #      0.00 - 0.20 K/uL  0.03   Gran%      38.0 - 73.0 %  60.6   Lymph%      18.0 - 48.0 %  26.5   Mono%      4.0 - 15.0 %  11.5   Eosinophil%      0.0 - 8.0 %  0.9   Basophil%      0.0 - 1.9 %  0.5   Differential Method        Automated   Sodium      136 - 145 mmol/L  138   Potassium      3.5 - 5.1 mmol/L  4.2   Chloride      95 - 110 mmol/L  102   CO2      23 - 29 mmol/L  27   Glucose      70 - 110 mg/dL  94   BUN, Bld      8 - 23 mg/dL  6 (L)   Creatinine      0.5 - 1.4 mg/dL  0.7   Calcium      8.7 - 10.5 mg/dL  9.8   Anion Gap      8 - 16 mmol/L  9   eGFR if African American      >60 mL/min/1.73 m:2  >60   eGFR if non African American      >60 mL/min/1.73 m:2  >60   HCV Log      <1.08 Log (10) IU/mL <1.08    HCV, Qualitative      Not detected IU/mL Not detected    HCV RNA Quant PCR      <12 IU/mL <12    TSH      0.400 - 4.000 uIU/mL 0.546    Free T4      0.71 - 1.51 ng/dL 1.32    T3, Free      2.3 - 4.2 pg/mL 2.7      Component      Latest Ref Rng & Units 1/26/2018   Vit D, 25-Hydroxy      30 - 96 ng/mL 34     Assessment:       1. Fever, unspecified fever cause    2. Cough    3. Acute sinusitis, recurrence not specified, unspecified location    4. Smoker    5. Acquired hypothyroidism    6. Vitamin D deficiency disease    7. History of hepatitis C    8. Age-related osteoporosis without current pathological fracture        Plan:         Diagnoses and all orders for this visit:    Fever, Cough, Acute sinusitis  -     X-Ray Chest PA And Lateral; Future  -     Influenza A & B by Molecular  Flu test and CXR today with result review following. Symptomatic care, hydration, and rest. Further recommendations to follow review of pending results. ER if severe sxs occur.    Acquired hypothyroidism  Controlled. Pt taking Synthroid.    Vitamin D deficiency disease  Controlled. Pt taking vit D suppl.    History of hepatitis C  F/u with GI as recommended.    Age-related osteoporosis  without current pathological fracture  Most recent DEXA 6/30/17. Pt taking Fosamax.    Follow-up with your PCP as scheduled in one month for health management.

## 2018-09-28 ENCOUNTER — TELEPHONE (OUTPATIENT)
Dept: INTERNAL MEDICINE | Facility: CLINIC | Age: 68
End: 2018-09-28

## 2018-09-28 NOTE — TELEPHONE ENCOUNTER
Please let pt know that the lab cancelled flu tests yesterday, so we will not have a flu result for pt. CXR showed pneumonia and that is being treated with antibiotic. She may RTC for a repeat flu swab if desired, but she does not have to.

## 2018-09-28 NOTE — TELEPHONE ENCOUNTER
"----- Message from [employer] sent at 9/27/2018  3:13 PM CDT -----  Regarding: Cancellation of Order # [id number]  Order number [id number] for the procedure INFLUENZA A & B BY   MOLECULAR [[id number]] has been canceled by [employer]   [[id number]]. This procedure was ordered by SHEILA Roman [[id number]]  on Sep 27, 2018 for the patient Maggie Díaz [[id number]]. The   reason for cancellation was "None".  "

## 2018-10-01 ENCOUNTER — TELEPHONE (OUTPATIENT)
Dept: INTERNAL MEDICINE | Facility: CLINIC | Age: 68
End: 2018-10-01

## 2018-10-01 NOTE — TELEPHONE ENCOUNTER
----- Message from Ena Baeza sent at 9/28/2018  4:22 PM CDT -----  Contact: pt  The pt request a return call, no additional info given, can be reached at [phone]///[email or web address]

## 2018-10-04 ENCOUNTER — HOSPITAL ENCOUNTER (OUTPATIENT)
Dept: RADIOLOGY | Facility: HOSPITAL | Age: 68
Discharge: HOME | End: 2018-10-04
Attending: PHYSICIAN ASSISTANT
Payer: MEDICARE

## 2018-10-04 ENCOUNTER — OFFICE VISIT (OUTPATIENT)
Dept: INTERNAL MEDICINE | Facility: CLINIC | Age: 68
End: 2018-10-04
Attending: PHYSICIAN ASSISTANT
Payer: MEDICARE

## 2018-10-04 VITALS
SYSTOLIC BLOOD PRESSURE: 134 MMHG | OXYGEN SATURATION: 97 % | WEIGHT: 119.94 LBS | TEMPERATURE: 98 F | DIASTOLIC BLOOD PRESSURE: 88 MMHG | HEIGHT: 68 IN | BODY MASS INDEX: 18.18 KG/M2 | HEART RATE: 89 BPM

## 2018-10-04 DIAGNOSIS — J18.9 PNEUMONIA OF LEFT LUNG DUE TO INFECTIOUS ORGANISM, UNSPECIFIED PART OF LUNG: Primary | ICD-10-CM

## 2018-10-04 DIAGNOSIS — R05.9 COUGH: ICD-10-CM

## 2018-10-04 DIAGNOSIS — E03.9 ACQUIRED HYPOTHYROIDISM: ICD-10-CM

## 2018-10-04 DIAGNOSIS — E55.9 VITAMIN D DEFICIENCY DISEASE: ICD-10-CM

## 2018-10-04 DIAGNOSIS — J18.9 PNEUMONIA DUE TO INFECTIOUS ORGANISM, UNSPECIFIED LATERALITY, UNSPECIFIED PART OF LUNG: ICD-10-CM

## 2018-10-04 DIAGNOSIS — Z86.19 HISTORY OF HEPATITIS C: ICD-10-CM

## 2018-10-04 DIAGNOSIS — M81.0 AGE-RELATED OSTEOPOROSIS WITHOUT CURRENT PATHOLOGICAL FRACTURE: ICD-10-CM

## 2018-10-04 PROCEDURE — 99213 OFFICE O/P EST LOW 20 MIN: CPT | Mod: S$PBB,,, | Performed by: PHYSICIAN ASSISTANT

## 2018-10-04 PROCEDURE — 99214 OFFICE O/P EST MOD 30 MIN: CPT | Mod: PBBFAC,25,PO | Performed by: PHYSICIAN ASSISTANT

## 2018-10-04 PROCEDURE — 99999 PR PBB SHADOW E&M-EST. PATIENT-LVL IV: CPT | Mod: PBBFAC,,, | Performed by: PHYSICIAN ASSISTANT

## 2018-10-04 PROCEDURE — 71046 X-RAY EXAM CHEST 2 VIEWS: CPT | Mod: TC,FY,PO

## 2018-10-04 PROCEDURE — 71046 X-RAY EXAM CHEST 2 VIEWS: CPT | Mod: 26,,, | Performed by: RADIOLOGY

## 2018-10-04 RX ORDER — BENZONATATE 100 MG/1
100 CAPSULE ORAL 3 TIMES DAILY PRN
Qty: 30 CAPSULE | Refills: 0 | Status: SHIPPED | OUTPATIENT
Start: 2018-10-04 | End: 2018-10-14

## 2018-10-04 NOTE — PROGRESS NOTES
"Subjective:       Patient ID: Maggie Díaz is a 68 y.o. female.    Chief Complaint: Follow-up    68 year old female presents to clinic for one week f/u of pneumonia, treated with Levaquin. She reports tolerating her Levaquin and sxs have greatly improved since last visit. She reports a lingering cough, now with clear sputum, but otherwise feels well. She requests medication for cough to take as needed. She reports no fever, chills, N/V, CP, SOB, rash, edema, or other medical complaints. Repeat CXR today shows resolution of pneumonia. Pt has also gained 2 pounds since last visit.    PCP: Dr. Berumen    Patient Active Problem List:     Vitamin D deficiency disease     History of hepatitis C     Age-related osteoporosis without current pathological fracture     Restless legs syndrome (RLS)     Allergic rhinitis     Acquired hypothyroidism     Radius fracture     Special screening for malignant neoplasms, colon      Review of Systems   Constitutional: Negative for chills and fever.   HENT: Negative for congestion, ear pain, rhinorrhea, sore throat and trouble swallowing.    Respiratory: Positive for cough. Negative for chest tightness, shortness of breath and wheezing.    Cardiovascular: Negative for chest pain, palpitations and leg swelling.   Gastrointestinal: Negative for abdominal pain, nausea and vomiting.   Skin: Negative for rash.   Neurological: Negative for dizziness, weakness, numbness and headaches.   Psychiatric/Behavioral: Negative for confusion.       Objective:       Vitals:    10/04/18 0804   BP: 134/88   BP Location: Right arm   Patient Position: Sitting   BP Method: Small (Manual)   Pulse: 89   Temp: 97.5 °F (36.4 °C)   TempSrc: Tympanic   SpO2: 97%   Weight: 54.4 kg (119 lb 14.9 oz)   Height: 5' 8" (1.727 m)     Physical Exam   Constitutional: She is oriented to person, place, and time. She appears well-developed and well-nourished. No distress.   HENT:   Head: Normocephalic and atraumatic. " "  Right Ear: Tympanic membrane and ear canal normal.   Left Ear: Tympanic membrane and ear canal normal.   Mouth/Throat: Oropharynx is clear and moist. No oropharyngeal exudate.   Eyes: EOM are normal. No scleral icterus.   Neck: Neck supple.   Cardiovascular: Normal rate and regular rhythm.   Pulmonary/Chest: Effort normal and breath sounds normal. No respiratory distress. She has no decreased breath sounds. She has no wheezes. She has no rhonchi. She has no rales.   Musculoskeletal: Normal range of motion.   Neurological: She is alert and oriented to person, place, and time. No cranial nerve deficit.   Skin: Skin is warm and dry. No rash noted.   Psychiatric: She has a normal mood and affect. Her speech is normal and behavior is normal. Thought content normal.       CXR 9/27/18: "There is a new somewhat rounded airspace opacity in the region of the lingula that measures up to 4.2 x 3.8 cm and is suspicious for pneumonia.  Close follow-up to resolution is recommended.  The heart is not enlarged."    CXR today 10/4/18: "Previously seen rounded airspace opacity in the lingula has resolved with mild postinflammatory changes now seen in the lingula.  No new focal pulmonary consolidation.  Lungs remain hyperinflated.  No pleural effusion.  Cardiomediastinal silhouette and osseous structures are stable in appearance."  Assessment:       1. Pneumonia of left lung due to infectious organism, unspecified part of lung    2. Cough        Plan:         Maggie was seen today for follow-up.    Diagnoses and all orders for this visit:    Pneumonia of left lung due to infectious organism, unspecified part of lung, Cough  -     benzonatate (TESSALON) 100 MG capsule; Take 1 capsule (100 mg total) by mouth 3 (three) times daily as needed for Cough.  Today's CXR results were reviewed with pt during clinic visit today. Complete Levaquin regimen. Mucinex-DM or Tessalon Perles PRN cough. RTC if sxs persist or worsen.    Acquired " hypothyroidism  Controlled. Pt taking Synthroid.     Vitamin D deficiency disease  Controlled. Pt taking vit D suppl.     History of hepatitis C  F/u with GI as recommended.     Age-related osteoporosis without current pathological fracture  Most recent DEXA 6/30/17. Pt taking Fosamax.    Follow-up with your PCP as scheduled in 2 weeks for health management.

## 2018-10-05 NOTE — PROGRESS NOTES
"Subjective:      Patient ID: Maggie Díaz is a 68 y.o. female.    Chief Complaint: Follow-up      HPI  Here for follow up of medical problems.  Gained back 4 of 30# lost.  Says good appetite.  BMs good with miralax qod.  No f/c/sw/cough.      Updated/ annual due 2/19:  HM: 10/18 today fluvax, 3/15 [id number], 2/14 [id number], 7/18 TD, 1/10 TDaP, 8/12 zoster, 6/17 BMD rep 2y, 8/18 Cscope rep 10y, 3/16 OB neg x 2, 2/18 MMG, 5/18 pelvic u/s neg.     Review of Systems   Constitutional: Negative for chills, diaphoresis and fever.   Respiratory: Negative for cough and shortness of breath.    Cardiovascular: Negative for chest pain, palpitations and leg swelling.   Gastrointestinal: Negative for blood in stool, constipation, diarrhea, nausea and vomiting.   Genitourinary: Negative for dysuria, frequency and hematuria.   Psychiatric/Behavioral: The patient is not nervous/anxious.          Objective:   /82 (BP Location: Right arm, Patient Position: Sitting, BP Method: Medium (Manual))   Pulse 85   Temp 98.1 °F (36.7 °C) (Tympanic)   Ht 5' 8" (1.727 m)   Wt 55 kg (121 lb 4.1 oz)   SpO2 95%   BMI 18.44 kg/m²     Physical Exam   Constitutional: She is oriented to person, place, and time. She appears well-developed.   HENT:   Mouth/Throat: Oropharynx is clear and moist.   Neck: Neck supple. Carotid bruit is not present. No thyroid mass present.   Cardiovascular: Normal rate, regular rhythm and intact distal pulses. Exam reveals no gallop and no friction rub.   No murmur heard.  Pulmonary/Chest: Effort normal and breath sounds normal. She has no wheezes. She has no rales.   Abdominal: Soft. Bowel sounds are normal. She exhibits no mass. There is no hepatosplenomegaly. There is no tenderness.   Musculoskeletal: She exhibits no edema.   Lymphadenopathy:     She has no cervical adenopathy.   Neurological: She is alert and oriented to person, place, and time.   Psychiatric: She has a normal mood and affect. "           Assessment:       1. Acquired hypothyroidism    2. Weight loss    3. Preventive measure    4. Vitamin D deficiency disease    5. Encounter for screening mammogram for malignant neoplasm of breast           Plan:     Acquired hypothyroidism- Clinically stable, continue present treatment.    Weight loss- will follow, is gaining back.    Preventive measure- due in 4mo.  Discussed pt needs to get Shingrix vaccination at pharmacy.Fluvax now.  -     CBC auto differential; Future; Expected date: 10/19/2018  -     Comprehensive metabolic panel; Future; Expected date: 10/19/2018  -     Lipid panel; Future; Expected date: 10/19/2018  -     TSH; Future; Expected date: 10/19/2018  -     Vitamin D; Future  -     Mammo Digital Screening Bilat; Future; Expected date: 10/19/2018    Vitamin D deficiency disease  -     Vitamin D; Future    Other orders  -     Influenza - High Dose (65+) (PF) (IM)

## 2018-10-19 ENCOUNTER — OFFICE VISIT (OUTPATIENT)
Dept: INTERNAL MEDICINE | Facility: CLINIC | Age: 68
End: 2018-10-19
Payer: MEDICARE

## 2018-10-19 VITALS
TEMPERATURE: 98 F | HEART RATE: 85 BPM | OXYGEN SATURATION: 95 % | HEIGHT: 68 IN | BODY MASS INDEX: 18.38 KG/M2 | DIASTOLIC BLOOD PRESSURE: 82 MMHG | WEIGHT: 121.25 LBS | SYSTOLIC BLOOD PRESSURE: 132 MMHG

## 2018-10-19 DIAGNOSIS — Z29.9 PREVENTIVE MEASURE: ICD-10-CM

## 2018-10-19 DIAGNOSIS — E03.9 ACQUIRED HYPOTHYROIDISM: Primary | ICD-10-CM

## 2018-10-19 DIAGNOSIS — R63.4 WEIGHT LOSS: ICD-10-CM

## 2018-10-19 DIAGNOSIS — Z12.31 ENCOUNTER FOR SCREENING MAMMOGRAM FOR MALIGNANT NEOPLASM OF BREAST: ICD-10-CM

## 2018-10-19 DIAGNOSIS — E55.9 VITAMIN D DEFICIENCY DISEASE: ICD-10-CM

## 2018-10-19 PROCEDURE — 99213 OFFICE O/P EST LOW 20 MIN: CPT | Mod: S$PBB,,, | Performed by: INTERNAL MEDICINE

## 2018-10-19 PROCEDURE — 99999 PR PBB SHADOW E&M-EST. PATIENT-LVL III: CPT | Mod: PBBFAC,,, | Performed by: INTERNAL MEDICINE

## 2018-10-19 PROCEDURE — 99213 OFFICE O/P EST LOW 20 MIN: CPT | Mod: PBBFAC,PO,25 | Performed by: INTERNAL MEDICINE

## 2018-10-19 PROCEDURE — 90662 IIV NO PRSV INCREASED AG IM: CPT | Mod: PBBFAC,PO

## 2019-01-08 ENCOUNTER — TELEPHONE (OUTPATIENT)
Dept: INTERNAL MEDICINE | Facility: CLINIC | Age: 69
End: 2019-01-08

## 2019-01-08 ENCOUNTER — HOSPITAL ENCOUNTER (OUTPATIENT)
Dept: RADIOLOGY | Facility: HOSPITAL | Age: 69
Discharge: HOME | End: 2019-01-08
Attending: FAMILY MEDICINE
Payer: MEDICARE

## 2019-01-08 ENCOUNTER — OFFICE VISIT (OUTPATIENT)
Dept: INTERNAL MEDICINE | Facility: CLINIC | Age: 69
End: 2019-01-08
Payer: MEDICARE

## 2019-01-08 VITALS
HEART RATE: 90 BPM | TEMPERATURE: 99 F | WEIGHT: 120.56 LBS | OXYGEN SATURATION: 98 % | BODY MASS INDEX: 18.27 KG/M2 | SYSTOLIC BLOOD PRESSURE: 138 MMHG | HEIGHT: 68 IN | DIASTOLIC BLOOD PRESSURE: 84 MMHG

## 2019-01-08 DIAGNOSIS — J06.9 VIRAL URI WITH COUGH: Primary | ICD-10-CM

## 2019-01-08 DIAGNOSIS — R05.9 COUGH: ICD-10-CM

## 2019-01-08 DIAGNOSIS — J06.9 UPPER RESPIRATORY TRACT INFECTION, UNSPECIFIED TYPE: Primary | ICD-10-CM

## 2019-01-08 PROCEDURE — 71046 XR CHEST PA AND LATERAL: ICD-10-PCS | Mod: 26,,, | Performed by: RADIOLOGY

## 2019-01-08 PROCEDURE — 71046 X-RAY EXAM CHEST 2 VIEWS: CPT | Mod: TC,FY,PO

## 2019-01-08 PROCEDURE — 99999 PR PBB SHADOW E&M-EST. PATIENT-LVL IV: CPT | Mod: PBBFAC,,, | Performed by: FAMILY MEDICINE

## 2019-01-08 PROCEDURE — 71046 X-RAY EXAM CHEST 2 VIEWS: CPT | Mod: 26,,, | Performed by: RADIOLOGY

## 2019-01-08 PROCEDURE — 99214 OFFICE O/P EST MOD 30 MIN: CPT | Mod: PBBFAC,25,PO | Performed by: FAMILY MEDICINE

## 2019-01-08 PROCEDURE — 99214 PR OFFICE/OUTPT VISIT, EST, LEVL IV, 30-39 MIN: ICD-10-PCS | Mod: S$PBB,,, | Performed by: FAMILY MEDICINE

## 2019-01-08 PROCEDURE — 99214 OFFICE O/P EST MOD 30 MIN: CPT | Mod: S$PBB,,, | Performed by: FAMILY MEDICINE

## 2019-01-08 PROCEDURE — 99999 PR PBB SHADOW E&M-EST. PATIENT-LVL IV: ICD-10-PCS | Mod: PBBFAC,,, | Performed by: FAMILY MEDICINE

## 2019-01-08 NOTE — PROGRESS NOTES
"CHIEF COMPLAINT  Cough      HISTORY OF PRESENT ILLNESS: New problem is upper respiratory illness symptoms.  Quality of main complaint is cough, productive of a moderate amount of non-bloody sputum.  Associated symptoms include coryza and nasal congestion. Severity of symptoms described as moderately severe.  Onset reported over the last several days.  Timing of symptoms described as typically worse at night.  Her primary concern is that she might have pneumonia.    PLAN: I educated her on the apparently viral nature of this acute illness, how the management was largely supportive and symptom focused. We discussed the risks and benefits of treatment options, and she feels that the severity of her symptoms is sufficient to warrant the treatment prescribed (Prednisone 5mg - Take 4 tablets by mouth daily for 3 days, then 3 tablets daily for 3 days, then 2 tablets daily for 3 days, then 1 tablet daily for 3 days). I discouraged the use of over-the-counter cough and cold medications. I told her to expect gradual improvement and resolution of symptoms over the next week or two, and I instructed her to let me know if her illness failed to follow this expected course.     Problem List Items Addressed This Visit     None      Visit Diagnoses     Viral URI with cough    -  Primary    Cough        Relevant Orders    X-Ray Chest PA And Lateral (Completed)          REVIEW OF SYSTEMS  CONSTITUTIONAL: No objective fever reported.  PULMONARY: No hemoptysis or difficulty breathing reported.  CARDIOVASCULAR: No chest pain or orthopnea reported.     PHYSICAL EXAM  Vitals:    01/08/19 1434   BP: 138/84   BP Location: Right arm   Patient Position: Sitting   BP Method: Medium (Manual)   Pulse: 90   Temp: 99 °F (37.2 °C)   TempSrc: Oral   SpO2: 98%   Weight: 54.7 kg (120 lb 9.5 oz)   Height: 5' 8" (1.727 m)     CONSTITUTIONAL: Vital signs noted. No apparent distress. Does not appear acutely ill or septic. Appears adequately " "hydrated.  EYES: Pupils equal and reactive. Extraocular movements intact. Sclerae anicteric. Lids and conjunctiva unremarkable.  ENT: External ENT grossly unremarkable. Ear canals and visualized tympanic membranes are unremarkable. Hearing grossly intact. Nasal mucosa pink. Oropharynx moist without lesion, inflammation or exudate. Posterior oropharynx is symmetric.  NECK: Neck supple without meningismus. Trachea midline. No significant cervical lymphadenopathy.  PULM: Lungs clear. Breathing unlabored.  HEART: Auscultation reveals regular rate and rhythm.  DERM: Skin warm and moist with normal turgor.     PRESCRIPTION MEDICATION MANAGEMENT     There are no discontinued medications.    Follow-up if symptoms worsen or fail to improve.    Documentation entered by me for this encounter may have been done in part using speech-recognition technology. Although I have made an effort to ensure accuracy, "sound like" errors may exist and should be interpreted in context. -SELVIN Rivera MD      There are no Patient Instructions on file for this visit.   "

## 2019-01-08 NOTE — PROGRESS NOTES
Please call her and tell her that her chest x-ray did not show pneumonia or something bad with her heart and lungs.

## 2019-01-08 NOTE — TELEPHONE ENCOUNTER
Patient given the results of the chest xray, states she would like to get the steroids after all, [facility] to [facility] [address]

## 2019-01-09 NOTE — TELEPHONE ENCOUNTER
----- Message from Vangie Ferrell sent at 1/9/2019 12:40 PM CST -----  Contact: pt  She's calling in regards to medication being called in  , (pt doesn't know the name) pls call pt back at [phone] (home) [phone] (work)            [id number] - [place] - [address] AT [facility] [address]  [address]  [place] [address]  Phone: [phone] Fax: [phone]

## 2019-01-09 NOTE — TELEPHONE ENCOUNTER
Spoke with patient. States she was told that if chest xray was negative that an oral steroid would be sent to pharmacy. Patient is requesting oral steroid to be sent to pharmacy ([facility] Please address.//ddw

## 2019-01-11 RX ORDER — PREDNISONE 5 MG/1
TABLET ORAL
Qty: 30 TABLET | Refills: 0 | Status: SHIPPED | OUTPATIENT
Start: 2019-01-11 | End: 2019-02-22

## 2019-01-11 NOTE — TELEPHONE ENCOUNTER
TASK: Please call her, notify her of Rx, and tell her I apologize for the confusion. Thanks.    Medications Ordered This Encounter   Medications    predniSONE (DELTASONE) 5 MG tablet     Sig: Take 4 tablets by mouth daily for 3 days, then 3 tablets daily for 3 days, then 2 tablets daily for 3 days, then 1 tablet daily for 3 days     Dispense:  30 tablet     Refill:  0

## 2019-01-11 NOTE — TELEPHONE ENCOUNTER
Spoke with patient advised her that prednisone prescription was sent in and apologized for the confusion. Patient verbalized understanding and thanked us.

## 2019-01-28 DIAGNOSIS — G25.81 RESTLESS LEGS SYNDROME (RLS): ICD-10-CM

## 2019-01-29 RX ORDER — CLONAZEPAM 0.5 MG/1
TABLET ORAL
Qty: 60 TABLET | Refills: 2 | Status: SHIPPED | OUTPATIENT
Start: 2019-01-29 | End: 2019-01-29 | Stop reason: SDUPTHER

## 2019-02-08 NOTE — PROGRESS NOTES
"Subjective:      Patient ID: Maggie Díaz is a 68 y.o. female.    Chief Complaint: Follow-up      HPI  Here for f/u medical problems and preventive exam.  Energy good, walks dog daily.  No f/c/sw/cough.  No cp/sob/palp.  BMs normal.  Urine normal.  Taking vit D.  No gum bleeding or other bleeding.    HM: 10/18 fluvax, 3/15 [id number], 2/14 [id number], 7/18 TD, 1/10 TDaP, 8/12 zoster, 6/17 BMD rep 2y, 8/18 Cscope rep 10y, 3/16 OB neg x 2, 2/19 MMG, 5/18 pelvic u/s neg.     Review of Systems   Constitutional: Negative for appetite change, chills, diaphoresis and fever.   HENT: Negative for congestion, ear pain, rhinorrhea, sinus pressure and sore throat.    Respiratory: Negative for cough, chest tightness and shortness of breath.    Cardiovascular: Negative for chest pain and palpitations.   Gastrointestinal: Negative for blood in stool, constipation, diarrhea, nausea and vomiting.   Genitourinary: Negative for dysuria, frequency, hematuria, menstrual problem, urgency and vaginal discharge.   Musculoskeletal: Negative for arthralgias.   Skin: Negative for rash.   Neurological: Negative for dizziness and headaches.   Psychiatric/Behavioral: Negative for sleep disturbance. The patient is not nervous/anxious.          Objective:   /68 (BP Location: Right arm, Patient Position: Sitting, BP Method: Medium (Manual))   Pulse 68   Temp 97.2 °F (36.2 °C) (Tympanic)   Ht 5' 8" (1.727 m)   Wt 58.8 kg (129 lb 10.1 oz)   SpO2 100%   BMI 19.71 kg/m²     Physical Exam   Constitutional: She is oriented to person, place, and time. She appears well-developed and well-nourished.   HENT:   Right Ear: External ear normal. Tympanic membrane is not injected.   Left Ear: External ear normal. Tympanic membrane is not injected.   Mouth/Throat: Oropharynx is clear and moist.   Eyes: Conjunctivae are normal.   Neck: Normal range of motion. Neck supple. No thyromegaly present.   Cardiovascular: Normal rate, regular rhythm and intact " distal pulses. Exam reveals no gallop and no friction rub.   No murmur heard.  Pulmonary/Chest: Effort normal and breath sounds normal. She has no wheezes. She has no rales. Right breast exhibits no mass, no nipple discharge, no skin change and no tenderness. Left breast exhibits no mass, no nipple discharge, no skin change and no tenderness.   Abdominal: Soft. Bowel sounds are normal. She exhibits no mass. There is no tenderness.   Musculoskeletal: She exhibits no edema.   Lymphadenopathy:     She has no cervical adenopathy.        Right axillary: No lateral adenopathy present.        Left axillary: No lateral adenopathy present.  Neurological: She is alert and oriented to person, place, and time.   Skin: Skin is warm. No rash noted.   Psychiatric: She has a normal mood and affect.       Results for orders placed or performed in visit on 02/18/19   CBC auto differential   Result Value Ref Range    WBC 3.91 3.90 - 12.70 K/uL    RBC 4.42 4.00 - 5.40 M/uL    Hemoglobin 14.5 12.0 - 16.0 g/dL    Hematocrit 44.5 37.0 - 48.5 %     (H) 82 - 98 fL    MCH 32.8 (H) 27.0 - 31.0 pg    MCHC 32.6 32.0 - 36.0 g/dL    RDW 12.9 11.5 - 14.5 %    Platelets 140 (L) 150 - 350 K/uL    MPV 10.4 9.2 - 12.9 fL    Immature Granulocytes 0.3 0.0 - 0.5 %    Gran # (ANC) 1.4 (L) 1.8 - 7.7 K/uL    Immature Grans (Abs) 0.01 0.00 - 0.04 K/uL    Lymph # 2.0 1.0 - 4.8 K/uL    Mono # 0.4 0.3 - 1.0 K/uL    Eos # 0.1 0.0 - 0.5 K/uL    Baso # 0.04 0.00 - 0.20 K/uL    nRBC 0 0 /100 WBC    Gran% 34.7 (L) 38.0 - 73.0 %    Lymph% 51.2 (H) 18.0 - 48.0 %    Mono% 10.2 4.0 - 15.0 %    Eosinophil% 2.6 0.0 - 8.0 %    Basophil% 1.0 0.0 - 1.9 %    Differential Method Automated    Comprehensive metabolic panel   Result Value Ref Range    Sodium 142 136 - 145 mmol/L    Potassium 4.8 3.5 - 5.1 mmol/L    Chloride 102 95 - 110 mmol/L    CO2 31 (H) 23 - 29 mmol/L    Glucose 89 70 - 110 mg/dL    BUN, Bld 19 8 - 23 mg/dL    Creatinine 0.9 0.5 - 1.4 mg/dL    Calcium  9.9 8.7 - 10.5 mg/dL    Total Protein 7.7 6.0 - 8.4 g/dL    Albumin 4.4 3.5 - 5.2 g/dL    Total Bilirubin 0.7 0.1 - 1.0 mg/dL    Alkaline Phosphatase 31 (L) 55 - 135 U/L    AST 18 10 - 40 U/L    ALT 12 10 - 44 U/L    Anion Gap 9 8 - 16 mmol/L    eGFR if African American >60.0 >60 mL/min/1.73 m^2    eGFR if non African American >60.0 >60 mL/min/1.73 m^2   Lipid panel   Result Value Ref Range    Cholesterol 178 120 - 199 mg/dL    Triglycerides 45 30 - 150 mg/dL    HDL 80 (H) 40 - 75 mg/dL    LDL Cholesterol 89.0 63.0 - 159.0 mg/dL    HDL/Chol Ratio 44.9 20.0 - 50.0 %    Total Cholesterol/HDL Ratio 2.2 2.0 - 5.0    Non-HDL Cholesterol 98 mg/dL   TSH   Result Value Ref Range    TSH 4.709 (H) 0.400 - 4.000 uIU/mL   Vitamin D   Result Value Ref Range    Vit D, 25-Hydroxy 22 (L) 30 - 96 ng/mL   T4, free   Result Value Ref Range    Free T4 1.08 0.71 - 1.51 ng/dL         Assessment:       1. Encounter for preventive health examination    2. Vitamin D deficiency disease    3. Restless legs syndrome (RLS)    4. History of hepatitis C    5. Age-related osteoporosis without current pathological fracture    6. Acquired hypothyroidism    7. Thrombocytopenia          Plan:     Encounter for preventive health examination- utd.  Discussed pt needs to get Shingrix vaccination at pharmacy.    Thrombocytopenia- recheck 6mo.    Vitamin D deficiency disease- add 1K D3.    Restless legs syndrome (RLS)- cont clonazepam.    History of hepatitis C    Age-related osteoporosis without current pathological fracture  -     DXA Bone Density Spine And Hip; Future; Expected date: 02/22/2019    Acquired hypothyroidism - Clinically stable, continue present treatment.    RTC 6mo with BMD.

## 2019-02-18 ENCOUNTER — HOSPITAL ENCOUNTER (OUTPATIENT)
Dept: RADIOLOGY | Facility: HOSPITAL | Age: 69
Discharge: HOME | End: 2019-02-18
Attending: INTERNAL MEDICINE
Payer: MEDICARE

## 2019-02-18 VITALS — WEIGHT: 120 LBS | HEIGHT: 68 IN | BODY MASS INDEX: 18.19 KG/M2

## 2019-02-18 DIAGNOSIS — E03.9 ACQUIRED HYPOTHYROIDISM: ICD-10-CM

## 2019-02-18 DIAGNOSIS — Z12.31 ENCOUNTER FOR SCREENING MAMMOGRAM FOR MALIGNANT NEOPLASM OF BREAST: ICD-10-CM

## 2019-02-18 DIAGNOSIS — Z29.9 PREVENTIVE MEASURE: ICD-10-CM

## 2019-02-18 PROCEDURE — 77063 BREAST TOMOSYNTHESIS BI: CPT | Mod: 26,,, | Performed by: RADIOLOGY

## 2019-02-18 PROCEDURE — 77067 SCR MAMMO BI INCL CAD: CPT | Mod: TC

## 2019-02-18 PROCEDURE — 77067 SCR MAMMO BI INCL CAD: CPT | Mod: 26,,, | Performed by: RADIOLOGY

## 2019-02-18 PROCEDURE — 77063 MAMMO DIGITAL SCREENING BILAT WITH TOMOSYNTHESIS_CAD: ICD-10-PCS | Mod: 26,,, | Performed by: RADIOLOGY

## 2019-02-18 PROCEDURE — 77067 MAMMO DIGITAL SCREENING BILAT WITH TOMOSYNTHESIS_CAD: ICD-10-PCS | Mod: 26,,, | Performed by: RADIOLOGY

## 2019-02-22 ENCOUNTER — OFFICE VISIT (OUTPATIENT)
Dept: INTERNAL MEDICINE | Facility: CLINIC | Age: 69
End: 2019-02-22
Payer: MEDICARE

## 2019-02-22 VITALS
HEIGHT: 68 IN | SYSTOLIC BLOOD PRESSURE: 118 MMHG | OXYGEN SATURATION: 100 % | TEMPERATURE: 97 F | DIASTOLIC BLOOD PRESSURE: 68 MMHG | BODY MASS INDEX: 19.65 KG/M2 | WEIGHT: 129.63 LBS | HEART RATE: 68 BPM

## 2019-02-22 DIAGNOSIS — E55.9 VITAMIN D DEFICIENCY DISEASE: ICD-10-CM

## 2019-02-22 DIAGNOSIS — D69.6 THROMBOCYTOPENIA: ICD-10-CM

## 2019-02-22 DIAGNOSIS — M81.0 AGE-RELATED OSTEOPOROSIS WITHOUT CURRENT PATHOLOGICAL FRACTURE: ICD-10-CM

## 2019-02-22 DIAGNOSIS — G25.81 RESTLESS LEGS SYNDROME (RLS): ICD-10-CM

## 2019-02-22 DIAGNOSIS — Z00.00 ENCOUNTER FOR PREVENTIVE HEALTH EXAMINATION: Primary | ICD-10-CM

## 2019-02-22 DIAGNOSIS — Z86.19 HISTORY OF HEPATITIS C: ICD-10-CM

## 2019-02-22 DIAGNOSIS — E03.9 ACQUIRED HYPOTHYROIDISM: ICD-10-CM

## 2019-02-22 PROCEDURE — 99397 PR PREVENTIVE VISIT,EST,65 & OVER: ICD-10-PCS | Mod: S$PBB,,, | Performed by: INTERNAL MEDICINE

## 2019-02-22 PROCEDURE — 99397 PER PM REEVAL EST PAT 65+ YR: CPT | Mod: S$PBB,,, | Performed by: INTERNAL MEDICINE

## 2019-02-22 PROCEDURE — 99999 PR PBB SHADOW E&M-EST. PATIENT-LVL III: ICD-10-PCS | Mod: PBBFAC,,, | Performed by: INTERNAL MEDICINE

## 2019-02-22 PROCEDURE — 99999 PR PBB SHADOW E&M-EST. PATIENT-LVL III: CPT | Mod: PBBFAC,,, | Performed by: INTERNAL MEDICINE

## 2019-02-22 PROCEDURE — 99213 OFFICE O/P EST LOW 20 MIN: CPT | Mod: PBBFAC,PN | Performed by: INTERNAL MEDICINE

## 2019-02-22 RX ORDER — CLONAZEPAM 0.5 MG/1
TABLET ORAL
Refills: 2 | COMMUNITY
Start: 2019-01-29 | End: 2019-04-25 | Stop reason: SDUPTHER

## 2019-03-28 ENCOUNTER — LAB VISIT (OUTPATIENT)
Dept: LAB | Facility: HOSPITAL | Age: 69
End: 2019-03-28
Attending: PHYSICIAN ASSISTANT
Payer: MEDICARE

## 2019-03-28 ENCOUNTER — OFFICE VISIT (OUTPATIENT)
Dept: INTERNAL MEDICINE | Facility: CLINIC | Age: 69
End: 2019-03-28
Payer: MEDICARE

## 2019-03-28 ENCOUNTER — TELEPHONE (OUTPATIENT)
Dept: INTERNAL MEDICINE | Facility: CLINIC | Age: 69
End: 2019-03-28

## 2019-03-28 VITALS
WEIGHT: 130.06 LBS | BODY MASS INDEX: 19.71 KG/M2 | TEMPERATURE: 96 F | SYSTOLIC BLOOD PRESSURE: 130 MMHG | HEIGHT: 68 IN | OXYGEN SATURATION: 98 % | DIASTOLIC BLOOD PRESSURE: 74 MMHG | HEART RATE: 86 BPM

## 2019-03-28 DIAGNOSIS — R31.29 MICROSCOPIC HEMATURIA: Primary | ICD-10-CM

## 2019-03-28 DIAGNOSIS — R30.0 DYSURIA: Primary | ICD-10-CM

## 2019-03-28 DIAGNOSIS — Z86.19 HISTORY OF HEPATITIS C: ICD-10-CM

## 2019-03-28 DIAGNOSIS — R30.0 DYSURIA: ICD-10-CM

## 2019-03-28 DIAGNOSIS — M81.0 AGE-RELATED OSTEOPOROSIS WITHOUT CURRENT PATHOLOGICAL FRACTURE: ICD-10-CM

## 2019-03-28 DIAGNOSIS — E55.9 VITAMIN D DEFICIENCY DISEASE: ICD-10-CM

## 2019-03-28 DIAGNOSIS — D69.6 THROMBOCYTOPENIA: ICD-10-CM

## 2019-03-28 DIAGNOSIS — R68.83 CHILLS: ICD-10-CM

## 2019-03-28 DIAGNOSIS — E03.9 ACQUIRED HYPOTHYROIDISM: ICD-10-CM

## 2019-03-28 LAB
BILIRUB UR QL STRIP: NEGATIVE
CLARITY UR: CLEAR
COLOR UR: YELLOW
GLUCOSE UR QL STRIP: NEGATIVE
HGB UR QL STRIP: ABNORMAL
KETONES UR QL STRIP: NEGATIVE
LEUKOCYTE ESTERASE UR QL STRIP: NEGATIVE
MICROSCOPIC COMMENT: ABNORMAL
NITRITE UR QL STRIP: NEGATIVE
PH UR STRIP: 6 [PH] (ref 5–8)
PROT UR QL STRIP: NEGATIVE
RBC #/AREA URNS HPF: 10 /HPF (ref 0–4)
SP GR UR STRIP: 1.01 (ref 1–1.03)
SQUAMOUS #/AREA URNS HPF: 2 /HPF
URN SPEC COLLECT METH UR: ABNORMAL

## 2019-03-28 PROCEDURE — 99213 OFFICE O/P EST LOW 20 MIN: CPT | Mod: S$PBB,,, | Performed by: PHYSICIAN ASSISTANT

## 2019-03-28 PROCEDURE — 81000 URINALYSIS NONAUTO W/SCOPE: CPT

## 2019-03-28 PROCEDURE — 99999 PR PBB SHADOW E&M-EST. PATIENT-LVL III: ICD-10-PCS | Mod: PBBFAC,,, | Performed by: PHYSICIAN ASSISTANT

## 2019-03-28 PROCEDURE — 99213 PR OFFICE/OUTPT VISIT, EST, LEVL III, 20-29 MIN: ICD-10-PCS | Mod: S$PBB,,, | Performed by: PHYSICIAN ASSISTANT

## 2019-03-28 PROCEDURE — 87086 URINE CULTURE/COLONY COUNT: CPT

## 2019-03-28 PROCEDURE — 99213 OFFICE O/P EST LOW 20 MIN: CPT | Mod: PBBFAC,PN | Performed by: PHYSICIAN ASSISTANT

## 2019-03-28 PROCEDURE — 99999 PR PBB SHADOW E&M-EST. PATIENT-LVL III: CPT | Mod: PBBFAC,,, | Performed by: PHYSICIAN ASSISTANT

## 2019-03-28 RX ORDER — LEVOTHYROXINE SODIUM 50 UG/1
TABLET ORAL
Qty: 30 TABLET | Refills: 11 | Status: SHIPPED | OUTPATIENT
Start: 2019-03-28 | End: 2020-04-29 | Stop reason: SDUPTHER

## 2019-03-28 NOTE — TELEPHONE ENCOUNTER
----- Message from Fe Ramirez sent at 3/28/2019 12:14 PM CDT -----  Contact: pt  Type:  Test Results    Who Called: pt  Name of Test (Lab/Mammo/Etc): lab  Date of Test: 03/28  Ordering Provider: Lilibeth Nash  Where the test was performed: [facility]  Would the patient rather a call back or a response via [facility]? Call back  Best Call Back Number: [phone]  Additional Information:

## 2019-03-28 NOTE — PROGRESS NOTES
"Subjective:       Patient ID: Maggie Díaz is a 68 y.o. female.    Chief Complaint: Dysuria    68 year old female c/o dysuria, mild urinary urgency/frequency, and mild intermittent nausea and chills X couple days. She reports no hematuria, abd pain, vomiting, diarrhea, blood in stool, rash, fever, genital lesions, or other medical complaints. She has taken Advil with short-term improvement of sxs. She reports last UTI was about 15 years ago. She reports no known history of urinary stones. She reports a stomach virus is going around her work and she thought her nausea/chills may be due to that, but then dysuria began.    PCP: Dr. Berumen    Patient Active Problem List:     Vitamin D deficiency disease     History of hepatitis C     Age-related osteoporosis without current pathological fracture     Restless legs syndrome (RLS)     Allergic rhinitis     Acquired hypothyroidism     Radius fracture     Thrombocytopenia    Review of Systems   Constitutional: Positive for chills. Negative for fever.   HENT: Negative for congestion, ear pain, rhinorrhea and sore throat.    Respiratory: Negative for cough and shortness of breath.    Cardiovascular: Negative for chest pain, palpitations and leg swelling.   Gastrointestinal: Positive for nausea. Negative for abdominal pain, blood in stool, diarrhea and vomiting.   Genitourinary: Positive for dysuria, frequency and urgency. Negative for difficulty urinating, flank pain and hematuria.   Skin: Negative for rash.   Neurological: Negative for dizziness, weakness, numbness and headaches.   Psychiatric/Behavioral: Negative for confusion.       Objective:       Vitals:    03/28/19 0808   BP: 130/74   BP Location: Left arm   Patient Position: Sitting   BP Method: Small (Manual)   Pulse: 86   Temp: (!) 95.7 °F (35.4 °C)   TempSrc: Tympanic   SpO2: 98%   Weight: 59 kg (130 lb 1.1 oz)   Height: 5' 8" (1.727 m)     Physical Exam   Constitutional: She is oriented to person, place, and " time. She appears well-developed and well-nourished. No distress.   HENT:   Head: Normocephalic and atraumatic.   Mouth/Throat: Oropharynx is clear and moist. No oropharyngeal exudate.   Eyes: EOM are normal. No scleral icterus.   Neck: Neck supple.   Cardiovascular: Normal rate and regular rhythm.   Pulmonary/Chest: Effort normal and breath sounds normal. No stridor. No respiratory distress. She has no decreased breath sounds. She has no wheezes. She has no rhonchi. She has no rales.   Abdominal: Soft. Bowel sounds are normal. She exhibits no distension and no mass. There is no tenderness. There is no rigidity, no rebound, no guarding and no CVA tenderness.   Musculoskeletal: Normal range of motion. She exhibits no edema.   Neurological: She is alert and oriented to person, place, and time. No cranial nerve deficit.   Skin: Skin is warm and dry. No rash noted.   Psychiatric: She has a normal mood and affect. Her speech is normal and behavior is normal. Thought content normal.       Component      Latest Ref Rng & Units 2/18/2019   WBC      3.90 - 12.70 K/uL 3.91   RBC      4.00 - 5.40 M/uL 4.42   Hemoglobin      12.0 - 16.0 g/dL 14.5   Hematocrit      37.0 - 48.5 % 44.5   MCV      82 - 98 fL 101 (H)   MCH      27.0 - 31.0 pg 32.8 (H)   MCHC      32.0 - 36.0 g/dL 32.6   RDW      11.5 - 14.5 % 12.9   Platelets      150 - 350 K/uL 140 (L)   MPV      9.2 - 12.9 fL 10.4   Immature Granulocytes      0.0 - 0.5 % 0.3   Gran # (ANC)      1.8 - 7.7 K/uL 1.4 (L)   Immature Grans (Abs)      0.00 - 0.04 K/uL 0.01   Lymph #      1.0 - 4.8 K/uL 2.0   Mono #      0.3 - 1.0 K/uL 0.4   Eos #      0.0 - 0.5 K/uL 0.1   Baso #      0.00 - 0.20 K/uL 0.04   nRBC      0 /100 WBC 0   Gran%      38.0 - 73.0 % 34.7 (L)   Lymph%      18.0 - 48.0 % 51.2 (H)   Mono%      4.0 - 15.0 % 10.2   Eosinophil%      0.0 - 8.0 % 2.6   Basophil%      0.0 - 1.9 % 1.0   Differential Method       Automated   Sodium      136 - 145 mmol/L 142   Potassium       3.5 - 5.1 mmol/L 4.8   Chloride      95 - 110 mmol/L 102   CO2      23 - 29 mmol/L 31 (H)   Glucose      70 - 110 mg/dL 89   BUN, Bld      8 - 23 mg/dL 19   Creatinine      0.5 - 1.4 mg/dL 0.9   Calcium      8.7 - 10.5 mg/dL 9.9   Total Protein      6.0 - 8.4 g/dL 7.7   Albumin      3.5 - 5.2 g/dL 4.4   Total Bilirubin      0.1 - 1.0 mg/dL 0.7   Alkaline Phosphatase      55 - 135 U/L 31 (L)   AST      10 - 40 U/L 18   ALT      10 - 44 U/L 12   Anion Gap      8 - 16 mmol/L 9   eGFR if African American      >60 mL/min/1.73 m:2 >60.0   eGFR if non African American      >60 mL/min/1.73 m:2 >60.0   Cholesterol      120 - 199 mg/dL 178   Triglycerides      30 - 150 mg/dL 45   HDL      40 - 75 mg/dL 80 (H)   LDL Cholesterol      63.0 - 159.0 mg/dL 89.0   HDL/Chol Ratio      20.0 - 50.0 % 44.9   Total Cholesterol/HDL Ratio      2.0 - 5.0 2.2   Non-HDL Cholesterol      mg/dL 98   TSH      0.400 - 4.000 uIU/mL 4.709 (H)   Vit D, 25-Hydroxy      30 - 96 ng/mL 22 (L)   Free T4      0.71 - 1.51 ng/dL 1.08     Assessment:       1. Dysuria    2. Acquired hypothyroidism    3. Age-related osteoporosis without current pathological fracture    4. Vitamin D deficiency disease    5. Thrombocytopenia    6. History of hepatitis C        Plan:         Maggie was seen today for dysuria.    Diagnoses and all orders for this visit:    Dysuria  -     Urinalysis; Future  -     Urine culture; Future  Urine studies today with result review following. Monitor sxs and stay hydrated. RTC if sxs persist or worsen.    Acquired hypothyroidism    Age-related osteoporosis without current pathological fracture    Vitamin D deficiency disease    Thrombocytopenia    History of hepatitis C    Follow-up with your PCP as scheduled in 5 months for health management.

## 2019-03-29 LAB — BACTERIA UR CULT: NORMAL

## 2019-04-01 DIAGNOSIS — R31.29 MICROSCOPIC HEMATURIA: Primary | ICD-10-CM

## 2019-04-08 ENCOUNTER — LAB VISIT (OUTPATIENT)
Dept: LAB | Facility: HOSPITAL | Age: 69
End: 2019-04-08
Attending: PHYSICIAN ASSISTANT
Payer: MEDICARE

## 2019-04-08 DIAGNOSIS — R31.29 MICROSCOPIC HEMATURIA: ICD-10-CM

## 2019-04-08 LAB
BACTERIA #/AREA URNS HPF: ABNORMAL /HPF
BILIRUB UR QL STRIP: NEGATIVE
CLARITY UR: CLEAR
COLOR UR: YELLOW
GLUCOSE UR QL STRIP: NEGATIVE
HGB UR QL STRIP: ABNORMAL
HYALINE CASTS #/AREA URNS LPF: 0 /LPF
KETONES UR QL STRIP: ABNORMAL
LEUKOCYTE ESTERASE UR QL STRIP: NEGATIVE
MICROSCOPIC COMMENT: ABNORMAL
NITRITE UR QL STRIP: NEGATIVE
PH UR STRIP: 6 [PH] (ref 5–8)
PROT UR QL STRIP: ABNORMAL
RBC #/AREA URNS HPF: 15 /HPF (ref 0–4)
SP GR UR STRIP: 1.02 (ref 1–1.03)
SQUAMOUS #/AREA URNS HPF: 2 /HPF
URN SPEC COLLECT METH UR: ABNORMAL
WBC #/AREA URNS HPF: 3 /HPF (ref 0–5)

## 2019-04-08 PROCEDURE — 81000 URINALYSIS NONAUTO W/SCOPE: CPT

## 2019-04-15 ENCOUNTER — HOSPITAL ENCOUNTER (OUTPATIENT)
Dept: RADIOLOGY | Facility: HOSPITAL | Age: 69
Discharge: HOME | End: 2019-04-15
Attending: PHYSICIAN ASSISTANT
Payer: MEDICARE

## 2019-04-15 DIAGNOSIS — R31.29 MICROSCOPIC HEMATURIA: ICD-10-CM

## 2019-04-15 DIAGNOSIS — R68.83 CHILLS: ICD-10-CM

## 2019-04-15 PROCEDURE — 74176 CT ABD & PELVIS W/O CONTRAST: CPT | Mod: TC

## 2019-04-15 PROCEDURE — 74176 CT ABD & PELVIS W/O CONTRAST: CPT | Mod: 26,,, | Performed by: RADIOLOGY

## 2019-04-15 PROCEDURE — 74176 CT RENAL STONE STUDY ABD PELVIS WO: ICD-10-PCS | Mod: 26,,, | Performed by: RADIOLOGY

## 2019-04-16 ENCOUNTER — TELEPHONE (OUTPATIENT)
Dept: INTERNAL MEDICINE | Facility: CLINIC | Age: 69
End: 2019-04-16

## 2019-04-16 NOTE — TELEPHONE ENCOUNTER
----- Message from Angie Soto sent at 4/16/2019 10:18 AM CDT -----  Contact: Pt  Type:  Patient Returning Call    Who Called: Maggie (pt)  Who Left Message for Patient: ISIDRO Nash  Does the patient know what this is regarding?: Test results  Would the patient rather a call back or a response via [facility]? Callback  Best Call Back Number: [phone]  Additional Information: No

## 2019-04-17 ENCOUNTER — OFFICE VISIT (OUTPATIENT)
Dept: OBSTETRICS AND GYNECOLOGY | Facility: CLINIC | Age: 69
End: 2019-04-17
Payer: MEDICARE

## 2019-04-17 VITALS
WEIGHT: 130.31 LBS | BODY MASS INDEX: 19.75 KG/M2 | HEIGHT: 68 IN | SYSTOLIC BLOOD PRESSURE: 126 MMHG | DIASTOLIC BLOOD PRESSURE: 84 MMHG

## 2019-04-17 DIAGNOSIS — R31.29 OTHER MICROSCOPIC HEMATURIA: ICD-10-CM

## 2019-04-17 DIAGNOSIS — N95.2 VAGINAL ATROPHY: ICD-10-CM

## 2019-04-17 DIAGNOSIS — N94.9 VAGINAL BURNING: Primary | ICD-10-CM

## 2019-04-17 PROCEDURE — 87510 GARDNER VAG DNA DIR PROBE: CPT

## 2019-04-17 PROCEDURE — 99213 OFFICE O/P EST LOW 20 MIN: CPT | Mod: PBBFAC,PN | Performed by: NURSE PRACTITIONER

## 2019-04-17 PROCEDURE — 88175 CYTOPATH C/V AUTO FLUID REDO: CPT

## 2019-04-17 PROCEDURE — 87480 CANDIDA DNA DIR PROBE: CPT

## 2019-04-17 PROCEDURE — 99214 PR OFFICE/OUTPT VISIT, EST, LEVL IV, 30-39 MIN: ICD-10-PCS | Mod: S$PBB,,, | Performed by: NURSE PRACTITIONER

## 2019-04-17 PROCEDURE — 99999 PR PBB SHADOW E&M-EST. PATIENT-LVL III: ICD-10-PCS | Mod: PBBFAC,,, | Performed by: NURSE PRACTITIONER

## 2019-04-17 PROCEDURE — 99214 OFFICE O/P EST MOD 30 MIN: CPT | Mod: S$PBB,,, | Performed by: NURSE PRACTITIONER

## 2019-04-17 PROCEDURE — 99999 PR PBB SHADOW E&M-EST. PATIENT-LVL III: CPT | Mod: PBBFAC,,, | Performed by: NURSE PRACTITIONER

## 2019-04-17 RX ORDER — ESTRADIOL 0.1 MG/G
1 CREAM VAGINAL
Qty: 42.5 G | Refills: 2 | Status: SHIPPED | OUTPATIENT
Start: 2019-04-17 | End: 2019-05-07

## 2019-04-17 NOTE — PROGRESS NOTES
Maggie Díaz is a 68 y.o. female  presents for vaginal burning for last 3 weeks, and  well woman exam.  She states it burns in vaginal area when urinates.  Urine was done does show greater than 15 RBC in urine.  Has not had pap in few years was typically done by her pcp Dr. Mcduffie who has left [facility] some years back.  Pt states were all normal in past.   Denies vaginal bleeding.    LMP: No LMP recorded. Patient is postmenopausal..      Past Medical History:   Diagnosis Date    Hematuria, microscopic     negative CT    Hepatitis C     slow progression/ Dr. Otoole    Hypertension     Migraines     OP (osteoporosis)     on drug holiday.    Osteoarthritis     Radius fracture     Restless legs syndrome (RLS)     Vitamin D deficiency disease      Past Surgical History:   Procedure Laterality Date     SECTION, CLASSIC      COLONOSCOPY N/A 2018    [facility]    WRIST FRACTURE SURGERY      , Dr. Bunch.     Social History     Socioeconomic History    Marital status:      Spouse name: Not on file    Number of children: Not on file    Years of education: Not on file    Highest education level: Not on file   Occupational History     Employer: [employer]   Social Needs    Financial resource strain: Not on file    Food insecurity:     Worry: Not on file     Inability: Not on file    Transportation needs:     Medical: Not on file     Non-medical: Not on file   Tobacco Use    Smoking status: Former Smoker     Packs/day: 0.50     Years: 10.00     Pack years: 5.00     Types: Cigarettes     Last attempt to quit: 3/3/1979     Years since quittin.1    Smokeless tobacco: Never Used   Substance and Sexual Activity    Alcohol use: Yes     Alcohol/week: 0.0 oz     Comment: occasionally    Drug use: No    Sexual activity: Yes     Partners: Male   Lifestyle    Physical activity:     Days per week: Not on file     Minutes per  "session: Not on file    Stress: Not on file   Relationships    Social connections:     Talks on phone: Not on file     Gets together: Not on file     Attends Sikh service: Not on file     Active member of club or organization: Not on file     Attends meetings of clubs or organizations: Not on file     Relationship status: Not on file   Other Topics Concern    Not on file   Social History Narrative    Live with      Family History   Adopted: Yes   Family history unknown: Yes     OB History        6    Para   3    Term   3            AB   3    Living   3       SAB   3    TAB        Ectopic        Multiple        Live Births   3                 /84   Ht 5' 8" (1.727 m)   Wt 59.1 kg (130 lb 4.7 oz)   BMI 19.81 kg/m²       ROS:  Per hpi    PHYSICAL EXAM:  APPEARANCE: Well nourished, well developed, in no acute distress.  AFFECT: WNL, alert and oriented x 3  SKIN: No acne or hirsutism  PELVIC: Normal external genitalia without lesions.  Normal hair distribution.  Adequate perineal body, normal urethral meatus.  Vagina atrophic  or discharge - removed a large clump of hair from vaginal canal.  Cervix pink but stenotic and blending with vaginal walls, without lesions, discharge or tenderness.  No significant cystocele or rectocele.  Bimanual exam shows uterus to be normal size, regular, mobile and nontender.  Adnexa without masses or tenderness.    EXTREMITIES: No edema.  Physical Exam    1. Vaginal burning  Vaginosis Screen by DNA Probe    estradiol (ESTRACE) 0.01 % (0.1 mg/gram) vaginal cream    Liquid-based pap smear, screening   2. Vaginal atrophy  Vaginosis Screen by DNA Probe    estradiol (ESTRACE) 0.01 % (0.1 mg/gram) vaginal cream    Liquid-based pap smear, screening   3. Other microscopic hematuria      AND PLAN:    Patient was counseled today on A.C.S. Pap guidelines and recommendations for yearly pelvic exams, mammograms and monthly self breast exams; to see her PCP for other " health maintenance.     Long discussion had on vaginal atrophy and dryness - could be source of her issues  She still wants to see urology

## 2019-04-17 NOTE — PATIENT INSTRUCTIONS
Atrophic Vaginitis    Atrophic vaginitis means the walls of your vagina have become thin. This happens when your body makes too little of the hormone estrogen. Menopause or surgical removal of the ovaries are the most common causes for a drop in estrogen. Breastfeeding can also cause the hormone level to drop.  Symptoms of atrophic vaginitis include:  · Dryness, soreness, burning, or itching in the vagina  · Vaginal discharge  Sex can be uncomfortable, even painful. After sex, you may have bleeding from your vagina. You may also have burning or pain when you urinate.  Home care  Your healthcare provider may recommend 1 or more of these as treatment:  · Vaginal creams, lotions, and lubricants. These products help relieve vaginal dryness. They dont need a prescription. They can be found in the personal care section of most pharmacies. Creams and lotions are used daily to help keep the vagina moist. Lubricants help reduce dryness and pain during sex. Choose water-based lubricants. Dont use petroleum jelly, mineral oil, or other oils. These increase the chance of infection.   · Hormone therapy (HT). HT increases the amount of estrogen in your body. This can help manage or relieve symptoms. HT can be given in pill form. It may be given as a lotion, cream, ring put into the vagina, or a patch on the skin. The risks and benefits of HT vary for each woman. For instance, your risk may be higher if you have had breast cancer. Discuss this treatment with your healthcare provider. Not every woman can use HT.  You dont need to give up (abstain from) sex. In fact, regular sex can help keep vaginal tissues healthy. Take steps to make sex more comfortable by using water-based lubricants.  Preventing infections  Atrophic vaginitis makes an infection of the vagina or the urinary tract more likely. To help reduce your risk:  · Keep your genitals clean. When you bathe, wash the outside of your vagina with mild soap and water.  Clean gently between the folds of your vagina.  · Wipe from front to back after a bowel movement.  · Dont douche unless your healthcare provider tells you to.  · Avoid scented toilet paper, scented vaginal sprays, and scented tampons.  · Avoid wearing clothes that are tight in the crotch. These include pantyhose, jeans, and leggings. Wear cotton underwear. Change it every day.  Follow-up care  Follow up with your healthcare provider, or as advised.  When to seek medical advice  Call your health care provider right away if any of these occur:  · Fever of 100.4°F (38°C) or higher, or as directed by your healthcare provider  · Symptoms dont go away or get worse even with treatment  · Vaginal area swells or becomes painful  · Vaginal area bleeds, but not because of your period  · Bad-smelling discharge from the vagina  · Pain or burning when you urinate or you have trouble passing urine  · Open sores develop around vagina   Date Last Reviewed: 12/1/2016 © 2000-2017 [employer]. [address] [place] [address]. All rights reserved. This information is not intended as a substitute for professional medical care. Always follow your healthcare professional's instructions.        Vaginal Infection: Understanding the Vaginal Environment  The vagina is a canal. It connects the uterus (womb) to the outside of the body. It is home to many types of bacteria and other tiny organisms. These different bacteria most often stay balanced in number. This keeps the vagina healthy. If the balance changes, it can cause infection.   A healthy environment  Many types of bacteria are present in a healthy vagina. When balanced, they dont cause problems. Small amounts of yeast may also be present without causing problems. The most common type of bacteria in the vagina is lactobacillus. It helps keep the vagina at a low pH. A low pH keeps bad bacteria from taking over.  Normal vaginal discharge  The vagina makes fluid.  It is sent out as discharge. This also keeps the vagina healthy. Normal discharge can be clear, white, or yellowish. Most women find that normal discharge varies in amount and color through the month.  An unhealthy environment  The vaginal environment may get out of balance. This may result in a vaginal infection. There are a few reasons this can happen. The pH may have changed. The amount of one organism, such as yeast, may increase. Or an outside organism may get into the vagina and throw off the balance:  · Bacterial vaginosis (BV). BV is due to an imbalance in the normal bacteria in the vagina. Lactobacillus bacteria decrease. As a result, the numbers of bad bacteria increase.  · Candidiasis (yeast infection). Yeast is a type of fungus. A yeast infection occurs when yeast cells in the vagina increase. They then attack vaginal tissues. A type of yeast called Candida albicans is often involved.  · Trichomoniasis (trich). Trich is a parasite. It is passed from one person to another during sex. Men with trich often dont have any symptoms. In women, it can take weeks or months before symptoms appear.  Date Last Reviewed: 3/1/2017  © 9473-3063 [employer]. [address] [place] [address], [place] [address]. All rights reserved. This information is not intended as a substitute for professional medical care. Always follow your healthcare professional's instructions.

## 2019-04-22 ENCOUNTER — TELEPHONE (OUTPATIENT)
Dept: FAMILY MEDICINE | Facility: CLINIC | Age: 69
End: 2019-04-22

## 2019-04-22 NOTE — TELEPHONE ENCOUNTER
----- Message from Dina Brower sent at 4/22/2019  4:23 PM CDT -----  Contact: Patient  Type:  Patient Returning Call    Who Called: Maggie  Who Left Message for Patient: Margie  Does the patient know what this is regarding?: about being worked in for Constipation/follow up from seeing the NP.  Would the patient rather a call back or a response via [facility]?  Call back   Best Call Back Number: [phone]  Additional Information: n/a    ThanksDina

## 2019-04-22 NOTE — TELEPHONE ENCOUNTER
----- Message from Agusto Jauregui sent at 4/22/2019 11:41 AM CDT -----  Contact: Pt  Please give pt a call at ..[phone] (home)  regarding a urgent message that was left earlier for her to be seen today

## 2019-04-22 NOTE — TELEPHONE ENCOUNTER
----- Message from Deandra Red sent at 4/22/2019  8:08 AM CDT -----  Contact: pt  Type:  Needs Medical Advice    Who Called: Patient  Symptoms (please be specific): Stomach pain/bowel issues   How long has patient had these symptoms:  Several days  Pharmacy name and phone #:  [facility]  Would the patient rather a call back or a response via [facility]? Call back  Best Call Back Number: [phone]  Additional Information: na

## 2019-04-23 ENCOUNTER — OFFICE VISIT (OUTPATIENT)
Dept: INTERNAL MEDICINE | Facility: CLINIC | Age: 69
End: 2019-04-23
Payer: MEDICARE

## 2019-04-23 VITALS
OXYGEN SATURATION: 98 % | SYSTOLIC BLOOD PRESSURE: 132 MMHG | HEART RATE: 74 BPM | TEMPERATURE: 98 F | BODY MASS INDEX: 19.34 KG/M2 | WEIGHT: 127.19 LBS | DIASTOLIC BLOOD PRESSURE: 86 MMHG

## 2019-04-23 DIAGNOSIS — K59.00 CONSTIPATION, UNSPECIFIED CONSTIPATION TYPE: Primary | ICD-10-CM

## 2019-04-23 PROCEDURE — 99999 PR PBB SHADOW E&M-EST. PATIENT-LVL IV: CPT | Mod: PBBFAC,,, | Performed by: INTERNAL MEDICINE

## 2019-04-23 PROCEDURE — 99214 OFFICE O/P EST MOD 30 MIN: CPT | Mod: PBBFAC,PN | Performed by: INTERNAL MEDICINE

## 2019-04-23 PROCEDURE — 99213 PR OFFICE/OUTPT VISIT, EST, LEVL III, 20-29 MIN: ICD-10-PCS | Mod: S$PBB,,, | Performed by: INTERNAL MEDICINE

## 2019-04-23 PROCEDURE — 99213 OFFICE O/P EST LOW 20 MIN: CPT | Mod: S$PBB,,, | Performed by: INTERNAL MEDICINE

## 2019-04-23 PROCEDURE — 99999 PR PBB SHADOW E&M-EST. PATIENT-LVL IV: ICD-10-PCS | Mod: PBBFAC,,, | Performed by: INTERNAL MEDICINE

## 2019-04-23 NOTE — PATIENT INSTRUCTIONS
Try over counter fleets enema today. If no improvement today then try Magnesium citrate tomorrow. Ok to take a second dose of magnesium citrate tomorrow evening if needed.

## 2019-04-23 NOTE — TELEPHONE ENCOUNTER
Requested Medications      alendronate (FOSAMAX) 70 MG tablet         Sig: TAKE 1 TABLET BY MOUTH EVERY MORNING EVERY WEEK WITH A FULL    Disp:  4 tablet    Refills:  11    Start: 4/23/2019    Class: Normal    Last ordered: 1 year ago by Adeola Berumen MD        To be filled at: [facility]

## 2019-04-23 NOTE — PROGRESS NOTES
Subjective:      Patient ID: Maggie Díaz is a 68 y.o. female.    Chief Complaint: Abdominal Pain    HPI   67 yo with   Patient Active Problem List   Diagnosis    Vitamin D deficiency disease    History of hepatitis C    Age-related osteoporosis without current pathological fracture    Restless legs syndrome (RLS)    Allergic rhinitis    Acquired hypothyroidism    Radius fracture    Thrombocytopenia     Past Medical History:   Diagnosis Date    Hematuria, microscopic     negative CT    Hepatitis C     slow progression/ Dr. Otoole    Hypertension     Migraines     OP (osteoporosis)     on drug holiday.    Osteoarthritis     Radius fracture     Restless legs syndrome (RLS)     Vitamin D deficiency disease      Here today c/o constipaiton.   Last normal bm was one week ago. this is a recurrent problem for her. Over past week has gone one day without bm. Feels they are partial BMs. Required pushing. Small amounts of soft stool. Feels pressure/stool in rectum. Similar to previous episodes. miralax and dulcolax over past 2 days not helping.     Review of Systems   Constitutional: Negative for chills and fever.   Respiratory: Negative for cough, shortness of breath and wheezing.    Cardiovascular: Negative for chest pain and palpitations.   Gastrointestinal: Positive for constipation. Negative for abdominal distention, abdominal pain, anal bleeding, blood in stool, diarrhea, nausea and vomiting.   Skin: Negative for rash and wound.     Objective:   /86 (BP Location: Right arm, Patient Position: Sitting, BP Method: Medium (Manual))   Pulse 74   Temp 97.6 °F (36.4 °C) (Tympanic)   Wt 57.7 kg (127 lb 3.3 oz)   SpO2 98%   BMI 19.34 kg/m²     Physical Exam   Constitutional: She is oriented to person, place, and time. She appears well-developed and well-nourished. No distress.   HENT:   Head: Normocephalic and atraumatic.   Mouth/Throat: Oropharynx is clear and moist.   Eyes: Conjunctivae are  normal.   Neck: Neck supple. No thyromegaly present.   Cardiovascular: Normal rate and regular rhythm.   Pulmonary/Chest: Effort normal and breath sounds normal. She has no wheezes. She has no rales.   Abdominal: Soft. Bowel sounds are normal. She exhibits no distension and no mass. There is no tenderness. There is no rebound and no guarding.   Lymphadenopathy:     She has no cervical adenopathy.   Neurological: She is alert and oriented to person, place, and time.   Skin: Skin is warm and dry.   Psychiatric: She has a normal mood and affect. Her behavior is normal.       Assessment:     1. Constipation, unspecified constipation type      Plan:   Constipation, unspecified constipation type      Try over counter fleets enema today. If no improvement today then try Magnesium citrate tomorrow. Ok to take a second dose of magnesium citrate tomorrow evening if needed. See me or pcp if no resolution.   Lab Frequency Next Occurrence   CT Abdomen Pelvis W Wo Contrast Once 08/03/2018   DXA Bone Density Spine And Hip Once 02/22/2019   CBC auto differential Once 02/22/2019       Problem List Items Addressed This Visit     None      Visit Diagnoses     Constipation, unspecified constipation type    -  Primary          Follow up if symptoms worsen or fail to improve.

## 2019-04-24 LAB
BACTERIAL VAGINOSIS DNA: NEGATIVE
CANDIDA GLABRATA DNA: NEGATIVE
CANDIDA KRUSEI DNA: NEGATIVE
CANDIDA RRNA VAG QL PROBE: NEGATIVE
T VAGINALIS RRNA GENITAL QL PROBE: NEGATIVE

## 2019-04-24 RX ORDER — ALENDRONATE SODIUM 70 MG/1
TABLET ORAL
Qty: 4 TABLET | Refills: 11 | Status: SHIPPED | OUTPATIENT
Start: 2019-04-24 | End: 2019-04-26 | Stop reason: SDUPTHER

## 2019-04-25 ENCOUNTER — PATIENT MESSAGE (OUTPATIENT)
Dept: OBSTETRICS AND GYNECOLOGY | Facility: CLINIC | Age: 69
End: 2019-04-25

## 2019-04-25 ENCOUNTER — TELEPHONE (OUTPATIENT)
Dept: FAMILY MEDICINE | Facility: CLINIC | Age: 69
End: 2019-04-25

## 2019-04-25 RX ORDER — CLONAZEPAM 0.5 MG/1
TABLET ORAL
Qty: 60 TABLET | Refills: 4 | Status: SHIPPED | OUTPATIENT
Start: 2019-04-25 | End: 2019-04-28 | Stop reason: SDUPTHER

## 2019-04-25 NOTE — TELEPHONE ENCOUNTER
S/W Pt  and notified him that her RX was sent to the pharmacy and she could pick it up. Pt  said he will tell her and she will pick it up.//rf

## 2019-04-25 NOTE — TELEPHONE ENCOUNTER
----- Message from Otf Pfeiffer, PharmD sent at 4/23/2019 11:30 AM CDT -----  Hello,    Pt states she will run out of her clonazepam at the end of the month. When the rx is due, could you send a new rx to [facility] the [place].    Thanks,    Otf

## 2019-04-26 RX ORDER — ALENDRONATE SODIUM 70 MG/1
TABLET ORAL
Qty: 4 TABLET | Refills: 11 | Status: SHIPPED | OUTPATIENT
Start: 2019-04-26 | End: 2019-05-01 | Stop reason: SDUPTHER

## 2019-04-29 RX ORDER — CLONAZEPAM 0.5 MG/1
TABLET ORAL
Qty: 60 TABLET | Refills: 4 | Status: SHIPPED | OUTPATIENT
Start: 2019-04-29 | End: 2019-09-30 | Stop reason: SDUPTHER

## 2019-04-30 NOTE — TELEPHONE ENCOUNTER
----- Message from Arlet Hull PharmD sent at 4/30/2019  1:59 PM CDT -----  Mrs. íDaz is requesting that her prescription for clonazepam and alendronate be sent here to [facility] [place] instead of [facility].  She is no longer filling any prescriptions at [facility]. Please cancel the clonazepam that was sent to [facility] yesterday per patient's request.    Thanks

## 2019-04-30 NOTE — TELEPHONE ENCOUNTER
----- Message from Arlet Hull PharmD sent at 4/30/2019  1:59 PM CDT -----  Mrs. Díaz is requesting that her prescription for clonazepam and alendronate be sent here to [facility] [place] instead of [facility].  She is no longer filling any prescriptions at [facility]. Please cancel the clonazepam that was sent to [facility] yesterday per patient's request.    Thanks

## 2019-04-30 NOTE — TELEPHONE ENCOUNTER
I was unable to get through to someone at .  LMOM to cancel Clonazepam 0.5mg.  Rx faxed to [facility]./rpr

## 2019-05-01 RX ORDER — ALENDRONATE SODIUM 70 MG/1
TABLET ORAL
Qty: 4 TABLET | Refills: 11 | Status: SHIPPED | OUTPATIENT
Start: 2019-05-01 | End: 2019-08-23

## 2019-05-03 ENCOUNTER — PATIENT MESSAGE (OUTPATIENT)
Dept: INTERNAL MEDICINE | Facility: CLINIC | Age: 69
End: 2019-05-03

## 2019-05-07 ENCOUNTER — OFFICE VISIT (OUTPATIENT)
Dept: GASTROENTEROLOGY | Facility: CLINIC | Age: 69
End: 2019-05-07
Payer: MEDICARE

## 2019-05-07 VITALS
BODY MASS INDEX: 18.84 KG/M2 | HEART RATE: 74 BPM | SYSTOLIC BLOOD PRESSURE: 130 MMHG | WEIGHT: 124.31 LBS | HEIGHT: 68 IN | DIASTOLIC BLOOD PRESSURE: 64 MMHG

## 2019-05-07 DIAGNOSIS — R10.30 LOWER ABDOMINAL PAIN: ICD-10-CM

## 2019-05-07 DIAGNOSIS — K59.00 CONSTIPATION, UNSPECIFIED CONSTIPATION TYPE: Primary | ICD-10-CM

## 2019-05-07 PROCEDURE — 99213 OFFICE O/P EST LOW 20 MIN: CPT | Mod: S$PBB,,, | Performed by: NURSE PRACTITIONER

## 2019-05-07 PROCEDURE — 99999 PR PBB SHADOW E&M-EST. PATIENT-LVL III: CPT | Mod: PBBFAC,,, | Performed by: NURSE PRACTITIONER

## 2019-05-07 PROCEDURE — 99213 OFFICE O/P EST LOW 20 MIN: CPT | Mod: PBBFAC | Performed by: NURSE PRACTITIONER

## 2019-05-07 PROCEDURE — 99213 PR OFFICE/OUTPT VISIT, EST, LEVL III, 20-29 MIN: ICD-10-PCS | Mod: S$PBB,,, | Performed by: NURSE PRACTITIONER

## 2019-05-07 PROCEDURE — 99999 PR PBB SHADOW E&M-EST. PATIENT-LVL III: ICD-10-PCS | Mod: PBBFAC,,, | Performed by: NURSE PRACTITIONER

## 2019-05-07 NOTE — PROGRESS NOTES
Clinic Follow Up:  [facility] Follow Up Note    Reason for Follow Up:  The primary encounter diagnosis was Constipation, unspecified constipation type. A diagnosis of Lower abdominal pain was also pertinent to this visit.    PCP: Adeola Jones       HPI:  This is a 68 y.o. female here for follow up of the above  Pt states that since her last visit, she had been feeling well until recently.   She states that starting a few weeks ago, she began having a return of her constipation and lower abdominal pain .  She stopped the miralax for unclear reason.  She was seen by PCP and instructed to complete a Fleet's enema and Mag Citrate. She had good results with that but has continued with the constipation.   She has been under an increased amount of stress that has contributed to the constipation and abdominal pain.   No melena or hematochezia      Review of Systems   Constitutional: Negative for chills, fever, malaise/fatigue and weight loss.   Respiratory: Negative for cough.    Cardiovascular: Negative for chest pain.   Gastrointestinal:        Per HPI   Musculoskeletal: Negative for myalgias.   Skin: Negative for itching and rash.   Neurological: Negative for headaches.   Psychiatric/Behavioral: The patient is not nervous/anxious.        Medical History:  Past Medical History:   Diagnosis Date    Hematuria, microscopic     negative CT    Hepatitis C     slow progression/ Dr. Otoole    Hypertension     Migraines     OP (osteoporosis)     on drug holiday.    Osteoarthritis     Radius fracture     Restless legs syndrome (RLS)     Vitamin D deficiency disease        Surgical History:   Past Surgical History:   Procedure Laterality Date     SECTION, CLASSIC      COLONOSCOPY N/A 2018    Performed by [facility] at [facility]    WRIST FRACTURE SURGERY      , Dr. Bunch.       Family History:   Family History   Adopted: Yes   Family history unknown: Yes       Social  "History:   Social History     Tobacco Use    Smoking status: Former Smoker     Packs/day: 0.50     Years: 10.00     Pack years: 5.00     Types: Cigarettes     Last attempt to quit: 3/3/1979     Years since quittin.2    Smokeless tobacco: Never Used   Substance Use Topics    Alcohol use: Yes     Alcohol/week: 0.0 oz     Comment: occasionally    Drug use: No       Allergies: Reviewed    Home Medications:  Current Outpatient Medications on File Prior to Visit   Medication Sig Dispense Refill    alendronate (FOSAMAX) 70 MG tablet TAKE 1 TABLET BY MOUTH EVERY WEEK IN THE MORNING ON AN EMPTY STOMACH WITH 180  ML OF WATER. NO FOOD/MEDS FOR 30 MINUTES. REMAIN UPRIGHT 4 tablet 11    clonazePAM (KLONOPIN) 0.5 MG tablet TAKE 1 TO 2 TABLETS BY MOUTH EVERY EVENING IF NEEDED 60 tablet 4    levothyroxine (SYNTHROID) 50 MCG tablet TAKE 1 TABLET BY MOUTH ONCE DAILY 30 tablet 11    polyethylene glycol (GLYCOLAX) 17 gram/dose powder Take 17 g by mouth as needed.       [DISCONTINUED] estradiol (ESTRACE) 0.01 % (0.1 mg/gram) vaginal cream Place 1 gram vaginally every Monday, Wednesday, and Friday. 42.5 g 2     No current facility-administered medications on file prior to visit.        Physical Exam:  Vital Signs:  /64   Pulse 74   Ht 5' 8" (1.727 m)   Wt 56.4 kg (124 lb 5.4 oz)   BMI 18.91 kg/m²   Body mass index is 18.91 kg/m².  Physical Exam   Constitutional: She is oriented to person, place, and time. She appears well-developed and well-nourished.   HENT:   Head: Normocephalic.   Eyes: No scleral icterus.   Neck: Normal range of motion.   Cardiovascular: Normal rate and regular rhythm.   Pulmonary/Chest: Effort normal and breath sounds normal.   Abdominal: Soft. Bowel sounds are normal. She exhibits no distension. There is no tenderness.   Musculoskeletal: Normal range of motion.   Neurological: She is alert and oriented to person, place, and time.   Skin: Skin is warm and dry.   Psychiatric: She has a " normal mood and affect.   Vitals reviewed.      Labs: Pertinent labs reviewed.  Assessment:  1. Constipation, unspecified constipation type    2. Lower abdominal pain        Recommendations:  Constipation, unspecified constipation type  Lower abdominal pain  - I suspect that her symptoms are related to a return of her constipation   - Restart miralax once daily  - increase water and dietary fiber  - If the constipation continues despite the miralax, may benefit from trial of linzess or amitiza.           Return to Clinic:    As needed based on symptoms.

## 2019-05-10 ENCOUNTER — PATIENT MESSAGE (OUTPATIENT)
Dept: GASTROENTEROLOGY | Facility: CLINIC | Age: 69
End: 2019-05-10

## 2019-05-10 ENCOUNTER — PATIENT MESSAGE (OUTPATIENT)
Dept: OBSTETRICS AND GYNECOLOGY | Facility: CLINIC | Age: 69
End: 2019-05-10

## 2019-05-14 ENCOUNTER — PATIENT MESSAGE (OUTPATIENT)
Dept: GASTROENTEROLOGY | Facility: CLINIC | Age: 69
End: 2019-05-14

## 2019-05-14 DIAGNOSIS — R19.7 DIARRHEA, UNSPECIFIED TYPE: ICD-10-CM

## 2019-05-14 DIAGNOSIS — R10.9 ABDOMINAL PAIN, UNSPECIFIED ABDOMINAL LOCATION: Primary | ICD-10-CM

## 2019-05-14 DIAGNOSIS — K59.00 CONSTIPATION, UNSPECIFIED CONSTIPATION TYPE: ICD-10-CM

## 2019-05-15 ENCOUNTER — PATIENT MESSAGE (OUTPATIENT)
Dept: INTERNAL MEDICINE | Facility: CLINIC | Age: 69
End: 2019-05-15

## 2019-05-27 ENCOUNTER — PATIENT MESSAGE (OUTPATIENT)
Dept: GASTROENTEROLOGY | Facility: CLINIC | Age: 69
End: 2019-05-27

## 2019-05-28 ENCOUNTER — PATIENT MESSAGE (OUTPATIENT)
Dept: GASTROENTEROLOGY | Facility: CLINIC | Age: 69
End: 2019-05-28

## 2019-05-28 ENCOUNTER — PATIENT MESSAGE (OUTPATIENT)
Dept: INTERNAL MEDICINE | Facility: CLINIC | Age: 69
End: 2019-05-28

## 2019-05-28 ENCOUNTER — HOSPITAL ENCOUNTER (OUTPATIENT)
Dept: RADIOLOGY | Facility: HOSPITAL | Age: 69
Discharge: HOME | End: 2019-05-28
Attending: NURSE PRACTITIONER
Payer: MEDICARE

## 2019-05-28 DIAGNOSIS — R10.9 ABDOMINAL PAIN, UNSPECIFIED ABDOMINAL LOCATION: ICD-10-CM

## 2019-05-28 DIAGNOSIS — K59.00 CONSTIPATION, UNSPECIFIED CONSTIPATION TYPE: ICD-10-CM

## 2019-05-28 DIAGNOSIS — R19.7 DIARRHEA, UNSPECIFIED TYPE: ICD-10-CM

## 2019-05-28 PROCEDURE — 74019 RADEX ABDOMEN 2 VIEWS: CPT | Mod: 26,,, | Performed by: RADIOLOGY

## 2019-05-28 PROCEDURE — 74019 RADEX ABDOMEN 2 VIEWS: CPT | Mod: TC

## 2019-05-28 PROCEDURE — 74019 XR ABDOMEN FLAT AND ERECT: ICD-10-PCS | Mod: 26,,, | Performed by: RADIOLOGY

## 2019-05-29 ENCOUNTER — PATIENT MESSAGE (OUTPATIENT)
Dept: GASTROENTEROLOGY | Facility: CLINIC | Age: 69
End: 2019-05-29

## 2019-05-30 ENCOUNTER — PATIENT MESSAGE (OUTPATIENT)
Dept: GASTROENTEROLOGY | Facility: CLINIC | Age: 69
End: 2019-05-30

## 2019-05-30 ENCOUNTER — PATIENT MESSAGE (OUTPATIENT)
Dept: INTERNAL MEDICINE | Facility: CLINIC | Age: 69
End: 2019-05-30

## 2019-06-03 ENCOUNTER — PATIENT MESSAGE (OUTPATIENT)
Dept: GASTROENTEROLOGY | Facility: CLINIC | Age: 69
End: 2019-06-03

## 2019-06-05 ENCOUNTER — PATIENT MESSAGE (OUTPATIENT)
Dept: GASTROENTEROLOGY | Facility: CLINIC | Age: 69
End: 2019-06-05

## 2019-06-05 DIAGNOSIS — K59.00 CONSTIPATION, UNSPECIFIED CONSTIPATION TYPE: Primary | ICD-10-CM

## 2019-06-06 ENCOUNTER — PATIENT MESSAGE (OUTPATIENT)
Dept: GASTROENTEROLOGY | Facility: CLINIC | Age: 69
End: 2019-06-06

## 2019-06-06 RX ORDER — SODIUM, POTASSIUM,MAG SULFATES 17.5-3.13G
1 SOLUTION, RECONSTITUTED, ORAL ORAL ONCE
Qty: 1 KIT | Refills: 0 | Status: SHIPPED | OUTPATIENT
Start: 2019-06-06 | End: 2019-06-06

## 2019-06-07 ENCOUNTER — PATIENT MESSAGE (OUTPATIENT)
Dept: INTERNAL MEDICINE | Facility: CLINIC | Age: 69
End: 2019-06-07

## 2019-06-10 ENCOUNTER — PATIENT MESSAGE (OUTPATIENT)
Dept: GASTROENTEROLOGY | Facility: CLINIC | Age: 69
End: 2019-06-10

## 2019-06-10 ENCOUNTER — PATIENT MESSAGE (OUTPATIENT)
Dept: FAMILY MEDICINE | Facility: CLINIC | Age: 69
End: 2019-06-10

## 2019-06-10 NOTE — TELEPHONE ENCOUNTER
Please sign requested referral to Nutritional Services and send back.  Also see msg from pt portal./rpr

## 2019-06-11 ENCOUNTER — PATIENT MESSAGE (OUTPATIENT)
Dept: FAMILY MEDICINE | Facility: CLINIC | Age: 69
End: 2019-06-11

## 2019-06-12 ENCOUNTER — PATIENT MESSAGE (OUTPATIENT)
Dept: GASTROENTEROLOGY | Facility: CLINIC | Age: 69
End: 2019-06-12

## 2019-06-12 ENCOUNTER — PATIENT MESSAGE (OUTPATIENT)
Dept: FAMILY MEDICINE | Facility: CLINIC | Age: 69
End: 2019-06-12

## 2019-06-12 DIAGNOSIS — R63.4 WEIGHT LOSS: ICD-10-CM

## 2019-06-12 DIAGNOSIS — F41.9 ANXIETY: ICD-10-CM

## 2019-06-12 DIAGNOSIS — E03.9 ACQUIRED HYPOTHYROIDISM: Primary | ICD-10-CM

## 2019-06-13 ENCOUNTER — TELEPHONE (OUTPATIENT)
Dept: FAMILY MEDICINE | Facility: CLINIC | Age: 69
End: 2019-06-13

## 2019-06-13 ENCOUNTER — LAB VISIT (OUTPATIENT)
Dept: LAB | Facility: HOSPITAL | Age: 69
End: 2019-06-13
Attending: INTERNAL MEDICINE
Payer: MEDICARE

## 2019-06-13 ENCOUNTER — PATIENT MESSAGE (OUTPATIENT)
Dept: FAMILY MEDICINE | Facility: CLINIC | Age: 69
End: 2019-06-13

## 2019-06-13 DIAGNOSIS — E03.9 ACQUIRED HYPOTHYROIDISM: ICD-10-CM

## 2019-06-13 DIAGNOSIS — R63.4 WEIGHT LOSS: Primary | ICD-10-CM

## 2019-06-13 LAB — TSH SERPL DL<=0.005 MIU/L-ACNC: 0.82 UIU/ML (ref 0.4–4)

## 2019-06-13 PROCEDURE — 84443 ASSAY THYROID STIM HORMONE: CPT

## 2019-06-13 PROCEDURE — 36415 COLL VENOUS BLD VENIPUNCTURE: CPT | Mod: PO

## 2019-06-13 NOTE — TELEPHONE ENCOUNTER
S/w pt.  Will have lab drawn today.  Need referrals for Psych & Nutrition in order to schedule.  Please sign and send back./rpr

## 2019-06-13 NOTE — TELEPHONE ENCOUNTER
You can tell her that her insurance does not cover nutrition consult.  Does she still want it?  SM

## 2019-06-13 NOTE — TELEPHONE ENCOUNTER
Please make sure pt is scheduled with nutritionist, and schedule lab, and see if she really wants counseling.  SM

## 2019-06-15 ENCOUNTER — PATIENT MESSAGE (OUTPATIENT)
Dept: FAMILY MEDICINE | Facility: CLINIC | Age: 69
End: 2019-06-15

## 2019-06-23 ENCOUNTER — PATIENT MESSAGE (OUTPATIENT)
Dept: GASTROENTEROLOGY | Facility: CLINIC | Age: 69
End: 2019-06-23

## 2019-06-24 ENCOUNTER — PATIENT MESSAGE (OUTPATIENT)
Dept: FAMILY MEDICINE | Facility: CLINIC | Age: 69
End: 2019-06-24

## 2019-07-03 ENCOUNTER — PATIENT MESSAGE (OUTPATIENT)
Dept: FAMILY MEDICINE | Facility: CLINIC | Age: 69
End: 2019-07-03

## 2019-07-03 ENCOUNTER — PATIENT MESSAGE (OUTPATIENT)
Dept: GASTROENTEROLOGY | Facility: CLINIC | Age: 69
End: 2019-07-03

## 2019-07-10 ENCOUNTER — PATIENT MESSAGE (OUTPATIENT)
Dept: FAMILY MEDICINE | Facility: CLINIC | Age: 69
End: 2019-07-10

## 2019-07-22 ENCOUNTER — PATIENT MESSAGE (OUTPATIENT)
Dept: GASTROENTEROLOGY | Facility: CLINIC | Age: 69
End: 2019-07-22

## 2019-08-02 ENCOUNTER — PATIENT MESSAGE (OUTPATIENT)
Dept: FAMILY MEDICINE | Facility: CLINIC | Age: 69
End: 2019-08-02

## 2019-08-09 NOTE — PROGRESS NOTES
"Subjective:      Patient ID: Maggie Díaz is a 69 y.o. female.    Chief Complaint: Follow-up      HPI  Here for follow up of medical problems and weight loss.  Had really lost 20#, has gained back 7# purposefully with some dietary changes and protein shakes.  Still 13# down from last visit with me 6mo.  Went to Gastro.  Pt attributes her difficulty gaining weight to HARVONI treatment 3 years ago.  On linzess for new constipation, started a few months ago and working well.  No gum bleeding.  1/19 CXR clear.  4/19 Abd pelvis simple ovarian cyst.  Smoked in past, about 13 years and quit about 40y ago.  Denies anxiety or depression.  On fosamax.  2015 BMD says to "restart" fosamax, so it seems pt has had at least 5y of fosamax.  Has had an extensive skin check, Dr. Munson.    8/19 BMD:  FINDINGS:  The L1 to L4 vertebral bone mineral density is equal to 1.052 g/cm squared with a T score of -1.1.  There has been a 5.8% non statistically significant change relative to the prior study.    The left femoral neck bone mineral density is equal to 0.678 g/cm squared with a T score of -2.6.  There has been  a 6.2% statistically significant change relative to the prior study.      Impression       Osteoporosis or         Updated/ annual due 2/20:  HM: 10/18 fluvax, 3/15 [id number], 2/14 [id number], 7/18 TD, 1/10 TDaP, 8/12 zoster, 8/19 BMD rep 2y, 8/18 Cscope rep 10y, 3/16 OB neg x 2, 2/19 MMG, 5/18 pelvic u/s neg.     Review of Systems   Constitutional: Positive for unexpected weight change. Negative for activity change, appetite change, chills, diaphoresis and fever.   HENT: Negative for congestion, ear pain, hearing loss, rhinorrhea, sinus pressure, sore throat and trouble swallowing.    Eyes: Negative for discharge and visual disturbance.   Respiratory: Negative for cough, chest tightness, shortness of breath and wheezing.    Cardiovascular: Negative for chest pain and palpitations.   Gastrointestinal: Negative for blood " "in stool, constipation, diarrhea, nausea and vomiting.   Endocrine: Negative for polydipsia and polyuria.   Genitourinary: Negative for difficulty urinating, dysuria, frequency, hematuria, menstrual problem, urgency and vaginal discharge.   Musculoskeletal: Negative for arthralgias, joint swelling and neck pain.   Skin: Negative for rash.   Neurological: Negative for dizziness, weakness and headaches.   Psychiatric/Behavioral: Negative for confusion, dysphoric mood and sleep disturbance. The patient is not nervous/anxious.          Objective:   /67   Pulse 96   Temp 98 °F (36.7 °C)   Ht 5' 8" (1.727 m)   Wt 52.8 kg (116 lb 6.5 oz)   SpO2 97%   BMI 17.70 kg/m²     Physical Exam   Constitutional: She is oriented to person, place, and time. She appears well-developed.   HENT:   Mouth/Throat: Oropharynx is clear and moist.   Neck: Neck supple. Carotid bruit is not present. No thyroid mass present.   Cardiovascular: Normal rate, regular rhythm and intact distal pulses. Exam reveals no gallop and no friction rub.   No murmur heard.  Pulmonary/Chest: Effort normal and breath sounds normal. She has no wheezes. She has no rales.   Abdominal: Soft. Bowel sounds are normal. She exhibits no mass. There is no hepatosplenomegaly. There is no tenderness.   Musculoskeletal: She exhibits no edema.   Lymphadenopathy:     She has no cervical adenopathy.   Neurological: She is alert and oriented to person, place, and time.   Psychiatric: She has a normal mood and affect.       Results for DESIREE TAFOYA (MRN [id number]) as of 8/23/2019 08:55   Ref. Range 6/13/2019 11:10 8/16/2019 08:55   WBC Latest Ref Range: 3.90 - 12.70 K/uL  4.33   RBC Latest Ref Range: 4.00 - 5.40 M/uL  4.24   Hemoglobin Latest Ref Range: 12.0 - 16.0 g/dL  13.9   Hematocrit Latest Ref Range: 37.0 - 48.5 %  42.1   MCV Latest Ref Range: 82 - 98 fL  99 (H)   MCH Latest Ref Range: 27.0 - 31.0 pg  32.8 (H)   MCHC Latest Ref Range: 32.0 - 36.0 g/dL  33.0 "   RDW Latest Ref Range: 11.5 - 14.5 %  12.9   Platelets Latest Ref Range: 150 - 350 K/uL  150   MPV Latest Ref Range: 9.2 - 12.9 fL  10.5   Gran% Latest Ref Range: 38.0 - 73.0 %  41.2   Gran # (ANC) Latest Ref Range: 1.8 - 7.7 K/uL  1.8   Lymph% Latest Ref Range: 18.0 - 48.0 %  44.3   Lymph # Latest Ref Range: 1.0 - 4.8 K/uL  1.9   Mono% Latest Ref Range: 4.0 - 15.0 %  11.5   Mono # Latest Ref Range: 0.3 - 1.0 K/uL  0.5   Eosinophil% Latest Ref Range: 0.0 - 8.0 %  1.6   Eos # Latest Ref Range: 0.0 - 0.5 K/uL  0.1   Basophil% Latest Ref Range: 0.0 - 1.9 %  1.2   Baso # Latest Ref Range: 0.00 - 0.20 K/uL  0.05   nRBC Latest Ref Range: 0 /100 WBC  0   Differential Method Unknown  Automated   Immature Grans (Abs) Latest Ref Range: 0.00 - 0.04 K/uL  0.01   Immature Granulocytes Latest Ref Range: 0.0 - 0.5 %  0.2   TSH Latest Ref Range: 0.400 - 4.000 uIU/mL 0.823        Assessment:       1. Weight loss    2. Acquired hypothyroidism    3. Age-related osteoporosis without current pathological fracture    4. Restless legs syndrome (RLS)    5. Thrombocytopenia    6. Aortic atherosclerosis    7. Preventive measure    8. Personal history of nicotine dependence     9. History of hepatitis C          Plan:     Weight loss, significant, forcing self to eat/take in calories.  -     CT Chest Lung Screening Low Dose; Future; Expected date: 08/23/2019    Acquired hypothyroidism- Clinically stable, continue present treatment.    Age-related osteoporosis without current pathological fracture- will stop fosamax now as looks like over 5y of treatment.    Restless legs syndrome (RLS)- doing well on rx, cont.    Thrombocytopenia- no sx.    Aortic atherosclerosis- cont exercise.    Preventive measure- HAV now.    Personal history of nicotine dependence   -     CT Chest Lung Screening Low Dose; Future; Expected date: 08/23/2019    History of hepatitis C, treated with harvoni.    RTC 3 mo.

## 2019-08-16 ENCOUNTER — LAB VISIT (OUTPATIENT)
Dept: LAB | Facility: HOSPITAL | Age: 69
End: 2019-08-16
Attending: INTERNAL MEDICINE
Payer: MEDICARE

## 2019-08-16 DIAGNOSIS — D69.6 THROMBOCYTOPENIA: ICD-10-CM

## 2019-08-16 LAB
BASOPHILS # BLD AUTO: 0.05 K/UL (ref 0–0.2)
BASOPHILS NFR BLD: 1.2 % (ref 0–1.9)
DIFFERENTIAL METHOD: ABNORMAL
EOSINOPHIL # BLD AUTO: 0.1 K/UL (ref 0–0.5)
EOSINOPHIL NFR BLD: 1.6 % (ref 0–8)
ERYTHROCYTE [DISTWIDTH] IN BLOOD BY AUTOMATED COUNT: 12.9 % (ref 11.5–14.5)
HCT VFR BLD AUTO: 42.1 % (ref 37–48.5)
HGB BLD-MCNC: 13.9 G/DL (ref 12–16)
IMM GRANULOCYTES # BLD AUTO: 0.01 K/UL (ref 0–0.04)
IMM GRANULOCYTES NFR BLD AUTO: 0.2 % (ref 0–0.5)
LYMPHOCYTES # BLD AUTO: 1.9 K/UL (ref 1–4.8)
LYMPHOCYTES NFR BLD: 44.3 % (ref 18–48)
MCH RBC QN AUTO: 32.8 PG (ref 27–31)
MCHC RBC AUTO-ENTMCNC: 33 G/DL (ref 32–36)
MCV RBC AUTO: 99 FL (ref 82–98)
MONOCYTES # BLD AUTO: 0.5 K/UL (ref 0.3–1)
MONOCYTES NFR BLD: 11.5 % (ref 4–15)
NEUTROPHILS # BLD AUTO: 1.8 K/UL (ref 1.8–7.7)
NEUTROPHILS NFR BLD: 41.2 % (ref 38–73)
NRBC BLD-RTO: 0 /100 WBC
PLATELET # BLD AUTO: 150 K/UL (ref 150–350)
PMV BLD AUTO: 10.5 FL (ref 9.2–12.9)
RBC # BLD AUTO: 4.24 M/UL (ref 4–5.4)
WBC # BLD AUTO: 4.33 K/UL (ref 3.9–12.7)

## 2019-08-16 PROCEDURE — 36415 COLL VENOUS BLD VENIPUNCTURE: CPT

## 2019-08-16 PROCEDURE — 85025 COMPLETE CBC W/AUTO DIFF WBC: CPT

## 2019-08-18 ENCOUNTER — PATIENT MESSAGE (OUTPATIENT)
Dept: FAMILY MEDICINE | Facility: CLINIC | Age: 69
End: 2019-08-18

## 2019-08-22 PROBLEM — I70.0 AORTIC ATHEROSCLEROSIS: Status: ACTIVE | Noted: 2019-08-22

## 2019-08-23 ENCOUNTER — OFFICE VISIT (OUTPATIENT)
Dept: FAMILY MEDICINE | Facility: CLINIC | Age: 69
End: 2019-08-23
Payer: MEDICARE

## 2019-08-23 VITALS
DIASTOLIC BLOOD PRESSURE: 67 MMHG | OXYGEN SATURATION: 97 % | HEIGHT: 68 IN | SYSTOLIC BLOOD PRESSURE: 100 MMHG | TEMPERATURE: 98 F | WEIGHT: 116.38 LBS | BODY MASS INDEX: 17.64 KG/M2 | HEART RATE: 96 BPM

## 2019-08-23 DIAGNOSIS — I70.0 AORTIC ATHEROSCLEROSIS: ICD-10-CM

## 2019-08-23 DIAGNOSIS — Z86.19 HISTORY OF HEPATITIS C: ICD-10-CM

## 2019-08-23 DIAGNOSIS — R63.4 WEIGHT LOSS: Primary | ICD-10-CM

## 2019-08-23 DIAGNOSIS — M81.0 AGE-RELATED OSTEOPOROSIS WITHOUT CURRENT PATHOLOGICAL FRACTURE: ICD-10-CM

## 2019-08-23 DIAGNOSIS — E03.9 ACQUIRED HYPOTHYROIDISM: ICD-10-CM

## 2019-08-23 DIAGNOSIS — Z87.891 PERSONAL HISTORY OF NICOTINE DEPENDENCE: ICD-10-CM

## 2019-08-23 DIAGNOSIS — Z29.9 PREVENTIVE MEASURE: ICD-10-CM

## 2019-08-23 DIAGNOSIS — D69.6 THROMBOCYTOPENIA: ICD-10-CM

## 2019-08-23 DIAGNOSIS — G25.81 RESTLESS LEGS SYNDROME (RLS): ICD-10-CM

## 2019-08-23 PROCEDURE — 90471 IMMUNIZATION ADMIN: CPT | Mod: PBBFAC,PO

## 2019-08-23 PROCEDURE — 99214 PR OFFICE/OUTPT VISIT, EST, LEVL IV, 30-39 MIN: ICD-10-PCS | Mod: 25,S$PBB,, | Performed by: INTERNAL MEDICINE

## 2019-08-23 PROCEDURE — 99214 OFFICE O/P EST MOD 30 MIN: CPT | Mod: PBBFAC,PO,25 | Performed by: INTERNAL MEDICINE

## 2019-08-23 PROCEDURE — 99999 PR PBB SHADOW E&M-EST. PATIENT-LVL IV: ICD-10-PCS | Mod: PBBFAC,,, | Performed by: INTERNAL MEDICINE

## 2019-08-23 PROCEDURE — 99999 PR PBB SHADOW E&M-EST. PATIENT-LVL IV: CPT | Mod: PBBFAC,,, | Performed by: INTERNAL MEDICINE

## 2019-08-23 PROCEDURE — 99214 OFFICE O/P EST MOD 30 MIN: CPT | Mod: 25,S$PBB,, | Performed by: INTERNAL MEDICINE

## 2019-08-23 RX ORDER — HYDROCORTISONE ACETATE, PRAMOXINE HCL 2.5; 1 G/100G; G/100G
CREAM TOPICAL
Refills: 2 | COMMUNITY
Start: 2019-07-03 | End: 2019-12-10

## 2019-08-28 ENCOUNTER — HOSPITAL ENCOUNTER (OUTPATIENT)
Dept: RADIOLOGY | Facility: HOSPITAL | Age: 69
Discharge: HOME | End: 2019-08-28
Attending: INTERNAL MEDICINE
Payer: MEDICARE

## 2019-08-28 DIAGNOSIS — Z87.891 PERSONAL HISTORY OF NICOTINE DEPENDENCE: ICD-10-CM

## 2019-08-28 DIAGNOSIS — R63.4 WEIGHT LOSS: ICD-10-CM

## 2019-08-28 PROCEDURE — G0297 LDCT FOR LUNG CA SCREEN: HCPCS | Mod: TC

## 2019-08-28 PROCEDURE — G0297 CT CHEST LUNG SCREENING LOW DOSE: ICD-10-PCS | Mod: 26,,, | Performed by: RADIOLOGY

## 2019-08-28 PROCEDURE — G0297 LDCT FOR LUNG CA SCREEN: HCPCS | Mod: 26,,, | Performed by: RADIOLOGY

## 2019-09-30 RX ORDER — CLONAZEPAM 0.5 MG/1
TABLET ORAL
Qty: 60 TABLET | Refills: 4 | Status: SHIPPED | OUTPATIENT
Start: 2019-09-30 | End: 2020-03-01 | Stop reason: SDUPTHER

## 2019-10-10 ENCOUNTER — PATIENT MESSAGE (OUTPATIENT)
Dept: FAMILY MEDICINE | Facility: CLINIC | Age: 69
End: 2019-10-10

## 2019-10-10 DIAGNOSIS — R19.7 DIARRHEA, UNSPECIFIED TYPE: Primary | ICD-10-CM

## 2019-10-15 ENCOUNTER — PATIENT MESSAGE (OUTPATIENT)
Dept: FAMILY MEDICINE | Facility: CLINIC | Age: 69
End: 2019-10-15

## 2019-10-24 ENCOUNTER — PATIENT MESSAGE (OUTPATIENT)
Dept: FAMILY MEDICINE | Facility: CLINIC | Age: 69
End: 2019-10-24

## 2019-11-04 ENCOUNTER — IMMUNIZATION (OUTPATIENT)
Dept: INTERNAL MEDICINE | Facility: CLINIC | Age: 69
End: 2019-11-04
Payer: MEDICARE

## 2019-11-04 ENCOUNTER — OFFICE VISIT (OUTPATIENT)
Dept: GASTROENTEROLOGY | Facility: CLINIC | Age: 69
End: 2019-11-04
Payer: MEDICARE

## 2019-11-04 ENCOUNTER — HOSPITAL ENCOUNTER (OUTPATIENT)
Dept: RADIOLOGY | Facility: HOSPITAL | Age: 69
Discharge: HOME | End: 2019-11-04
Attending: PHYSICIAN ASSISTANT
Payer: MEDICARE

## 2019-11-04 VITALS
BODY MASS INDEX: 17.64 KG/M2 | DIASTOLIC BLOOD PRESSURE: 68 MMHG | HEIGHT: 68 IN | WEIGHT: 116.38 LBS | SYSTOLIC BLOOD PRESSURE: 130 MMHG | HEART RATE: 76 BPM

## 2019-11-04 DIAGNOSIS — K59.00 CONSTIPATION, UNSPECIFIED CONSTIPATION TYPE: ICD-10-CM

## 2019-11-04 DIAGNOSIS — R10.9 ABDOMINAL PAIN, UNSPECIFIED ABDOMINAL LOCATION: ICD-10-CM

## 2019-11-04 DIAGNOSIS — R10.30 LOWER ABDOMINAL PAIN: ICD-10-CM

## 2019-11-04 DIAGNOSIS — R63.4 WEIGHT LOSS, UNINTENTIONAL: ICD-10-CM

## 2019-11-04 DIAGNOSIS — K59.00 CONSTIPATION, UNSPECIFIED CONSTIPATION TYPE: Primary | ICD-10-CM

## 2019-11-04 PROCEDURE — 74019 XR ABDOMEN FLAT AND ERECT: ICD-10-PCS | Mod: 26,,, | Performed by: RADIOLOGY

## 2019-11-04 PROCEDURE — 99213 OFFICE O/P EST LOW 20 MIN: CPT | Mod: PBBFAC,25,27 | Performed by: PHYSICIAN ASSISTANT

## 2019-11-04 PROCEDURE — 99999 PR PBB SHADOW E&M-EST. PATIENT-LVL II: CPT | Mod: PBBFAC,,,

## 2019-11-04 PROCEDURE — 99214 PR OFFICE/OUTPT VISIT, EST, LEVL IV, 30-39 MIN: ICD-10-PCS | Mod: S$PBB,,, | Performed by: PHYSICIAN ASSISTANT

## 2019-11-04 PROCEDURE — 99214 OFFICE O/P EST MOD 30 MIN: CPT | Mod: S$PBB,,, | Performed by: PHYSICIAN ASSISTANT

## 2019-11-04 PROCEDURE — 74019 RADEX ABDOMEN 2 VIEWS: CPT | Mod: TC

## 2019-11-04 PROCEDURE — 74019 RADEX ABDOMEN 2 VIEWS: CPT | Mod: 26,,, | Performed by: RADIOLOGY

## 2019-11-04 PROCEDURE — 90662 IIV NO PRSV INCREASED AG IM: CPT | Mod: PBBFAC

## 2019-11-04 PROCEDURE — 99999 PR PBB SHADOW E&M-EST. PATIENT-LVL III: ICD-10-PCS | Mod: PBBFAC,,, | Performed by: PHYSICIAN ASSISTANT

## 2019-11-04 PROCEDURE — 99999 PR PBB SHADOW E&M-EST. PATIENT-LVL III: CPT | Mod: PBBFAC,,, | Performed by: PHYSICIAN ASSISTANT

## 2019-11-04 PROCEDURE — 99999 PR PBB SHADOW E&M-EST. PATIENT-LVL II: ICD-10-PCS | Mod: PBBFAC,,,

## 2019-11-04 PROCEDURE — 99212 OFFICE O/P EST SF 10 MIN: CPT | Mod: PBBFAC

## 2019-11-04 RX ORDER — LINACLOTIDE 72 UG/1
CAPSULE, GELATIN COATED ORAL
COMMUNITY
Start: 2019-10-28 | End: 2019-12-06 | Stop reason: SDUPTHER

## 2019-11-04 NOTE — PROGRESS NOTES
"Clinic Consult:  [facility] Consultation Note    Reason for Consult:  The primary encounter diagnosis was Constipation, unspecified constipation type. Diagnoses of Abdominal pain, unspecified abdominal location, Lower abdominal pain, and Weight loss, unintentional were also pertinent to this visit.    PCP: Adeola Jones   [address] / [place] [id number]    CC: Weight Loss and Rectal Pain      HPI:  This is a 69 y.o. female here for evaluation of the above. Patient reports constipation symptoms that began in 2018. It seemed to resolve on its own. She was symptom free until May 2019 when she began with constipation issues once again, in addition to weight loss. CT scan was performed along with blood work with no significant findings. She was given Linzess for constipation, in addition to Miralax, that she has been on since then. She reports that constipation has seemed to resolve, although she is still struggling with her weight. 15lbs weight loss since May. One physician tested her for Celiac disease. Blood work was negative, but patient states that she feels better overall without eating gluten. The same physician suggested an EGD which the patient denied. Her last colonoscopy was last yr with a 10 yr repeat. While constipation has resolved, patient says she had a stomach virus approximately one month ago with increased gas and "stomach aches". It has resolved, but she is still complaining of "stomach sensitivity" so she stopped her Linzess about a week ago. Bowels are moving. Denies fever, blood in the stool, nausea, vomiting. Her only complaint today is pressure in her rectal area (patient has history of hemorrhoids) and her weight loss.    Review of Systems   Constitutional: Positive for unexpected weight change. Negative for appetite change, fatigue and fever.   HENT: Negative for sore throat and trouble swallowing.    Eyes: Negative for pain and visual disturbance.   Respiratory: " Negative for cough, chest tightness and shortness of breath.    Cardiovascular: Negative for chest pain.   Gastrointestinal: Positive for abdominal pain and constipation. Negative for blood in stool, diarrhea, nausea and vomiting.   Genitourinary: Negative for dysuria and hematuria.   Musculoskeletal: Negative for myalgias.   Skin: Negative for rash and wound.   Neurological: Negative for dizziness, weakness and light-headedness.   Psychiatric/Behavioral: Negative for agitation and confusion.        Medical History:   Past Medical History:   Diagnosis Date    Hematuria, microscopic     negative CT    Hepatitis C     slow progression/ Dr. Otoole    Hypertension     Migraines     OP (osteoporosis)     on drug holiday.    Osteoarthritis     Radius fracture     Restless legs syndrome (RLS)     Vitamin D deficiency disease        Surgical History:   Past Surgical History:   Procedure Laterality Date     SECTION, CLASSIC      COLONOSCOPY N/A 2018    Procedure: COLONOSCOPY;  Surgeon: Jose Royal MD;  Location: [facility];  Service: Endoscopy;  Laterality: N/A;    WRIST FRACTURE SURGERY      , Dr. Bunch.       Family History:    Family History   Adopted: Yes   Family history unknown: Yes       Social History:   Social History     Socioeconomic History    Marital status:      Spouse name: Not on file    Number of children: Not on file    Years of education: Not on file    Highest education level: Not on file   Occupational History     Employer: [employer]   Social Needs    Financial resource strain: Not on file    Food insecurity:     Worry: Not on file     Inability: Not on file    Transportation needs:     Medical: Not on file     Non-medical: Not on file   Tobacco Use    Smoking status: Former Smoker     Packs/day: 0.50     Years: 10.00     Pack years: 5.00     Types: Cigarettes     Last attempt to quit: 3/3/1979     Years since quittin.7    Smokeless tobacco:  "Never Used   Substance and Sexual Activity    Alcohol use: Yes     Alcohol/week: 0.0 standard drinks     Comment: occasionally    Drug use: No    Sexual activity: Yes     Partners: Male   Lifestyle    Physical activity:     Days per week: 3 days     Minutes per session: 40 min    Stress: Not at all   Relationships    Social connections:     Talks on phone: Patient refused     Gets together: Patient refused     Attends Congregational service: Not on file     Active member of club or organization: Patient refused     Attends meetings of clubs or organizations: Patient refused     Relationship status:    Other Topics Concern    Not on file   Social History Narrative    Live with        Allergies:   Review of patient's allergies indicates:   Allergen Reactions    Bactrim [sulfamethoxazole-trimethoprim]      hallucination    Latex, natural rubber Swelling    Penicillins Shortness Of Breath       Home Medications:   Current Outpatient Medications on File Prior to Visit   Medication Sig Dispense Refill    clonazePAM (KLONOPIN) 0.5 MG tablet TAKE 1 TO 2 TABLETS BY MOUTH EVERY EVENING IF NEEDED 60 tablet 4    levothyroxine (SYNTHROID) 50 MCG tablet TAKE 1 TABLET BY MOUTH ONCE DAILY 30 tablet 11    polyethylene glycol (GLYCOLAX) 17 gram/dose powder Take 17 g by mouth as needed.       LINZESS 72 mcg Cap capsule       pramoxine-hydrocortisone cream U SML AMT REC BID PRN  2     No current facility-administered medications on file prior to visit.        Physical Exam:  /68   Pulse 76   Ht 5' 8" (1.727 m)   Wt 52.8 kg (116 lb 6.5 oz)   BMI 17.70 kg/m²   Body mass index is 17.7 kg/m².  Physical Exam   Constitutional: She is oriented to person, place, and time. She appears well-developed and well-nourished. No distress.   HENT:   Head: Normocephalic and atraumatic.   Eyes: EOM are normal. No scleral icterus.   Neck: Normal range of motion.   Cardiovascular: Normal rate, regular rhythm and normal " heart sounds.   Pulmonary/Chest: Effort normal and breath sounds normal. No respiratory distress.   Abdominal: Soft. Bowel sounds are normal. She exhibits no distension. There is no tenderness.   Musculoskeletal: Normal range of motion. She exhibits no deformity.   Neurological: She is alert and oriented to person, place, and time.   Skin: Skin is warm and dry. No erythema.   Psychiatric: She has a normal mood and affect. Her behavior is normal.         Labs: Pertinent labs reviewed.    Assessment:  1. Constipation, unspecified constipation type    2. Abdominal pain, unspecified abdominal location    3. Lower abdominal pain    4. Weight loss, unintentional         Recommendations:  Constipation, unspecified constipation type  -     X-Ray Abdomen Flat And Erect; Future; Expected date: 11/04/2019    Abdominal pain, unspecified abdominal location  -     X-Ray Abdomen Flat And Erect; Future; Expected date: 11/04/2019    Lower abdominal pain  -     X-Ray Abdomen Flat And Erect; Future; Expected date: 11/04/2019    Weight loss, unintentional  -     Case request GI: EGD (ESOPHAGOGASTRODUODENOSCOPY)        No follow-ups on file.    Thank you so much for allowing me to participate in the care of Maggie Díaz  Yesenia Smiley PA-C

## 2019-11-04 NOTE — LETTER
November 4, 2019      Adeola Berumen MD  [address] [place] [address]           [facility]  [address] [facility] [address]  [place] [address]  Phone: [phone]  Fax: [phone]          Patient: Maggie Díaz   MR Number: [id number]   YOB: 1950   Date of Visit: 11/4/2019       Dear Dr. Adeola Berumen:    Thank you for referring Maggie Díaz to me for evaluation. Attached you will find relevant portions of my assessment and plan of care.    If you have questions, please do not hesitate to call me. I look forward to following Maggie Díaz along with you.    Sincerely,    Yesenia Smiley PA-C    Enclosure  CC:  No Recipients    If you would like to receive this communication electronically, please contact [email or web address] or [phone] to request more information on [employer] Link access.    For providers and/or their staff who would like to refer a patient to [facility], please contact us through our one-stop-shop provider referral line, [facility], at [phone].    If you feel you have received this communication in error or would no longer like to receive these types of communications, please e-mail [email or web address]

## 2019-11-06 ENCOUNTER — PATIENT MESSAGE (OUTPATIENT)
Dept: GASTROENTEROLOGY | Facility: CLINIC | Age: 69
End: 2019-11-06

## 2019-11-08 ENCOUNTER — PATIENT MESSAGE (OUTPATIENT)
Dept: GASTROENTEROLOGY | Facility: CLINIC | Age: 69
End: 2019-11-08

## 2019-11-26 NOTE — PROGRESS NOTES
"Subjective:      Patient ID: Maggie Díaz is a 69 y.o. female.    Chief Complaint: Follow-up      HPI  Here for follow up of medical problems.  Has retired from nursery care.  Weight stable in past 3-4mo.  Has stopped linzess, taking miralax for past 2 weeks.  Appetite is good.  BMs a little loose, not black or bloody ever.  No f/c/nausea.  No cp/sob/palp.  Denies anxiety or sweats.  Taking vit D.    Updated/ annual due 2/20:  HM: 10/19 fluvax, 8/19 HAV, 3/15 [id number], 2/14 [id number], 7/18 TD, 1/10 TDaP, 8/12 zoster, 8/19 BMD rep 2y, 8/18 Cscope rep 10y, 2/19 MMG, 5/18 pelvic u/s neg.     Review of Systems   Constitutional: Negative for activity change, chills, diaphoresis, fever and unexpected weight change.   HENT: Negative for hearing loss, rhinorrhea and trouble swallowing.    Eyes: Negative for discharge and visual disturbance.   Respiratory: Negative for cough, chest tightness, shortness of breath and wheezing.    Cardiovascular: Negative for chest pain, palpitations and leg swelling.   Gastrointestinal: Negative for blood in stool, constipation, diarrhea, nausea and vomiting.   Endocrine: Negative for polydipsia and polyuria.   Genitourinary: Negative for difficulty urinating, dysuria, frequency, hematuria and menstrual problem.   Musculoskeletal: Negative for arthralgias, joint swelling and neck pain.   Neurological: Negative for weakness and headaches.   Psychiatric/Behavioral: Negative for confusion and dysphoric mood. The patient is not nervous/anxious.          Objective:   /80   Pulse 88   Temp 97.7 °F (36.5 °C) (Oral)   Ht 5' 8" (1.727 m)   Wt 52.9 kg (116 lb 10 oz)   SpO2 99%   BMI 17.73 kg/m²     Physical Exam   Constitutional: She is oriented to person, place, and time. She appears well-developed.   HENT:   Mouth/Throat: Oropharynx is clear and moist.   Neck: Neck supple. Carotid bruit is not present. No thyroid mass present.   Cardiovascular: Normal rate, regular rhythm and intact " distal pulses. Exam reveals no gallop and no friction rub.   No murmur heard.  Pulmonary/Chest: Effort normal and breath sounds normal. She has no wheezes. She has no rales.   Abdominal: Soft. Bowel sounds are normal. She exhibits no mass. There is no hepatosplenomegaly. There is no tenderness.   Musculoskeletal: She exhibits no edema.   Lymphadenopathy:     She has no cervical adenopathy.   Neurological: She is alert and oriented to person, place, and time.   Psychiatric: She has a normal mood and affect.           Assessment:       1. Acquired hypothyroidism    2. Age-related osteoporosis without current pathological fracture    3. Vitamin D deficiency disease    4. Preventive measure    5. Encounter for screening mammogram for malignant neoplasm of breast     6. Weight loss    7. Restless legs syndrome (RLS)    8. Constipation, unspecified constipation type          Plan:     Acquired hypothyroidism- Clinically stable, continue present treatment.    Age-related osteoporosis without current pathological fracture- on drug holiday.    Weight loss, net loss 13#, currently stable.    Constipation- cont miralax, add fiber.    RLS- doing well on clonazepam.    Vitamin D deficiency disease  -     Vitamin D; Future    Preventive measure- will do in 3mo  -     CBC auto differential; Future; Expected date: 12/10/2019  -     Comprehensive metabolic panel; Future; Expected date: 12/10/2019  -     Lipid panel; Future; Expected date: 12/10/2019  -     TSH; Future; Expected date: 12/10/2019  -     Vitamin D; Future  -     Mammo Digital Screening Bilat; Future; Expected date: 12/10/2019    Encounter for screening mammogram for malignant neoplasm of breast   -     Mammo Digital Screening Bilat; Future; Expected date: 12/10/2019

## 2019-12-06 RX ORDER — LINACLOTIDE 72 UG/1
CAPSULE, GELATIN COATED ORAL
Qty: 30 CAPSULE | Refills: 0 | Status: SHIPPED | OUTPATIENT
Start: 2019-12-06 | End: 2019-12-10

## 2019-12-08 ENCOUNTER — PATIENT MESSAGE (OUTPATIENT)
Dept: FAMILY MEDICINE | Facility: CLINIC | Age: 69
End: 2019-12-08

## 2019-12-10 ENCOUNTER — OFFICE VISIT (OUTPATIENT)
Dept: FAMILY MEDICINE | Facility: CLINIC | Age: 69
End: 2019-12-10
Payer: MEDICARE

## 2019-12-10 VITALS
WEIGHT: 116.63 LBS | SYSTOLIC BLOOD PRESSURE: 127 MMHG | WEIGHT: 116.63 LBS | DIASTOLIC BLOOD PRESSURE: 80 MMHG | BODY MASS INDEX: 17.68 KG/M2 | RESPIRATION RATE: 20 BRPM | OXYGEN SATURATION: 99 % | SYSTOLIC BLOOD PRESSURE: 127 MMHG | HEART RATE: 88 BPM | HEART RATE: 88 BPM | HEIGHT: 68 IN | OXYGEN SATURATION: 99 % | DIASTOLIC BLOOD PRESSURE: 80 MMHG | HEIGHT: 68 IN | BODY MASS INDEX: 17.68 KG/M2 | TEMPERATURE: 98 F

## 2019-12-10 DIAGNOSIS — Z00.00 ENCOUNTER FOR PREVENTIVE HEALTH EXAMINATION: ICD-10-CM

## 2019-12-10 DIAGNOSIS — Z12.31 ENCOUNTER FOR SCREENING MAMMOGRAM FOR MALIGNANT NEOPLASM OF BREAST: ICD-10-CM

## 2019-12-10 DIAGNOSIS — M81.0 AGE-RELATED OSTEOPOROSIS WITHOUT CURRENT PATHOLOGICAL FRACTURE: ICD-10-CM

## 2019-12-10 DIAGNOSIS — I70.0 AORTIC ATHEROSCLEROSIS: Primary | ICD-10-CM

## 2019-12-10 DIAGNOSIS — K59.00 CONSTIPATION, UNSPECIFIED CONSTIPATION TYPE: ICD-10-CM

## 2019-12-10 DIAGNOSIS — E03.9 ACQUIRED HYPOTHYROIDISM: ICD-10-CM

## 2019-12-10 DIAGNOSIS — G25.81 RESTLESS LEGS SYNDROME (RLS): ICD-10-CM

## 2019-12-10 DIAGNOSIS — R63.4 WEIGHT LOSS: ICD-10-CM

## 2019-12-10 DIAGNOSIS — D69.6 THROMBOCYTOPENIA: ICD-10-CM

## 2019-12-10 DIAGNOSIS — F13.20 BENZODIAZEPINE DEPENDENCE: ICD-10-CM

## 2019-12-10 DIAGNOSIS — E55.9 VITAMIN D DEFICIENCY DISEASE: ICD-10-CM

## 2019-12-10 DIAGNOSIS — E03.9 ACQUIRED HYPOTHYROIDISM: Primary | ICD-10-CM

## 2019-12-10 DIAGNOSIS — Z29.9 PREVENTIVE MEASURE: ICD-10-CM

## 2019-12-10 PROCEDURE — 1159F MED LIST DOCD IN RCRD: CPT | Mod: ,,, | Performed by: INTERNAL MEDICINE

## 2019-12-10 PROCEDURE — G0439 PPPS, SUBSEQ VISIT: HCPCS | Mod: S$GLB,,, | Performed by: NURSE PRACTITIONER

## 2019-12-10 PROCEDURE — 99999 PR PBB SHADOW E&M-EST. PATIENT-LVL III: CPT | Mod: PBBFAC,,, | Performed by: INTERNAL MEDICINE

## 2019-12-10 PROCEDURE — 1126F PR PAIN SEVERITY QUANTIFIED, NO PAIN PRESENT: ICD-10-PCS | Mod: ,,, | Performed by: INTERNAL MEDICINE

## 2019-12-10 PROCEDURE — 99213 OFFICE O/P EST LOW 20 MIN: CPT | Mod: PBBFAC,PO | Performed by: INTERNAL MEDICINE

## 2019-12-10 PROCEDURE — 99214 OFFICE O/P EST MOD 30 MIN: CPT | Mod: S$PBB,,, | Performed by: INTERNAL MEDICINE

## 2019-12-10 PROCEDURE — 1126F AMNT PAIN NOTED NONE PRSNT: CPT | Mod: ,,, | Performed by: INTERNAL MEDICINE

## 2019-12-10 PROCEDURE — 99999 PR PBB SHADOW E&M-EST. PATIENT-LVL III: ICD-10-PCS | Mod: PBBFAC,,, | Performed by: INTERNAL MEDICINE

## 2019-12-10 PROCEDURE — 99214 PR OFFICE/OUTPT VISIT, EST, LEVL IV, 30-39 MIN: ICD-10-PCS | Mod: S$PBB,,, | Performed by: INTERNAL MEDICINE

## 2019-12-10 PROCEDURE — G0439 PR MEDICARE ANNUAL WELLNESS SUBSEQUENT VISIT: ICD-10-PCS | Mod: S$GLB,,, | Performed by: NURSE PRACTITIONER

## 2019-12-10 PROCEDURE — 1159F PR MEDICATION LIST DOCUMENTED IN MEDICAL RECORD: ICD-10-PCS | Mod: ,,, | Performed by: INTERNAL MEDICINE

## 2019-12-10 NOTE — PROGRESS NOTES
"Maggie Díaz presented for a  Medicare AWV and comprehensive Health Risk Assessment today. The following components were reviewed and updated:    · Medical history  · Family History  · Social history  · Allergies and Current Medications  · Health Risk Assessment  · Health Maintenance  · Care Team     ** See Completed Assessments for Annual Wellness Visit within the encounter summary.**       The following assessments were completed:  · Living Situation  · CAGE  · Depression Screening  · Timed Get Up and Go  · Whisper Test  · Cognitive Function Screening  · Nutrition Screening  · ADL Screening  · PAQ Screening    Vitals:    12/10/19 0850   BP: 127/80   Pulse: 88   Resp: 20   SpO2: 99%   Weight: 52.9 kg (116 lb 10 oz)   Height: 5' 8" (1.727 m)     Body mass index is 17.73 kg/m².  Physical Exam      Diagnoses and health risks identified today and associated recommendations/orders:    1. Encounter for preventive health examination  completed    2. Aortic atherosclerosis  15/19 CT Renal Stone Study- There is aortoiliac atherosclerosis.  Chronic and Stable. Continue current treatment plan as previously prescribed with your PCP    3. Thrombocytopenia  Platelets 150 - 350 K/uL 150  166  140 Low    Chronic and Stable. Continue current treatment plan as previously prescribed with your PCP    4. Restless legs syndrome (RLS)  Chronic and Stable on Klonopin. Continue current treatment plan as previously prescribed with your PCP    5 Benzodiazepine dependence  Chronic and Stable.   See  #  5 Continue current treatment plan as previously prescribed with your PCP    6. Age-related osteoporosis without current pathological fracture  DEXA 8/ 2019 - due for a repeat in  2 years  Chronic and Stable. Off for holiday  Continue current treatment plan as previously prescribed with your PCP    7. Vitamin D deficiency disease  Chronic and Stable on vitamin D . Continue current treatment plan as previously prescribed with your PCP    9. " Acquired hypothyroidism.  Chronic and Stable on Synthroid. Continue current treatment plan as previously prescribed with your PCP    0. Weight loss  Chronic and Ongoing. BMI has been stable in last few months states appetitte is back.   current treatment plan as previously prescribed with your PCP     I offered to discuss end of life issues, including information on how to make advance directives that the patient could use to name someone who would make medical decisions on their behalf if they became too ill to make themselves.  X_Patient is interested, I provided paper work and offered to discuss.     Provided Maggie with a 5-10 year written screening schedule and personal prevention plan. Recommendations were developed using the USPHS age appropriate recommendations. Education, counseling, and referrals were provided as needed. After Visit Summary printed and given to patient which includes a list of additional screenings\tests needed.    Follow up in about 1 year (around 12/10/2020).    Erin Baeza NP

## 2019-12-10 NOTE — Clinical Note
Your patient was seen today for a HRA visitI have included a copy of my visit note, please review the note and feel free to contact me with any questions. Thank you for allowing me to participate in the care of your patients. Erin Baeza NP

## 2019-12-10 NOTE — PATIENT INSTRUCTIONS
Counseling and Referral of Other Preventative  (Italic type indicates deductible and co-insurance are waived)    Patient Name: Maggie Díaz  Today's Date: 12/10/2019    Health Maintenance       Date Due Completion Date    High Dose Statin 07/22/1969 ---    Shingles Vaccine (2 of 3) 10/02/2012 8/7/2012    Mammogram 02/18/2021 2/18/2019    Override on 11/21/2011: Done    DEXA SCAN 08/16/2021 8/16/2019    Override on 1/3/2013: Done (Osteoporosis; stable BMD; chk vit D; repeat in 2 years; fosamax holiday)    Lipid Panel 02/18/2024 2/18/2019    TETANUS VACCINE 07/18/2028 7/18/2018    Colonoscopy 08/22/2028 8/22/2018    Override on 1/27/2017: Declined (Pt currently refuseing per PCP note 1/21/17)        No orders of the defined types were placed in this encounter.    The following information is provided to all patients.  This information is to help you find resources for any of the problems found today that may be affecting your health:                Living healthy guide: www.[facility].[place].gov      Understanding Diabetes: www.diabetes.org      Eating healthy: www.cdc.gov/healthyweight      CDC home safety checklist: www.cdc.gov/steadi/patient.html      Agency on Aging: www.goea.[place].[facility]      Alcoholics anonymous (AA): www.aa.org      Physical Activity: www.parth.nih.gov/zs6hltq      Tobacco use: www.quitwithusla.org

## 2020-01-16 ENCOUNTER — PATIENT MESSAGE (OUTPATIENT)
Dept: FAMILY MEDICINE | Facility: CLINIC | Age: 70
End: 2020-01-16

## 2020-01-17 ENCOUNTER — OFFICE VISIT (OUTPATIENT)
Dept: INTERNAL MEDICINE | Facility: CLINIC | Age: 70
End: 2020-01-17
Payer: MEDICARE

## 2020-01-17 VITALS
WEIGHT: 116.19 LBS | TEMPERATURE: 96 F | SYSTOLIC BLOOD PRESSURE: 136 MMHG | DIASTOLIC BLOOD PRESSURE: 80 MMHG | HEART RATE: 88 BPM | HEIGHT: 68 IN | BODY MASS INDEX: 17.61 KG/M2 | OXYGEN SATURATION: 96 %

## 2020-01-17 DIAGNOSIS — R05.9 COUGH: Primary | ICD-10-CM

## 2020-01-17 PROCEDURE — 99999 PR PBB SHADOW E&M-EST. PATIENT-LVL IV: CPT | Mod: PBBFAC,,, | Performed by: INTERNAL MEDICINE

## 2020-01-17 PROCEDURE — 99214 OFFICE O/P EST MOD 30 MIN: CPT | Mod: PBBFAC | Performed by: INTERNAL MEDICINE

## 2020-01-17 PROCEDURE — 1159F PR MEDICATION LIST DOCUMENTED IN MEDICAL RECORD: ICD-10-PCS | Mod: ,,, | Performed by: INTERNAL MEDICINE

## 2020-01-17 PROCEDURE — 1126F PR PAIN SEVERITY QUANTIFIED, NO PAIN PRESENT: ICD-10-PCS | Mod: ,,, | Performed by: INTERNAL MEDICINE

## 2020-01-17 PROCEDURE — 99214 PR OFFICE/OUTPT VISIT, EST, LEVL IV, 30-39 MIN: ICD-10-PCS | Mod: S$PBB,,, | Performed by: INTERNAL MEDICINE

## 2020-01-17 PROCEDURE — 99999 PR PBB SHADOW E&M-EST. PATIENT-LVL IV: ICD-10-PCS | Mod: PBBFAC,,, | Performed by: INTERNAL MEDICINE

## 2020-01-17 PROCEDURE — 99214 OFFICE O/P EST MOD 30 MIN: CPT | Mod: S$PBB,,, | Performed by: INTERNAL MEDICINE

## 2020-01-17 PROCEDURE — 1126F AMNT PAIN NOTED NONE PRSNT: CPT | Mod: ,,, | Performed by: INTERNAL MEDICINE

## 2020-01-17 PROCEDURE — 1159F MED LIST DOCD IN RCRD: CPT | Mod: ,,, | Performed by: INTERNAL MEDICINE

## 2020-01-17 RX ORDER — BENZONATATE 200 MG/1
200 CAPSULE ORAL 3 TIMES DAILY PRN
Qty: 30 CAPSULE | Refills: 0 | Status: SHIPPED | OUTPATIENT
Start: 2020-01-17 | End: 2020-01-27

## 2020-01-17 RX ORDER — PROMETHAZINE HYDROCHLORIDE AND DEXTROMETHORPHAN HYDROBROMIDE 6.25; 15 MG/5ML; MG/5ML
SYRUP ORAL
COMMUNITY
Start: 2020-01-12 | End: 2020-03-11

## 2020-01-18 NOTE — PROGRESS NOTES
"Subjective:      Patient ID: Maggie Díaz is a 69 y.o. female.    Chief Complaint: Cough    Cough   This is a new problem. The current episode started 1 to 4 weeks ago (started 10 days ago. dx with influenza 4 days later). The problem has been waxing and waning. The problem occurs every few hours. The cough is non-productive. Associated symptoms include chest pain, postnasal drip and rhinorrhea. Pertinent negatives include no chills, ear congestion, ear pain, fever, headaches, heartburn, hemoptysis, myalgias, nasal congestion, rash, sore throat, shortness of breath, sweats, weight loss or wheezing. Nothing aggravates the symptoms. She has tried OTC cough suppressant, prescription cough suppressant and rest (promethazine dm, mucinex) for the symptoms. The treatment provided moderate relief. Her past medical history is significant for pneumonia. There is no history of asthma, bronchiectasis, bronchitis, COPD, emphysema or environmental allergies.     Review of Systems   Constitutional: Negative for chills, fever and weight loss.   HENT: Positive for postnasal drip and rhinorrhea. Negative for congestion, ear pain and sore throat.    Respiratory: Positive for cough. Negative for hemoptysis, chest tightness, shortness of breath, wheezing and stridor.    Cardiovascular: Positive for chest pain.   Gastrointestinal: Negative for heartburn.   Musculoskeletal: Negative for myalgias.   Skin: Negative for rash.   Allergic/Immunologic: Negative for environmental allergies.   Neurological: Negative for headaches.     Objective:   /80 (BP Location: Left arm, Patient Position: Sitting, BP Method: Medium (Manual))   Pulse 88   Temp 96.4 °F (35.8 °C) (Tympanic)   Ht 5' 8" (1.727 m)   Wt 52.7 kg (116 lb 2.9 oz)   SpO2 96%   BMI 17.67 kg/m²     Physical Exam   Constitutional: She is oriented to person, place, and time. She appears well-developed and well-nourished. No distress.   HENT:   Head: Normocephalic and " atraumatic.   Eyes: Conjunctivae and EOM are normal.   Cardiovascular: Normal rate and regular rhythm.   Pulmonary/Chest: Effort normal.   Abdominal: Soft. Bowel sounds are normal.   Musculoskeletal: She exhibits no edema.   Neurological: She is alert and oriented to person, place, and time.   Skin: Skin is warm and dry.   Psychiatric: She has a normal mood and affect. Her behavior is normal.       Assessment:     1. Cough      Plan:   Cough  -     benzonatate (TESSALON) 200 MG capsule; Take 1 capsule (200 mg total) by mouth 3 (three) times daily as needed for Cough.  Dispense: 30 capsule; Refill: 0    c/w post viral cough. Try tessalon perles and otc antihistamines.     Lab Frequency Next Occurrence   CBC auto differential Once 12/10/2019   Comprehensive metabolic panel Once 12/10/2019   Lipid panel Once 12/10/2019   TSH Once 12/10/2019   Mammo Digital Screening Bilat Once 12/10/2019       Problem List Items Addressed This Visit     None      Visit Diagnoses     Cough    -  Primary    Relevant Medications    benzonatate (TESSALON) 200 MG capsule          Follow up if symptoms worsen or fail to improve.

## 2020-02-27 NOTE — PROGRESS NOTES
"Subjective:      Patient ID: Maggie Díaz is a 69 y.o. female.    Chief Complaint: Annual Exam      HPI  Here for f/u medical problems and preventive exam.  Ongoing abdominal gurgling and loose stool.  No black or blood.  Weight stable in past few months, but about 25# loss in past year.  No f/c/sw/cough.  No cp/sob/palp.  Urine normal.  Very anxious "over bowel situation."  Quit work over this.    HM: 10/19 fluvax, 8/19 HAV, 3/15 [id number], 2/14 [id number], 7/18 TD, 1/10 TDaP, 8/12 zoster, 8/19 BMD rep 2y, 8/18 Cscope rep 10y, 3/20 MMG/me, 5/18 pelvic u/s neg.     Review of Systems   Constitutional: Negative for activity change, appetite change, chills, diaphoresis, fever and unexpected weight change.   HENT: Negative for congestion, ear pain, hearing loss, rhinorrhea, sinus pressure, sore throat and trouble swallowing.    Eyes: Negative for discharge and visual disturbance.   Respiratory: Negative for cough, chest tightness, shortness of breath and wheezing.    Cardiovascular: Negative for chest pain and palpitations.   Gastrointestinal: Negative for blood in stool, constipation, diarrhea, nausea and vomiting.   Endocrine: Positive for polydipsia. Negative for polyuria.   Genitourinary: Negative for difficulty urinating, dysuria, frequency, hematuria, menstrual problem, urgency and vaginal discharge.   Musculoskeletal: Negative for arthralgias, joint swelling and neck pain.   Skin: Negative for rash.   Neurological: Negative for dizziness, weakness and headaches.   Psychiatric/Behavioral: Positive for dysphoric mood. Negative for confusion and sleep disturbance. The patient is not nervous/anxious.          Objective:   /76 (BP Location: Left arm, Patient Position: Sitting, BP Method: Large (Automatic))   Pulse 85   Temp 98.1 °F (36.7 °C) (Temporal)   Resp 16   Ht 5' 8" (1.727 m)   Wt 52.5 kg (115 lb 11.9 oz)   SpO2 96%   BMI 17.60 kg/m²     Physical Exam   Constitutional: She is oriented to person, " place, and time. She appears well-developed and well-nourished.   HENT:   Right Ear: External ear normal. Tympanic membrane is not injected.   Left Ear: External ear normal. Tympanic membrane is not injected.   Mouth/Throat: Oropharynx is clear and moist.   Eyes: Conjunctivae are normal.   Neck: Normal range of motion. Neck supple. No thyromegaly present.   Cardiovascular: Normal rate, regular rhythm and intact distal pulses. Exam reveals no gallop and no friction rub.   No murmur heard.  Pulmonary/Chest: Effort normal and breath sounds normal. She has no wheezes. She has no rales. Right breast exhibits no mass, no nipple discharge, no skin change and no tenderness. Left breast exhibits no mass, no nipple discharge, no skin change and no tenderness.   Abdominal: Soft. Bowel sounds are normal. She exhibits no mass. There is no tenderness.   Musculoskeletal: She exhibits no edema.   Lymphadenopathy:     She has no cervical adenopathy.        Right axillary: No lateral adenopathy present.        Left axillary: No lateral adenopathy present.  Neurological: She is alert and oriented to person, place, and time.   Skin: Skin is warm. No rash noted.   Psychiatric: Her mood appears anxious.       Results for orders placed or performed in visit on 03/04/20   CBC auto differential   Result Value Ref Range    WBC 4.41 3.90 - 12.70 K/uL    RBC 4.47 4.00 - 5.40 M/uL    Hemoglobin 14.8 12.0 - 16.0 g/dL    Hematocrit 46.7 37.0 - 48.5 %    Mean Corpuscular Volume 105 (H) 82 - 98 fL    Mean Corpuscular Hemoglobin 33.1 (H) 27.0 - 31.0 pg    Mean Corpuscular Hemoglobin Conc 31.7 (L) 32.0 - 36.0 g/dL    RDW 13.0 11.5 - 14.5 %    Platelets 173 150 - 350 K/uL    MPV 10.4 9.2 - 12.9 fL    Immature Granulocytes 0.2 0.0 - 0.5 %    Gran # (ANC) 2.4 1.8 - 7.7 K/uL    Immature Grans (Abs) 0.01 0.00 - 0.04 K/uL    Lymph # 1.5 1.0 - 4.8 K/uL    Mono # 0.5 0.3 - 1.0 K/uL    Eos # 0.1 0.0 - 0.5 K/uL    Baso # 0.04 0.00 - 0.20 K/uL    nRBC 0 0 /100  WBC    Gran% 53.6 38.0 - 73.0 %    Lymph% 33.3 18.0 - 48.0 %    Mono% 10.9 4.0 - 15.0 %    Eosinophil% 1.1 0.0 - 8.0 %    Basophil% 0.9 0.0 - 1.9 %    Differential Method Automated    Comprehensive metabolic panel   Result Value Ref Range    Sodium 139 136 - 145 mmol/L    Potassium 4.7 3.5 - 5.1 mmol/L    Chloride 101 95 - 110 mmol/L    CO2 28 23 - 29 mmol/L    Glucose 98 70 - 110 mg/dL    BUN, Bld 10 8 - 23 mg/dL    Creatinine 0.9 0.5 - 1.4 mg/dL    Calcium 10.1 8.7 - 10.5 mg/dL    Total Protein 8.1 6.0 - 8.4 g/dL    Albumin 4.5 3.5 - 5.2 g/dL    Total Bilirubin 0.9 0.1 - 1.0 mg/dL    Alkaline Phosphatase 40 (L) 55 - 135 U/L    AST 22 10 - 40 U/L    ALT 14 10 - 44 U/L    Anion Gap 10 8 - 16 mmol/L    eGFR if African American >60.0 >60 mL/min/1.73 m^2    eGFR if non African American >60.0 >60 mL/min/1.73 m^2   Lipid panel   Result Value Ref Range    Cholesterol 171 120 - 199 mg/dL    Triglycerides 79 30 - 150 mg/dL    HDL 84 (H) 40 - 75 mg/dL    LDL Cholesterol 71.2 63.0 - 159.0 mg/dL    Hdl/Cholesterol Ratio 49.1 20.0 - 50.0 %    Total Cholesterol/HDL Ratio 2.0 2.0 - 5.0    Non-HDL Cholesterol 87 mg/dL   TSH   Result Value Ref Range    TSH 1.519 0.400 - 4.000 uIU/mL   Vitamin D   Result Value Ref Range    Vit D, 25-Hydroxy 60 30 - 96 ng/mL         Assessment:       1. Acquired hypothyroidism    2. Age-related osteoporosis without current pathological fracture    3. Seasonal allergic rhinitis due to pollen    4. History of hepatitis C    5. Restless legs syndrome (RLS)    6. Thrombocytopenia    7. Vitamin D deficiency disease    8. Encounter for preventive health examination    9. Diarrhea, unspecified type    10. Unintentional weight loss    11. Anxiety          Plan:     Acquired hypothyroidism- Clinically stable, continue present treatment.    Age-related osteoporosis without current pathological fracture on drug holiday.    Seasonal allergic rhinitis due to pollen    History of hepatitis C    Restless legs  syndrome (RLS)- cont rx.    Thrombocytopenia- resolved now, cont to follow.    Vitamin D deficiency disease- stable, cont rx.    Encounter for preventive health examination- utd.    Diarrhea, unspecified type, Unintentional weight loss  -     Ambulatory referral/consult to Gastroenterology; Future; Expected date: 03/18/2020  -     CT Abdomen Pelvis W Wo Contrast; Future; Expected date: 03/11/2020    Start lexapro for anxiety.  RTC 2wk, Gastro appt after.

## 2020-03-02 ENCOUNTER — PATIENT OUTREACH (OUTPATIENT)
Dept: ADMINISTRATIVE | Facility: HOSPITAL | Age: 70
End: 2020-03-02

## 2020-03-02 RX ORDER — CLONAZEPAM 0.5 MG/1
TABLET ORAL
Qty: 60 TABLET | Refills: 4 | Status: SHIPPED | OUTPATIENT
Start: 2020-03-02 | End: 2020-08-10 | Stop reason: SDUPTHER

## 2020-03-02 NOTE — PROGRESS NOTES
Statin Addressed in appt notes       Dionne MONTALVO LPN Care Coordinator  Care Coordination Department  [facility]  [phone]

## 2020-03-04 ENCOUNTER — HOSPITAL ENCOUNTER (OUTPATIENT)
Dept: RADIOLOGY | Facility: HOSPITAL | Age: 70
Discharge: HOME | End: 2020-03-04
Attending: INTERNAL MEDICINE
Payer: MEDICARE

## 2020-03-04 VITALS — BODY MASS INDEX: 17.61 KG/M2 | WEIGHT: 116.19 LBS | HEIGHT: 68 IN

## 2020-03-04 DIAGNOSIS — Z12.31 ENCOUNTER FOR SCREENING MAMMOGRAM FOR MALIGNANT NEOPLASM OF BREAST: ICD-10-CM

## 2020-03-04 DIAGNOSIS — E03.9 ACQUIRED HYPOTHYROIDISM: ICD-10-CM

## 2020-03-04 DIAGNOSIS — Z29.9 PREVENTIVE MEASURE: ICD-10-CM

## 2020-03-04 PROCEDURE — 77063 BREAST TOMOSYNTHESIS BI: CPT | Mod: 26,,, | Performed by: RADIOLOGY

## 2020-03-04 PROCEDURE — 77067 SCR MAMMO BI INCL CAD: CPT | Mod: 26,,, | Performed by: RADIOLOGY

## 2020-03-04 PROCEDURE — 77067 SCR MAMMO BI INCL CAD: CPT | Mod: TC

## 2020-03-04 PROCEDURE — 77067 MAMMO DIGITAL SCREENING BILAT WITH TOMOSYNTHESIS_CAD: ICD-10-PCS | Mod: 26,,, | Performed by: RADIOLOGY

## 2020-03-04 PROCEDURE — 77063 MAMMO DIGITAL SCREENING BILAT WITH TOMOSYNTHESIS_CAD: ICD-10-PCS | Mod: 26,,, | Performed by: RADIOLOGY

## 2020-03-11 ENCOUNTER — OFFICE VISIT (OUTPATIENT)
Dept: FAMILY MEDICINE | Facility: CLINIC | Age: 70
End: 2020-03-11
Payer: MEDICARE

## 2020-03-11 VITALS
HEIGHT: 68 IN | RESPIRATION RATE: 16 BRPM | SYSTOLIC BLOOD PRESSURE: 120 MMHG | TEMPERATURE: 98 F | BODY MASS INDEX: 17.54 KG/M2 | HEART RATE: 85 BPM | WEIGHT: 115.75 LBS | OXYGEN SATURATION: 96 % | DIASTOLIC BLOOD PRESSURE: 76 MMHG

## 2020-03-11 DIAGNOSIS — E03.9 ACQUIRED HYPOTHYROIDISM: Primary | ICD-10-CM

## 2020-03-11 DIAGNOSIS — R63.4 UNINTENTIONAL WEIGHT LOSS: ICD-10-CM

## 2020-03-11 DIAGNOSIS — Z86.19 HISTORY OF HEPATITIS C: ICD-10-CM

## 2020-03-11 DIAGNOSIS — Z00.00 ENCOUNTER FOR PREVENTIVE HEALTH EXAMINATION: ICD-10-CM

## 2020-03-11 DIAGNOSIS — M81.0 AGE-RELATED OSTEOPOROSIS WITHOUT CURRENT PATHOLOGICAL FRACTURE: ICD-10-CM

## 2020-03-11 DIAGNOSIS — E55.9 VITAMIN D DEFICIENCY DISEASE: ICD-10-CM

## 2020-03-11 DIAGNOSIS — G25.81 RESTLESS LEGS SYNDROME (RLS): ICD-10-CM

## 2020-03-11 DIAGNOSIS — J30.1 SEASONAL ALLERGIC RHINITIS DUE TO POLLEN: ICD-10-CM

## 2020-03-11 DIAGNOSIS — R19.7 DIARRHEA, UNSPECIFIED TYPE: ICD-10-CM

## 2020-03-11 DIAGNOSIS — F41.9 ANXIETY: ICD-10-CM

## 2020-03-11 DIAGNOSIS — D69.6 THROMBOCYTOPENIA: ICD-10-CM

## 2020-03-11 PROCEDURE — 99214 OFFICE O/P EST MOD 30 MIN: CPT | Mod: PBBFAC,PO | Performed by: INTERNAL MEDICINE

## 2020-03-11 PROCEDURE — 99214 PR OFFICE/OUTPT VISIT, EST, LEVL IV, 30-39 MIN: ICD-10-PCS | Mod: S$PBB,,, | Performed by: INTERNAL MEDICINE

## 2020-03-11 PROCEDURE — 99999 PR PBB SHADOW E&M-EST. PATIENT-LVL IV: ICD-10-PCS | Mod: PBBFAC,,, | Performed by: INTERNAL MEDICINE

## 2020-03-11 PROCEDURE — 99999 PR PBB SHADOW E&M-EST. PATIENT-LVL IV: CPT | Mod: PBBFAC,,, | Performed by: INTERNAL MEDICINE

## 2020-03-11 PROCEDURE — 99214 OFFICE O/P EST MOD 30 MIN: CPT | Mod: S$PBB,,, | Performed by: INTERNAL MEDICINE

## 2020-03-11 RX ORDER — ESCITALOPRAM OXALATE 10 MG/1
10 TABLET ORAL DAILY
Qty: 30 TABLET | Refills: 6 | Status: SHIPPED | OUTPATIENT
Start: 2020-03-11 | End: 2020-09-29 | Stop reason: SDUPTHER

## 2020-03-12 ENCOUNTER — PATIENT MESSAGE (OUTPATIENT)
Dept: FAMILY MEDICINE | Facility: CLINIC | Age: 70
End: 2020-03-12

## 2020-03-20 ENCOUNTER — PATIENT MESSAGE (OUTPATIENT)
Dept: FAMILY MEDICINE | Facility: CLINIC | Age: 70
End: 2020-03-20

## 2020-03-20 ENCOUNTER — TELEPHONE (OUTPATIENT)
Dept: FAMILY MEDICINE | Facility: CLINIC | Age: 70
End: 2020-03-20

## 2020-03-20 NOTE — TELEPHONE ENCOUNTER
Please tell pt I would like a video visit in 2 weeks, to discuss her anxiety and abdominal symptoms and see if we can improve on her medicine.  SM

## 2020-03-26 ENCOUNTER — PATIENT MESSAGE (OUTPATIENT)
Dept: GASTROENTEROLOGY | Facility: CLINIC | Age: 70
End: 2020-03-26

## 2020-04-07 ENCOUNTER — PATIENT MESSAGE (OUTPATIENT)
Dept: FAMILY MEDICINE | Facility: CLINIC | Age: 70
End: 2020-04-07

## 2020-04-08 ENCOUNTER — TELEPHONE (OUTPATIENT)
Dept: GASTROENTEROLOGY | Facility: CLINIC | Age: 70
End: 2020-04-08

## 2020-04-08 NOTE — TELEPHONE ENCOUNTER
Spoke with pt in hopes to schedule her in the near future. She stated she will like to hold off, being she is seeing her primary care physician tomorrow 4/9/20 (virtual visit)

## 2020-04-09 ENCOUNTER — OFFICE VISIT (OUTPATIENT)
Dept: FAMILY MEDICINE | Facility: CLINIC | Age: 70
End: 2020-04-09
Payer: MEDICARE

## 2020-04-09 DIAGNOSIS — R63.4 UNINTENTIONAL WEIGHT LOSS: ICD-10-CM

## 2020-04-09 DIAGNOSIS — F41.9 ANXIETY: Primary | ICD-10-CM

## 2020-04-09 PROCEDURE — 99213 PR OFFICE/OUTPT VISIT, EST, LEVL III, 20-29 MIN: ICD-10-PCS | Mod: 95,,, | Performed by: INTERNAL MEDICINE

## 2020-04-09 PROCEDURE — 99213 OFFICE O/P EST LOW 20 MIN: CPT | Mod: 95,,, | Performed by: INTERNAL MEDICINE

## 2020-04-09 NOTE — PROGRESS NOTES
Primary Care Telemedicine Note  The patient location is:  Patient Home   The chief complaint leading to consultation is:   Total time spent with patient: 12 minutes.    Visit type: Virtual visit with synchronous audio only and video  Each patient to whom he or she provides medical services by telemedicine is:  (1) informed of the relationship between the physician and patient and the respective role of any other health care provider with respect to management of the patient; and (2) notified that he or she may decline to receive medical services by telemedicine and may withdraw from such care at any time.    Updated/ annual due 3/21:  HM: 10/19 fluvax, 8/19 HAV, 3/15 [id number], 2/14 [id number], 7/18 TD, 1/10 TDaP, 8/12 zoster, 8/19 BMD rep 2y, 8/18 Cscope rep 10y, 3/20 MMG/me, 5/18 pelvic u/s neg.     HPI:  Feeling much better on lexapro.  Stomach sx are back to normal.  Appetite has returned.  Not sure if has picked up any weight, but has not lost any weight.  Using flonase, allergies doing well.      Problem List Items Addressed This Visit     Anxiety - Primary      Other Visit Diagnoses     Unintentional weight loss              The patient's Health Maintenance was reviewed and the following appears to be due at this time:  Health Maintenance Due   Topic Date Due    High Dose Statin  07/22/1969       [unfilled]  Outpatient Medications Prior to Visit   Medication Sig Dispense Refill    clonazePAM (KLONOPIN) 0.5 MG tablet TAKE 1 TO 2 TABLETS BY MOUTH EVERY EVENING IF NEEDED 60 tablet 4    escitalopram oxalate (LEXAPRO) 10 MG tablet Take 1 tablet (10 mg total) by mouth once daily. 30 tablet 6    levothyroxine (SYNTHROID) 50 MCG tablet TAKE 1 TABLET BY MOUTH ONCE DAILY 30 tablet 11    polyethylene glycol (GLYCOLAX) 17 gram/dose powder Take 17 g by mouth as needed.        No facility-administered medications prior to visit.         Physical Exam   No flowsheet data found.  No flowsheet data  found.      Constitutional: The patient appears well-developed and well-nourished. No distress.   Psychiatric: The mood appears not anxious and the affect is not angry, not blunt, not labile and not inappropriate. Speech is not rapid and/or pressured, not delayed, not tangential and not slurred. The patient is not agitated, not aggressive, is not hyperactive, not slowed, not withdrawn, not actively hallucinating and not combative. Thought content is not paranoid and not delusional. Cognition and memory are not impaired. The patient does not express impulsivity or inappropriate judgment and does not exhibit a depressed mood. The patient expresses no homicidal and no suicidal ideation and has no suicidal plans and no homicidal plans. The patient is communicative and exhibits a normal recent memory and normal remote memory. The patient is attentive.     Encounter Diagnoses   Name Primary?    Anxiety Yes    Unintentional weight loss        PLAN:    I am having Maggie Díaz maintain her polyethylene glycol, levothyroxine, clonazePAM, and escitalopram oxalate.    Diagnoses and all orders for this visit:    Anxiety- doing well on rx, cont.    Unintentional weight loss- will follow.  RTC 3mo check wt.              No orders of the defined types were placed in this encounter.

## 2020-04-29 RX ORDER — LEVOTHYROXINE SODIUM 50 UG/1
TABLET ORAL
Qty: 30 TABLET | Refills: 11 | Status: SHIPPED | OUTPATIENT
Start: 2020-04-29 | End: 2021-04-22 | Stop reason: SDUPTHER

## 2020-06-16 NOTE — PROGRESS NOTES
"Subjective:      Patient ID: Maggie Díaz is a 69 y.o. female.    Chief Complaint: Follow-up      HPI  Here for follow up of medical problems.  No more abdominal symptoms.  Has gained back 5# of 15# lost.  No f/c/sw/cough.  BMs normal.  No cp/sob/palp.  Walking for exercise.    Updated/ annual due 3/21:  HM: 10/19 fluvax, 8/19 HAV, 3/15 [id number], 2/14 [id number], 7/18 TD, 1/10 TDaP, 8/12 zoster, 8/19 BMD rep 2y, 8/18 Cscope rep 10y, 3/20 MMG/me, 5/18 pelvic u/s neg.     Review of Systems   Constitutional: Negative for activity change, chills, diaphoresis, fever and unexpected weight change.   HENT: Negative for hearing loss, rhinorrhea and trouble swallowing.    Eyes: Negative for discharge and visual disturbance.   Respiratory: Negative for cough, chest tightness, shortness of breath and wheezing.    Cardiovascular: Negative for chest pain, palpitations and leg swelling.   Gastrointestinal: Negative for blood in stool, constipation, diarrhea, nausea and vomiting.   Endocrine: Negative for polydipsia and polyuria.   Genitourinary: Negative for difficulty urinating, dysuria, frequency, hematuria and menstrual problem.   Musculoskeletal: Negative for arthralgias, joint swelling and neck pain.   Neurological: Negative for weakness and headaches.   Psychiatric/Behavioral: Negative for confusion and dysphoric mood. The patient is not nervous/anxious.          Objective:   /60   Pulse 78   Temp 97.6 °F (36.4 °C) (Oral)   Ht 5' 8" (1.727 m)   Wt 54.8 kg (120 lb 13 oz)   SpO2 98%   BMI 18.37 kg/m²     Physical Exam  Constitutional:       Appearance: She is well-developed.   Neck:      Musculoskeletal: Neck supple.      Thyroid: No thyroid mass.      Vascular: No carotid bruit.   Cardiovascular:      Rate and Rhythm: Normal rate and regular rhythm.      Heart sounds: No murmur. No friction rub. No gallop.    Pulmonary:      Effort: Pulmonary effort is normal.      Breath sounds: Normal breath sounds. No " wheezing or rales.   Abdominal:      General: Bowel sounds are normal.      Palpations: Abdomen is soft. There is no mass.      Tenderness: There is no abdominal tenderness.   Lymphadenopathy:      Cervical: No cervical adenopathy.   Neurological:      Mental Status: She is alert and oriented to person, place, and time.             Assessment:       1. Unintentional weight loss    2. Anxiety    3. Acquired hypothyroidism    4. Preventive measure    5. Vitamin D deficiency disease    6. Encounter for screening mammogram for malignant neoplasm of breast           Plan:     Unintentional weight loss- improving with rx.    Anxiety- doing well, cont rx.    Acquired hypothyroidism- Clinically stable, continue present treatment.    Preventive measure- due in 9mo.  -     Mammo Digital Screening Bilat; Future; Expected date: 06/17/2020  -     CBC auto differential; Future; Expected date: 06/17/2020  -     Comprehensive metabolic panel; Future; Expected date: 06/17/2020  -     Lipid Panel; Future; Expected date: 06/17/2020  -     TSH; Future; Expected date: 06/17/2020  -     Vitamin D; Future    Vitamin D deficiency disease  -     Vitamin D; Future    Encounter for screening mammogram for malignant neoplasm of breast   -     Mammo Digital Screening Bilat; Future; Expected date: 06/17/2020

## 2020-06-17 ENCOUNTER — OFFICE VISIT (OUTPATIENT)
Dept: FAMILY MEDICINE | Facility: CLINIC | Age: 70
End: 2020-06-17
Payer: MEDICARE

## 2020-06-17 VITALS
BODY MASS INDEX: 18.31 KG/M2 | WEIGHT: 120.81 LBS | SYSTOLIC BLOOD PRESSURE: 102 MMHG | OXYGEN SATURATION: 98 % | TEMPERATURE: 98 F | HEIGHT: 68 IN | DIASTOLIC BLOOD PRESSURE: 60 MMHG | HEART RATE: 78 BPM

## 2020-06-17 DIAGNOSIS — Z12.31 ENCOUNTER FOR SCREENING MAMMOGRAM FOR MALIGNANT NEOPLASM OF BREAST: ICD-10-CM

## 2020-06-17 DIAGNOSIS — Z29.9 PREVENTIVE MEASURE: ICD-10-CM

## 2020-06-17 DIAGNOSIS — F41.9 ANXIETY: ICD-10-CM

## 2020-06-17 DIAGNOSIS — R63.4 UNINTENTIONAL WEIGHT LOSS: Primary | ICD-10-CM

## 2020-06-17 DIAGNOSIS — E55.9 VITAMIN D DEFICIENCY DISEASE: ICD-10-CM

## 2020-06-17 DIAGNOSIS — E03.9 ACQUIRED HYPOTHYROIDISM: ICD-10-CM

## 2020-06-17 PROCEDURE — 99999 PR PBB SHADOW E&M-EST. PATIENT-LVL IV: ICD-10-PCS | Mod: PBBFAC,,, | Performed by: INTERNAL MEDICINE

## 2020-06-17 PROCEDURE — 99213 PR OFFICE/OUTPT VISIT, EST, LEVL III, 20-29 MIN: ICD-10-PCS | Mod: S$PBB,,, | Performed by: INTERNAL MEDICINE

## 2020-06-17 PROCEDURE — 99214 OFFICE O/P EST MOD 30 MIN: CPT | Mod: PBBFAC,PO | Performed by: INTERNAL MEDICINE

## 2020-06-17 PROCEDURE — 99999 PR PBB SHADOW E&M-EST. PATIENT-LVL IV: CPT | Mod: PBBFAC,,, | Performed by: INTERNAL MEDICINE

## 2020-06-17 PROCEDURE — 99213 OFFICE O/P EST LOW 20 MIN: CPT | Mod: S$PBB,,, | Performed by: INTERNAL MEDICINE

## 2020-06-17 RX ORDER — ACETAMINOPHEN 500 MG
1 TABLET ORAL DAILY
COMMUNITY

## 2020-08-10 DIAGNOSIS — F41.9 ANXIETY: Primary | ICD-10-CM

## 2020-08-10 RX ORDER — CLONAZEPAM 0.5 MG/1
TABLET ORAL
Qty: 60 TABLET | Refills: 4 | Status: SHIPPED | OUTPATIENT
Start: 2020-08-10 | End: 2020-08-13

## 2020-09-29 ENCOUNTER — PATIENT MESSAGE (OUTPATIENT)
Dept: FAMILY MEDICINE | Facility: CLINIC | Age: 70
End: 2020-09-29

## 2020-10-26 ENCOUNTER — PES CALL (OUTPATIENT)
Dept: ADMINISTRATIVE | Facility: CLINIC | Age: 70
End: 2020-10-26

## 2020-11-15 ENCOUNTER — PATIENT MESSAGE (OUTPATIENT)
Dept: INTERNAL MEDICINE | Facility: CLINIC | Age: 70
End: 2020-11-15

## 2020-11-18 ENCOUNTER — OFFICE VISIT (OUTPATIENT)
Dept: INTERNAL MEDICINE | Facility: CLINIC | Age: 70
End: 2020-11-18
Payer: MEDICARE

## 2020-11-18 DIAGNOSIS — D69.6 THROMBOCYTOPENIA: ICD-10-CM

## 2020-11-18 DIAGNOSIS — I70.0 AORTIC ATHEROSCLEROSIS: ICD-10-CM

## 2020-11-18 DIAGNOSIS — F13.20 BENZODIAZEPINE DEPENDENCE: ICD-10-CM

## 2020-11-18 DIAGNOSIS — G25.81 RESTLESS LEGS SYNDROME (RLS): ICD-10-CM

## 2020-11-18 DIAGNOSIS — Z86.19 HISTORY OF HEPATITIS C: ICD-10-CM

## 2020-11-18 DIAGNOSIS — E55.9 VITAMIN D DEFICIENCY DISEASE: ICD-10-CM

## 2020-11-18 DIAGNOSIS — E03.9 ACQUIRED HYPOTHYROIDISM: ICD-10-CM

## 2020-11-18 DIAGNOSIS — M81.0 AGE-RELATED OSTEOPOROSIS WITHOUT CURRENT PATHOLOGICAL FRACTURE: ICD-10-CM

## 2020-11-18 DIAGNOSIS — F41.9 ANXIETY: ICD-10-CM

## 2020-11-18 DIAGNOSIS — Z00.00 ENCOUNTER FOR MEDICARE ANNUAL WELLNESS EXAM: Primary | ICD-10-CM

## 2020-11-18 PROCEDURE — 96160 PT-FOCUSED HLTH RISK ASSMT: CPT | Mod: PBBFAC,PO | Performed by: PHYSICIAN ASSISTANT

## 2020-11-18 PROCEDURE — G0439 PPPS, SUBSEQ VISIT: HCPCS | Mod: 95,,, | Performed by: PHYSICIAN ASSISTANT

## 2020-11-18 PROCEDURE — G0439 PR MEDICARE ANNUAL WELLNESS SUBSEQUENT VISIT: ICD-10-PCS | Mod: 95,,, | Performed by: PHYSICIAN ASSISTANT

## 2020-11-18 NOTE — PROGRESS NOTES
The patient location is: [facility]   The chief complaint leading to consultation is: AWV    Visit type: audiovisual    Face to Face time with patient: 30 MINS  30 minutes of total time spent on the encounter, which includes face to face time and non-face to face time preparing to see the patient (eg, review of tests), Obtaining and/or reviewing separately obtained history, Documenting clinical information in the electronic or other health record, Independently interpreting results (not separately reported) and communicating results to the patient/family/caregiver, or Care coordination (not separately reported).         Each patient to whom he or she provides medical services by telemedicine is:  (1) informed of the relationship between the physician and patient and the respective role of any other health care provider with respect to management of the patient; and (2) notified that he or she may decline to receive medical services by telemedicine and may withdraw from such care at any time.    Notes:       Maggie Díaz presented for a follow-up Medicare AWV today. The following components were reviewed and updated:    · Medical history  · Family History  · Social history  · Allergies and Current Medications  · Health Risk Assessment  · Health Maintenance  · Care Team    **See Completed Assessments for Annual Wellness visit with in the encounter summary    The following assessments were completed:  · Depression Screening  · Cognitive function Screening  · Timed Get Up Test  · Whisper Test    There were no vitals filed for this visit.  There is no height or weight on file to calculate BMI.   ]        Diagnoses and health risks identified today and associated recommendations/orders:    Encounter for Medicare annual wellness exam    Vitamin D deficiency disease  Comments:  taking Vit D per PCP, managed by PCP     Thrombocytopenia  Comments:  resolved, not an issue    Restless legs syndrome (RLS)  Comments:  taking  clonazepam at night, followed by PCP    History of hepatitis C  Comments:  tx years ago     Aortic atherosclerosis  Comments:  Aorta and vasculature: Atherosclerosis including coronary arteries. CT 2019    Age-related osteoporosis without current pathological fracture  Comments:  DEXA 2019 followed and managed by PCP    Anxiety  Comments:  managed well by Dr. Jones     Acquired hypothyroidism  Comments:  followed and managed by PCP     Benzodiazepine dependence  Comments:  using for anxiety, followed by PCP      Provided Maggie with a 5-10 year written screening schedule and personal prevention plan. Recommendations were developed using the USPSTF age appropriate recommendations. Education, counseling, and referrals were provided as needed.  After Visit Summary printed and given to patient which includes a list of additional screenings\tests needed.      SHEILA Cespedes

## 2020-11-19 ENCOUNTER — PATIENT MESSAGE (OUTPATIENT)
Dept: INTERNAL MEDICINE | Facility: CLINIC | Age: 70
End: 2020-11-19

## 2021-01-06 ENCOUNTER — PATIENT MESSAGE (OUTPATIENT)
Dept: FAMILY MEDICINE | Facility: CLINIC | Age: 71
End: 2021-01-06

## 2021-01-10 ENCOUNTER — IMMUNIZATION (OUTPATIENT)
Dept: INTERNAL MEDICINE | Facility: CLINIC | Age: 71
End: 2021-01-10
Payer: MEDICARE

## 2021-01-10 DIAGNOSIS — Z23 NEED FOR VACCINATION: ICD-10-CM

## 2021-01-10 PROCEDURE — 91300 COVID-19, MRNA, LNP-S, PF, 30 MCG/0.3 ML DOSE VACCINE: CPT | Mod: PBBFAC | Performed by: FAMILY MEDICINE

## 2021-01-31 ENCOUNTER — IMMUNIZATION (OUTPATIENT)
Dept: INTERNAL MEDICINE | Facility: CLINIC | Age: 71
End: 2021-01-31
Payer: MEDICARE

## 2021-01-31 DIAGNOSIS — Z23 NEED FOR VACCINATION: Primary | ICD-10-CM

## 2021-01-31 PROCEDURE — 91300 COVID-19, MRNA, LNP-S, PF, 30 MCG/0.3 ML DOSE VACCINE: CPT | Mod: PBBFAC

## 2021-01-31 PROCEDURE — 0002A COVID-19, MRNA, LNP-S, PF, 30 MCG/0.3 ML DOSE VACCINE: CPT | Mod: PBBFAC

## 2021-03-10 ENCOUNTER — HOSPITAL ENCOUNTER (OUTPATIENT)
Dept: RADIOLOGY | Facility: HOSPITAL | Age: 71
Discharge: HOME | End: 2021-03-10
Attending: INTERNAL MEDICINE
Payer: MEDICARE

## 2021-03-10 DIAGNOSIS — Z29.9 PREVENTIVE MEASURE: ICD-10-CM

## 2021-03-10 DIAGNOSIS — Z12.31 ENCOUNTER FOR SCREENING MAMMOGRAM FOR MALIGNANT NEOPLASM OF BREAST: ICD-10-CM

## 2021-03-10 DIAGNOSIS — E03.9 ACQUIRED HYPOTHYROIDISM: ICD-10-CM

## 2021-03-10 PROCEDURE — 77067 SCR MAMMO BI INCL CAD: CPT | Mod: TC

## 2021-03-10 PROCEDURE — 77063 MAMMO DIGITAL SCREENING BILAT WITH TOMO: ICD-10-PCS | Mod: 26,,, | Performed by: RADIOLOGY

## 2021-03-10 PROCEDURE — 77063 BREAST TOMOSYNTHESIS BI: CPT | Mod: 26,,, | Performed by: RADIOLOGY

## 2021-03-10 PROCEDURE — 77067 MAMMO DIGITAL SCREENING BILAT WITH TOMO: ICD-10-PCS | Mod: 26,,, | Performed by: RADIOLOGY

## 2021-03-10 PROCEDURE — 77067 SCR MAMMO BI INCL CAD: CPT | Mod: 26,,, | Performed by: RADIOLOGY

## 2021-03-17 ENCOUNTER — OFFICE VISIT (OUTPATIENT)
Dept: FAMILY MEDICINE | Facility: CLINIC | Age: 71
End: 2021-03-17
Payer: MEDICARE

## 2021-03-17 VITALS
OXYGEN SATURATION: 98 % | WEIGHT: 133.19 LBS | SYSTOLIC BLOOD PRESSURE: 124 MMHG | HEART RATE: 77 BPM | TEMPERATURE: 97 F | DIASTOLIC BLOOD PRESSURE: 80 MMHG | BODY MASS INDEX: 20.18 KG/M2 | HEIGHT: 68 IN

## 2021-03-17 DIAGNOSIS — F13.20 BENZODIAZEPINE DEPENDENCE: ICD-10-CM

## 2021-03-17 DIAGNOSIS — F41.9 ANXIETY: ICD-10-CM

## 2021-03-17 DIAGNOSIS — M81.0 AGE-RELATED OSTEOPOROSIS WITHOUT CURRENT PATHOLOGICAL FRACTURE: ICD-10-CM

## 2021-03-17 DIAGNOSIS — E55.9 VITAMIN D DEFICIENCY DISEASE: ICD-10-CM

## 2021-03-17 DIAGNOSIS — J30.1 SEASONAL ALLERGIC RHINITIS DUE TO POLLEN: ICD-10-CM

## 2021-03-17 DIAGNOSIS — I70.0 AORTIC ATHEROSCLEROSIS: ICD-10-CM

## 2021-03-17 DIAGNOSIS — E03.9 ACQUIRED HYPOTHYROIDISM: Primary | ICD-10-CM

## 2021-03-17 DIAGNOSIS — G25.81 RESTLESS LEGS SYNDROME (RLS): ICD-10-CM

## 2021-03-17 DIAGNOSIS — D69.6 THROMBOCYTOPENIA: ICD-10-CM

## 2021-03-17 DIAGNOSIS — Z86.19 HISTORY OF HEPATITIS C: ICD-10-CM

## 2021-03-17 DIAGNOSIS — Z78.0 ASYMPTOMATIC POSTMENOPAUSAL STATE: ICD-10-CM

## 2021-03-17 DIAGNOSIS — Z00.00 ENCOUNTER FOR PREVENTIVE HEALTH EXAMINATION: ICD-10-CM

## 2021-03-17 PROCEDURE — 99214 PR OFFICE/OUTPT VISIT, EST, LEVL IV, 30-39 MIN: ICD-10-PCS | Mod: S$PBB,,, | Performed by: INTERNAL MEDICINE

## 2021-03-17 PROCEDURE — 99214 OFFICE O/P EST MOD 30 MIN: CPT | Mod: S$PBB,,, | Performed by: INTERNAL MEDICINE

## 2021-03-17 PROCEDURE — 99999 PR PBB SHADOW E&M-EST. PATIENT-LVL IV: CPT | Mod: PBBFAC,,, | Performed by: INTERNAL MEDICINE

## 2021-03-17 PROCEDURE — 99999 PR PBB SHADOW E&M-EST. PATIENT-LVL IV: ICD-10-PCS | Mod: PBBFAC,,, | Performed by: INTERNAL MEDICINE

## 2021-03-17 PROCEDURE — 99214 OFFICE O/P EST MOD 30 MIN: CPT | Mod: PBBFAC,PO | Performed by: INTERNAL MEDICINE

## 2021-03-17 RX ORDER — FLUTICASONE PROPIONATE 50 MCG
2 SPRAY, SUSPENSION (ML) NASAL DAILY
Qty: 16 G | Refills: 6 | Status: SHIPPED | OUTPATIENT
Start: 2021-03-17 | End: 2021-10-18 | Stop reason: SDUPTHER

## 2021-06-07 ENCOUNTER — PATIENT MESSAGE (OUTPATIENT)
Dept: FAMILY MEDICINE | Facility: CLINIC | Age: 71
End: 2021-06-07

## 2021-06-18 ENCOUNTER — PATIENT OUTREACH (OUTPATIENT)
Dept: ADMINISTRATIVE | Facility: HOSPITAL | Age: 71
End: 2021-06-18

## 2021-07-01 ENCOUNTER — PATIENT MESSAGE (OUTPATIENT)
Dept: FAMILY MEDICINE | Facility: CLINIC | Age: 71
End: 2021-07-01

## 2021-08-05 ENCOUNTER — PATIENT MESSAGE (OUTPATIENT)
Dept: FAMILY MEDICINE | Facility: CLINIC | Age: 71
End: 2021-08-05

## 2021-08-05 DIAGNOSIS — Z29.9 PREVENTIVE MEASURE: Primary | ICD-10-CM

## 2021-08-06 ENCOUNTER — LAB VISIT (OUTPATIENT)
Dept: INTERNAL MEDICINE | Facility: CLINIC | Age: 71
End: 2021-08-06
Payer: MEDICARE

## 2021-08-06 DIAGNOSIS — Z29.9 PREVENTIVE MEASURE: ICD-10-CM

## 2021-08-06 PROCEDURE — U0003 INFECTIOUS AGENT DETECTION BY NUCLEIC ACID (DNA OR RNA); SEVERE ACUTE RESPIRATORY SYNDROME CORONAVIRUS 2 (SARS-COV-2) (CORONAVIRUS DISEASE [COVID-19]), AMPLIFIED PROBE TECHNIQUE, MAKING USE OF HIGH THROUGHPUT TECHNOLOGIES AS DESCRIBED BY CMS-2020-01-R: HCPCS | Performed by: INTERNAL MEDICINE

## 2021-08-06 PROCEDURE — U0005 INFEC AGEN DETEC AMPLI PROBE: HCPCS | Performed by: INTERNAL MEDICINE

## 2021-08-07 LAB
SARS-COV-2 RNA RESP QL NAA+PROBE: NOT DETECTED
SARS-COV-2- CYCLE NUMBER: -1

## 2021-08-09 ENCOUNTER — PATIENT MESSAGE (OUTPATIENT)
Dept: FAMILY MEDICINE | Facility: CLINIC | Age: 71
End: 2021-08-09

## 2021-09-10 ENCOUNTER — APPOINTMENT (OUTPATIENT)
Dept: RADIOLOGY | Facility: HOSPITAL | Age: 71
End: 2021-09-10
Attending: INTERNAL MEDICINE
Payer: MEDICARE

## 2021-09-10 DIAGNOSIS — Z78.0 ASYMPTOMATIC POSTMENOPAUSAL STATE: ICD-10-CM

## 2021-09-10 PROCEDURE — 77080 DXA BONE DENSITY AXIAL: CPT | Mod: 26,,, | Performed by: RADIOLOGY

## 2021-09-10 PROCEDURE — 77080 DXA BONE DENSITY AXIAL: CPT | Mod: TC

## 2021-09-10 PROCEDURE — 77080 DEXA BONE DENSITY SPINE HIP: ICD-10-PCS | Mod: 26,,, | Performed by: RADIOLOGY

## 2021-09-20 ENCOUNTER — OFFICE VISIT (OUTPATIENT)
Dept: FAMILY MEDICINE | Facility: CLINIC | Age: 71
End: 2021-09-20
Payer: MEDICARE

## 2021-09-20 VITALS
HEIGHT: 68 IN | DIASTOLIC BLOOD PRESSURE: 70 MMHG | SYSTOLIC BLOOD PRESSURE: 127 MMHG | WEIGHT: 134.5 LBS | BODY MASS INDEX: 20.38 KG/M2 | HEART RATE: 78 BPM | RESPIRATION RATE: 18 BRPM | OXYGEN SATURATION: 95 % | TEMPERATURE: 98 F

## 2021-09-20 DIAGNOSIS — D69.6 THROMBOCYTOPENIA: ICD-10-CM

## 2021-09-20 DIAGNOSIS — Z29.9 PREVENTIVE MEASURE: ICD-10-CM

## 2021-09-20 DIAGNOSIS — E03.9 ACQUIRED HYPOTHYROIDISM: Primary | ICD-10-CM

## 2021-09-20 DIAGNOSIS — F41.9 ANXIETY: ICD-10-CM

## 2021-09-20 DIAGNOSIS — R63.4 WEIGHT LOSS: ICD-10-CM

## 2021-09-20 DIAGNOSIS — M81.0 AGE-RELATED OSTEOPOROSIS WITHOUT CURRENT PATHOLOGICAL FRACTURE: ICD-10-CM

## 2021-09-20 DIAGNOSIS — E55.9 VITAMIN D DEFICIENCY DISEASE: ICD-10-CM

## 2021-09-20 PROCEDURE — 99214 PR OFFICE/OUTPT VISIT, EST, LEVL IV, 30-39 MIN: ICD-10-PCS | Mod: S$PBB,,, | Performed by: INTERNAL MEDICINE

## 2021-09-20 PROCEDURE — 99999 PR PBB SHADOW E&M-EST. PATIENT-LVL IV: CPT | Mod: PBBFAC,,, | Performed by: INTERNAL MEDICINE

## 2021-09-20 PROCEDURE — 99214 OFFICE O/P EST MOD 30 MIN: CPT | Mod: PBBFAC,PO | Performed by: INTERNAL MEDICINE

## 2021-09-20 PROCEDURE — 99999 PR PBB SHADOW E&M-EST. PATIENT-LVL IV: ICD-10-PCS | Mod: PBBFAC,,, | Performed by: INTERNAL MEDICINE

## 2021-09-20 PROCEDURE — 99214 OFFICE O/P EST MOD 30 MIN: CPT | Mod: S$PBB,,, | Performed by: INTERNAL MEDICINE

## 2021-09-20 RX ORDER — CLONAZEPAM 0.5 MG/1
TABLET ORAL
Qty: 60 TABLET | Refills: 5 | Status: SHIPPED | OUTPATIENT
Start: 2021-09-20 | End: 2022-03-17 | Stop reason: SDUPTHER

## 2021-09-22 ENCOUNTER — PES CALL (OUTPATIENT)
Dept: ADMINISTRATIVE | Facility: CLINIC | Age: 71
End: 2021-09-22

## 2021-09-27 ENCOUNTER — PATIENT MESSAGE (OUTPATIENT)
Dept: FAMILY MEDICINE | Facility: CLINIC | Age: 71
End: 2021-09-27

## 2021-09-29 ENCOUNTER — PATIENT MESSAGE (OUTPATIENT)
Dept: FAMILY MEDICINE | Facility: CLINIC | Age: 71
End: 2021-09-29

## 2021-10-21 ENCOUNTER — PATIENT MESSAGE (OUTPATIENT)
Dept: FAMILY MEDICINE | Facility: CLINIC | Age: 71
End: 2021-10-21

## 2021-10-22 ENCOUNTER — PATIENT MESSAGE (OUTPATIENT)
Dept: FAMILY MEDICINE | Facility: CLINIC | Age: 71
End: 2021-10-22
Payer: MEDICARE

## 2021-11-11 ENCOUNTER — PES CALL (OUTPATIENT)
Dept: ADMINISTRATIVE | Facility: CLINIC | Age: 71
End: 2021-11-11
Payer: MEDICARE

## 2021-12-05 ENCOUNTER — PATIENT MESSAGE (OUTPATIENT)
Dept: FAMILY MEDICINE | Facility: CLINIC | Age: 71
End: 2021-12-05
Payer: MEDICARE

## 2021-12-06 ENCOUNTER — TELEPHONE (OUTPATIENT)
Dept: ADMINISTRATIVE | Facility: HOSPITAL | Age: 71
End: 2021-12-06
Payer: MEDICARE

## 2021-12-07 ENCOUNTER — OFFICE VISIT (OUTPATIENT)
Dept: INTERNAL MEDICINE | Facility: CLINIC | Age: 71
End: 2021-12-07
Payer: MEDICARE

## 2021-12-07 ENCOUNTER — TELEPHONE (OUTPATIENT)
Dept: ADMINISTRATIVE | Facility: CLINIC | Age: 71
End: 2021-12-07
Payer: MEDICARE

## 2021-12-07 VITALS — BODY MASS INDEX: 20.46 KG/M2 | WEIGHT: 135 LBS | HEIGHT: 68 IN

## 2021-12-07 DIAGNOSIS — E55.9 VITAMIN D DEFICIENCY DISEASE: ICD-10-CM

## 2021-12-07 DIAGNOSIS — G25.81 RESTLESS LEGS SYNDROME (RLS): ICD-10-CM

## 2021-12-07 DIAGNOSIS — E03.9 ACQUIRED HYPOTHYROIDISM: ICD-10-CM

## 2021-12-07 DIAGNOSIS — Z00.00 ENCOUNTER FOR PREVENTIVE HEALTH EXAMINATION: Primary | ICD-10-CM

## 2021-12-07 DIAGNOSIS — I70.0 AORTIC ATHEROSCLEROSIS: ICD-10-CM

## 2021-12-07 DIAGNOSIS — M81.0 AGE-RELATED OSTEOPOROSIS WITHOUT CURRENT PATHOLOGICAL FRACTURE: ICD-10-CM

## 2021-12-07 DIAGNOSIS — F41.9 ANXIETY: ICD-10-CM

## 2021-12-07 PROBLEM — F13.20 BENZODIAZEPINE DEPENDENCE: Status: RESOLVED | Noted: 2020-11-18 | Resolved: 2021-12-07

## 2021-12-07 PROCEDURE — G0439 PPPS, SUBSEQ VISIT: HCPCS | Mod: 95,,, | Performed by: NURSE PRACTITIONER

## 2021-12-07 PROCEDURE — G0439 PR MEDICARE ANNUAL WELLNESS SUBSEQUENT VISIT: ICD-10-PCS | Mod: 95,,, | Performed by: NURSE PRACTITIONER

## 2021-12-30 ENCOUNTER — PATIENT MESSAGE (OUTPATIENT)
Dept: FAMILY MEDICINE | Facility: CLINIC | Age: 71
End: 2021-12-30
Payer: MEDICARE

## 2021-12-30 RX ORDER — HYDROCORTISONE ACETATE 25 MG/1
25 SUPPOSITORY RECTAL 2 TIMES DAILY
Qty: 20 SUPPOSITORY | Refills: 0 | Status: SHIPPED | OUTPATIENT
Start: 2021-12-30 | End: 2022-01-09

## 2021-12-31 ENCOUNTER — PATIENT MESSAGE (OUTPATIENT)
Dept: FAMILY MEDICINE | Facility: CLINIC | Age: 71
End: 2021-12-31
Payer: MEDICARE

## 2022-01-15 ENCOUNTER — PATIENT MESSAGE (OUTPATIENT)
Dept: FAMILY MEDICINE | Facility: CLINIC | Age: 72
End: 2022-01-15
Payer: MEDICARE

## 2022-01-18 ENCOUNTER — OFFICE VISIT (OUTPATIENT)
Dept: HEPATOLOGY | Facility: CLINIC | Age: 72
End: 2022-01-18
Payer: MEDICARE

## 2022-01-18 ENCOUNTER — HOSPITAL ENCOUNTER (OUTPATIENT)
Dept: RADIOLOGY | Facility: HOSPITAL | Age: 72
Discharge: HOME | End: 2022-01-18
Attending: NURSE PRACTITIONER
Payer: MEDICARE

## 2022-01-18 VITALS
DIASTOLIC BLOOD PRESSURE: 60 MMHG | SYSTOLIC BLOOD PRESSURE: 112 MMHG | BODY MASS INDEX: 19.58 KG/M2 | RESPIRATION RATE: 18 BRPM | WEIGHT: 129.19 LBS | HEIGHT: 68 IN | HEART RATE: 63 BPM

## 2022-01-18 DIAGNOSIS — R10.30 LOWER ABDOMINAL PAIN: ICD-10-CM

## 2022-01-18 DIAGNOSIS — K59.00 CONSTIPATION, UNSPECIFIED CONSTIPATION TYPE: ICD-10-CM

## 2022-01-18 DIAGNOSIS — K59.00 CONSTIPATION, UNSPECIFIED CONSTIPATION TYPE: Primary | ICD-10-CM

## 2022-01-18 PROCEDURE — 99214 PR OFFICE/OUTPT VISIT, EST, LEVL IV, 30-39 MIN: ICD-10-PCS | Mod: S$PBB,,, | Performed by: NURSE PRACTITIONER

## 2022-01-18 PROCEDURE — 99999 PR PBB SHADOW E&M-EST. PATIENT-LVL III: ICD-10-PCS | Mod: PBBFAC,,, | Performed by: NURSE PRACTITIONER

## 2022-01-18 PROCEDURE — 74019 RADEX ABDOMEN 2 VIEWS: CPT | Mod: TC

## 2022-01-18 PROCEDURE — 99999 PR PBB SHADOW E&M-EST. PATIENT-LVL III: CPT | Mod: PBBFAC,,, | Performed by: NURSE PRACTITIONER

## 2022-01-18 PROCEDURE — 99213 OFFICE O/P EST LOW 20 MIN: CPT | Mod: PBBFAC | Performed by: NURSE PRACTITIONER

## 2022-01-18 PROCEDURE — 99214 OFFICE O/P EST MOD 30 MIN: CPT | Mod: S$PBB,,, | Performed by: NURSE PRACTITIONER

## 2022-01-18 PROCEDURE — 74019 RADEX ABDOMEN 2 VIEWS: CPT | Mod: 26,,, | Performed by: RADIOLOGY

## 2022-01-18 PROCEDURE — 74019 XR ABDOMEN FLAT AND ERECT: ICD-10-PCS | Mod: 26,,, | Performed by: RADIOLOGY

## 2022-01-18 RX ORDER — ACETAMINOPHEN 325 MG/1
325 TABLET ORAL EVERY 6 HOURS PRN
COMMUNITY
End: 2022-04-21

## 2022-01-18 RX ORDER — POLYETHYLENE GLYCOL 3350 17 G/17G
POWDER, FOR SOLUTION ORAL
COMMUNITY
End: 2022-09-29

## 2022-01-18 NOTE — PROGRESS NOTES
Clinic Follow Up:  [facility] Follow Up Note    Reason for Follow Up:  The primary encounter diagnosis was Constipation, unspecified constipation type. A diagnosis of Lower abdominal pain was also pertinent to this visit.    PCP: Adeola Jones       HPI:  This is a 71 y.o. female here for follow up of the above  Pt has not been seen since 2019.  Pt reports that she has been overall well since that time until the last week.  She has had a return of constipation with lower abdominal pain.  No known exacerbating factors.  Has restarted Miralax over the last 2 days and has had some improvement in the constipation.  Previously on Linzess but reports no significant improvement.  Had been taking miralax daily which appeared to be working well.  Stopped when bowels began to move regularly.   No melena or hematochezia.  No weight loss.  No nausea or vomiting.  +flatus.       Review of Systems   Constitutional: Negative for chills, fever, malaise/fatigue and weight loss.   Respiratory: Negative for cough.    Cardiovascular: Negative for chest pain.   Gastrointestinal:        Per HPI   Musculoskeletal: Negative for myalgias.   Skin: Negative for itching and rash.   Neurological: Negative for headaches.   Psychiatric/Behavioral: The patient is not nervous/anxious.        Medical History:  Past Medical History:   Diagnosis Date    Anxiety     Benzodiazepine dependence 2020    Hematuria, microscopic     negative CT    Hepatitis C     slow progression/ Dr. Otoole    Hypertension     Migraines     OP (osteoporosis)     on drug holiday.    Osteoarthritis     Radius fracture     Radius fracture     Restless legs syndrome (RLS)     Thrombocytopenia 2019    Vitamin D deficiency disease        Surgical History:   Past Surgical History:   Procedure Laterality Date     SECTION, CLASSIC  1982    COLONOSCOPY N/A 2018    Procedure: COLONOSCOPY;  Surgeon: Jose Royal MD;   "Location: [facility];  Service: Endoscopy;  Laterality: N/A;    WRIST FRACTURE SURGERY      , Dr. Bunch.       Family History:   Family History   Adopted: Yes   Family history unknown: Yes       Social History:   Social History     Tobacco Use    Smoking status: Former Smoker     Packs/day: 0.50     Years: 10.00     Pack years: 5.00     Types: Cigarettes     Quit date: 3/3/1979     Years since quittin.9    Smokeless tobacco: Never Used   Substance Use Topics    Alcohol use: Yes     Alcohol/week: 0.0 standard drinks     Comment: occasionally    Drug use: No       Allergies: Reviewed    Home Medications:  Current Outpatient Medications on File Prior to Visit   Medication Sig Dispense Refill    acetaminophen (TYLENOL) 325 MG tablet Take 325 mg by mouth every 6 (six) hours as needed for Pain.      cholecalciferol, vitamin D3, (VITAMIN D3) 50 mcg (2,000 unit) Cap Take 1 capsule by mouth once daily.      clonazePAM (KLONOPIN) 0.5 MG tablet TAKE 1 TO 2 TABLET BY MOUTH EVERY EVENING AS NEEDED 60 tablet 5    EScitalopram oxalate (LEXAPRO) 10 MG tablet TAKE 1 TABLET(10 MG) BY MOUTH EVERY DAY 30 tablet 6    fluticasone propionate (FLONASE) 50 mcg/actuation nasal spray SHAKE LIQUID AND USE 2 SPRAYS(100 MCG) IN EACH NOSTRIL EVERY DAY 16 g 6    levothyroxine (SYNTHROID) 50 MCG tablet TAKE 1 TABLET BY MOUTH EVERY DAY 30 tablet 11    polyethylene glycol (GLYCOLAX) 17 gram PwPk Take by mouth.       No current facility-administered medications on file prior to visit.       Physical Exam:  Vital Signs:  /60   Pulse 63   Resp 18   Ht 5' 8" (1.727 m)   Wt 58.6 kg (129 lb 3 oz)   BMI 19.64 kg/m²   Body mass index is 19.64 kg/m².  Physical Exam  Vitals reviewed.   Constitutional:       Appearance: She is well-developed and well-nourished.   HENT:      Head: Normocephalic.   Eyes:      General: No scleral icterus.  Cardiovascular:      Rate and Rhythm: Normal rate.   Pulmonary:      Effort: Pulmonary effort " is normal.   Abdominal:      General: There is no distension.   Musculoskeletal:         General: Normal range of motion.      Cervical back: Normal range of motion.   Skin:     General: Skin is dry.   Neurological:      Mental Status: She is alert and oriented to person, place, and time.   Psychiatric:         Mood and Affect: Mood and affect normal.         Labs: Pertinent labs reviewed.      Assessment:  1. Constipation, unspecified constipation type    2. Lower abdominal pain        Recommendations:  Constipation, unspecified constipation type  Lower abdominal pain  -  I suspect the pain is related to a return of underlying constipation that is not currently well managed  - will get an x-ray today to determine stool burden.   - May need a bowel prep depending on stool burden.   - Resume miralax daily.   - Can try IBGard OTC for the discomfort.  If the symptoms continue, will have pt see GI for further evaluation and management   -     X-Ray Abdomen Flat And Erect; Future; Expected date: 01/18/2022              Return to Clinic:  Follow up to be determined by results of above.

## 2022-01-20 ENCOUNTER — PATIENT MESSAGE (OUTPATIENT)
Dept: HEPATOLOGY | Facility: CLINIC | Age: 72
End: 2022-01-20
Payer: MEDICARE

## 2022-01-24 ENCOUNTER — PATIENT MESSAGE (OUTPATIENT)
Dept: FAMILY MEDICINE | Facility: CLINIC | Age: 72
End: 2022-01-24
Payer: MEDICARE

## 2022-01-25 ENCOUNTER — OFFICE VISIT (OUTPATIENT)
Dept: GASTROENTEROLOGY | Facility: CLINIC | Age: 72
End: 2022-01-25
Payer: MEDICARE

## 2022-01-25 VITALS
WEIGHT: 128.5 LBS | SYSTOLIC BLOOD PRESSURE: 118 MMHG | OXYGEN SATURATION: 99 % | BODY MASS INDEX: 19.48 KG/M2 | DIASTOLIC BLOOD PRESSURE: 70 MMHG | HEIGHT: 68 IN | HEART RATE: 81 BPM

## 2022-01-25 DIAGNOSIS — R10.9 ABDOMINAL DISCOMFORT: ICD-10-CM

## 2022-01-25 DIAGNOSIS — K58.1 IRRITABLE BOWEL SYNDROME WITH CONSTIPATION: Primary | ICD-10-CM

## 2022-01-25 PROCEDURE — 99999 PR PBB SHADOW E&M-EST. PATIENT-LVL IV: ICD-10-PCS | Mod: PBBFAC,,, | Performed by: PHYSICIAN ASSISTANT

## 2022-01-25 PROCEDURE — 99214 OFFICE O/P EST MOD 30 MIN: CPT | Mod: PBBFAC | Performed by: PHYSICIAN ASSISTANT

## 2022-01-25 PROCEDURE — 99999 PR PBB SHADOW E&M-EST. PATIENT-LVL IV: CPT | Mod: PBBFAC,,, | Performed by: PHYSICIAN ASSISTANT

## 2022-01-25 PROCEDURE — 99214 OFFICE O/P EST MOD 30 MIN: CPT | Mod: S$PBB,,, | Performed by: PHYSICIAN ASSISTANT

## 2022-01-25 PROCEDURE — 99214 PR OFFICE/OUTPT VISIT, EST, LEVL IV, 30-39 MIN: ICD-10-PCS | Mod: S$PBB,,, | Performed by: PHYSICIAN ASSISTANT

## 2022-01-25 RX ORDER — DICYCLOMINE HYDROCHLORIDE 20 MG/1
20 TABLET ORAL EVERY 6 HOURS
Qty: 120 TABLET | Refills: 0 | Status: SHIPPED | OUTPATIENT
Start: 2022-01-25 | End: 2022-02-24

## 2022-01-25 NOTE — PATIENT INSTRUCTIONS
-Continue with Miralax 1-2x daily.  -Try low Fodmap diet  -Continue IBGuard  -start on probiotic daily.  -If symptoms continue, may need to try Lactulose or Amitiza. Linzess not helpful.  -Gas X for bloating.  -Will need to follow up if symptoms continue.   -Suspect IBS  -Trial of Bentyl for cramping.

## 2022-01-25 NOTE — PROGRESS NOTES
Subjective:      Patient ID: Maggie Díaz is a 71 y.o. female.    Chief Complaint: Gas    HPI:  Patient reports to clinic today for follow up of the above. Seen by our service last week. Xray completed which showed stool in the colon. Was instructed to take a bottle of magnesium citrate which helped, but she feels as though symptoms of gas and bloating have returned.   Patient reports this is an ongoing issue - symptoms improved once PCP put her on Lexapro. She had stopped taking Miralax because bowels moving so well. Symptoms have now returned. Waking up with gas discomfort. Having numerous small bowel movements throughout the day. Is taking IBGuard which is helpful with the cramps. Started back on Miralax at beginning of the month. Reports she ate breakfast this AM with no problems and had a normal sized bowel movement.  Has tried Linzess in the past which was not helpful.  UTD on colonoscopy.    Review of Systems   Constitutional: Negative for activity change, appetite change, chills, diaphoresis, fatigue, fever and unexpected weight change.   HENT: Negative for trouble swallowing.    Respiratory: Negative for cough and shortness of breath.    Cardiovascular: Negative for chest pain.   Gastrointestinal: Positive for abdominal pain and constipation. Negative for abdominal distention, anal bleeding, blood in stool, diarrhea, nausea and vomiting.   Genitourinary: Negative for dysuria.   Skin: Negative for color change and pallor.   Neurological: Negative for dizziness and weakness.   Psychiatric/Behavioral: Negative for dysphoric mood. The patient is nervous/anxious.        Medical History: Reviewed    Social History: Reviewed    Allergies: Reviewed    Objective:     Physical Exam  Constitutional:       General: She is not in acute distress.     Appearance: Normal appearance. She is normal weight. She is not ill-appearing, toxic-appearing or diaphoretic.   HENT:      Head: Normocephalic and atraumatic.   Eyes:       General: No scleral icterus.     Extraocular Movements: Extraocular movements intact.   Cardiovascular:      Rate and Rhythm: Normal rate and regular rhythm.   Pulmonary:      Effort: Pulmonary effort is normal. No respiratory distress.   Abdominal:      General: Bowel sounds are normal. There is no distension.      Palpations: Abdomen is soft. There is no mass.      Tenderness: There is no abdominal tenderness. There is no guarding.   Musculoskeletal:         General: Normal range of motion.      Cervical back: Normal range of motion.      Right lower leg: No edema.      Left lower leg: No edema.   Skin:     General: Skin is warm and dry.      Coloration: Skin is not jaundiced.   Neurological:      General: No focal deficit present.      Mental Status: She is alert and oriented to person, place, and time.   Psychiatric:         Mood and Affect: Mood normal.         Behavior: Behavior normal.         Assessment:     1. Irritable bowel syndrome with constipation    2. Abdominal discomfort        Plan:     -Continue with Miralax 1-2x daily.  -Try low Fodmap diet  -Continue IBGuard  -start on probiotic daily.  -If symptoms continue, may need to try Lactulose or Amitiza. Linzess not helpful.  -Gas X for bloating.  -Will need to follow up if symptoms continue.   -Suspect IBS  -Trial of Bentyl for cramping.    Maggie was seen today for gas.    Diagnoses and all orders for this visit:    Irritable bowel syndrome with constipation  -     dicyclomine (BENTYL) 20 mg tablet; Take 1 tablet (20 mg total) by mouth every 6 (six) hours.    Abdominal discomfort  -     dicyclomine (BENTYL) 20 mg tablet; Take 1 tablet (20 mg total) by mouth every 6 (six) hours.        No follow-ups on file.    Thank you for the opportunity to participate in the care of this patient.   Yesenia Smiley PA-C.

## 2022-01-26 ENCOUNTER — PATIENT MESSAGE (OUTPATIENT)
Dept: GASTROENTEROLOGY | Facility: CLINIC | Age: 72
End: 2022-01-26
Payer: MEDICARE

## 2022-01-31 ENCOUNTER — PATIENT MESSAGE (OUTPATIENT)
Dept: GASTROENTEROLOGY | Facility: CLINIC | Age: 72
End: 2022-01-31
Payer: MEDICARE

## 2022-01-31 DIAGNOSIS — K58.1 IRRITABLE BOWEL SYNDROME WITH CONSTIPATION: Primary | ICD-10-CM

## 2022-01-31 RX ORDER — LUBIPROSTONE 8 UG/1
8 CAPSULE ORAL 2 TIMES DAILY WITH MEALS
Qty: 60 CAPSULE | Refills: 2 | Status: SHIPPED | OUTPATIENT
Start: 2022-01-31 | End: 2022-02-08 | Stop reason: SDUPTHER

## 2022-02-01 ENCOUNTER — DOCUMENTATION ONLY (OUTPATIENT)
Dept: GASTROENTEROLOGY | Facility: CLINIC | Age: 72
End: 2022-02-01
Payer: MEDICARE

## 2022-02-01 NOTE — PROGRESS NOTES
2/1/22-PA for Lubiprostone 8MCG started.SC/MA      Maggie Díaz (Key: [id number]) - [id number]  Lubiprostone 8MCG capsules       Status: PA Response - Approved    Created: January 31st, 2022 [phone]    Sent: February 1st, 2022

## 2022-02-02 ENCOUNTER — PATIENT MESSAGE (OUTPATIENT)
Dept: GASTROENTEROLOGY | Facility: CLINIC | Age: 72
End: 2022-02-02
Payer: MEDICARE

## 2022-02-07 ENCOUNTER — PATIENT MESSAGE (OUTPATIENT)
Dept: GASTROENTEROLOGY | Facility: CLINIC | Age: 72
End: 2022-02-07
Payer: MEDICARE

## 2022-02-07 DIAGNOSIS — K58.1 IRRITABLE BOWEL SYNDROME WITH CONSTIPATION: ICD-10-CM

## 2022-02-08 RX ORDER — LUBIPROSTONE 24 UG/1
24 CAPSULE ORAL 2 TIMES DAILY WITH MEALS
Qty: 30 CAPSULE | Refills: 2 | Status: SHIPPED | OUTPATIENT
Start: 2022-02-08 | End: 2022-04-11

## 2022-02-09 ENCOUNTER — PATIENT MESSAGE (OUTPATIENT)
Dept: GASTROENTEROLOGY | Facility: CLINIC | Age: 72
End: 2022-02-09
Payer: MEDICARE

## 2022-02-22 ENCOUNTER — PATIENT MESSAGE (OUTPATIENT)
Dept: GASTROENTEROLOGY | Facility: CLINIC | Age: 72
End: 2022-02-22
Payer: MEDICARE

## 2022-02-24 ENCOUNTER — PATIENT MESSAGE (OUTPATIENT)
Dept: FAMILY MEDICINE | Facility: CLINIC | Age: 72
End: 2022-02-24
Payer: MEDICARE

## 2022-02-28 ENCOUNTER — TELEPHONE (OUTPATIENT)
Dept: FAMILY MEDICINE | Facility: CLINIC | Age: 72
End: 2022-02-28
Payer: MEDICARE

## 2022-02-28 DIAGNOSIS — Z12.31 ENCOUNTER FOR SCREENING MAMMOGRAM FOR MALIGNANT NEOPLASM OF BREAST: Primary | ICD-10-CM

## 2022-02-28 NOTE — TELEPHONE ENCOUNTER
----- Message from Nannette Baeza sent at 2/28/2022  4:25 PM CST -----  Contact: self [phone]  Caller is requesting to schedule their annual screening mammogram appointment. Order is not listed in Epic.  Please enter order and contact patient to schedule.  Would the patient like a call back, or a response through their [facility] portal?:   call back

## 2022-03-17 DIAGNOSIS — F41.9 ANXIETY: ICD-10-CM

## 2022-03-17 NOTE — TELEPHONE ENCOUNTER
No new care gaps identified.  Powered by [facility] [employer] by [email or web address]. Reference number: [id number].   3/17/2022 2:37:52 PM CDT

## 2022-03-18 RX ORDER — CLONAZEPAM 0.5 MG/1
TABLET ORAL
Qty: 60 TABLET | Refills: 5 | Status: SHIPPED | OUTPATIENT
Start: 2022-03-18 | End: 2022-09-16 | Stop reason: SDUPTHER

## 2022-03-21 ENCOUNTER — LAB VISIT (OUTPATIENT)
Dept: LAB | Facility: HOSPITAL | Age: 72
End: 2022-03-21
Attending: INTERNAL MEDICINE
Payer: MEDICARE

## 2022-03-21 ENCOUNTER — PATIENT MESSAGE (OUTPATIENT)
Dept: FAMILY MEDICINE | Facility: CLINIC | Age: 72
End: 2022-03-21
Payer: MEDICARE

## 2022-03-21 DIAGNOSIS — Z29.9 PREVENTIVE MEASURE: ICD-10-CM

## 2022-03-21 DIAGNOSIS — E55.9 VITAMIN D DEFICIENCY DISEASE: ICD-10-CM

## 2022-03-21 DIAGNOSIS — E03.9 ACQUIRED HYPOTHYROIDISM: ICD-10-CM

## 2022-03-21 LAB
25(OH)D3+25(OH)D2 SERPL-MCNC: 40 NG/ML (ref 30–96)
ALBUMIN SERPL BCP-MCNC: 4.6 G/DL (ref 3.5–5.2)
ALP SERPL-CCNC: 39 U/L (ref 55–135)
ALT SERPL W/O P-5'-P-CCNC: 16 U/L (ref 10–44)
ANION GAP SERPL CALC-SCNC: 8 MMOL/L (ref 8–16)
AST SERPL-CCNC: 20 U/L (ref 10–40)
BASOPHILS # BLD AUTO: 0.03 K/UL (ref 0–0.2)
BASOPHILS NFR BLD: 1.1 % (ref 0–1.9)
BILIRUB SERPL-MCNC: 0.8 MG/DL (ref 0.1–1)
BUN SERPL-MCNC: 14 MG/DL (ref 8–23)
CALCIUM SERPL-MCNC: 10.3 MG/DL (ref 8.7–10.5)
CHLORIDE SERPL-SCNC: 103 MMOL/L (ref 95–110)
CHOLEST SERPL-MCNC: 220 MG/DL (ref 120–199)
CHOLEST/HDLC SERPL: 2.8 {RATIO} (ref 2–5)
CO2 SERPL-SCNC: 30 MMOL/L (ref 23–29)
CREAT SERPL-MCNC: 0.9 MG/DL (ref 0.5–1.4)
DIFFERENTIAL METHOD: ABNORMAL
EOSINOPHIL # BLD AUTO: 0 K/UL (ref 0–0.5)
EOSINOPHIL NFR BLD: 1.5 % (ref 0–8)
ERYTHROCYTE [DISTWIDTH] IN BLOOD BY AUTOMATED COUNT: 13.9 % (ref 11.5–14.5)
EST. GFR  (AFRICAN AMERICAN): >60 ML/MIN/1.73 M^2
EST. GFR  (NON AFRICAN AMERICAN): >60 ML/MIN/1.73 M^2
GLUCOSE SERPL-MCNC: 88 MG/DL (ref 70–110)
HCT VFR BLD AUTO: 45.3 % (ref 37–48.5)
HDLC SERPL-MCNC: 78 MG/DL (ref 40–75)
HDLC SERPL: 35.5 % (ref 20–50)
HGB BLD-MCNC: 14.7 G/DL (ref 12–16)
IMM GRANULOCYTES # BLD AUTO: 0 K/UL (ref 0–0.04)
IMM GRANULOCYTES NFR BLD AUTO: 0 % (ref 0–0.5)
LDLC SERPL CALC-MCNC: 132.8 MG/DL (ref 63–159)
LYMPHOCYTES # BLD AUTO: 1.3 K/UL (ref 1–4.8)
LYMPHOCYTES NFR BLD: 46.7 % (ref 18–48)
MCH RBC QN AUTO: 32.6 PG (ref 27–31)
MCHC RBC AUTO-ENTMCNC: 32.5 G/DL (ref 32–36)
MCV RBC AUTO: 100 FL (ref 82–98)
MONOCYTES # BLD AUTO: 0.3 K/UL (ref 0.3–1)
MONOCYTES NFR BLD: 9.9 % (ref 4–15)
NEUTROPHILS # BLD AUTO: 1.1 K/UL (ref 1.8–7.7)
NEUTROPHILS NFR BLD: 40.8 % (ref 38–73)
NONHDLC SERPL-MCNC: 142 MG/DL
NRBC BLD-RTO: 0 /100 WBC
PLATELET # BLD AUTO: 137 K/UL (ref 150–450)
PMV BLD AUTO: 10 FL (ref 9.2–12.9)
POTASSIUM SERPL-SCNC: 5.6 MMOL/L (ref 3.5–5.1)
PROT SERPL-MCNC: 8 G/DL (ref 6–8.4)
RBC # BLD AUTO: 4.51 M/UL (ref 4–5.4)
SODIUM SERPL-SCNC: 141 MMOL/L (ref 136–145)
TRIGL SERPL-MCNC: 46 MG/DL (ref 30–150)
TSH SERPL DL<=0.005 MIU/L-ACNC: 1.91 UIU/ML (ref 0.4–4)
WBC # BLD AUTO: 2.72 K/UL (ref 3.9–12.7)

## 2022-03-21 PROCEDURE — 36415 COLL VENOUS BLD VENIPUNCTURE: CPT | Mod: PO | Performed by: INTERNAL MEDICINE

## 2022-03-21 PROCEDURE — 85025 COMPLETE CBC W/AUTO DIFF WBC: CPT | Performed by: INTERNAL MEDICINE

## 2022-03-21 PROCEDURE — 84443 ASSAY THYROID STIM HORMONE: CPT | Performed by: INTERNAL MEDICINE

## 2022-03-21 PROCEDURE — 82306 VITAMIN D 25 HYDROXY: CPT | Performed by: INTERNAL MEDICINE

## 2022-03-21 PROCEDURE — 80053 COMPREHEN METABOLIC PANEL: CPT | Performed by: INTERNAL MEDICINE

## 2022-03-21 PROCEDURE — 80061 LIPID PANEL: CPT | Performed by: INTERNAL MEDICINE

## 2022-03-23 ENCOUNTER — PATIENT MESSAGE (OUTPATIENT)
Dept: GASTROENTEROLOGY | Facility: CLINIC | Age: 72
End: 2022-03-23
Payer: MEDICARE

## 2022-04-10 ENCOUNTER — PATIENT MESSAGE (OUTPATIENT)
Dept: FAMILY MEDICINE | Facility: CLINIC | Age: 72
End: 2022-04-10
Payer: MEDICARE

## 2022-04-11 NOTE — PROGRESS NOTES
"Subjective:      Patient ID: Maggie Díaz is a 71 y.o. female.    Chief Complaint: Follow-up      HPI  Here for f/u medical problems and preventive exam.  BMs normal now.  Says appetite is good, but has lost 10# in past 6 months.  Fell while walking dog, and had shoulder fx 1 month ago, treatment was sling only.  No f/c/sw/cough.  No cp/sob/palp.  Urine normal.  Taking vit D.  Using flonase, allergies doing well.  Now biking for exercise.  No infections/illness, no bleeding gums or bruising.    HM: 10/21 fluvax, 1/21 covid vaccines/booster, 8/19 HAV, 3/15 [id number], 2/14 [id number], 7/18 Td, 1/10 TDaP, 8/12 zoster, 2020 Shingrix x2, 9/21 BMD stable/hx fosamax rep 2y, 8/18 Cscope rep 10y, 4/22 MMG/me, 5/18 pelvic u/s neg.     Review of Systems   Constitutional: Negative for appetite change, chills, diaphoresis and fever.   HENT: Negative for congestion, ear pain, rhinorrhea, sinus pressure and sore throat.    Respiratory: Negative for cough, chest tightness and shortness of breath.    Cardiovascular: Negative for chest pain and palpitations.   Gastrointestinal: Negative for blood in stool, constipation, diarrhea, nausea and vomiting.   Genitourinary: Negative for dysuria, frequency, hematuria, menstrual problem, urgency and vaginal discharge.   Musculoskeletal: Negative for arthralgias.   Skin: Negative for rash.   Neurological: Negative for dizziness and headaches.   Psychiatric/Behavioral: Negative for sleep disturbance. The patient is not nervous/anxious.          Objective:   /80 (BP Location: Right arm, Patient Position: Sitting, BP Method: Small (Manual))   Pulse 97   Temp 96.9 °F (36.1 °C)   Ht 5' 8" (1.727 m)   Wt 56.4 kg (124 lb 7.2 oz)   SpO2 99%   BMI 18.92 kg/m²     Physical Exam  Constitutional:       Appearance: She is well-developed.   HENT:      Right Ear: External ear normal. Tympanic membrane is not injected.      Left Ear: External ear normal. Tympanic membrane is not injected.   Eyes: "      Conjunctiva/sclera: Conjunctivae normal.   Neck:      Thyroid: No thyromegaly.   Cardiovascular:      Rate and Rhythm: Normal rate and regular rhythm.      Heart sounds: No murmur heard.    No friction rub. No gallop.   Pulmonary:      Effort: Pulmonary effort is normal.      Breath sounds: Normal breath sounds. No wheezing or rales.   Chest:   Breasts:      Right: No mass, nipple discharge, skin change or tenderness.      Left: No mass, nipple discharge, skin change or tenderness.       Abdominal:      General: Bowel sounds are normal.      Palpations: Abdomen is soft. There is no mass.      Tenderness: There is no abdominal tenderness.   Musculoskeletal:      Cervical back: Normal range of motion and neck supple.   Lymphadenopathy:      Cervical: No cervical adenopathy.   Skin:     General: Skin is warm.      Findings: No rash.   Neurological:      Mental Status: She is alert and oriented to person, place, and time.          Latest Reference Range & Units 03/21/22 09:12   WBC 3.90 - 12.70 K/uL 2.72 (L)   RBC 4.00 - 5.40 M/uL 4.51   Hemoglobin 12.0 - 16.0 g/dL 14.7   Hematocrit 37.0 - 48.5 % 45.3   MCV 82 - 98 fL 100 (H)   MCH 27.0 - 31.0 pg 32.6 (H)   MCHC 32.0 - 36.0 g/dL 32.5   RDW 11.5 - 14.5 % 13.9   Platelets 150 - 450 K/uL 137 (L)   MPV 9.2 - 12.9 fL 10.0   Gran % 38.0 - 73.0 % 40.8   Lymph % 18.0 - 48.0 % 46.7   Mono % 4.0 - 15.0 % 9.9   Eosinophil % 0.0 - 8.0 % 1.5   Basophil % 0.0 - 1.9 % 1.1   Immature Granulocytes 0.0 - 0.5 % 0.0   Gran # (ANC) 1.8 - 7.7 K/uL 1.1 (L)   Lymph # 1.0 - 4.8 K/uL 1.3   Mono # 0.3 - 1.0 K/uL 0.3   Eos # 0.0 - 0.5 K/uL 0.0   Baso # 0.00 - 0.20 K/uL 0.03   Immature Grans (Abs) 0.00 - 0.04 K/uL 0.00 [1]   NRBC 0 /100 WBC 0   Differential Method  Automated   Sodium 136 - 145 mmol/L 141   Potassium 3.5 - 5.1 mmol/L 5.6 (H) [2]   Chloride 95 - 110 mmol/L 103   CO2 23 - 29 mmol/L 30 (H)   Anion Gap 8 - 16 mmol/L 8   BUN 8 - 23 mg/dL 14   Creatinine 0.5 - 1.4 mg/dL 0.9   EGFR if  non African American >60 mL/min/1.73 m^2 >60.0 [3]   EGFR if African American >60 mL/min/1.73 m^2 >60.0   Glucose 70 - 110 mg/dL 88   Calcium 8.7 - 10.5 mg/dL 10.3   Alkaline Phosphatase 55 - 135 U/L 39 (L)   PROTEIN TOTAL 6.0 - 8.4 g/dL 8.0   Albumin 3.5 - 5.2 g/dL 4.6   BILIRUBIN TOTAL 0.1 - 1.0 mg/dL 0.8 [4]   AST 10 - 40 U/L 20   ALT 10 - 44 U/L 16   Triglycerides 30 - 150 mg/dL 46 [5]   Cholesterol 120 - 199 mg/dL 220 (H) [6]   HDL 40 - 75 mg/dL 78 (H) [7]   HDL/Cholesterol Ratio 20.0 - 50.0 % 35.5   LDL Cholesterol External 63.0 - 159.0 mg/dL 132.8 [8]   Non-HDL Cholesterol mg/dL 142 [9]   Total Cholesterol/HDL Ratio 2.0 - 5.0  2.8   Vit D, 25-Hydroxy 30 - 96 ng/mL 40 [10]   TSH 0.400 - 4.000 uIU/mL 1.909         Assessment:       1. Acquired hypothyroidism    2. Age-related osteoporosis without current pathological fracture    3. Anxiety    4. Restless legs syndrome (RLS)    5. Vitamin D deficiency disease    6. Encounter for preventive health examination    7. Thrombocytopenia    8. Leukopenia, unspecified type    9. Weight loss          Plan:     Acquired hypothyroidism- Clinically stable, continue present treatment.    Age-related osteoporosis without current pathological fracture- on drug holiday.    Anxiety, Restless legs syndrome (RLS)- cont clonaz.    Vitamin D deficiency disease- cont supp.    Encounter for preventive health examination- utd.    Thrombocytopenia, Leukopenia, Weight loss-   -     Ambulatory referral/consult to Hematology / Oncology; Future; Expected date: 04/28/2022    RTC 3mo.

## 2022-04-14 ENCOUNTER — HOSPITAL ENCOUNTER (OUTPATIENT)
Dept: RADIOLOGY | Facility: HOSPITAL | Age: 72
Discharge: HOME | End: 2022-04-14
Attending: INTERNAL MEDICINE
Payer: MEDICARE

## 2022-04-14 VITALS — BODY MASS INDEX: 19.4 KG/M2 | WEIGHT: 128 LBS | HEIGHT: 68 IN

## 2022-04-14 DIAGNOSIS — Z12.31 ENCOUNTER FOR SCREENING MAMMOGRAM FOR MALIGNANT NEOPLASM OF BREAST: ICD-10-CM

## 2022-04-14 PROCEDURE — 77067 MAMMO DIGITAL SCREENING BILAT WITH TOMO: ICD-10-PCS | Mod: 26,,, | Performed by: RADIOLOGY

## 2022-04-14 PROCEDURE — 77063 BREAST TOMOSYNTHESIS BI: CPT | Mod: 26,,, | Performed by: RADIOLOGY

## 2022-04-14 PROCEDURE — 77067 SCR MAMMO BI INCL CAD: CPT | Mod: TC

## 2022-04-14 PROCEDURE — 77067 SCR MAMMO BI INCL CAD: CPT | Mod: 26,,, | Performed by: RADIOLOGY

## 2022-04-14 PROCEDURE — 77063 BREAST TOMOSYNTHESIS BI: CPT | Mod: TC

## 2022-04-14 PROCEDURE — 77063 MAMMO DIGITAL SCREENING BILAT WITH TOMO: ICD-10-PCS | Mod: 26,,, | Performed by: RADIOLOGY

## 2022-04-21 ENCOUNTER — OFFICE VISIT (OUTPATIENT)
Dept: FAMILY MEDICINE | Facility: CLINIC | Age: 72
End: 2022-04-21
Payer: MEDICARE

## 2022-04-21 VITALS
HEIGHT: 68 IN | TEMPERATURE: 97 F | DIASTOLIC BLOOD PRESSURE: 80 MMHG | HEART RATE: 97 BPM | OXYGEN SATURATION: 99 % | WEIGHT: 124.44 LBS | BODY MASS INDEX: 18.86 KG/M2 | SYSTOLIC BLOOD PRESSURE: 130 MMHG

## 2022-04-21 DIAGNOSIS — E03.9 ACQUIRED HYPOTHYROIDISM: Primary | ICD-10-CM

## 2022-04-21 DIAGNOSIS — E55.9 VITAMIN D DEFICIENCY DISEASE: ICD-10-CM

## 2022-04-21 DIAGNOSIS — D72.819 LEUKOPENIA, UNSPECIFIED TYPE: ICD-10-CM

## 2022-04-21 DIAGNOSIS — F41.9 ANXIETY: ICD-10-CM

## 2022-04-21 DIAGNOSIS — M81.0 AGE-RELATED OSTEOPOROSIS WITHOUT CURRENT PATHOLOGICAL FRACTURE: ICD-10-CM

## 2022-04-21 DIAGNOSIS — D69.6 THROMBOCYTOPENIA: ICD-10-CM

## 2022-04-21 DIAGNOSIS — G25.81 RESTLESS LEGS SYNDROME (RLS): ICD-10-CM

## 2022-04-21 DIAGNOSIS — R63.4 WEIGHT LOSS: ICD-10-CM

## 2022-04-21 DIAGNOSIS — Z00.00 ENCOUNTER FOR PREVENTIVE HEALTH EXAMINATION: ICD-10-CM

## 2022-04-21 PROCEDURE — 99999 PR PBB SHADOW E&M-EST. PATIENT-LVL IV: ICD-10-PCS | Mod: PBBFAC,,, | Performed by: INTERNAL MEDICINE

## 2022-04-21 PROCEDURE — 99214 OFFICE O/P EST MOD 30 MIN: CPT | Mod: PBBFAC,PO | Performed by: INTERNAL MEDICINE

## 2022-04-21 PROCEDURE — 99214 OFFICE O/P EST MOD 30 MIN: CPT | Mod: S$PBB,,, | Performed by: INTERNAL MEDICINE

## 2022-04-21 PROCEDURE — 99999 PR PBB SHADOW E&M-EST. PATIENT-LVL IV: CPT | Mod: PBBFAC,,, | Performed by: INTERNAL MEDICINE

## 2022-04-21 PROCEDURE — 99214 PR OFFICE/OUTPT VISIT, EST, LEVL IV, 30-39 MIN: ICD-10-PCS | Mod: S$PBB,,, | Performed by: INTERNAL MEDICINE

## 2022-05-05 ENCOUNTER — TELEPHONE (OUTPATIENT)
Dept: HEMATOLOGY/ONCOLOGY | Facility: CLINIC | Age: 72
End: 2022-05-05
Payer: COMMERCIAL

## 2022-05-05 NOTE — TELEPHONE ENCOUNTER
Spoke to patient in reference to Hematology referral from Dr. Berumen.  Appointment scheduled date, time and location given.  Appointment noticed mailed.

## 2022-05-14 RX ORDER — LEVOTHYROXINE SODIUM 50 UG/1
TABLET ORAL
Qty: 30 TABLET | Refills: 3 | Status: SHIPPED | OUTPATIENT
Start: 2022-05-14 | End: 2022-09-11

## 2022-05-14 NOTE — TELEPHONE ENCOUNTER
No new care gaps identified.  [facility] Embedded Care Gaps. Reference number: [id number]. 5/14/2022   11:18:43 AM LIANAT

## 2022-05-15 NOTE — TELEPHONE ENCOUNTER
Refill Authorization Note   Maggie Díaz  is requesting a refill authorization.  Brief Assessment and Rationale for Refill:  Approve     Medication Therapy Plan:       Medication Reconciliation Completed: No   Comments:     No Care Gaps recommended.     Note composed:7:31 PM 05/14/2022

## 2022-05-19 ENCOUNTER — LAB VISIT (OUTPATIENT)
Dept: LAB | Facility: HOSPITAL | Age: 72
End: 2022-05-19
Attending: INTERNAL MEDICINE
Payer: MEDICARE

## 2022-05-19 ENCOUNTER — OFFICE VISIT (OUTPATIENT)
Dept: HEMATOLOGY/ONCOLOGY | Facility: CLINIC | Age: 72
End: 2022-05-19
Payer: MEDICARE

## 2022-05-19 VITALS
OXYGEN SATURATION: 99 % | TEMPERATURE: 98 F | WEIGHT: 126.31 LBS | HEART RATE: 75 BPM | BODY MASS INDEX: 19.14 KG/M2 | HEIGHT: 68 IN | SYSTOLIC BLOOD PRESSURE: 119 MMHG | DIASTOLIC BLOOD PRESSURE: 72 MMHG | RESPIRATION RATE: 20 BRPM

## 2022-05-19 DIAGNOSIS — D75.89 MACROCYTOSIS WITHOUT ANEMIA: ICD-10-CM

## 2022-05-19 DIAGNOSIS — R63.4 WEIGHT LOSS: ICD-10-CM

## 2022-05-19 DIAGNOSIS — D70.8 OTHER NEUTROPENIA: ICD-10-CM

## 2022-05-19 DIAGNOSIS — D69.6 THROMBOCYTOPENIA: ICD-10-CM

## 2022-05-19 DIAGNOSIS — K58.1 IRRITABLE BOWEL SYNDROME WITH CONSTIPATION: ICD-10-CM

## 2022-05-19 DIAGNOSIS — D72.819 LEUKOPENIA, UNSPECIFIED TYPE: ICD-10-CM

## 2022-05-19 DIAGNOSIS — D75.89 MACROCYTOSIS WITHOUT ANEMIA: Primary | ICD-10-CM

## 2022-05-19 DIAGNOSIS — D52.0 DIETARY FOLATE DEFICIENCY ANEMIA: ICD-10-CM

## 2022-05-19 LAB
ALBUMIN SERPL BCP-MCNC: 4.3 G/DL (ref 3.5–5.2)
ALP SERPL-CCNC: 40 U/L (ref 55–135)
ALT SERPL W/O P-5'-P-CCNC: 11 U/L (ref 10–44)
ANION GAP SERPL CALC-SCNC: 11 MMOL/L (ref 8–16)
AST SERPL-CCNC: 17 U/L (ref 10–40)
BASOPHILS # BLD AUTO: 0.02 K/UL (ref 0–0.2)
BASOPHILS NFR BLD: 0.7 % (ref 0–1.9)
BILIRUB SERPL-MCNC: 0.5 MG/DL (ref 0.1–1)
BUN SERPL-MCNC: 9 MG/DL (ref 8–23)
CALCIUM SERPL-MCNC: 9.5 MG/DL (ref 8.7–10.5)
CHLORIDE SERPL-SCNC: 104 MMOL/L (ref 95–110)
CO2 SERPL-SCNC: 22 MMOL/L (ref 23–29)
CREAT SERPL-MCNC: 0.8 MG/DL (ref 0.5–1.4)
DIFFERENTIAL METHOD: ABNORMAL
EOSINOPHIL # BLD AUTO: 0.1 K/UL (ref 0–0.5)
EOSINOPHIL NFR BLD: 1.7 % (ref 0–8)
ERYTHROCYTE [DISTWIDTH] IN BLOOD BY AUTOMATED COUNT: 13.1 % (ref 11.5–14.5)
EST. GFR  (AFRICAN AMERICAN): >60 ML/MIN/1.73 M^2
EST. GFR  (NON AFRICAN AMERICAN): >60 ML/MIN/1.73 M^2
FOLATE SERPL-MCNC: 5 NG/ML (ref 4–24)
GLUCOSE SERPL-MCNC: 84 MG/DL (ref 70–110)
HCT VFR BLD AUTO: 41 % (ref 37–48.5)
HGB BLD-MCNC: 13.8 G/DL (ref 12–16)
IMM GRANULOCYTES # BLD AUTO: 0 K/UL (ref 0–0.04)
IMM GRANULOCYTES NFR BLD AUTO: 0 % (ref 0–0.5)
LYMPHOCYTES # BLD AUTO: 1.2 K/UL (ref 1–4.8)
LYMPHOCYTES NFR BLD: 38.1 % (ref 18–48)
MCH RBC QN AUTO: 33.3 PG (ref 27–31)
MCHC RBC AUTO-ENTMCNC: 33.7 G/DL (ref 32–36)
MCV RBC AUTO: 99 FL (ref 82–98)
MONOCYTES # BLD AUTO: 0.4 K/UL (ref 0.3–1)
MONOCYTES NFR BLD: 11.9 % (ref 4–15)
NEUTROPHILS # BLD AUTO: 1.4 K/UL (ref 1.8–7.7)
NEUTROPHILS NFR BLD: 47.6 % (ref 38–73)
NRBC BLD-RTO: 0 /100 WBC
PATH REV BLD -IMP: NORMAL
PLATELET # BLD AUTO: 137 K/UL (ref 150–450)
PMV BLD AUTO: 9.4 FL (ref 9.2–12.9)
POTASSIUM SERPL-SCNC: 4 MMOL/L (ref 3.5–5.1)
PROT SERPL-MCNC: 7.6 G/DL (ref 6–8.4)
RBC # BLD AUTO: 4.15 M/UL (ref 4–5.4)
SODIUM SERPL-SCNC: 137 MMOL/L (ref 136–145)
VIT B12 SERPL-MCNC: 411 PG/ML (ref 210–950)
WBC # BLD AUTO: 3.02 K/UL (ref 3.9–12.7)

## 2022-05-19 PROCEDURE — 85060 PATHOLOGIST REVIEW: ICD-10-PCS | Mod: ,,, | Performed by: PATHOLOGY

## 2022-05-19 PROCEDURE — 99214 PR OFFICE/OUTPT VISIT, EST, LEVL IV, 30-39 MIN: ICD-10-PCS | Mod: S$PBB,,, | Performed by: NURSE PRACTITIONER

## 2022-05-19 PROCEDURE — 82607 VITAMIN B-12: CPT | Performed by: NURSE PRACTITIONER

## 2022-05-19 PROCEDURE — 99214 OFFICE O/P EST MOD 30 MIN: CPT | Mod: PBBFAC | Performed by: NURSE PRACTITIONER

## 2022-05-19 PROCEDURE — 99214 OFFICE O/P EST MOD 30 MIN: CPT | Mod: S$PBB,,, | Performed by: NURSE PRACTITIONER

## 2022-05-19 PROCEDURE — 85025 COMPLETE CBC W/AUTO DIFF WBC: CPT | Performed by: NURSE PRACTITIONER

## 2022-05-19 PROCEDURE — 99999 PR PBB SHADOW E&M-EST. PATIENT-LVL IV: CPT | Mod: PBBFAC,,, | Performed by: NURSE PRACTITIONER

## 2022-05-19 PROCEDURE — 80053 COMPREHEN METABOLIC PANEL: CPT | Performed by: NURSE PRACTITIONER

## 2022-05-19 PROCEDURE — 36415 COLL VENOUS BLD VENIPUNCTURE: CPT | Performed by: NURSE PRACTITIONER

## 2022-05-19 PROCEDURE — 82746 ASSAY OF FOLIC ACID SERUM: CPT | Performed by: NURSE PRACTITIONER

## 2022-05-19 PROCEDURE — 85060 BLOOD SMEAR INTERPRETATION: CPT | Mod: ,,, | Performed by: PATHOLOGY

## 2022-05-19 PROCEDURE — 99999 PR PBB SHADOW E&M-EST. PATIENT-LVL IV: ICD-10-PCS | Mod: PBBFAC,,, | Performed by: NURSE PRACTITIONER

## 2022-05-19 NOTE — PROGRESS NOTES
Subjective:      Patient ID: Maggie Díaz is a 71 y.o. female.    Chief Complaint: left should pain due to past injury    HPI:  Patient is a 71 year old female who presents today for further evaluation and management of thrombocytopenia.  Thrombocytopenia has been off/on since 2018 ranging from 124-149K.    Other diagnosis include RLS, anxiety, aortic atherosclerosis, vitamin D deficiency, hypothyroidism, history of Hep C, osteoporosis without fracture, and allergic rhinitis. History of Hep C - treated and cleared per patient.       Former cigarette smoker- quit in .  Smoked for 41 years- smoked 1/2 pack per day  2019 Low dose CT chest negative for malignancy  2022 mammogram - negative for malignancy  2018 colonoscopy - negative with recs to repeat in 10 years  2019 PAP negative for malignancy     Alcohol use socially on occasion  Smoke marijuana on weekends - edibles    Has lost 9 lbs unintentionally in the past 6 months due to digestive issues.  Diagnosed with IBS with constipation.  States that she has gotten her appetite back.     Fm hx of cancer - states that she is adopted   Social History     Socioeconomic History    Marital status:    Occupational History     Employer: [employer]   Tobacco Use    Smoking status: Former Smoker     Packs/day: 0.50     Years: 10.00     Pack years: 5.00     Types: Cigarettes     Quit date: 3/3/1979     Years since quittin.2    Smokeless tobacco: Never Used   Substance and Sexual Activity    Alcohol use: Not Currently     Alcohol/week: 0.0 standard drinks     Comment: occasionally    Drug use: No    Sexual activity: Yes     Partners: Male   Social History Narrative    Live with      Social Determinants of Health     Financial Resource Strain: Low Risk     Difficulty of Paying Living Expenses: Not very hard   Food Insecurity: Food Insecurity Present    Worried About Running Out of Food in the Last Year: Sometimes true    Ran Out  of Food in the Last Year: Never true   Transportation Needs: No Transportation Needs    Lack of Transportation (Medical): No    Lack of Transportation (Non-Medical): No   Physical Activity: Sufficiently Active    Days of Exercise per Week: 6 days    Minutes of Exercise per Session: 40 min   Stress: No Stress Concern Present    Feeling of Stress : Not at all   Social Connections: Unknown    Frequency of Communication with Friends and Family: Patient refused    Frequency of Social Gatherings with Friends and Family: Patient refused    Attends Jehovah's witness Services: Never    Active Member of Clubs or Organizations: Patient refused    Attends Club or Organization Meetings: Patient refused    Marital Status:    Housing Stability: Low Risk     Unable to Pay for Housing in the Last Year: No    Number of Places Lived in the Last Year: 1    Unstable Housing in the Last Year: No       Family History   Adopted: Yes   Family history unknown: Yes       Past Surgical History:   Procedure Laterality Date     SECTION, CLASSIC      COLONOSCOPY N/A 2018    Procedure: COLONOSCOPY;  Surgeon: Jose Royal MD;  Location[facility];  Service: Endoscopy;  Laterality: N/A;    WRIST FRACTURE SURGERY      , Dr. Bunch.       Past Medical History:   Diagnosis Date    Anxiety     Benzodiazepine dependence 2020    Hematuria, microscopic     negative CT    Hepatitis C     slow progression/ Dr. Otoole    Hypertension     Migraines     OP (osteoporosis)     on drug holiday.    Osteoarthritis     Radius fracture     Radius fracture     Restless legs syndrome (RLS)     Thrombocytopenia 2019    Vitamin D deficiency disease        Review of Systems   Constitutional: Positive for unexpected weight change (weight loss).   HENT: Negative.    Eyes: Negative.    Respiratory: Negative.    Cardiovascular: Negative.    Gastrointestinal: Positive for constipation.        States diagnosed with IBS    Endocrine: Negative.    Genitourinary: Negative.    Musculoskeletal: Positive for arthralgias (left shoulder pain).   Skin: Negative.    Allergic/Immunologic: Negative.    Neurological: Negative.    Hematological: Negative.    Psychiatric/Behavioral: Negative.           Medication List with Changes/Refills   Current Medications    CHOLECALCIFEROL, VITAMIN D3, (VITAMIN D3) 50 MCG (2,000 UNIT) CAP    Take 1 capsule by mouth once daily.    CLONAZEPAM (KLONOPIN) 0.5 MG TABLET    TAKE 1 TO 2 TABLET BY MOUTH EVERY EVENING AS NEEDED    ESCITALOPRAM OXALATE (LEXAPRO) 10 MG TABLET    TAKE 1 TABLET(10 MG) BY MOUTH EVERY DAY    FLUTICASONE PROPIONATE (FLONASE) 50 MCG/ACTUATION NASAL SPRAY    SHAKE LIQUID AND USE 2 SPRAYS(100 MCG) IN EACH NOSTRIL EVERY DAY    LEVOTHYROXINE (SYNTHROID) 50 MCG TABLET    TAKE 1 TABLET BY MOUTH EVERY DAY    POLYETHYLENE GLYCOL (GLYCOLAX) 17 GRAM PWPK    Take by mouth.        Objective:     Vitals:    05/19/22 0905   BP: 119/72   Pulse: 75   Resp: 20   Temp: 97.6 °F (36.4 °C)       Physical Exam  Vitals and nursing note reviewed.   Constitutional:       Appearance: Normal appearance.   HENT:      Head: Normocephalic and atraumatic.      Right Ear: External ear normal.      Left Ear: External ear normal.   Cardiovascular:      Rate and Rhythm: Normal rate and regular rhythm.      Heart sounds: Normal heart sounds, S1 normal and S2 normal.   Pulmonary:      Effort: Pulmonary effort is normal.      Breath sounds: Normal breath sounds.   Chest:   Breasts:      Right: No axillary adenopathy or supraclavicular adenopathy.      Left: No axillary adenopathy or supraclavicular adenopathy.       Abdominal:      General: There is no distension.   Musculoskeletal:         General: Normal range of motion.      Cervical back: Normal range of motion.   Lymphadenopathy:      Head:      Right side of head: No submental, submandibular, tonsillar, preauricular, posterior auricular or occipital adenopathy.      Left  side of head: No submental, submandibular, tonsillar, preauricular, posterior auricular or occipital adenopathy.      Cervical: No cervical adenopathy.      Upper Body:      Right upper body: No supraclavicular, axillary or pectoral adenopathy.      Left upper body: No supraclavicular, axillary or pectoral adenopathy.   Skin:     General: Skin is warm and dry.   Neurological:      General: No focal deficit present.      Mental Status: She is alert and oriented to person, place, and time.   Psychiatric:         Attention and Perception: Attention and perception normal.         Mood and Affect: Mood and affect normal.         Speech: Speech normal.         Behavior: Behavior normal. Behavior is cooperative.         Thought Content: Thought content normal.         Cognition and Memory: Cognition and memory normal.         Judgment: Judgment normal.         Assessment:     Problem List Items Addressed This Visit    None     Visit Diagnoses     Macrocytosis without anemia    -  Primary    Relevant Orders    CBC Auto Differential (Completed)    Vitamin B12 (Completed)    Folate (Completed)    Pathologist Interpretation Differential (Completed)    Thrombocytopenia        Relevant Orders    CBC Auto Differential (Completed)    Pathologist Interpretation Differential (Completed)    Leukopenia, unspecified type        Relevant Orders    CBC Auto Differential (Completed)    Comprehensive Metabolic Panel (Completed)    Vitamin B12 (Completed)    Folate (Completed)    Pathologist Interpretation Differential (Completed)    Weight loss        Relevant Orders    CBC Auto Differential (Completed)    Comprehensive Metabolic Panel (Completed)    Vitamin B12 (Completed)    Folate (Completed)    Pathologist Interpretation Differential (Completed)    Dietary folate deficiency anemia         Relevant Orders    Vitamin B12 (Completed)    Folate (Completed)    Other neutropenia        Irritable bowel syndrome with constipation             Lab Results   Component Value Date    WBC 3.02 (L) 05/19/2022    RBC 4.15 05/19/2022    HGB 13.8 05/19/2022    HCT 41.0 05/19/2022    MCV 99 (H) 05/19/2022    MCH 33.3 (H) 05/19/2022    MCHC 33.7 05/19/2022    RDW 13.1 05/19/2022     (L) 05/19/2022    MPV 9.4 05/19/2022    GRAN 1.4 (L) 05/19/2022    GRAN 47.6 05/19/2022    LYMPH 1.2 05/19/2022    LYMPH 38.1 05/19/2022    MONO 0.4 05/19/2022    MONO 11.9 05/19/2022    EOS 0.1 05/19/2022    BASO 0.02 05/19/2022    EOSINOPHIL 1.7 05/19/2022    BASOPHIL 0.7 05/19/2022     BMP  Lab Results   Component Value Date     05/19/2022    K 4.0 05/19/2022     05/19/2022    CO2 22 (L) 05/19/2022    BUN 9 05/19/2022    CREATININE 0.8 05/19/2022    CALCIUM 9.5 05/19/2022    ANIONGAP 11 05/19/2022    ESTGFRAFRICA >60 05/19/2022    EGFRNONAA >60 05/19/2022     Lab Results   Component Value Date    ALT 11 05/19/2022    AST 17 05/19/2022    GGT 10 04/18/2016    ALKPHOS 40 (L) 05/19/2022    BILITOT 0.5 05/19/2022     Lab Results   Component Value Date    FOLATE 5.0 05/19/2022     Lab Results   Component Value Date    AERYRGUA77 411 05/19/2022     Lab Results   Component Value Date    TSH 1.909 03/21/2022       Plan:   Macrocytosis without anemia  -     CBC Auto Differential; Future; Expected date: 05/19/2022  -     Vitamin B12; Future; Expected date: 05/19/2022  -     Folate; Future; Expected date: 05/19/2022  -     Pathologist Interpretation Differential; Future; Expected date: 05/19/2022    Thrombocytopenia  -     Ambulatory referral/consult to Hematology / Oncology  -     CBC Auto Differential; Future; Expected date: 05/19/2022  -     Pathologist Interpretation Differential; Future; Expected date: 05/19/2022    Leukopenia, unspecified type  -     Ambulatory referral/consult to Hematology / Oncology  -     CBC Auto Differential; Future; Expected date: 05/19/2022  -     Comprehensive Metabolic Panel; Future; Expected date: 05/19/2022  -     Vitamin B12; Future;  Expected date: 05/19/2022  -     Folate; Future; Expected date: 05/19/2022  -     Pathologist Interpretation Differential; Future; Expected date: 05/19/2022    Weight loss  -     Ambulatory referral/consult to Hematology / Oncology  -     CBC Auto Differential; Future; Expected date: 05/19/2022  -     Comprehensive Metabolic Panel; Future; Expected date: 05/19/2022  -     Vitamin B12; Future; Expected date: 05/19/2022  -     Folate; Future; Expected date: 05/19/2022  -     Pathologist Interpretation Differential; Future; Expected date: 05/19/2022    Dietary folate deficiency anemia   -     Vitamin B12; Future; Expected date: 05/19/2022  -     Folate; Future; Expected date: 05/19/2022    Other neutropenia    Irritable bowel syndrome with constipation    Thrombocytopenia has been very mild.  Will continue to monitor every 4 months.  May assess liver/spleen with abdominal US at next visit. Possible ITP. With normal B12 and folate levels.      RTC in 4 months with cbc, cmp, TAMIKO prior - consider abdominal US at that time.     Collaborating Provider:  Dr. Saw Becerra    Thank You,  Vicki Minaya, FNP-C  Hematology Oncology

## 2022-05-20 ENCOUNTER — PATIENT MESSAGE (OUTPATIENT)
Dept: HEMATOLOGY/ONCOLOGY | Facility: CLINIC | Age: 72
End: 2022-05-20
Payer: COMMERCIAL

## 2022-05-20 LAB — PATH REV BLD -IMP: NORMAL

## 2022-05-27 ENCOUNTER — TELEPHONE (OUTPATIENT)
Dept: OPHTHALMOLOGY | Facility: CLINIC | Age: 72
End: 2022-05-27
Payer: COMMERCIAL

## 2022-05-27 NOTE — TELEPHONE ENCOUNTER
The patient is scheduled to see Dr. Hightower on 5/30/22 at 2:20 pm at the [place] location.    ----- Message from Glenna Kaye sent at 5/27/2022  9:18 AM CDT -----  Pt would like to schedule an appt please. Call back number is [phone]. Thx. EL

## 2022-06-10 ENCOUNTER — PATIENT MESSAGE (OUTPATIENT)
Dept: GASTROENTEROLOGY | Facility: CLINIC | Age: 72
End: 2022-06-10
Payer: COMMERCIAL

## 2022-06-12 DIAGNOSIS — F41.9 ANXIETY: ICD-10-CM

## 2022-06-12 DIAGNOSIS — J30.1 SEASONAL ALLERGIC RHINITIS DUE TO POLLEN: ICD-10-CM

## 2022-06-12 NOTE — TELEPHONE ENCOUNTER
No new care gaps identified.  [facility] Embedded Care Gaps. Reference number: [id number]. 6/12/2022   3:28:09 AM LIANAT

## 2022-06-13 RX ORDER — ESCITALOPRAM OXALATE 10 MG/1
10 TABLET ORAL DAILY
Qty: 90 TABLET | Refills: 3 | Status: SHIPPED | OUTPATIENT
Start: 2022-06-13 | End: 2023-01-19 | Stop reason: SDUPTHER

## 2022-06-13 RX ORDER — FLUTICASONE PROPIONATE 50 MCG
2 SPRAY, SUSPENSION (ML) NASAL DAILY
Qty: 48 G | Refills: 3 | Status: SHIPPED | OUTPATIENT
Start: 2022-06-13 | End: 2023-03-22 | Stop reason: SDUPTHER

## 2022-06-13 NOTE — TELEPHONE ENCOUNTER
Refill Authorization Note   Maggie Díaz  is requesting a refill authorization.  Brief Assessment and Rationale for Refill:  Approve     Medication Therapy Plan:       Medication Reconciliation Completed: No   Comments:     No Care Gaps recommended.     Note composed:12:27 PM 06/13/2022

## 2022-06-29 ENCOUNTER — PATIENT MESSAGE (OUTPATIENT)
Dept: FAMILY MEDICINE | Facility: CLINIC | Age: 72
End: 2022-06-29
Payer: COMMERCIAL

## 2022-07-06 ENCOUNTER — OFFICE VISIT (OUTPATIENT)
Dept: FAMILY MEDICINE | Facility: CLINIC | Age: 72
End: 2022-07-06
Payer: MEDICARE

## 2022-07-06 DIAGNOSIS — F41.9 ANXIETY: ICD-10-CM

## 2022-07-06 DIAGNOSIS — R63.4 WEIGHT LOSS: Primary | ICD-10-CM

## 2022-07-06 DIAGNOSIS — E03.9 ACQUIRED HYPOTHYROIDISM: ICD-10-CM

## 2022-07-06 PROCEDURE — 99213 OFFICE O/P EST LOW 20 MIN: CPT | Mod: 95,,, | Performed by: INTERNAL MEDICINE

## 2022-07-06 PROCEDURE — 99213 PR OFFICE/OUTPT VISIT, EST, LEVL III, 20-29 MIN: ICD-10-PCS | Mod: 95,,, | Performed by: INTERNAL MEDICINE

## 2022-07-06 NOTE — PROGRESS NOTES
Primary Care Telemedicine Note  The patient location is:  Patient Home   The chief complaint leading to consultation is: f/u medical problems    Visit type: Virtual visit with synchronous audio only and video  Each patient to whom he or she provides medical services by telemedicine is:  (1) informed of the relationship between the physician and patient and the respective role of any other health care provider with respect to management of the patient; and (2) notified that he or she may decline to receive medical services by telemedicine and may withdraw from such care at any time.      HPI:  No further wt loss, weighs 125# at home.  No more falls.  IBS sx on and off, taking miralax/probiotics in food/digestive enzymes/ bentyl prn.  BMs normal, gassy but moving better now.  HemeOnc eval no worrisome concerns.      Updated/ annual due 4/23:  HM: 10/21 fluvax, 1/21 covid vaccines/booster, 8/19 HAV, 3/15 [id number], 2/14 [id number], 7/18 Td, 1/10 TDaP, 8/12 zoster, 2020 Shingrix x2, 9/21 BMD stable/hx fosamax rep 2y, 8/18 Cscope rep 10y, 4/22 MMG/me, 5/18 pelvic u/s neg.         Problem List Items Addressed This Visit     Acquired hypothyroidism    Anxiety      Other Visit Diagnoses     Weight loss    -  Primary          The patient's Health Maintenance was reviewed and the following appears to be due at this time:  Health Maintenance Due   Topic Date Due    High Dose Statin  Never done       [unfilled]  Outpatient Medications Prior to Visit   Medication Sig Dispense Refill    cholecalciferol, vitamin D3, (VITAMIN D3) 50 mcg (2,000 unit) Cap Take 1 capsule by mouth once daily.      clonazePAM (KLONOPIN) 0.5 MG tablet TAKE 1 TO 2 TABLET BY MOUTH EVERY EVENING AS NEEDED 60 tablet 5    EScitalopram oxalate (LEXAPRO) 10 MG tablet Take 1 tablet (10 mg total) by mouth once daily. 90 tablet 3    fluticasone propionate (FLONASE) 50 mcg/actuation nasal spray 2 sprays (100 mcg total) by Each Nostril route once daily. 48 g 3     levothyroxine (SYNTHROID) 50 MCG tablet TAKE 1 TABLET BY MOUTH EVERY DAY 30 tablet 3    polyethylene glycol (GLYCOLAX) 17 gram PwPk Take by mouth.       No facility-administered medications prior to visit.        Physical Exam   No flowsheet data found.  No flowsheet data found.      Constitutional: The patient appears well-developed and well-nourished. No distress.   Psychiatric: The mood appears not anxious and the affect is not angry, not blunt, not labile and not inappropriate. Speech is not rapid and/or pressured, not delayed, not tangential and not slurred. The patient is not agitated, not aggressive, is not hyperactive, not slowed, not withdrawn, not actively hallucinating and not combative. Thought content is not paranoid and not delusional. Cognition and memory are not impaired. The patient does not express impulsivity or inappropriate judgment and does not exhibit a depressed mood. The patient expresses no homicidal and no suicidal ideation and has no suicidal plans and no homicidal plans. The patient is communicative and exhibits a normal recent memory and normal remote memory. The patient is attentive.     Encounter Diagnoses   Name Primary?    Weight loss Yes    Anxiety     Acquired hypothyroidism        PLAN:    I am having Maggie Díaz maintain her cholecalciferol (vitamin D3), polyethylene glycol, clonazePAM, levothyroxine, EScitalopram oxalate, and fluticasone propionate.    Diagnoses and all orders for this visit:    Weight loss, now stabilized after 10# down.  Cont to follow, RTC 4mo.    Anxiety    Acquired hypothyroidism              No orders of the defined types were placed in this encounter.

## 2022-07-13 ENCOUNTER — PATIENT MESSAGE (OUTPATIENT)
Dept: GASTROENTEROLOGY | Facility: CLINIC | Age: 72
End: 2022-07-13
Payer: COMMERCIAL

## 2022-07-14 ENCOUNTER — PATIENT MESSAGE (OUTPATIENT)
Dept: GASTROENTEROLOGY | Facility: CLINIC | Age: 72
End: 2022-07-14

## 2022-07-14 ENCOUNTER — OFFICE VISIT (OUTPATIENT)
Dept: GASTROENTEROLOGY | Facility: CLINIC | Age: 72
End: 2022-07-14
Payer: MEDICARE

## 2022-07-14 DIAGNOSIS — R10.9 ABDOMINAL DISCOMFORT: ICD-10-CM

## 2022-07-14 DIAGNOSIS — K58.1 IRRITABLE BOWEL SYNDROME WITH CONSTIPATION: Primary | ICD-10-CM

## 2022-07-14 DIAGNOSIS — R14.0 BLOATING: ICD-10-CM

## 2022-07-14 PROCEDURE — 99214 PR OFFICE/OUTPT VISIT, EST, LEVL IV, 30-39 MIN: ICD-10-PCS | Mod: 95,,, | Performed by: PHYSICIAN ASSISTANT

## 2022-07-14 PROCEDURE — 99214 OFFICE O/P EST MOD 30 MIN: CPT | Mod: 95,,, | Performed by: PHYSICIAN ASSISTANT

## 2022-07-14 NOTE — PROGRESS NOTES
Subjective:      Patient ID: Maggie Díaz is a 71 y.o. female.    Chief Complaint: No chief complaint on file.  The patient location is: home  The chief complaint leading to consultation is: abdominal pain    Visit type: audiovisual    Face to Face time with patient: 15 mins  25 minutes of total time spent on the encounter, which includes face to face time and non-face to face time preparing to see the patient (eg, review of tests), Obtaining and/or reviewing separately obtained history, Documenting clinical information in the electronic or other health record, Independently interpreting results (not separately reported) and communicating results to the patient/family/caregiver, or Care coordination (not separately reported).     Each patient to whom he or she provides medical services by telemedicine is:  (1) informed of the relationship between the physician and patient and the respective role of any other health care provider with respect to management of the patient; and (2) notified that he or she may decline to receive medical services by telemedicine and may withdraw from such care at any time.    Notes:     HPI:  Patient reports today via telemedicine for follow up of the above. She has long history of abdominal discomfort with more constipation type symptoms. She has had workup including CT (2019), colonoscopy (2018), and Xrays (most recent 2022). Imaging has shown some retained stool for which she used to take Linzess. This ended up being too strong - now patient typically takes daily Miralax. Reports that she believes her stools have been moving well. Began 2 days ago with abdominal discomfort and a large amount of bloating/gas. She had some diarrhea yesterday, but this has resolved. She now feels pressure to her rectum as though she needs to have a bowel movement. Discomfort now improved with BM. She has symptoms consistent with an IBS and confirms her stress/anxiety has been very high. She has  Bentyl, but has only taken this a few times. Reports it is somewhat helpful. Denies fevers, blood in the stool. Black stool after taking a dose of Pepto Bismol.     Review of Systems   Constitutional: Negative for activity change, appetite change, chills, diaphoresis, fatigue and fever.   HENT: Negative for trouble swallowing.    Cardiovascular: Negative for chest pain.   Gastrointestinal: Positive for abdominal pain, constipation and diarrhea. Negative for abdominal distention, anal bleeding, blood in stool, nausea and vomiting. Rectal pain: pressure.   Neurological: Negative for dizziness and weakness.   Psychiatric/Behavioral: Positive for dysphoric mood. The patient is nervous/anxious.        Medical History: Reviewed    Social History: Reviewed    Allergies: Reviewed    Objective:     Physical Exam    Assessment:     1. Irritable bowel syndrome with constipation    2. Abdominal discomfort    3. Bloating        Plan:     -Schedule for CT for further evaluation of abdominal discomfort. Suspect this could be IBS related, however, patient is very uncomfortable. Last CT imaging in 2019.   -Colonoscopy with 10 yr recall in 2018. No plans at this time for repeat colonoscopy unless symptoms change.   -Recommend using her Bentyl regularly, TID. She has only used on an as needed basis.   -Instructed ED with severe symptoms. Red flag symptoms discussed.    Diagnoses and all orders for this visit:    Irritable bowel syndrome with constipation  -     CT Abdomen Without Contrast; Future    Abdominal discomfort  -     CT Abdomen Without Contrast; Future    Bloating  -     CT Abdomen Without Contrast; Future        No follow-ups on file.    Thank you for the opportunity to participate in the care of this patient.   Yesenia Smiley PA-C.

## 2022-07-18 ENCOUNTER — PATIENT MESSAGE (OUTPATIENT)
Dept: GASTROENTEROLOGY | Facility: CLINIC | Age: 72
End: 2022-07-18
Payer: COMMERCIAL

## 2022-07-18 DIAGNOSIS — R19.7 DIARRHEA, UNSPECIFIED TYPE: ICD-10-CM

## 2022-07-18 DIAGNOSIS — R14.0 BLOATING: ICD-10-CM

## 2022-07-18 DIAGNOSIS — R10.9 ABDOMINAL DISCOMFORT: Primary | ICD-10-CM

## 2022-07-20 ENCOUNTER — PATIENT MESSAGE (OUTPATIENT)
Dept: GASTROENTEROLOGY | Facility: CLINIC | Age: 72
End: 2022-07-20
Payer: COMMERCIAL

## 2022-08-03 ENCOUNTER — PATIENT MESSAGE (OUTPATIENT)
Dept: FAMILY MEDICINE | Facility: CLINIC | Age: 72
End: 2022-08-03
Payer: COMMERCIAL

## 2022-08-05 ENCOUNTER — PATIENT MESSAGE (OUTPATIENT)
Dept: GASTROENTEROLOGY | Facility: CLINIC | Age: 72
End: 2022-08-05
Payer: COMMERCIAL

## 2022-08-09 ENCOUNTER — HOSPITAL ENCOUNTER (OUTPATIENT)
Dept: RADIOLOGY | Facility: HOSPITAL | Age: 72
Discharge: HOME | End: 2022-08-09
Attending: PHYSICIAN ASSISTANT
Payer: MEDICARE

## 2022-08-09 DIAGNOSIS — R10.9 ABDOMINAL DISCOMFORT: ICD-10-CM

## 2022-08-09 DIAGNOSIS — R14.0 BLOATING: ICD-10-CM

## 2022-08-09 DIAGNOSIS — R10.9 ABDOMINAL DISCOMFORT: Primary | ICD-10-CM

## 2022-08-09 DIAGNOSIS — K58.1 IRRITABLE BOWEL SYNDROME WITH CONSTIPATION: ICD-10-CM

## 2022-08-09 DIAGNOSIS — R19.7 DIARRHEA, UNSPECIFIED TYPE: ICD-10-CM

## 2022-08-09 PROCEDURE — 74176 CT ABD & PELVIS W/O CONTRAST: CPT | Mod: 26,,, | Performed by: RADIOLOGY

## 2022-08-09 PROCEDURE — A9698 NON-RAD CONTRAST MATERIALNOC: HCPCS | Performed by: PHYSICIAN ASSISTANT

## 2022-08-09 PROCEDURE — 74176 CT ABD & PELVIS W/O CONTRAST: CPT | Mod: TC

## 2022-08-09 PROCEDURE — 25500020 PHARM REV CODE 255: Performed by: PHYSICIAN ASSISTANT

## 2022-08-09 PROCEDURE — 74176 CT ABDOMEN PELVIS WITHOUT CONTRAST: ICD-10-PCS | Mod: 26,,, | Performed by: RADIOLOGY

## 2022-08-09 RX ADMIN — IOHEXOL 1000 ML: 12 SOLUTION ORAL at 09:08

## 2022-08-10 ENCOUNTER — PATIENT MESSAGE (OUTPATIENT)
Dept: GASTROENTEROLOGY | Facility: CLINIC | Age: 72
End: 2022-08-10
Payer: COMMERCIAL

## 2022-08-11 ENCOUNTER — PATIENT MESSAGE (OUTPATIENT)
Dept: GASTROENTEROLOGY | Facility: CLINIC | Age: 72
End: 2022-08-11
Payer: COMMERCIAL

## 2022-08-26 ENCOUNTER — PATIENT MESSAGE (OUTPATIENT)
Dept: GASTROENTEROLOGY | Facility: CLINIC | Age: 72
End: 2022-08-26
Payer: COMMERCIAL

## 2022-08-26 RX ORDER — DICYCLOMINE HYDROCHLORIDE 20 MG/1
20 TABLET ORAL EVERY 6 HOURS
Qty: 120 TABLET | Refills: 0 | Status: SHIPPED | OUTPATIENT
Start: 2022-08-26 | End: 2022-08-29

## 2022-08-26 NOTE — TELEPHONE ENCOUNTER
Maggie Díaz Megan S., PA-C 10 minutes ago (10:15 AM)           Could you prescribe more of that med?It seem to help my morning if I take 1 before bed.

## 2022-09-07 ENCOUNTER — PATIENT MESSAGE (OUTPATIENT)
Dept: GASTROENTEROLOGY | Facility: CLINIC | Age: 72
End: 2022-09-07
Payer: COMMERCIAL

## 2022-09-08 ENCOUNTER — PES CALL (OUTPATIENT)
Dept: ADMINISTRATIVE | Facility: CLINIC | Age: 72
End: 2022-09-08
Payer: COMMERCIAL

## 2022-09-16 ENCOUNTER — PATIENT MESSAGE (OUTPATIENT)
Dept: PRIMARY CARE CLINIC | Facility: CLINIC | Age: 72
End: 2022-09-16
Payer: COMMERCIAL

## 2022-09-16 DIAGNOSIS — F41.9 ANXIETY: ICD-10-CM

## 2022-09-16 NOTE — TELEPHONE ENCOUNTER
No new care gaps identified.  [facility] Embedded Care Gaps. Reference number: [id number]. 9/16/2022   11:04:39 AM LIANAT

## 2022-09-17 RX ORDER — CLONAZEPAM 0.5 MG/1
TABLET ORAL
Qty: 60 TABLET | Refills: 5 | Status: SHIPPED | OUTPATIENT
Start: 2022-09-17 | End: 2022-11-22 | Stop reason: SDUPTHER

## 2022-09-28 ENCOUNTER — LAB VISIT (OUTPATIENT)
Dept: LAB | Facility: HOSPITAL | Age: 72
End: 2022-09-28
Attending: NURSE PRACTITIONER
Payer: MEDICARE

## 2022-09-28 DIAGNOSIS — D69.6 THROMBOCYTOPENIA: ICD-10-CM

## 2022-09-28 LAB
ALBUMIN SERPL BCP-MCNC: 4.5 G/DL (ref 3.5–5.2)
ALP SERPL-CCNC: 42 U/L (ref 55–135)
ALT SERPL W/O P-5'-P-CCNC: 18 U/L (ref 10–44)
ANION GAP SERPL CALC-SCNC: 9 MMOL/L (ref 8–16)
AST SERPL-CCNC: 23 U/L (ref 10–40)
BASOPHILS # BLD AUTO: 0.05 K/UL (ref 0–0.2)
BASOPHILS NFR BLD: 1.5 % (ref 0–1.9)
BILIRUB SERPL-MCNC: 0.9 MG/DL (ref 0.1–1)
BUN SERPL-MCNC: 13 MG/DL (ref 8–23)
CALCIUM SERPL-MCNC: 9.9 MG/DL (ref 8.7–10.5)
CHLORIDE SERPL-SCNC: 100 MMOL/L (ref 95–110)
CO2 SERPL-SCNC: 29 MMOL/L (ref 23–29)
CREAT SERPL-MCNC: 0.9 MG/DL (ref 0.5–1.4)
DIFFERENTIAL METHOD: ABNORMAL
EOSINOPHIL # BLD AUTO: 0 K/UL (ref 0–0.5)
EOSINOPHIL NFR BLD: 1.2 % (ref 0–8)
ERYTHROCYTE [DISTWIDTH] IN BLOOD BY AUTOMATED COUNT: 13.4 % (ref 11.5–14.5)
EST. GFR  (NO RACE VARIABLE): >60 ML/MIN/1.73 M^2
GLUCOSE SERPL-MCNC: 80 MG/DL (ref 70–110)
HCT VFR BLD AUTO: 39.3 % (ref 37–48.5)
HGB BLD-MCNC: 13.4 G/DL (ref 12–16)
IMM GRANULOCYTES # BLD AUTO: 0 K/UL (ref 0–0.04)
IMM GRANULOCYTES NFR BLD AUTO: 0 % (ref 0–0.5)
LYMPHOCYTES # BLD AUTO: 1.2 K/UL (ref 1–4.8)
LYMPHOCYTES NFR BLD: 36.8 % (ref 18–48)
MCH RBC QN AUTO: 33.1 PG (ref 27–31)
MCHC RBC AUTO-ENTMCNC: 34.1 G/DL (ref 32–36)
MCV RBC AUTO: 97 FL (ref 82–98)
MONOCYTES # BLD AUTO: 0.4 K/UL (ref 0.3–1)
MONOCYTES NFR BLD: 11.2 % (ref 4–15)
NEUTROPHILS # BLD AUTO: 1.6 K/UL (ref 1.8–7.7)
NEUTROPHILS NFR BLD: 49.3 % (ref 38–73)
NRBC BLD-RTO: 0 /100 WBC
PLATELET # BLD AUTO: 135 K/UL (ref 150–450)
PMV BLD AUTO: 9.4 FL (ref 9.2–12.9)
POTASSIUM SERPL-SCNC: 5 MMOL/L (ref 3.5–5.1)
PROT SERPL-MCNC: 7.7 G/DL (ref 6–8.4)
RBC # BLD AUTO: 4.05 M/UL (ref 4–5.4)
SODIUM SERPL-SCNC: 138 MMOL/L (ref 136–145)
WBC # BLD AUTO: 3.29 K/UL (ref 3.9–12.7)

## 2022-09-28 PROCEDURE — 80053 COMPREHEN METABOLIC PANEL: CPT | Performed by: NURSE PRACTITIONER

## 2022-09-28 PROCEDURE — 86038 ANTINUCLEAR ANTIBODIES: CPT | Performed by: NURSE PRACTITIONER

## 2022-09-28 PROCEDURE — 85025 COMPLETE CBC W/AUTO DIFF WBC: CPT | Performed by: NURSE PRACTITIONER

## 2022-09-28 PROCEDURE — 36415 COLL VENOUS BLD VENIPUNCTURE: CPT | Performed by: NURSE PRACTITIONER

## 2022-09-29 ENCOUNTER — OFFICE VISIT (OUTPATIENT)
Dept: HEMATOLOGY/ONCOLOGY | Facility: CLINIC | Age: 72
End: 2022-09-29
Payer: MEDICARE

## 2022-09-29 DIAGNOSIS — D70.8 OTHER NEUTROPENIA: ICD-10-CM

## 2022-09-29 DIAGNOSIS — D69.6 THROMBOCYTOPENIA: Primary | ICD-10-CM

## 2022-09-29 DIAGNOSIS — D72.819 LEUKOPENIA, UNSPECIFIED TYPE: ICD-10-CM

## 2022-09-29 LAB — ANA SER QL IF: NORMAL

## 2022-09-29 PROCEDURE — 99213 PR OFFICE/OUTPT VISIT, EST, LEVL III, 20-29 MIN: ICD-10-PCS | Mod: 95,,, | Performed by: NURSE PRACTITIONER

## 2022-09-29 PROCEDURE — 99213 OFFICE O/P EST LOW 20 MIN: CPT | Mod: 95,,, | Performed by: NURSE PRACTITIONER

## 2022-09-29 NOTE — PROGRESS NOTES
The patient location is: home  The chief complaint leading to consultation is: lab discussion    Visit type: audiovisual    Face to Face time with patient: 7  20 minutes of total time spent on the encounter, which includes face to face time and non-face to face time preparing to see the patient (eg, review of tests), Obtaining and/or reviewing separately obtained history, Documenting clinical information in the electronic or other health record, Independently interpreting results (not separately reported) and communicating results to the patient/family/caregiver, or Care coordination (not separately reported).     Each patient to whom he or she provides medical services by telemedicine is:  (1) informed of the relationship between the physician and patient and the respective role of any other health care provider with respect to management of the patient; and (2) notified that he or she may decline to receive medical services by telemedicine and may withdraw from such care at any time.    Patient ID: Maggie Díaz is a 72 y.o. female.    Chief Complaint: lab discussion    HPI:  Patient is a 72 year old female who presents today for further evaluation and management of thrombocytopenia.  Thrombocytopenia has been off/on since 2018 ranging from 124-149K.     Other diagnosis include RLS, anxiety, aortic atherosclerosis, vitamin D deficiency, hypothyroidism, history of Hep C, osteoporosis without fracture, and allergic rhinitis. History of Hep C - treated and cleared per patient.        Former cigarette smoker- quit in 1979.  Smoked for 41 years- smoked 1/2 pack per day  8/2019 Low dose CT chest negative for malignancy  04/2022 mammogram - negative for malignancy  8/2018 colonoscopy - negative with recs to repeat in 10 years  4/2019 PAP negative for malignancy     Alcohol use socially on occasion  Smoke marijuana on weekends - edibles     Has lost 9 lbs unintentionally in the past 6 months due to digestive issues.   Diagnosed with IBS with constipation.  States that she has gotten her appetite back.     2022 CT abdomen pelvis without contrast Impression:No acute abnormality in the abdomen or pelvis     Fm hx of cancer - states that she is adopted     Interval History:  Present today for continued monitoring of thrombocytopenia - Labs from  2022 platelets 135K stable.  On of stable neutropenia for many years.      Social History     Socioeconomic History    Marital status:    Occupational History     Employer: [employer]   Tobacco Use    Smoking status: Former     Packs/day: 0.50     Years: 10.00     Pack years: 5.00     Types: Cigarettes     Quit date: 3/3/1979     Years since quittin.6    Smokeless tobacco: Never   Substance and Sexual Activity    Alcohol use: Not Currently     Alcohol/week: 0.0 standard drinks     Comment: occasionally    Drug use: No    Sexual activity: Yes     Partners: Male   Social History Narrative    Live with      Social Determinants of Health     Financial Resource Strain: Low Risk     Difficulty of Paying Living Expenses: Not very hard   Food Insecurity: No Food Insecurity    Worried About Running Out of Food in the Last Year: Never true    Ran Out of Food in the Last Year: Never true   Transportation Needs: No Transportation Needs    Lack of Transportation (Medical): No    Lack of Transportation (Non-Medical): No   Physical Activity: Unknown    Days of Exercise per Week: Patient refused    Minutes of Exercise per Session: 50 min   Stress: Unknown    Feeling of Stress : Patient refused   Social Connections: Moderately Isolated    Frequency of Communication with Friends and Family: More than three times a week    Frequency of Social Gatherings with Friends and Family: Twice a week    Attends Temple Services: Never    Active Member of Clubs or Organizations: No    Attends Club or Organization Meetings: Never    Marital Status:    Housing Stability: Low Risk      Unable to Pay for Housing in the Last Year: No    Number of Places Lived in the Last Year: 1    Unstable Housing in the Last Year: No       Family History   Adopted: Yes   Family history unknown: Yes       Past Surgical History:   Procedure Laterality Date     SECTION, CLASSIC  1982    COLONOSCOPY N/A 2018    Procedure: COLONOSCOPY;  Surgeon: Jose Royal MD;  Location: [facility];  Service: Endoscopy;  Laterality: N/A;    WRIST FRACTURE SURGERY      , Dr. Bunch.       Past Medical History:   Diagnosis Date    Anxiety     Benzodiazepine dependence 2020    Hematuria, microscopic     negative CT    Hepatitis C     slow progression/ Dr. Otoole    Hypertension     Migraines     OP (osteoporosis)     on drug holiday.    Osteoarthritis     Radius fracture     Radius fracture     Restless legs syndrome (RLS)     Thrombocytopenia 2019    Vitamin D deficiency disease        Review of Systems   Constitutional:  Positive for unexpected weight change. Negative for appetite change.   HENT:  Negative for mouth sores and nosebleeds.    Respiratory:  Negative for cough and shortness of breath.    Cardiovascular:  Negative for chest pain.   Gastrointestinal:  Negative for anal bleeding, blood in stool and diarrhea.   Genitourinary:  Negative for menstrual problem and vaginal bleeding.   Musculoskeletal: Negative.    Skin:  Negative for rash.   Neurological:  Negative for headaches.   Hematological:  Negative for adenopathy.   Psychiatric/Behavioral:  The patient is not nervous/anxious.         Medication List with Changes/Refills   Current Medications    CHOLECALCIFEROL, VITAMIN D3, (VITAMIN D3) 50 MCG (2,000 UNIT) CAP    Take 1 capsule by mouth once daily.    CLONAZEPAM (KLONOPIN) 0.5 MG TABLET    TAKE 1 TO 2 TABLET BY MOUTH EVERY EVENING AS NEEDED    DICYCLOMINE (BENTYL) 20 MG TABLET    Take 1 tablet (20 mg total) by mouth 3 (three) times daily.    ESCITALOPRAM OXALATE (LEXAPRO) 10 MG TABLET    Take  1 tablet (10 mg total) by mouth once daily.    FLUTICASONE PROPIONATE (FLONASE) 50 MCG/ACTUATION NASAL SPRAY    2 sprays (100 mcg total) by Each Nostril route once daily.    LEVOTHYROXINE (SYNTHROID) 50 MCG TABLET    TAKE 1 TABLET BY MOUTH EVERY DAY    POLYETHYLENE GLYCOL (GLYCOLAX) 17 GRAM PWPK    Take by mouth.        Objective:   There were no vitals filed for this visit.    Physical Exam  Constitutional:       Appearance: Normal appearance.   Pulmonary:      Effort: Pulmonary effort is normal.   Neurological:      General: No focal deficit present.      Mental Status: She is alert and oriented to person, place, and time.   Psychiatric:         Mood and Affect: Mood normal.         Behavior: Behavior normal.         Thought Content: Thought content normal.         Judgment: Judgment normal.       Assessment:     Problem List Items Addressed This Visit    None  Visit Diagnoses       Thrombocytopenia    -  Primary    Relevant Orders    TAMIKO Screen w/Reflex    CBC Auto Differential    Comprehensive Metabolic Panel    Leukopenia, unspecified type        Relevant Orders    TAMIKO Screen w/Reflex    CBC Auto Differential    Comprehensive Metabolic Panel    Other neutropenia        Relevant Orders    TAMIKO Screen w/Reflex    CBC Auto Differential    Comprehensive Metabolic Panel            Lab Results   Component Value Date    WBC 3.29 (L) 09/28/2022    RBC 4.05 09/28/2022    HGB 13.4 09/28/2022    HCT 39.3 09/28/2022    MCV 97 09/28/2022    MCH 33.1 (H) 09/28/2022    MCHC 34.1 09/28/2022    RDW 13.4 09/28/2022     (L) 09/28/2022    MPV 9.4 09/28/2022    GRAN 1.6 (L) 09/28/2022    GRAN 49.3 09/28/2022    LYMPH 1.2 09/28/2022    LYMPH 36.8 09/28/2022    MONO 0.4 09/28/2022    MONO 11.2 09/28/2022    EOS 0.0 09/28/2022    BASO 0.05 09/28/2022    EOSINOPHIL 1.2 09/28/2022    BASOPHIL 1.5 09/28/2022      Lab Results   Component Value Date     09/28/2022    K 5.0 09/28/2022     09/28/2022    CO2 29 09/28/2022     BUN 13 09/28/2022    CREATININE 0.9 09/28/2022    CALCIUM 9.9 09/28/2022    ANIONGAP 9 09/28/2022    ESTGFRAFRICA >60 05/19/2022    EGFRNONAA >60 05/19/2022     Lab Results   Component Value Date    ALT 18 09/28/2022    AST 23 09/28/2022    GGT 10 04/18/2016    ALKPHOS 42 (L) 09/28/2022    BILITOT 0.9 09/28/2022     Lab Results   Component Value Date    FOLATE 5.0 05/19/2022     Lab Results   Component Value Date    NWCKYUSU08 411 05/19/2022     Lab Results   Component Value Date    HEPAIGM Negative 10/30/2007    HEPBIGM Negative 10/30/2007    HEPCAB Positive (A) 03/05/2015     No results found for: TAMIKO  Lab Results   Component Value Date    TSH 1.909 03/21/2022       Plan:   Thrombocytopenia  -     TAMIKO Screen w/Reflex; Future; Expected date: 09/29/2022  -     CBC Auto Differential; Future; Expected date: 09/29/2022  -     Comprehensive Metabolic Panel; Future; Expected date: 09/29/2022    Leukopenia, unspecified type  -     TAMIKO Screen w/Reflex; Future; Expected date: 09/29/2022  -     CBC Auto Differential; Future; Expected date: 09/29/2022  -     Comprehensive Metabolic Panel; Future; Expected date: 09/29/2022    Other neutropenia  -     TAMIKO Screen w/Reflex; Future; Expected date: 09/29/2022  -     CBC Auto Differential; Future; Expected date: 09/29/2022  -     Comprehensive Metabolic Panel; Future; Expected date: 09/29/2022    Labs continue to remain stable.  If Hgb drops <10 g/dL, platelets drop <100 K, or ANC drop <1.0 will proceed with evaluation with bone marrow.  For now we will continue to monitor due to the long term stability of her current blood counts every 4 months.  .    Will assess TAMIKO for possible autoimmune disorders which could cause leukopenia and thrombocytopenia.  Will communicate results via portal if abnormal and refer to rheumatology for workup if needed.    Collaborating Provider:  Dr. Saw Becerra    Thank You,  Vicki Minaya, P-C  Hematology Oncology

## 2022-09-30 ENCOUNTER — PATIENT MESSAGE (OUTPATIENT)
Dept: HEMATOLOGY/ONCOLOGY | Facility: CLINIC | Age: 72
End: 2022-09-30
Payer: COMMERCIAL

## 2022-10-05 ENCOUNTER — PATIENT MESSAGE (OUTPATIENT)
Dept: PRIMARY CARE CLINIC | Facility: CLINIC | Age: 72
End: 2022-10-05
Payer: COMMERCIAL

## 2022-10-14 NOTE — PROGRESS NOTES
"Subjective:      Patient ID: Maggie Díaz is a 72 y.o. female.    Chief Complaint: Follow-up (Patient in for four month follow up with no complaints.)      HPI  Here for follow up of medical problems.  Down 8# more, total 18#.  "My clothes don't fit, and I don't know why."  Had been 115 when lots belly pains 3/2020.  BMs good and abd no pain, on citrucel daily.  Less gassy now.  Energy is good, walks dog 2 miles daily.  No f/c/NS.    Allergies lately, sachin postnasal drainage after walk.  Anxiety doing well.      Updated/ annual due 4/23:  HM: 10/22 fluvax, 1/21 covid vaccines/booster, 8/19 HAV, 3/15 [id number], 2/14 [id number], 7/18 Td, 1/10 TDaP, 8/12 zoster, 2020 Shingrix x2, 9/21 BMD stable/hx fosamax rep 2y, 8/18 Cscope rep 10y, 4/22 MMG/me, 5/18 pelvic u/s neg.     Review of Systems   Constitutional:  Negative for chills, diaphoresis and fever.   Respiratory:  Negative for cough and shortness of breath.    Cardiovascular:  Negative for chest pain, palpitations and leg swelling.   Gastrointestinal:  Negative for blood in stool, constipation, diarrhea, nausea and vomiting.   Genitourinary:  Negative for dysuria, frequency and hematuria.   Psychiatric/Behavioral:  The patient is not nervous/anxious.        Objective:   /62   Pulse 66   Temp 98 °F (36.7 °C)   Ht 5' 8" (1.727 m)   Wt 53.1 kg (117 lb 1.6 oz)   SpO2 99%   BMI 17.80 kg/m²     Physical Exam  Constitutional:       Appearance: She is well-developed.   Neck:      Thyroid: No thyroid mass.      Vascular: No carotid bruit.   Cardiovascular:      Rate and Rhythm: Normal rate and regular rhythm.      Heart sounds: No murmur heard.    No friction rub. No gallop.   Pulmonary:      Effort: Pulmonary effort is normal.      Breath sounds: Normal breath sounds. No wheezing or rales.   Abdominal:      General: Bowel sounds are normal.      Palpations: Abdomen is soft. There is no mass.      Tenderness: There is no abdominal tenderness.   Musculoskeletal: "      Cervical back: Neck supple.   Lymphadenopathy:      Cervical: No cervical adenopathy.   Neurological:      Mental Status: She is alert and oriented to person, place, and time.           Assessment:       1. Weight loss    2. Acquired hypothyroidism    3. Anxiety    4. Restless legs syndrome (RLS)    5. Aortic atherosclerosis    6. Wellness examination    7. Seasonal allergic rhinitis due to pollen          Plan:     Weight loss, down 18#- r/o hyperthyroid with lab now.  RTC 3mo.  -     TSH; Future; Expected date: 10/19/2022    Acquired hypothyroidism  -     TSH; Future; Expected date: 10/19/2022    Anxiety- doing well, cont rx.    AR- add claritin.    Restless legs syndrome (RLS)- cont bz.    Aortic atherosclerosis- cont exercise.    Wellness examination- utd.

## 2022-10-19 ENCOUNTER — OFFICE VISIT (OUTPATIENT)
Dept: PRIMARY CARE CLINIC | Facility: CLINIC | Age: 72
End: 2022-10-19
Payer: MEDICARE

## 2022-10-19 VITALS
TEMPERATURE: 98 F | DIASTOLIC BLOOD PRESSURE: 62 MMHG | OXYGEN SATURATION: 99 % | HEART RATE: 66 BPM | WEIGHT: 117.13 LBS | BODY MASS INDEX: 17.75 KG/M2 | HEIGHT: 68 IN | SYSTOLIC BLOOD PRESSURE: 136 MMHG

## 2022-10-19 DIAGNOSIS — Z00.00 WELLNESS EXAMINATION: ICD-10-CM

## 2022-10-19 DIAGNOSIS — J30.1 SEASONAL ALLERGIC RHINITIS DUE TO POLLEN: ICD-10-CM

## 2022-10-19 DIAGNOSIS — F41.9 ANXIETY: ICD-10-CM

## 2022-10-19 DIAGNOSIS — I70.0 AORTIC ATHEROSCLEROSIS: ICD-10-CM

## 2022-10-19 DIAGNOSIS — R63.4 WEIGHT LOSS: Primary | ICD-10-CM

## 2022-10-19 DIAGNOSIS — E03.9 ACQUIRED HYPOTHYROIDISM: ICD-10-CM

## 2022-10-19 DIAGNOSIS — G25.81 RESTLESS LEGS SYNDROME (RLS): ICD-10-CM

## 2022-10-19 PROCEDURE — 99999 PR PBB SHADOW E&M-EST. PATIENT-LVL III: ICD-10-PCS | Mod: PBBFAC,,, | Performed by: INTERNAL MEDICINE

## 2022-10-19 PROCEDURE — 84443 ASSAY THYROID STIM HORMONE: CPT | Performed by: INTERNAL MEDICINE

## 2022-10-19 PROCEDURE — 99214 OFFICE O/P EST MOD 30 MIN: CPT | Mod: S$PBB,,, | Performed by: INTERNAL MEDICINE

## 2022-10-19 PROCEDURE — 99999 PR PBB SHADOW E&M-EST. PATIENT-LVL III: CPT | Mod: PBBFAC,,, | Performed by: INTERNAL MEDICINE

## 2022-10-19 PROCEDURE — 99213 OFFICE O/P EST LOW 20 MIN: CPT | Mod: PBBFAC,PN | Performed by: INTERNAL MEDICINE

## 2022-10-19 PROCEDURE — 99214 PR OFFICE/OUTPT VISIT, EST, LEVL IV, 30-39 MIN: ICD-10-PCS | Mod: S$PBB,,, | Performed by: INTERNAL MEDICINE

## 2022-10-20 ENCOUNTER — PATIENT MESSAGE (OUTPATIENT)
Dept: INTERNAL MEDICINE | Facility: CLINIC | Age: 72
End: 2022-10-20
Payer: COMMERCIAL

## 2022-10-20 LAB — TSH SERPL DL<=0.005 MIU/L-ACNC: 1.61 UIU/ML (ref 0.4–4)

## 2022-10-21 ENCOUNTER — PATIENT MESSAGE (OUTPATIENT)
Dept: PRIMARY CARE CLINIC | Facility: CLINIC | Age: 72
End: 2022-10-21
Payer: COMMERCIAL

## 2022-11-22 ENCOUNTER — PATIENT MESSAGE (OUTPATIENT)
Dept: PRIMARY CARE CLINIC | Facility: CLINIC | Age: 72
End: 2022-11-22
Payer: COMMERCIAL

## 2022-11-22 DIAGNOSIS — F41.9 ANXIETY: ICD-10-CM

## 2022-11-22 RX ORDER — CLONAZEPAM 0.5 MG/1
TABLET ORAL
Qty: 60 TABLET | Refills: 5 | Status: SHIPPED | OUTPATIENT
Start: 2022-11-22 | End: 2023-05-04 | Stop reason: SDUPTHER

## 2022-11-29 ENCOUNTER — PATIENT MESSAGE (OUTPATIENT)
Dept: PRIMARY CARE CLINIC | Facility: CLINIC | Age: 72
End: 2022-11-29
Payer: COMMERCIAL

## 2022-12-07 ENCOUNTER — TELEPHONE (OUTPATIENT)
Dept: ADMINISTRATIVE | Facility: HOSPITAL | Age: 72
End: 2022-12-07
Payer: COMMERCIAL

## 2023-01-05 NOTE — PROGRESS NOTES
"Subjective:      Patient ID: Maggie Díaz is a 72 y.o. female.    Chief Complaint: Follow-up (Pt has had stomach pain for a week, constipation, diarrhea, and hemorroids. Pt also isn't eating well. Pt says this is making her depressed. )      HPI  Here for follow up of medical problems.  Weight stable in past 3 months, with 18# loss prior.  BMs have been doing well, but 2 days ago took laxative and yesterday had diarrhea.  Had been taking citrucel daily and BMs had been doing well until this event.  No f/c/sw/cough.  No cp/sob/palp.  No b/b in BMs.  Urine normal.  8/22 CT abd/pelvis negative.      Updated/ annual due 4/23:  HM: 10/22 fluvax, 1/21 covid vaccines/booster, 8/19 HAV, 3/15 [id number], 2/14 [id number], 7/18 Td, 1/10 TDaP, 8/12 zoster, 2020 Shingrix x2, 9/21 BMD stable/hx fosamax rep 2y, 8/18 Cscope rep 10y, 4/22 MMG/me, 5/18 pelvic u/s neg.     Review of Systems   Constitutional:  Negative for chills, diaphoresis and fever.   Respiratory:  Negative for cough and shortness of breath.    Cardiovascular:  Negative for chest pain, palpitations and leg swelling.   Gastrointestinal:  Negative for blood in stool, constipation, diarrhea, nausea and vomiting.   Genitourinary:  Negative for dysuria, frequency and hematuria.   Psychiatric/Behavioral:  The patient is not nervous/anxious.        Objective:   /74 (BP Location: Right arm)   Pulse 60   Temp 97.9 °F (36.6 °C) (Oral)   Ht 5' 8" (1.727 m)   Wt 53.2 kg (117 lb 4.8 oz)   SpO2 98%   BMI 17.84 kg/m²     Physical Exam  Constitutional:       Appearance: She is well-developed.   Neck:      Thyroid: No thyroid mass.      Vascular: No carotid bruit.   Cardiovascular:      Rate and Rhythm: Normal rate and regular rhythm.      Heart sounds: No murmur heard.    No friction rub. No gallop.   Pulmonary:      Effort: Pulmonary effort is normal.      Breath sounds: Normal breath sounds. No wheezing or rales.   Abdominal:      General: Bowel sounds are normal.    "   Palpations: Abdomen is soft. There is no mass.      Tenderness: There is no abdominal tenderness.   Musculoskeletal:      Cervical back: Neck supple.   Lymphadenopathy:      Cervical: No cervical adenopathy.   Neurological:      Mental Status: She is alert and oriented to person, place, and time.         Assessment:       1. Weight loss    2. Acquired hypothyroidism    3. Anxiety    4. Age-related osteoporosis without current pathological fracture    5. Aortic atherosclerosis    6. Other neutropenia    7. Thrombocytopenia    8. Constipation, unspecified constipation type    9. Restless legs syndrome (RLS)          Plan:     Weight loss, stable in 3mo, cont to follow.    Acquired hypothyroidism- Clinically stable, continue present treatment.    Anxiety- double lexapro to 20mg daily, recheck 6wk.    Age-related osteoporosis without current pathological fracture, on drug holiday.    Aortic atherosclerosis- cont exercise.    Other neutropenia, Thrombocytopenia- lab sched and HemeOnc appt next week.    Constipation, unspecified constipation type- check now, cont citracel.  -     X-Ray Abdomen AP 1 View; Future; Expected date: 01/19/2023    Cont clonaz for RLS.

## 2023-01-06 ENCOUNTER — PATIENT MESSAGE (OUTPATIENT)
Dept: FAMILY MEDICINE | Facility: CLINIC | Age: 73
End: 2023-01-06
Payer: COMMERCIAL

## 2023-01-11 ENCOUNTER — TELEPHONE (OUTPATIENT)
Dept: ADMINISTRATIVE | Facility: HOSPITAL | Age: 73
End: 2023-01-11
Payer: COMMERCIAL

## 2023-01-19 ENCOUNTER — OFFICE VISIT (OUTPATIENT)
Dept: PRIMARY CARE CLINIC | Facility: CLINIC | Age: 73
End: 2023-01-19
Payer: MEDICARE

## 2023-01-19 ENCOUNTER — PATIENT MESSAGE (OUTPATIENT)
Dept: PRIMARY CARE CLINIC | Facility: CLINIC | Age: 73
End: 2023-01-19

## 2023-01-19 ENCOUNTER — HOSPITAL ENCOUNTER (OUTPATIENT)
Dept: RADIOLOGY | Facility: HOSPITAL | Age: 73
Discharge: HOME | End: 2023-01-19
Attending: INTERNAL MEDICINE
Payer: MEDICARE

## 2023-01-19 VITALS
SYSTOLIC BLOOD PRESSURE: 112 MMHG | HEART RATE: 60 BPM | DIASTOLIC BLOOD PRESSURE: 74 MMHG | HEIGHT: 68 IN | TEMPERATURE: 98 F | BODY MASS INDEX: 17.78 KG/M2 | OXYGEN SATURATION: 98 % | WEIGHT: 117.31 LBS

## 2023-01-19 DIAGNOSIS — K59.00 CONSTIPATION, UNSPECIFIED CONSTIPATION TYPE: ICD-10-CM

## 2023-01-19 DIAGNOSIS — D70.8 OTHER NEUTROPENIA: ICD-10-CM

## 2023-01-19 DIAGNOSIS — F41.9 ANXIETY: ICD-10-CM

## 2023-01-19 DIAGNOSIS — E03.9 ACQUIRED HYPOTHYROIDISM: ICD-10-CM

## 2023-01-19 DIAGNOSIS — M81.0 AGE-RELATED OSTEOPOROSIS WITHOUT CURRENT PATHOLOGICAL FRACTURE: ICD-10-CM

## 2023-01-19 DIAGNOSIS — D69.6 THROMBOCYTOPENIA: ICD-10-CM

## 2023-01-19 DIAGNOSIS — G25.81 RESTLESS LEGS SYNDROME (RLS): ICD-10-CM

## 2023-01-19 DIAGNOSIS — I70.0 AORTIC ATHEROSCLEROSIS: ICD-10-CM

## 2023-01-19 DIAGNOSIS — R63.4 WEIGHT LOSS: Primary | ICD-10-CM

## 2023-01-19 PROCEDURE — 99214 OFFICE O/P EST MOD 30 MIN: CPT | Mod: S$PBB,,, | Performed by: INTERNAL MEDICINE

## 2023-01-19 PROCEDURE — 99999 PR PBB SHADOW E&M-EST. PATIENT-LVL III: CPT | Mod: PBBFAC,,, | Performed by: INTERNAL MEDICINE

## 2023-01-19 PROCEDURE — 99214 PR OFFICE/OUTPT VISIT, EST, LEVL IV, 30-39 MIN: ICD-10-PCS | Mod: S$PBB,,, | Performed by: INTERNAL MEDICINE

## 2023-01-19 PROCEDURE — 74018 RADEX ABDOMEN 1 VIEW: CPT | Mod: TC,FY,PO

## 2023-01-19 PROCEDURE — 99213 OFFICE O/P EST LOW 20 MIN: CPT | Mod: PBBFAC,PN | Performed by: INTERNAL MEDICINE

## 2023-01-19 PROCEDURE — 74018 XR ABDOMEN AP 1 VIEW: ICD-10-PCS | Mod: 26,,, | Performed by: RADIOLOGY

## 2023-01-19 PROCEDURE — 74018 RADEX ABDOMEN 1 VIEW: CPT | Mod: 26,,, | Performed by: RADIOLOGY

## 2023-01-19 PROCEDURE — 99999 PR PBB SHADOW E&M-EST. PATIENT-LVL III: ICD-10-PCS | Mod: PBBFAC,,, | Performed by: INTERNAL MEDICINE

## 2023-01-19 RX ORDER — ESCITALOPRAM OXALATE 10 MG/1
20 TABLET ORAL DAILY
Qty: 90 TABLET | Refills: 3 | Status: SHIPPED | OUTPATIENT
Start: 2023-01-19 | End: 2023-02-21

## 2023-01-20 ENCOUNTER — PATIENT MESSAGE (OUTPATIENT)
Dept: PRIMARY CARE CLINIC | Facility: CLINIC | Age: 73
End: 2023-01-20
Payer: COMMERCIAL

## 2023-01-20 ENCOUNTER — TELEPHONE (OUTPATIENT)
Dept: ADMINISTRATIVE | Facility: HOSPITAL | Age: 73
End: 2023-01-20
Payer: COMMERCIAL

## 2023-01-23 PROBLEM — Z00.00 WELLNESS EXAMINATION: Status: RESOLVED | Noted: 2022-10-19 | Resolved: 2023-01-23

## 2023-01-24 ENCOUNTER — LAB VISIT (OUTPATIENT)
Dept: LAB | Facility: HOSPITAL | Age: 73
End: 2023-01-24
Attending: NURSE PRACTITIONER
Payer: MEDICARE

## 2023-01-24 DIAGNOSIS — D70.8 OTHER NEUTROPENIA: ICD-10-CM

## 2023-01-24 DIAGNOSIS — D69.6 THROMBOCYTOPENIA: ICD-10-CM

## 2023-01-24 DIAGNOSIS — D72.819 LEUKOPENIA, UNSPECIFIED TYPE: ICD-10-CM

## 2023-01-24 LAB
ALBUMIN SERPL BCP-MCNC: 4.7 G/DL (ref 3.5–5.2)
ALP SERPL-CCNC: 36 U/L (ref 55–135)
ALT SERPL W/O P-5'-P-CCNC: 15 U/L (ref 10–44)
ANION GAP SERPL CALC-SCNC: 13 MMOL/L (ref 8–16)
AST SERPL-CCNC: 23 U/L (ref 10–40)
BASOPHILS # BLD AUTO: 0.04 K/UL (ref 0–0.2)
BASOPHILS NFR BLD: 1.1 % (ref 0–1.9)
BILIRUB SERPL-MCNC: 0.8 MG/DL (ref 0.1–1)
BUN SERPL-MCNC: 10 MG/DL (ref 8–23)
CALCIUM SERPL-MCNC: 9.8 MG/DL (ref 8.7–10.5)
CHLORIDE SERPL-SCNC: 100 MMOL/L (ref 95–110)
CO2 SERPL-SCNC: 26 MMOL/L (ref 23–29)
CREAT SERPL-MCNC: 0.8 MG/DL (ref 0.5–1.4)
DIFFERENTIAL METHOD: ABNORMAL
EOSINOPHIL # BLD AUTO: 0 K/UL (ref 0–0.5)
EOSINOPHIL NFR BLD: 0.6 % (ref 0–8)
ERYTHROCYTE [DISTWIDTH] IN BLOOD BY AUTOMATED COUNT: 12.7 % (ref 11.5–14.5)
EST. GFR  (NO RACE VARIABLE): >60 ML/MIN/1.73 M^2
GLUCOSE SERPL-MCNC: 93 MG/DL (ref 70–110)
HCT VFR BLD AUTO: 41.3 % (ref 37–48.5)
HGB BLD-MCNC: 14.1 G/DL (ref 12–16)
IMM GRANULOCYTES # BLD AUTO: 0.01 K/UL (ref 0–0.04)
IMM GRANULOCYTES NFR BLD AUTO: 0.3 % (ref 0–0.5)
LYMPHOCYTES # BLD AUTO: 1.2 K/UL (ref 1–4.8)
LYMPHOCYTES NFR BLD: 33.4 % (ref 18–48)
MCH RBC QN AUTO: 33.3 PG (ref 27–31)
MCHC RBC AUTO-ENTMCNC: 34.1 G/DL (ref 32–36)
MCV RBC AUTO: 97 FL (ref 82–98)
MONOCYTES # BLD AUTO: 0.4 K/UL (ref 0.3–1)
MONOCYTES NFR BLD: 12.2 % (ref 4–15)
NEUTROPHILS # BLD AUTO: 1.9 K/UL (ref 1.8–7.7)
NEUTROPHILS NFR BLD: 52.4 % (ref 38–73)
NRBC BLD-RTO: 0 /100 WBC
PLATELET # BLD AUTO: 124 K/UL (ref 150–450)
PMV BLD AUTO: 9.2 FL (ref 9.2–12.9)
POTASSIUM SERPL-SCNC: 4.7 MMOL/L (ref 3.5–5.1)
PROT SERPL-MCNC: 7.7 G/DL (ref 6–8.4)
RBC # BLD AUTO: 4.24 M/UL (ref 4–5.4)
SODIUM SERPL-SCNC: 139 MMOL/L (ref 136–145)
WBC # BLD AUTO: 3.62 K/UL (ref 3.9–12.7)

## 2023-01-24 PROCEDURE — 36415 COLL VENOUS BLD VENIPUNCTURE: CPT | Performed by: NURSE PRACTITIONER

## 2023-01-24 PROCEDURE — 86038 ANTINUCLEAR ANTIBODIES: CPT | Performed by: NURSE PRACTITIONER

## 2023-01-24 PROCEDURE — 80053 COMPREHEN METABOLIC PANEL: CPT | Performed by: NURSE PRACTITIONER

## 2023-01-24 PROCEDURE — 85025 COMPLETE CBC W/AUTO DIFF WBC: CPT | Performed by: NURSE PRACTITIONER

## 2023-01-25 LAB — ANA SER QL IF: NORMAL

## 2023-01-30 ENCOUNTER — PATIENT MESSAGE (OUTPATIENT)
Dept: PRIMARY CARE CLINIC | Facility: CLINIC | Age: 73
End: 2023-01-30
Payer: COMMERCIAL

## 2023-02-06 ENCOUNTER — OFFICE VISIT (OUTPATIENT)
Dept: HEMATOLOGY/ONCOLOGY | Facility: CLINIC | Age: 73
End: 2023-02-06
Payer: MEDICARE

## 2023-02-06 DIAGNOSIS — D72.819 LEUKOPENIA, UNSPECIFIED TYPE: ICD-10-CM

## 2023-02-06 DIAGNOSIS — D69.6 THROMBOCYTOPENIA: Primary | ICD-10-CM

## 2023-02-06 PROCEDURE — 99214 PR OFFICE/OUTPT VISIT, EST, LEVL IV, 30-39 MIN: ICD-10-PCS | Mod: 95,,, | Performed by: NURSE PRACTITIONER

## 2023-02-06 PROCEDURE — 99214 OFFICE O/P EST MOD 30 MIN: CPT | Mod: 95,,, | Performed by: NURSE PRACTITIONER

## 2023-02-06 NOTE — PROGRESS NOTES
The patient location is: home  The chief complaint leading to consultation is: soreness    Visit type: audiovisual    Face to Face time with patient: 15  30 minutes of total time spent on the encounter, which includes face to face time and non-face to face time preparing to see the patient (eg, review of tests), Obtaining and/or reviewing separately obtained history, Documenting clinical information in the electronic or other health record, Independently interpreting results (not separately reported) and communicating results to the patient/family/caregiver, or Care coordination (not separately reported).     Each patient to whom he or she provides medical services by telemedicine is:  (1) informed of the relationship between the physician and patient and the respective role of any other health care provider with respect to management of the patient; and (2) notified that he or she may decline to receive medical services by telemedicine and may withdraw from such care at any time.    Patient ID: Maggie Díaz is a 72 y.o. female.    Chief Complaint: soreness from fall    HPI:  Patient is a 72 year old female who presents today for further evaluation and management of thrombocytopenia.  Thrombocytopenia has been off/on since 2018 ranging from 124-149K.     Other diagnosis include RLS, anxiety, aortic atherosclerosis, vitamin D deficiency, hypothyroidism, history of Hep C, osteoporosis without fracture, and allergic rhinitis. History of Hep C - treated and cleared per patient.        Former cigarette smoker- quit in 1979.  Smoked for 41 years- smoked 1/2 pack per day  8/2019 Low dose CT chest negative for malignancy  04/2022 mammogram - negative for malignancy  8/2018 colonoscopy - negative with recs to repeat in 10 years  4/2019 PAP negative for malignancy     Alcohol use socially on occasion  Smoke marijuana on weekends - edibles     Has lost 9 lbs unintentionally in the past 6 months due to digestive issues.   Diagnosed with IBS with constipation.  States that she has gotten her appetite back.      2022 CT abdomen pelvis without contrast Impression:No acute abnormality in the abdomen or pelvis     Fm hx of cancer - states that she is adopted      Interval History:     2022 Present today for continued monitoring of thrombocytopenia - Labs from  2022 platelets 135K stable.  On of stable neutropenia for many years.     2023 States that she fell last Friday walking her dog.  Has stitches to her chin and soreness around rib area.  Was evaluated in the ED and found with no fractures or breaks.  Labs continue to remain stable.       Social History     Socioeconomic History    Marital status:    Occupational History     Employer: [employer]   Tobacco Use    Smoking status: Former     Packs/day: 0.50     Years: 10.00     Pack years: 5.00     Types: Cigarettes     Quit date: 3/3/1979     Years since quittin.9    Smokeless tobacco: Never   Substance and Sexual Activity    Alcohol use: Not Currently     Alcohol/week: 0.0 standard drinks     Comment: occasionally    Drug use: No    Sexual activity: Yes     Partners: Male   Social History Narrative    Live with      Social Determinants of Health     Financial Resource Strain: Low Risk     Difficulty of Paying Living Expenses: Not very hard   Food Insecurity: No Food Insecurity    Worried About Running Out of Food in the Last Year: Never true    Ran Out of Food in the Last Year: Never true   Transportation Needs: No Transportation Needs    Lack of Transportation (Medical): No    Lack of Transportation (Non-Medical): No   Physical Activity: Unknown    Days of Exercise per Week: Patient refused    Minutes of Exercise per Session: 50 min   Stress: Unknown    Feeling of Stress : Patient refused   Social Connections: Unknown    Frequency of Communication with Friends and Family: More than three times a week    Frequency of Social Gatherings with  Friends and Family: Twice a week    Active Member of Clubs or Organizations: No    Attends Club or Organization Meetings: Never    Marital Status:    Housing Stability: Low Risk     Unable to Pay for Housing in the Last Year: No    Number of Places Lived in the Last Year: 1    Unstable Housing in the Last Year: No       Family History   Adopted: Yes   Family history unknown: Yes       Past Surgical History:   Procedure Laterality Date     SECTION, CLASSIC  1982    COLONOSCOPY N/A 2018    Procedure: COLONOSCOPY;  Surgeon: Jose Royal MD;  Location: [facility];  Service: Endoscopy;  Laterality: N/A;    WRIST FRACTURE SURGERY      , Dr. Bunch.       Past Medical History:   Diagnosis Date    Anxiety     Benzodiazepine dependence 2020    Hematuria, microscopic     negative CT    Hepatitis C     slow progression/ Dr. Otoole    Hypertension     Migraines     OP (osteoporosis)     on drug holiday.    Osteoarthritis     Radius fracture     Radius fracture     Restless legs syndrome (RLS)     Thrombocytopenia 2019    Vitamin D deficiency disease        Review of Systems   Constitutional:  Negative for appetite change and unexpected weight change.   HENT:  Negative for mouth sores and nosebleeds.    Respiratory:  Negative for cough and shortness of breath.    Cardiovascular:  Negative for chest pain.   Gastrointestinal:  Negative for anal bleeding, blood in stool and diarrhea.        Soreness to ribs   Genitourinary:  Negative for menstrual problem and vaginal bleeding.   Musculoskeletal: Negative.    Skin:  Positive for wound (to left side chin area with stitches and bruising). Negative for rash.   Neurological:  Negative for headaches.   Hematological:  Negative for adenopathy.   Psychiatric/Behavioral:  The patient is not nervous/anxious.         Medication List with Changes/Refills   Current Medications    CHOLECALCIFEROL, VITAMIN D3, (VITAMIN D3) 50 MCG (2,000 UNIT) CAP    Take 1 capsule  by mouth once daily.    CLONAZEPAM (KLONOPIN) 0.5 MG TABLET    TAKE 1 TO 2 TABLET BY MOUTH EVERY EVENING AS NEEDED    ESCITALOPRAM OXALATE (LEXAPRO) 10 MG TABLET    Take 2 tablets (20 mg total) by mouth once daily.    FLUTICASONE PROPIONATE (FLONASE) 50 MCG/ACTUATION NASAL SPRAY    2 sprays (100 mcg total) by Each Nostril route once daily.    LEVOTHYROXINE (SYNTHROID) 50 MCG TABLET    TAKE 1 TABLET BY MOUTH EVERY DAY        Objective:   There were no vitals filed for this visit.    Physical Exam  Constitutional:       Appearance: Normal appearance.   Pulmonary:      Effort: Pulmonary effort is normal.   Neurological:      Mental Status: She is alert and oriented to person, place, and time.   Psychiatric:         Mood and Affect: Mood normal.         Behavior: Behavior normal.         Thought Content: Thought content normal.         Judgment: Judgment normal.       Assessment:     Problem List Items Addressed This Visit          Hematology    Thrombocytopenia - Primary    Relevant Orders    Comprehensive Metabolic Panel    CBC Auto Differential     Other Visit Diagnoses       Leukopenia, unspecified type        Relevant Orders    Comprehensive Metabolic Panel    CBC Auto Differential            Lab Results   Component Value Date    WBC 3.62 (L) 01/24/2023    RBC 4.24 01/24/2023    HGB 14.1 01/24/2023    HCT 41.3 01/24/2023    MCV 97 01/24/2023    MCH 33.3 (H) 01/24/2023    MCHC 34.1 01/24/2023    RDW 12.7 01/24/2023     (L) 01/24/2023    MPV 9.2 01/24/2023    GRAN 1.9 01/24/2023    GRAN 52.4 01/24/2023    LYMPH 1.2 01/24/2023    LYMPH 33.4 01/24/2023    MONO 0.4 01/24/2023    MONO 12.2 01/24/2023    EOS 0.0 01/24/2023    BASO 0.04 01/24/2023    EOSINOPHIL 0.6 01/24/2023    BASOPHIL 1.1 01/24/2023      Lab Results   Component Value Date     01/24/2023    K 4.7 01/24/2023     01/24/2023    CO2 26 01/24/2023    BUN 10 01/24/2023    CREATININE 0.8 01/24/2023    CALCIUM 9.8 01/24/2023    ANIONGAP 13  01/24/2023    ESTGFRAFRICA >60 05/19/2022    EGFRNONAA >60 05/19/2022     Lab Results   Component Value Date    ALT 15 01/24/2023    AST 23 01/24/2023    GGT 10 04/18/2016    ALKPHOS 36 (L) 01/24/2023    BILITOT 0.8 01/24/2023     Med Onc Chart Routing      Follow up with physician    Follow up with KODI . F/u in 4 months with labs prior at the [facility] [place] - cbc, cmp, VV after to discuss results   Infusion scheduling note n/a   Injection scheduling note n/a   Labs   Lab interval:  See above   Imaging   N/a   Pharmacy appointment No pharmacy appointment needed      Other referrals No additional referrals needed          Plan:   Thrombocytopenia  -     Comprehensive Metabolic Panel; Future; Expected date: 02/06/2023  -     CBC Auto Differential; Future; Expected date: 02/06/2023    Leukopenia, unspecified type  -     Comprehensive Metabolic Panel; Future; Expected date: 02/06/2023  -     CBC Auto Differential; Future; Expected date: 02/06/2023        Labs continue to remain stable.  If Hgb drops <10 g/dL, platelets drop <100 K, or ANC drop <1.0 will proceed with evaluation with bone marrow.  For now we will continue to monitor due to the long term stability of her current blood counts every 4 months    Collaborating Provider:  Dr. Saw Beecrra    Thank You,  Vikci Minaya, FNP-C  Hematology Oncology

## 2023-02-20 ENCOUNTER — PATIENT MESSAGE (OUTPATIENT)
Dept: PRIMARY CARE CLINIC | Facility: CLINIC | Age: 73
End: 2023-02-20
Payer: MEDICARE

## 2023-02-20 NOTE — PROGRESS NOTES
"Subjective:      Patient ID: Maggie Díaz is a 72 y.o. female.    Chief Complaint: GI Problem (Pt has been having stomach pains in the mornings. Pt is scared to eat at night because of the pains she's having in the mornings. Pt is still eating well but still not feeling well. Pt says she feels like she wants to cry all the time, did say her Lexapro was increased. Pt did have a fall on 2/3 and had stitches in her chin.)      HPI  Here for follow up of medical problems.  Prior 18# weight loss, stable in last 4 months.  Feels very depressed, despite increase in lexapro last month.  Thinks anxiety is making her stomach ache.  BMs normal, on citrucel daily.  Says appetite is good.  No cp/sob/palp.    Not walking dog anymore, since fall a couple weeks ago.    Updated/ annual due 4/23:  HM: 10/22 fluvax, 1/21 covid vaccines/booster, 8/19 HAV, 3/15 [id number], 2/14 [id number], 7/18 Td, 1/10 TDaP, 8/12 zoster, 2020 Shingrix x2, 9/21 BMD stable/hx fosamax rep 2y, 8/18 Cscope rep 10y, 4/22 MMG/me, 5/18 pelvic u/s neg.     Review of Systems   Constitutional:  Negative for chills, diaphoresis and fever.   Respiratory:  Negative for cough and shortness of breath.    Cardiovascular:  Negative for chest pain, palpitations and leg swelling.   Gastrointestinal:  Negative for blood in stool, constipation, diarrhea, nausea and vomiting.   Genitourinary:  Negative for dysuria, frequency and hematuria.   Psychiatric/Behavioral:  The patient is not nervous/anxious.        Objective:   /72 (BP Location: Left arm)   Pulse 61   Temp 97.9 °F (36.6 °C) (Oral)   Ht 5' 8" (1.727 m)   Wt 52.9 kg (116 lb 9.6 oz)   SpO2 99%   BMI 17.73 kg/m²     Physical Exam  Constitutional:       Appearance: She is well-developed.   Neck:      Thyroid: No thyroid mass.      Vascular: No carotid bruit.   Cardiovascular:      Rate and Rhythm: Normal rate and regular rhythm.      Heart sounds: No murmur heard.    No friction rub. No gallop.   Pulmonary: "      Effort: Pulmonary effort is normal.      Breath sounds: Normal breath sounds. No wheezing or rales.   Abdominal:      General: Bowel sounds are normal.      Palpations: Abdomen is soft. There is no mass.      Tenderness: There is no abdominal tenderness.   Musculoskeletal:      Cervical back: Neck supple.   Lymphadenopathy:      Cervical: No cervical adenopathy.   Neurological:      Mental Status: She is alert and oriented to person, place, and time.         Assessment:       1. Recurrent major depressive disorder, in partial remission    2. Anxiety    3. Constipation, unspecified constipation type          Plan:     Recurrent major depressive disorder, in partial remission- stop lexapro, start effexor, RTC 1mo.  -     venlafaxine (EFFEXOR-XR) 75 MG 24 hr capsule; Take 1 capsule (75 mg total) by mouth once daily.  Dispense: 30 capsule; Refill: 11    Anxiety  -     venlafaxine (EFFEXOR-XR) 75 MG 24 hr capsule; Take 1 capsule (75 mg total) by mouth once daily.  Dispense: 30 capsule; Refill: 11    Constipation, unspecified constipation type- cont citrucel.    RLS, cont clonazepam at hs.

## 2023-02-21 ENCOUNTER — OFFICE VISIT (OUTPATIENT)
Dept: PRIMARY CARE CLINIC | Facility: CLINIC | Age: 73
End: 2023-02-21
Payer: MEDICARE

## 2023-02-21 VITALS
HEIGHT: 68 IN | SYSTOLIC BLOOD PRESSURE: 122 MMHG | BODY MASS INDEX: 17.68 KG/M2 | OXYGEN SATURATION: 99 % | WEIGHT: 116.63 LBS | TEMPERATURE: 98 F | HEART RATE: 61 BPM | DIASTOLIC BLOOD PRESSURE: 72 MMHG

## 2023-02-21 DIAGNOSIS — G25.81 RESTLESS LEGS SYNDROME (RLS): ICD-10-CM

## 2023-02-21 DIAGNOSIS — F41.9 ANXIETY: ICD-10-CM

## 2023-02-21 DIAGNOSIS — K59.00 CONSTIPATION, UNSPECIFIED CONSTIPATION TYPE: ICD-10-CM

## 2023-02-21 DIAGNOSIS — F33.41 RECURRENT MAJOR DEPRESSIVE DISORDER, IN PARTIAL REMISSION: Primary | ICD-10-CM

## 2023-02-21 PROCEDURE — 99999 PR PBB SHADOW E&M-EST. PATIENT-LVL III: CPT | Mod: PBBFAC,,, | Performed by: FAMILY MEDICINE

## 2023-02-21 PROCEDURE — 99214 OFFICE O/P EST MOD 30 MIN: CPT | Mod: S$PBB,,, | Performed by: FAMILY MEDICINE

## 2023-02-21 PROCEDURE — 99214 PR OFFICE/OUTPT VISIT, EST, LEVL IV, 30-39 MIN: ICD-10-PCS | Mod: S$PBB,,, | Performed by: FAMILY MEDICINE

## 2023-02-21 PROCEDURE — 99051 PR MEDICAL SERVICES, EVE/WKEND/HOLIDAY: ICD-10-PCS | Mod: ,,, | Performed by: INTERNAL MEDICINE

## 2023-02-21 PROCEDURE — 99051 MED SERV EVE/WKEND/HOLIDAY: CPT | Mod: ,,, | Performed by: INTERNAL MEDICINE

## 2023-02-21 PROCEDURE — 99999 PR PBB SHADOW E&M-EST. PATIENT-LVL III: ICD-10-PCS | Mod: PBBFAC,,, | Performed by: FAMILY MEDICINE

## 2023-02-21 PROCEDURE — 99213 OFFICE O/P EST LOW 20 MIN: CPT | Mod: PBBFAC,PN | Performed by: FAMILY MEDICINE

## 2023-02-21 RX ORDER — VENLAFAXINE HYDROCHLORIDE 75 MG/1
75 CAPSULE, EXTENDED RELEASE ORAL DAILY
Qty: 30 CAPSULE | Refills: 11 | Status: SHIPPED | OUTPATIENT
Start: 2023-02-21 | End: 2023-03-02

## 2023-03-01 ENCOUNTER — PATIENT MESSAGE (OUTPATIENT)
Dept: PRIMARY CARE CLINIC | Facility: CLINIC | Age: 73
End: 2023-03-01
Payer: MEDICARE

## 2023-03-01 DIAGNOSIS — F41.9 ANXIETY: ICD-10-CM

## 2023-03-02 ENCOUNTER — PATIENT MESSAGE (OUTPATIENT)
Dept: PRIMARY CARE CLINIC | Facility: CLINIC | Age: 73
End: 2023-03-02
Payer: MEDICARE

## 2023-03-02 RX ORDER — ESCITALOPRAM OXALATE 10 MG/1
20 TABLET ORAL DAILY
Qty: 90 TABLET | Refills: 3
Start: 2023-03-02 | End: 2023-05-24 | Stop reason: SDUPTHER

## 2023-03-08 ENCOUNTER — OFFICE VISIT (OUTPATIENT)
Dept: OPHTHALMOLOGY | Facility: CLINIC | Age: 73
End: 2023-03-08
Payer: MEDICARE

## 2023-03-08 DIAGNOSIS — H52.203 MYOPIA WITH ASTIGMATISM AND PRESBYOPIA, BILATERAL: ICD-10-CM

## 2023-03-08 DIAGNOSIS — H25.813 COMBINED FORM OF AGE-RELATED CATARACT, BOTH EYES: Primary | ICD-10-CM

## 2023-03-08 DIAGNOSIS — H52.4 MYOPIA WITH ASTIGMATISM AND PRESBYOPIA, BILATERAL: ICD-10-CM

## 2023-03-08 DIAGNOSIS — H52.13 MYOPIA WITH ASTIGMATISM AND PRESBYOPIA, BILATERAL: ICD-10-CM

## 2023-03-08 PROCEDURE — 92015 DETERMINE REFRACTIVE STATE: CPT | Mod: ,,, | Performed by: OPTOMETRIST

## 2023-03-08 PROCEDURE — 92004 COMPRE OPH EXAM NEW PT 1/>: CPT | Mod: S$PBB,,, | Performed by: OPTOMETRIST

## 2023-03-08 PROCEDURE — 99999 PR PBB SHADOW E&M-EST. PATIENT-LVL III: ICD-10-PCS | Mod: PBBFAC,,, | Performed by: OPTOMETRIST

## 2023-03-08 PROCEDURE — 99999 PR PBB SHADOW E&M-EST. PATIENT-LVL III: CPT | Mod: PBBFAC,,, | Performed by: OPTOMETRIST

## 2023-03-08 PROCEDURE — 92004 PR EYE EXAM, NEW PATIENT,COMPREHESV: ICD-10-PCS | Mod: S$PBB,,, | Performed by: OPTOMETRIST

## 2023-03-08 PROCEDURE — 92015 PR REFRACTION: ICD-10-PCS | Mod: ,,, | Performed by: OPTOMETRIST

## 2023-03-08 PROCEDURE — 99213 OFFICE O/P EST LOW 20 MIN: CPT | Mod: PBBFAC | Performed by: OPTOMETRIST

## 2023-03-08 NOTE — PROGRESS NOTES
HPI     Annual Exam            Comments: Annual Exam. VA is in right eye seems more blurrier. No pain or   irritation Not on any gtts. Needing new glasses Rx if needed.  Vision changes since last eye exam?: right eye blurrier    Any eye pain today: no    Other ocular symptoms: no    Interested in contact lens fitting today? no                    Last edited by Gabriel Mendoza on 3/8/2023  2:28 PM.            Assessment /Plan     For exam results, see Encounter Report.    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  Patient is at the option stage.   Cataract surgery will improve vision, but necessity is dependent on patient's symptoms.   Pt declines surgical consult at this time.  Will continue to monitor over the next 12 months. Pt to call or RTC with any significant change in vision prior to next visit.     Myopia with astigmatism and presbyopia, bilateral  Eyeglass Final Rx       Eyeglass Final Rx         Sphere Cylinder Axis Add    Right -1.75 +1.00 179 +2.50    Left -0.25 +1.50 013 +2.50      Type: PAL    Expiration Date: 3/8/2024                     RTC 1 yr for dilated eye exam or sooner if any changes to vision.   Discussed above and answered questions.

## 2023-03-16 RX ORDER — LEVOTHYROXINE SODIUM 50 UG/1
TABLET ORAL
Qty: 90 TABLET | Refills: 2 | Status: SHIPPED | OUTPATIENT
Start: 2023-03-16 | End: 2023-12-07

## 2023-03-16 NOTE — TELEPHONE ENCOUNTER
Refill Decision Note   Maggie Díaz  is requesting a refill authorization.  Brief Assessment and Rationale for Refill:  Approve     Medication Therapy Plan:       Medication Reconciliation Completed: No   Comments:     No Care Gaps recommended.     Note composed:11:44 AM 03/16/2023

## 2023-03-16 NOTE — TELEPHONE ENCOUNTER
No new care gaps identified.  [facility] Embedded Care Gaps. Reference number: [id number]. 3/16/2023   4:13:17 AM CDT

## 2023-03-22 ENCOUNTER — OFFICE VISIT (OUTPATIENT)
Dept: PRIMARY CARE CLINIC | Facility: CLINIC | Age: 73
End: 2023-03-22
Payer: MEDICARE

## 2023-03-22 VITALS
SYSTOLIC BLOOD PRESSURE: 142 MMHG | TEMPERATURE: 98 F | HEART RATE: 70 BPM | OXYGEN SATURATION: 98 % | BODY MASS INDEX: 16.57 KG/M2 | DIASTOLIC BLOOD PRESSURE: 65 MMHG | WEIGHT: 109 LBS

## 2023-03-22 DIAGNOSIS — E03.9 ACQUIRED HYPOTHYROIDISM: ICD-10-CM

## 2023-03-22 DIAGNOSIS — F33.41 RECURRENT MAJOR DEPRESSIVE DISORDER, IN PARTIAL REMISSION: ICD-10-CM

## 2023-03-22 DIAGNOSIS — J30.1 SEASONAL ALLERGIC RHINITIS DUE TO POLLEN: ICD-10-CM

## 2023-03-22 DIAGNOSIS — D70.8 OTHER NEUTROPENIA: ICD-10-CM

## 2023-03-22 DIAGNOSIS — E55.9 VITAMIN D DEFICIENCY DISEASE: ICD-10-CM

## 2023-03-22 DIAGNOSIS — R63.4 UNINTENDED WEIGHT LOSS: ICD-10-CM

## 2023-03-22 DIAGNOSIS — R63.6 UNDERWEIGHT: ICD-10-CM

## 2023-03-22 DIAGNOSIS — Z12.31 ENCOUNTER FOR SCREENING MAMMOGRAM FOR MALIGNANT NEOPLASM OF BREAST: Primary | ICD-10-CM

## 2023-03-22 DIAGNOSIS — I70.0 AORTIC ATHEROSCLEROSIS: ICD-10-CM

## 2023-03-22 PROCEDURE — 99214 OFFICE O/P EST MOD 30 MIN: CPT | Mod: PBBFAC,PN | Performed by: FAMILY MEDICINE

## 2023-03-22 PROCEDURE — 99999 PR PBB SHADOW E&M-EST. PATIENT-LVL IV: ICD-10-PCS | Mod: PBBFAC,,, | Performed by: FAMILY MEDICINE

## 2023-03-22 PROCEDURE — 99214 OFFICE O/P EST MOD 30 MIN: CPT | Mod: S$PBB,,, | Performed by: FAMILY MEDICINE

## 2023-03-22 PROCEDURE — 99999 PR PBB SHADOW E&M-EST. PATIENT-LVL IV: CPT | Mod: PBBFAC,,, | Performed by: FAMILY MEDICINE

## 2023-03-22 PROCEDURE — 99214 PR OFFICE/OUTPT VISIT, EST, LEVL IV, 30-39 MIN: ICD-10-PCS | Mod: S$PBB,,, | Performed by: FAMILY MEDICINE

## 2023-03-22 RX ORDER — MIRTAZAPINE 7.5 MG/1
15 TABLET, FILM COATED ORAL NIGHTLY
Qty: 180 TABLET | Refills: 3 | Status: SHIPPED | OUTPATIENT
Start: 2023-03-22 | End: 2023-05-04

## 2023-03-22 RX ORDER — FLUTICASONE PROPIONATE 50 MCG
2 SPRAY, SUSPENSION (ML) NASAL DAILY
Qty: 48 G | Refills: 3 | Status: SHIPPED | OUTPATIENT
Start: 2023-03-22 | End: 2024-02-29

## 2023-03-22 RX ORDER — ATORVASTATIN CALCIUM 40 MG/1
40 TABLET, FILM COATED ORAL DAILY
Qty: 90 TABLET | Refills: 3 | Status: SHIPPED | OUTPATIENT
Start: 2023-03-22 | End: 2024-03-13

## 2023-03-22 NOTE — PROGRESS NOTES
"Subjective:       Patient ID: Maggie Díaz is a 72 y.o. female.    Chief Complaint: Depression f/u    HPI:   Here for follow up of medical problems.  Had a flare up of irritable bowel syndrome and depressed feelings discussed at last visit. Still with unintentional weight loss; does admit to dietary restriction over concerns provoking GI symptoms. Eats oatmeal for breakfast, eggs and vegetables for lunch, and a "normal dinner" (last night had fish tacos). At her last visit, she was started on effexor. Felt sad and shaky while on Effexor and we decided to switch back to Lexapro over messaging. Has had a 7# weight loss since last visit 1 month ago. Only change to her diet has been eating less fruit.  Feels very depressed, despite increase in lexapro last month.  Thinks anxiety is making her stomach ache.  BMs normal, on citrucel daily. Sleeps well at night 10:30 pm -8:30 am and wakes feeling well-rested. UTD on cancer screenings; follows with hematology for surveillance of thrombocytopenia.   Says appetite is good.  No cp/sob/palp.    Not walking dog anymore, since fall. Has walked some on her own and is active with yard work. No new falls    Updated/ annual due 4/23:  HM: 10/22 fluvax, 1/21 covid vaccines/booster, 8/19 HAV, 3/15 [id number], 2/14 [id number], 7/18 Td, 1/10 TDaP, 8/12 zoster, 2020 Shingrix x2, 9/21 BMD stable/hx fosamax rep 2y, 8/18 Cscope rep 10y, 4/22 MMG/me, 5/18 pelvic u/s neg.     The 10-year ASCVD risk score (Augie JAEGER, et al., 2019) is: 17.5%    Values used to calculate the score:      Age: 72 years      Sex: Female      Is Non- : No      Diabetic: No      Tobacco smoker: No      Systolic Blood Pressure: 142 mmHg      Is BP treated: No      HDL Cholesterol: 78 mg/dL      Total Cholesterol: 220 mg/dL      Objective:     Vitals:    03/22/23 1103   BP: (!) 142/65   Pulse: 70   Temp: 98.2 °F (36.8 °C)     Wt Readings from Last 3 Encounters:   03/22/23 49.4 kg (109 lb) " Gastroenterology   02/21/23 52.9 kg (116 lb 9.6 oz)   01/19/23 53.2 kg (117 lb 4.8 oz)     Temp Readings from Last 3 Encounters:   03/22/23 98.2 °F (36.8 °C) (Oral)   02/21/23 97.9 °F (36.6 °C) (Oral)   01/19/23 97.9 °F (36.6 °C) (Oral)     BP Readings from Last 3 Encounters:   03/22/23 (!) 142/65   02/21/23 122/72   01/19/23 112/74     Pulse Readings from Last 3 Encounters:   03/22/23 70   02/21/23 61   01/19/23 60          Physical Exam  Vitals and nursing note reviewed.   Constitutional:       Appearance: She is well-developed.      Comments: thin   HENT:      Head: Normocephalic and atraumatic.   Eyes:      Pupils: Pupils are equal, round, and reactive to light.   Cardiovascular:      Rate and Rhythm: Normal rate and regular rhythm.   Pulmonary:      Effort: Pulmonary effort is normal.      Breath sounds: Normal breath sounds.   Musculoskeletal:         General: No deformity. Normal range of motion.      Cervical back: Normal range of motion and neck supple.   Skin:     General: Skin is warm and dry.   Neurological:      Mental Status: She is alert and oriented to person, place, and time.   Psychiatric:         Mood and Affect: Mood is anxious.         Speech: Speech is rapid and pressured.         Behavior: Behavior is not withdrawn or combative.           Assessment:       1. Encounter for screening mammogram for malignant neoplasm of breast    2. Recurrent major depressive disorder, in partial remission    3. Seasonal allergic rhinitis due to pollen    4. Aortic atherosclerosis    5. Acquired hypothyroidism    6. Other neutropenia    7. Vitamin D deficiency disease    8. Unintended weight loss    9. Underweight        Plan:           Problem List Items Addressed This Visit          Psychiatric    Recurrent major depressive disorder, in partial remission    Relevant Medications    mirtazapine (REMERON) 7.5 MG Tab       ENT    Allergic rhinitis    Relevant Medications    fluticasone propionate (FLONASE) 50 mcg/actuation nasal  spray       Cardiac/Vascular    Aortic atherosclerosis    Overview     4/15/19 CT Renal Stone Study- There is aortoiliac atherosclerosis.            Relevant Medications    atorvastatin (LIPITOR) 40 MG tablet    Other Relevant Orders    Lipid Panel       Oncology    Other neutropenia    Current Assessment & Plan     Lab Results   Component Value Date    WBC 3.62 (L) 01/24/2023    HGB 14.1 01/24/2023    HCT 41.3 01/24/2023    MCV 97 01/24/2023     (L) 01/24/2023                 Endocrine    Vitamin D deficiency disease    Relevant Orders    Vitamin D    Acquired hypothyroidism    Relevant Orders    TSH    T4, Free    Underweight    Overview     BMI 16.57          Other Visit Diagnoses       Encounter for screening mammogram for malignant neoplasm of breast    -  Primary    Relevant Orders    Mammo Digital Screening Bilat w/ Yonatan    Unintended weight loss        Relevant Orders    Sedimentation rate          Schedule RN visit to check weight and BP in 4-6 weeks   2 month f/u with MD with repeat labs   Added mirtazapine for appetite stimulation and MDD. Labs in 3 months with f/u.   Lipitor added for known aortic atherosclerosis on CT

## 2023-03-22 NOTE — ASSESSMENT & PLAN NOTE
Lab Results   Component Value Date    WBC 3.62 (L) 01/24/2023    HGB 14.1 01/24/2023    HCT 41.3 01/24/2023    MCV 97 01/24/2023     (L) 01/24/2023

## 2023-03-23 ENCOUNTER — PATIENT MESSAGE (OUTPATIENT)
Dept: PRIMARY CARE CLINIC | Facility: CLINIC | Age: 73
End: 2023-03-23
Payer: MEDICARE

## 2023-04-26 ENCOUNTER — CLINICAL SUPPORT (OUTPATIENT)
Dept: PRIMARY CARE CLINIC | Facility: CLINIC | Age: 73
End: 2023-04-26
Payer: MEDICARE

## 2023-04-26 VITALS
BODY MASS INDEX: 17.96 KG/M2 | WEIGHT: 118.5 LBS | SYSTOLIC BLOOD PRESSURE: 136 MMHG | DIASTOLIC BLOOD PRESSURE: 70 MMHG | HEIGHT: 68 IN

## 2023-04-26 PROCEDURE — 99999 PR PBB SHADOW E&M-EST. PATIENT-LVL II: CPT | Mod: PBBFAC,,,

## 2023-04-26 PROCEDURE — 99999 PR PBB SHADOW E&M-EST. PATIENT-LVL II: ICD-10-PCS | Mod: PBBFAC,,,

## 2023-04-26 PROCEDURE — 99212 OFFICE O/P EST SF 10 MIN: CPT | Mod: PBBFAC,PN

## 2023-05-02 ENCOUNTER — OFFICE VISIT (OUTPATIENT)
Dept: GASTROENTEROLOGY | Facility: CLINIC | Age: 73
End: 2023-05-02
Payer: MEDICARE

## 2023-05-02 VITALS
SYSTOLIC BLOOD PRESSURE: 120 MMHG | WEIGHT: 118 LBS | BODY MASS INDEX: 17.88 KG/M2 | HEIGHT: 68 IN | DIASTOLIC BLOOD PRESSURE: 70 MMHG | HEART RATE: 83 BPM | OXYGEN SATURATION: 97 %

## 2023-05-02 DIAGNOSIS — K58.1 IRRITABLE BOWEL SYNDROME WITH CONSTIPATION: Primary | ICD-10-CM

## 2023-05-02 DIAGNOSIS — R10.9 CHRONIC ABDOMINAL PAIN: ICD-10-CM

## 2023-05-02 DIAGNOSIS — G89.29 CHRONIC ABDOMINAL PAIN: ICD-10-CM

## 2023-05-02 PROCEDURE — 99999 PR PBB SHADOW E&M-EST. PATIENT-LVL IV: CPT | Mod: PBBFAC,,, | Performed by: INTERNAL MEDICINE

## 2023-05-02 PROCEDURE — 99214 OFFICE O/P EST MOD 30 MIN: CPT | Mod: S$PBB,,, | Performed by: INTERNAL MEDICINE

## 2023-05-02 PROCEDURE — 99214 PR OFFICE/OUTPT VISIT, EST, LEVL IV, 30-39 MIN: ICD-10-PCS | Mod: S$PBB,,, | Performed by: INTERNAL MEDICINE

## 2023-05-02 PROCEDURE — 99214 OFFICE O/P EST MOD 30 MIN: CPT | Mod: PBBFAC,PN | Performed by: INTERNAL MEDICINE

## 2023-05-02 PROCEDURE — 99999 PR PBB SHADOW E&M-EST. PATIENT-LVL IV: ICD-10-PCS | Mod: PBBFAC,,, | Performed by: INTERNAL MEDICINE

## 2023-05-02 NOTE — PROGRESS NOTES
[facility]  Gastroenterology    PCP: Adeola Jones MD    23    HPI       Abdominal Pain     Additional comments: Pt present today with lower abdomen pain with excessive gas. Dx with IBS in 2018           Last edited by Toni Milligan, MA on 2023  1:48 PM.        Reason for Visit: F/u Abdominal Pain, Chronic Constipation    Subjective:   Maggie Díaz is a 72 y.o. female here for f/u abdominal pain and chronic constipation felt to be due to IBS-C. Previously seen by Yesenia ANDREWS PA but this is her first visit with me. CT imaging normal in 2022. Last colonoscopy in 2018 normal aside from diverticulosis and hemorrhoids with 10 year recall recommended. States she take Citrucel BID but has a lot of gas in the morning between 3-6 am but once she drinks coffee or moves around and has a bowel movement it goes away but it does become uncomfortable. She had a KUB in 2023 that showed a lot of stool in lower colon/pelvis. Has tried Linzess and Amitiza in the past which were too strong. Mirlax BID gave watery stools.       Past Medical History:   Diagnosis Date    Anxiety     Benzodiazepine dependence 2020    Hematuria, microscopic     negative CT    Hepatitis C     slow progression/ Dr. Otoole    Hypertension     Migraines     OP (osteoporosis)     on drug holiday.    Osteoarthritis     Radius fracture     Radius fracture     Restless legs syndrome (RLS)     Thrombocytopenia 2019    Vitamin D deficiency disease        Past Surgical History:   Procedure Laterality Date     SECTION, CLASSIC      COLONOSCOPY N/A 2018    Procedure: COLONOSCOPY;  Surgeon: Jose Royal MD;  Location: [facility];  Service: Endoscopy;  Laterality: N/A;    WRIST FRACTURE SURGERY      , Dr. Bunch.       Current Outpatient Medications on File Prior to Visit   Medication Sig Dispense Refill    atorvastatin (LIPITOR) 40 MG tablet Take 1 tablet (40 mg total) by mouth once daily. 90  tablet 3    cholecalciferol, vitamin D3, (VITAMIN D3) 50 mcg (2,000 unit) Cap Take 1 capsule by mouth once daily.      clonazePAM (KLONOPIN) 0.5 MG tablet TAKE 1 TO 2 TABLET BY MOUTH EVERY EVENING AS NEEDED 60 tablet 5    EScitalopram oxalate (LEXAPRO) 10 MG tablet Take 2 tablets (20 mg total) by mouth once daily. 90 tablet 3    fluticasone propionate (FLONASE) 50 mcg/actuation nasal spray 2 sprays (100 mcg total) by Each Nostril route once daily. 48 g 3    levothyroxine (SYNTHROID) 50 MCG tablet TAKE 1 TABLET BY MOUTH EVERY DAY 90 tablet 2    mirtazapine (REMERON) 7.5 MG Tab Take 2 tablets (15 mg total) by mouth every evening. 180 tablet 3     No current facility-administered medications on file prior to visit.       Review of patient's allergies indicates:   Allergen Reactions    Bactrim [sulfamethoxazole-trimethoprim]      hallucination    Latex, natural rubber Swelling    Penicillins Shortness Of Breath       Social History     Socioeconomic History    Marital status:    Occupational History     Employer: [employer]   Tobacco Use    Smoking status: Former     Packs/day: 0.50     Years: 10.00     Pack years: 5.00     Types: Cigarettes     Quit date: 3/3/1979     Years since quittin.1    Smokeless tobacco: Never   Substance and Sexual Activity    Alcohol use: Not Currently     Alcohol/week: 0.0 standard drinks     Comment: occasionally    Drug use: No    Sexual activity: Yes     Partners: Male   Social History Narrative    Live with      Social Determinants of Health     Financial Resource Strain: Low Risk     Difficulty of Paying Living Expenses: Not very hard   Food Insecurity: No Food Insecurity    Worried About Running Out of Food in the Last Year: Never true    Ran Out of Food in the Last Year: Never true   Transportation Needs: No Transportation Needs    Lack of Transportation (Medical): No    Lack of Transportation (Non-Medical): No   Physical Activity: Unknown    Days of Exercise  per Week: Patient refused    Minutes of Exercise per Session: 50 min   Stress: Unknown    Feeling of Stress : Patient refused   Social Connections: Unknown    Frequency of Communication with Friends and Family: More than three times a week    Frequency of Social Gatherings with Friends and Family: Twice a week    Active Member of Clubs or Organizations: No    Attends Club or Organization Meetings: Never    Marital Status:    Housing Stability: Unknown    Unable to Pay for Housing in the Last Year: No    Unstable Housing in the Last Year: No       Family History   Adopted: Yes   Family history unknown: Yes       Review of Systems   Constitutional:  Negative for appetite change, fever and unexpected weight change.   HENT:  Negative for postnasal drip, rhinorrhea, sneezing, sore throat and trouble swallowing.    Eyes:  Negative for visual disturbance.   Respiratory:  Negative for cough, shortness of breath and wheezing.    Cardiovascular:  Negative for chest pain, palpitations and leg swelling.   Gastrointestinal:  Positive for abdominal pain (gas pain) and constipation. Negative for blood in stool, diarrhea, nausea and vomiting.   Genitourinary:  Negative for dysuria.   Musculoskeletal:  Negative for arthralgias, joint swelling and myalgias.   Skin:  Negative for color change, pallor and rash.   Neurological:  Negative for weakness, light-headedness, numbness and headaches.   Hematological:  Negative for adenopathy. Does not bruise/bleed easily.   Psychiatric/Behavioral:  Negative for agitation.          Objective:   Vitals:   Vitals:    05/02/23 1348   BP: 120/70   Pulse: 83       Physical Exam  Vitals reviewed.   Constitutional:       General: She is not in acute distress.     Appearance: She is not diaphoretic.   HENT:      Head: Normocephalic and atraumatic.      Mouth/Throat:      Pharynx: No oropharyngeal exudate.   Eyes:      General: No scleral icterus.        Right eye: No discharge.         Left eye:  No discharge.      Conjunctiva/sclera: Conjunctivae normal.      Pupils: Pupils are equal, round, and reactive to light.   Cardiovascular:      Rate and Rhythm: Normal rate and regular rhythm.      Heart sounds: Normal heart sounds. No murmur heard.    No friction rub. No gallop.   Pulmonary:      Effort: Pulmonary effort is normal. No respiratory distress.      Breath sounds: Normal breath sounds. No stridor. No wheezing or rales.   Abdominal:      General: Bowel sounds are normal. There is no distension.      Palpations: Abdomen is soft. There is no mass.      Tenderness: There is no abdominal tenderness. There is no guarding.   Musculoskeletal:         General: Normal range of motion.      Cervical back: Normal range of motion.   Skin:     General: Skin is warm and dry.      Coloration: Skin is not pale.      Findings: No erythema or rash.   Neurological:      Mental Status: She is alert and oriented to person, place, and time.       IMPRESSION     Problem List Items Addressed This Visit    None  Visit Diagnoses       Irritable bowel syndrome with constipation    -  Primary    Chronic abdominal pain                PLANS:    - Feel that symptoms are lower GI and related to constipation. Patient report resolution of gas once she is able to have a BM  - KUB in Jan 2023 reviewed- lots of stool in pelvis/lower colon  - As prescription meds were too strong, recommend Miralax 1 1/2 caps every morning  - Ok to continue with Citrucel but need to be mindful that fiber can increase gas/bloating  - Colonoscopy done 2018 -tics and hemorrhoids. Recall in 10 years    Irritable bowel syndrome with constipation    Chronic abdominal pain      Lilia Flores MD  Gastroenterology

## 2023-05-04 ENCOUNTER — PATIENT MESSAGE (OUTPATIENT)
Dept: PRIMARY CARE CLINIC | Facility: CLINIC | Age: 73
End: 2023-05-04
Payer: MEDICARE

## 2023-05-04 DIAGNOSIS — F41.9 ANXIETY: ICD-10-CM

## 2023-05-04 RX ORDER — CLONAZEPAM 0.5 MG/1
TABLET ORAL
Qty: 60 TABLET | Refills: 5 | Status: SHIPPED | OUTPATIENT
Start: 2023-05-04 | End: 2023-10-31 | Stop reason: SDUPTHER

## 2023-05-09 ENCOUNTER — HOSPITAL ENCOUNTER (OUTPATIENT)
Dept: RADIOLOGY | Facility: HOSPITAL | Age: 73
Discharge: HOME | End: 2023-05-09
Attending: FAMILY MEDICINE
Payer: MEDICARE

## 2023-05-09 DIAGNOSIS — Z12.31 ENCOUNTER FOR SCREENING MAMMOGRAM FOR MALIGNANT NEOPLASM OF BREAST: ICD-10-CM

## 2023-05-09 PROCEDURE — 77067 MAMMO DIGITAL SCREENING BILAT WITH TOMO: ICD-10-PCS | Mod: 26,,, | Performed by: RADIOLOGY

## 2023-05-09 PROCEDURE — 77063 MAMMO DIGITAL SCREENING BILAT WITH TOMO: ICD-10-PCS | Mod: 26,,, | Performed by: RADIOLOGY

## 2023-05-09 PROCEDURE — 77067 SCR MAMMO BI INCL CAD: CPT | Mod: TC

## 2023-05-09 PROCEDURE — 77063 BREAST TOMOSYNTHESIS BI: CPT | Mod: 26,,, | Performed by: RADIOLOGY

## 2023-05-09 PROCEDURE — 77067 SCR MAMMO BI INCL CAD: CPT | Mod: 26,,, | Performed by: RADIOLOGY

## 2023-05-24 ENCOUNTER — PATIENT MESSAGE (OUTPATIENT)
Dept: PRIMARY CARE CLINIC | Facility: CLINIC | Age: 73
End: 2023-05-24
Payer: MEDICARE

## 2023-05-24 DIAGNOSIS — F41.9 ANXIETY: ICD-10-CM

## 2023-05-24 RX ORDER — ESCITALOPRAM OXALATE 10 MG/1
10 TABLET ORAL DAILY
Qty: 90 TABLET | Refills: 3 | Status: SHIPPED | OUTPATIENT
Start: 2023-05-24 | End: 2023-10-20 | Stop reason: SDUPTHER

## 2023-06-01 ENCOUNTER — PATIENT MESSAGE (OUTPATIENT)
Dept: PRIMARY CARE CLINIC | Facility: CLINIC | Age: 73
End: 2023-06-01

## 2023-06-01 ENCOUNTER — OFFICE VISIT (OUTPATIENT)
Dept: PRIMARY CARE CLINIC | Facility: CLINIC | Age: 73
End: 2023-06-01
Payer: MEDICARE

## 2023-06-01 VITALS
DIASTOLIC BLOOD PRESSURE: 76 MMHG | TEMPERATURE: 99 F | WEIGHT: 121.69 LBS | HEART RATE: 68 BPM | SYSTOLIC BLOOD PRESSURE: 154 MMHG | BODY MASS INDEX: 18.44 KG/M2 | OXYGEN SATURATION: 96 % | HEIGHT: 68 IN

## 2023-06-01 DIAGNOSIS — K58.1 IRRITABLE BOWEL SYNDROME WITH CONSTIPATION: ICD-10-CM

## 2023-06-01 DIAGNOSIS — R10.9 ABDOMINAL PAIN, UNSPECIFIED ABDOMINAL LOCATION: Primary | ICD-10-CM

## 2023-06-01 PROCEDURE — 99213 OFFICE O/P EST LOW 20 MIN: CPT | Mod: PBBFAC,PN | Performed by: NURSE PRACTITIONER

## 2023-06-01 PROCEDURE — 99999 PR PBB SHADOW E&M-EST. PATIENT-LVL III: ICD-10-PCS | Mod: PBBFAC,,, | Performed by: NURSE PRACTITIONER

## 2023-06-01 PROCEDURE — 99999 PR PBB SHADOW E&M-EST. PATIENT-LVL III: CPT | Mod: PBBFAC,,, | Performed by: NURSE PRACTITIONER

## 2023-06-01 PROCEDURE — 99214 OFFICE O/P EST MOD 30 MIN: CPT | Mod: S$PBB,,, | Performed by: NURSE PRACTITIONER

## 2023-06-01 PROCEDURE — 99214 PR OFFICE/OUTPT VISIT, EST, LEVL IV, 30-39 MIN: ICD-10-PCS | Mod: S$PBB,,, | Performed by: NURSE PRACTITIONER

## 2023-06-01 RX ORDER — DICYCLOMINE HYDROCHLORIDE 20 MG/1
20 TABLET ORAL EVERY 6 HOURS PRN
Qty: 28 TABLET | Refills: 0 | Status: SHIPPED | OUTPATIENT
Start: 2023-06-01 | End: 2023-06-12

## 2023-06-01 RX ORDER — HYDROCODONE BITARTRATE AND ACETAMINOPHEN 7.5; 325 MG/1; MG/1
1 TABLET ORAL EVERY 8 HOURS PRN
Qty: 10 TABLET | Refills: 0 | Status: SHIPPED | OUTPATIENT
Start: 2023-06-01 | End: 2023-08-16

## 2023-06-01 NOTE — PROGRESS NOTES
Maggie Díaz  2023  [id number]    Adeola Berumen MD  Patient Care Team:  Adeola Berumen MD as PCP - General (Internal Medicine)  Edouard Munson MD as Consulting Physician (Dermatology)  Joce Nur LPN as Care Coordinator      [facility]      Chief Complaint:  Chief Complaint   Patient presents with    Abdominal Pain     Pt has has stomach pain, gurgling and rectum pain for about four days.       History of Present Illness:  Pt presents with lower abdominal discomfort/cramping associated with increased gas and rumbling  The cramping and the feeling of need to defecate wakes her up in the AM and is severe at times  Two days ago, family had come for dinner. Thought attributed to abnormal diet  Gurgling, + flatulence  Having bowel movements - usually twice daily - soft, sometimes formed  No constipation, no diarrhea  Diagnosed with IBS-constipation in 2018  Pt has tried only Bentyl x 1  Is worried she will start loosing weight.   Pt is very anxious today and is worried something is wrong. Very restless.       The following were reviewed: Active problem list, medication list, allergies, family history, social history, and Health Maintenance.     History:  Past Medical History:   Diagnosis Date    Anxiety     Benzodiazepine dependence 2020    Hematuria, microscopic     negative CT    Hepatitis C     slow progression/ Dr. Otoole    Hypertension     Migraines     OP (osteoporosis)     on drug holiday.    Osteoarthritis     Radius fracture     Radius fracture     Restless legs syndrome (RLS)     Thrombocytopenia 2019    Vitamin D deficiency disease      Past Surgical History:   Procedure Laterality Date     SECTION, CLASSIC      COLONOSCOPY N/A 2018    Procedure: COLONOSCOPY;  Surgeon: Jose Royal MD;  Location: [facility];  Service: Endoscopy;  Laterality: N/A;    WRIST FRACTURE SURGERY      , Dr. Bunch.     Family History   Adopted: Yes   Family  history unknown: Yes     Patient Active Problem List   Diagnosis    Vitamin D deficiency disease    History of hepatitis C    Age-related osteoporosis without current pathological fracture    Restless legs syndrome (RLS)    Allergic rhinitis    Acquired hypothyroidism    Thrombocytopenia    Aortic atherosclerosis    Anxiety    Weight loss    Other neutropenia    Recurrent major depressive disorder, in partial remission    Constipation    Underweight    Irritable bowel syndrome with constipation     Review of patient's allergies indicates:   Allergen Reactions    Bactrim [sulfamethoxazole-trimethoprim]      hallucination    Latex, natural rubber Swelling    Penicillins Shortness Of Breath       Medications:  Current Outpatient Medications on File Prior to Visit   Medication Sig Dispense Refill    atorvastatin (LIPITOR) 40 MG tablet Take 1 tablet (40 mg total) by mouth once daily. 90 tablet 3    cholecalciferol, vitamin D3, (VITAMIN D3) 50 mcg (2,000 unit) Cap Take 1 capsule by mouth once daily.      clonazePAM (KLONOPIN) 0.5 MG tablet TAKE 1 TO 2 TABLET BY MOUTH EVERY EVENING AS NEEDED 60 tablet 5    EScitalopram oxalate (LEXAPRO) 10 MG tablet Take 1 tablet (10 mg total) by mouth once daily. 90 tablet 3    fluticasone propionate (FLONASE) 50 mcg/actuation nasal spray 2 sprays (100 mcg total) by Each Nostril route once daily. 48 g 3    levothyroxine (SYNTHROID) 50 MCG tablet TAKE 1 TABLET BY MOUTH EVERY DAY 90 tablet 2     No current facility-administered medications on file prior to visit.       Medications have been reviewed and reconciled with patient at visit today.      Exam:  Vitals:    06/01/23 1523   BP: (!) 154/76   Pulse: 68   Temp: 98.5 °F (36.9 °C)     Weight: 55.2 kg (121 lb 11.2 oz)   Body mass index is 18.5 kg/m².      BP Readings from Last 3 Encounters:   06/01/23 (!) 154/76   05/02/23 120/70   04/26/23 136/70     Wt Readings from Last 3 Encounters:   06/01/23 1523 55.2 kg (121 lb 11.2 oz)   05/02/23  1348 53.5 kg (118 lb)   04/26/23 1105 53.8 kg (118 lb 8 oz)          Review of Systems   Constitutional:  Negative for chills, fever and malaise/fatigue.   HENT:  Negative for congestion and sore throat.    Respiratory:  Negative for cough and shortness of breath.    Cardiovascular:  Negative for chest pain, palpitations and leg swelling.   Gastrointestinal:  Positive for abdominal pain. Negative for blood in stool, constipation, diarrhea, nausea and vomiting.        Cramping, increased flatulence   Genitourinary:  Negative for dysuria and frequency.   Musculoskeletal:  Negative for back pain and myalgias.   Neurological:  Negative for dizziness and headaches.   Psychiatric/Behavioral:  Negative for depression. The patient is nervous/anxious.      Physical Exam  Vitals reviewed.   Constitutional:       General: She is not in acute distress.     Appearance: Normal appearance.      Comments: Very anxious, restless, hyperactive - came out of exam room x 3   HENT:      Head: Normocephalic and atraumatic.      Nose: Nose normal.      Mouth/Throat:      Mouth: Mucous membranes are moist.   Eyes:      Conjunctiva/sclera: Conjunctivae normal.   Cardiovascular:      Rate and Rhythm: Normal rate and regular rhythm.      Heart sounds: No murmur heard.  Pulmonary:      Effort: Pulmonary effort is normal. No respiratory distress.      Breath sounds: Normal breath sounds.   Abdominal:      General: Bowel sounds are increased. There is no distension.      Palpations: Abdomen is soft. There is no mass.      Tenderness: There is no abdominal tenderness. There is no guarding or rebound.          Comments: No tenderness to abdomen on exam   Musculoskeletal:         General: No swelling or deformity.      Cervical back: Normal range of motion and neck supple.      Right lower leg: No edema.      Left lower leg: No edema.   Skin:     General: Skin is warm and dry.   Neurological:      Mental Status: She is alert and oriented to person,  place, and time. Mental status is at baseline.   Psychiatric:         Mood and Affect: Mood is anxious.         Speech: Speech is rapid and pressured.         Thought Content: Thought content normal.       Laboratory Reviewed:   Lab Results   Component Value Date    WBC 4.85 06/02/2023    HGB 13.2 06/02/2023    HCT 38.5 06/02/2023     06/02/2023    CHOL 220 (H) 03/21/2022    TRIG 46 03/21/2022    HDL 78 (H) 03/21/2022    ALT 13 06/02/2023    AST 23 06/02/2023     (L) 06/02/2023    K 4.2 06/02/2023     06/02/2023    CREATININE 0.8 06/02/2023    BUN 11 06/02/2023    CO2 25 06/02/2023    TSH 1.609 10/19/2022    INR 1.1 03/14/2016           Health Maintenance  Health Maintenance Topics with due status: Not Due       Topic Last Completion Date    TETANUS VACCINE 07/18/2018    Colorectal Cancer Screening 08/22/2018    DEXA Scan 09/10/2021    Lipid Panel 03/21/2022    Mammogram 05/09/2023    High Dose Statin 06/01/2023     Health Maintenance Due   Topic Date Due    COVID-19 Vaccine (6 - Pfizer series) 03/28/2023       Assessment and Plan:  1. Abdominal pain, unspecified abdominal location  -     Cancel: CBC Auto Differential  -     Comprehensive Metabolic Panel; Future; Expected date: 06/01/2023  -     X-Ray Abdomen Flat And Erect  -     dicyclomine (BENTYL) 20 mg tablet; Take 1 tablet (20 mg total) by mouth every 6 (six) hours as needed (abdominal cramping).  Dispense: 28 tablet; Refill: 0  -     HYDROcodone-acetaminophen (NORCO) 7.5-325 mg per tablet; Take 1 tablet by mouth every 8 (eight) hours as needed for Pain.  Dispense: 10 tablet; Refill: 0  -     CBC W/ AUTO DIFFERENTIAL; Future; Expected date: 06/01/2023    2. Irritable bowel syndrome with constipation                 -Patient's lab results were reviewed and discussed with patient  -Treatment options and alternatives were discussed with the patient. Patient expressed understanding. Patient was given the opportunity to ask questions and be an  active participant in their medical care. Patient had no further questions or concerns at this time.         Future Appointments   Date Time Provider Department Center   6/9/2023  1:20 PM LABORATORY, [facility]   6/12/2023  2:00 PM [facility]   6/15/2023  2:20 PM Adeola Berumen MD [id number]   3/11/2024 10:00 AM Chuck Collins OD [facility]          After visit summary printed and given to patient upon discharge.  Patient goals and care plan are included in After visit summary.    Total medical decision making time was 30 min.    The following issues were discussed: The primary encounter diagnosis was Abdominal pain, unspecified abdominal location. A diagnosis of Irritable bowel syndrome with constipation was also pertinent to this visit.    Health maintenance needs, recent test results and goals of care discussed with pt and questions answered.           NIKHIL Rooney, NP-C  [facility] [address] [place] [address]

## 2023-06-02 ENCOUNTER — HOSPITAL ENCOUNTER (OUTPATIENT)
Dept: RADIOLOGY | Facility: HOSPITAL | Age: 73
Discharge: HOME | End: 2023-06-02
Attending: NURSE PRACTITIONER
Payer: MEDICARE

## 2023-06-02 PROBLEM — K58.1 IRRITABLE BOWEL SYNDROME WITH CONSTIPATION: Status: ACTIVE | Noted: 2023-06-02

## 2023-06-02 PROCEDURE — 74019 RADEX ABDOMEN 2 VIEWS: CPT | Mod: 26,,, | Performed by: RADIOLOGY

## 2023-06-02 PROCEDURE — 74019 RADEX ABDOMEN 2 VIEWS: CPT | Mod: TC

## 2023-06-02 PROCEDURE — 74019 XR ABDOMEN FLAT AND ERECT: ICD-10-PCS | Mod: 26,,, | Performed by: RADIOLOGY

## 2023-06-10 ENCOUNTER — LAB VISIT (OUTPATIENT)
Dept: LAB | Facility: HOSPITAL | Age: 73
End: 2023-06-10
Attending: NURSE PRACTITIONER
Payer: MEDICARE

## 2023-06-10 DIAGNOSIS — D72.819 LEUKOPENIA, UNSPECIFIED TYPE: ICD-10-CM

## 2023-06-10 DIAGNOSIS — D69.6 THROMBOCYTOPENIA: ICD-10-CM

## 2023-06-10 DIAGNOSIS — E03.9 ACQUIRED HYPOTHYROIDISM: ICD-10-CM

## 2023-06-10 DIAGNOSIS — E55.9 VITAMIN D DEFICIENCY DISEASE: ICD-10-CM

## 2023-06-10 DIAGNOSIS — R63.4 UNINTENDED WEIGHT LOSS: ICD-10-CM

## 2023-06-10 DIAGNOSIS — I70.0 AORTIC ATHEROSCLEROSIS: ICD-10-CM

## 2023-06-10 LAB
25(OH)D3+25(OH)D2 SERPL-MCNC: 59 NG/ML (ref 30–96)
ALBUMIN SERPL BCP-MCNC: 4.4 G/DL (ref 3.5–5.2)
ALP SERPL-CCNC: 46 U/L (ref 55–135)
ALT SERPL W/O P-5'-P-CCNC: 16 U/L (ref 10–44)
ANION GAP SERPL CALC-SCNC: 9 MMOL/L (ref 8–16)
AST SERPL-CCNC: 26 U/L (ref 10–40)
BASOPHILS # BLD AUTO: 0.04 K/UL (ref 0–0.2)
BASOPHILS NFR BLD: 1.1 % (ref 0–1.9)
BILIRUB SERPL-MCNC: 0.6 MG/DL (ref 0.1–1)
BUN SERPL-MCNC: 12 MG/DL (ref 8–23)
CALCIUM SERPL-MCNC: 10.1 MG/DL (ref 8.7–10.5)
CHLORIDE SERPL-SCNC: 102 MMOL/L (ref 95–110)
CHOLEST SERPL-MCNC: 153 MG/DL (ref 120–199)
CHOLEST/HDLC SERPL: 2 {RATIO} (ref 2–5)
CO2 SERPL-SCNC: 27 MMOL/L (ref 23–29)
CREAT SERPL-MCNC: 0.9 MG/DL (ref 0.5–1.4)
DIFFERENTIAL METHOD: ABNORMAL
EOSINOPHIL # BLD AUTO: 0 K/UL (ref 0–0.5)
EOSINOPHIL NFR BLD: 0.8 % (ref 0–8)
ERYTHROCYTE [DISTWIDTH] IN BLOOD BY AUTOMATED COUNT: 12.5 % (ref 11.5–14.5)
ERYTHROCYTE [SEDIMENTATION RATE] IN BLOOD BY PHOTOMETRIC METHOD: 6 MM/HR (ref 0–36)
EST. GFR  (NO RACE VARIABLE): >60 ML/MIN/1.73 M^2
GLUCOSE SERPL-MCNC: 85 MG/DL (ref 70–110)
HCT VFR BLD AUTO: 40.7 % (ref 37–48.5)
HDLC SERPL-MCNC: 76 MG/DL (ref 40–75)
HDLC SERPL: 49.7 % (ref 20–50)
HGB BLD-MCNC: 13.8 G/DL (ref 12–16)
IMM GRANULOCYTES # BLD AUTO: 0 K/UL (ref 0–0.04)
IMM GRANULOCYTES NFR BLD AUTO: 0 % (ref 0–0.5)
LDLC SERPL CALC-MCNC: 63.6 MG/DL (ref 63–159)
LYMPHOCYTES # BLD AUTO: 1.4 K/UL (ref 1–4.8)
LYMPHOCYTES NFR BLD: 37.4 % (ref 18–48)
MCH RBC QN AUTO: 33.3 PG (ref 27–31)
MCHC RBC AUTO-ENTMCNC: 33.9 G/DL (ref 32–36)
MCV RBC AUTO: 98 FL (ref 82–98)
MONOCYTES # BLD AUTO: 0.5 K/UL (ref 0.3–1)
MONOCYTES NFR BLD: 12.3 % (ref 4–15)
NEUTROPHILS # BLD AUTO: 1.8 K/UL (ref 1.8–7.7)
NEUTROPHILS NFR BLD: 48.4 % (ref 38–73)
NONHDLC SERPL-MCNC: 77 MG/DL
NRBC BLD-RTO: 0 /100 WBC
PLATELET # BLD AUTO: 141 K/UL (ref 150–450)
PMV BLD AUTO: 9.2 FL (ref 9.2–12.9)
POTASSIUM SERPL-SCNC: 4.8 MMOL/L (ref 3.5–5.1)
PROT SERPL-MCNC: 7.9 G/DL (ref 6–8.4)
RBC # BLD AUTO: 4.14 M/UL (ref 4–5.4)
SODIUM SERPL-SCNC: 138 MMOL/L (ref 136–145)
T4 FREE SERPL-MCNC: 1.15 NG/DL (ref 0.71–1.51)
TRIGL SERPL-MCNC: 67 MG/DL (ref 30–150)
TSH SERPL DL<=0.005 MIU/L-ACNC: 1.42 UIU/ML (ref 0.4–4)
WBC # BLD AUTO: 3.74 K/UL (ref 3.9–12.7)

## 2023-06-10 PROCEDURE — 85025 COMPLETE CBC W/AUTO DIFF WBC: CPT | Performed by: NURSE PRACTITIONER

## 2023-06-10 PROCEDURE — 84443 ASSAY THYROID STIM HORMONE: CPT | Performed by: FAMILY MEDICINE

## 2023-06-10 PROCEDURE — 36415 COLL VENOUS BLD VENIPUNCTURE: CPT | Performed by: FAMILY MEDICINE

## 2023-06-10 PROCEDURE — 80061 LIPID PANEL: CPT | Performed by: FAMILY MEDICINE

## 2023-06-10 PROCEDURE — 80053 COMPREHEN METABOLIC PANEL: CPT | Performed by: NURSE PRACTITIONER

## 2023-06-10 PROCEDURE — 84439 ASSAY OF FREE THYROXINE: CPT | Performed by: FAMILY MEDICINE

## 2023-06-10 PROCEDURE — 85652 RBC SED RATE AUTOMATED: CPT | Performed by: FAMILY MEDICINE

## 2023-06-10 PROCEDURE — 82306 VITAMIN D 25 HYDROXY: CPT | Performed by: FAMILY MEDICINE

## 2023-06-12 ENCOUNTER — OFFICE VISIT (OUTPATIENT)
Dept: HEMATOLOGY/ONCOLOGY | Facility: CLINIC | Age: 73
End: 2023-06-12
Payer: MEDICARE

## 2023-06-12 DIAGNOSIS — D69.6 THROMBOCYTOPENIA: Primary | ICD-10-CM

## 2023-06-12 DIAGNOSIS — D72.819 LEUKOPENIA, UNSPECIFIED TYPE: ICD-10-CM

## 2023-06-12 PROCEDURE — 99214 PR OFFICE/OUTPT VISIT, EST, LEVL IV, 30-39 MIN: ICD-10-PCS | Mod: 95,,, | Performed by: NURSE PRACTITIONER

## 2023-06-12 PROCEDURE — 99214 OFFICE O/P EST MOD 30 MIN: CPT | Mod: 95,,, | Performed by: NURSE PRACTITIONER

## 2023-06-12 NOTE — PROGRESS NOTES
The patient location is: home  The chief complaint leading to consultation is: lab discussion    Visit type: audiovisual    Face to Face time with patient: 15  30 minutes of total time spent on the encounter, which includes face to face time and non-face to face time preparing to see the patient (eg, review of tests), Obtaining and/or reviewing separately obtained history, Documenting clinical information in the electronic or other health record, Independently interpreting results (not separately reported) and communicating results to the patient/family/caregiver, or Care coordination (not separately reported).     Each patient to whom he or she provides medical services by telemedicine is:  (1) informed of the relationship between the physician and patient and the respective role of any other health care provider with respect to management of the patient; and (2) notified that he or she may decline to receive medical services by telemedicine and may withdraw from such care at any time.      Patient ID: Maggie Díaz is a 72 y.o. female.    Chief Complaint: lab discussion    HPI:  Patient is a 72 year old female who presents today for further evaluation and management of thrombocytopenia.  Thrombocytopenia has been off/on since 2018 ranging from 124-149K.     Other diagnosis include RLS, anxiety, aortic atherosclerosis, vitamin D deficiency, hypothyroidism, history of Hep C, osteoporosis without fracture, and allergic rhinitis. History of Hep C - treated and cleared per patient.        Former cigarette smoker- quit in 1979.  Smoked for 41 years- smoked 1/2 pack per day  8/2019 Low dose CT chest negative for malignancy  04/2022 mammogram - negative for malignancy  8/2018 colonoscopy - negative with recs to repeat in 10 years  4/2019 PAP negative for malignancy     Alcohol use socially on occasion  Smoke marijuana on weekends - edibles     Has lost 9 lbs unintentionally in the past 6 months due to digestive issues.   Diagnosed with IBS with constipation.  States that she has gotten her appetite back.      2022 CT abdomen pelvis without contrast Impression:No acute abnormality in the abdomen or pelvis     Fm hx of cancer - states that she is adopted      Interval History:     2022 Present today for continued monitoring of thrombocytopenia - Labs from  2022 platelets 135K stable.  On of stable neutropenia for many years.      Interval History:  2023 States that she fell last Friday walking her dog.  Has stitches to her chin and soreness around rib area.  Was evaluated in the ED and found with no fractures or breaks.  Labs continue to remain stable.      Interval History:  2023 Patient present today for continued monitoring of thrombocytopenia/leukopenia.  No complaints of abnormal bleeding.  Differential WBC in normal ranges.          Social History     Socioeconomic History    Marital status:    Occupational History     Employer: [employer]   Tobacco Use    Smoking status: Former     Packs/day: 0.50     Years: 10.00     Pack years: 5.00     Types: Cigarettes     Quit date: 3/3/1979     Years since quittin.3    Smokeless tobacco: Never   Substance and Sexual Activity    Alcohol use: Not Currently     Alcohol/week: 0.0 standard drinks     Comment: occasionally    Drug use: No    Sexual activity: Yes     Partners: Male   Social History Narrative    Live with      Social Determinants of Health     Financial Resource Strain: Low Risk     Difficulty of Paying Living Expenses: Not very hard   Food Insecurity: No Food Insecurity    Worried About Running Out of Food in the Last Year: Never true    Ran Out of Food in the Last Year: Never true   Transportation Needs: No Transportation Needs    Lack of Transportation (Medical): No    Lack of Transportation (Non-Medical): No   Physical Activity: Unknown    Days of Exercise per Week: Patient refused    Minutes of Exercise per Session: 50 min    Stress: Unknown    Feeling of Stress : Patient refused   Social Connections: Unknown    Frequency of Communication with Friends and Family: More than three times a week    Frequency of Social Gatherings with Friends and Family: Twice a week    Active Member of Clubs or Organizations: No    Attends Club or Organization Meetings: Never    Marital Status:    Housing Stability: Unknown    Unable to Pay for Housing in the Last Year: No    Unstable Housing in the Last Year: No       Family History   Adopted: Yes   Family history unknown: Yes       Past Surgical History:   Procedure Laterality Date     SECTION, CLASSIC  1982    COLONOSCOPY N/A 2018    Procedure: COLONOSCOPY;  Surgeon: Jose Royal MD;  Location: [facility];  Service: Endoscopy;  Laterality: N/A;    WRIST FRACTURE SURGERY      , Dr. Bunch.       Past Medical History:   Diagnosis Date    Anxiety     Benzodiazepine dependence 2020    Hematuria, microscopic     negative CT    Hepatitis C     slow progression/ Dr. Otoole    Hypertension     Migraines     OP (osteoporosis)     on drug holiday.    Osteoarthritis     Radius fracture     Radius fracture     Restless legs syndrome (RLS)     Thrombocytopenia 2019    Vitamin D deficiency disease        Review of Systems   Constitutional: Negative.    HENT: Negative.     Eyes: Negative.    Respiratory: Negative.     Cardiovascular: Negative.    Gastrointestinal: Negative.    Endocrine: Negative.    Genitourinary: Negative.    Musculoskeletal: Negative.    Skin: Negative.    Allergic/Immunologic: Negative.    Neurological: Negative.    Hematological: Negative.    Psychiatric/Behavioral: Negative.          Medication List with Changes/Refills   Current Medications    ATORVASTATIN (LIPITOR) 40 MG TABLET    Take 1 tablet (40 mg total) by mouth once daily.    CHOLECALCIFEROL, VITAMIN D3, (VITAMIN D3) 50 MCG (2,000 UNIT) CAP    Take 1 capsule by mouth once daily.    CLONAZEPAM (KLONOPIN)  0.5 MG TABLET    TAKE 1 TO 2 TABLET BY MOUTH EVERY EVENING AS NEEDED    DICYCLOMINE (BENTYL) 20 MG TABLET    Take 1 tablet (20 mg total) by mouth every 6 (six) hours as needed (abdominal cramping).    ESCITALOPRAM OXALATE (LEXAPRO) 10 MG TABLET    Take 1 tablet (10 mg total) by mouth once daily.    FLUTICASONE PROPIONATE (FLONASE) 50 MCG/ACTUATION NASAL SPRAY    2 sprays (100 mcg total) by Each Nostril route once daily.    HYDROCODONE-ACETAMINOPHEN (NORCO) 7.5-325 MG PER TABLET    Take 1 tablet by mouth every 8 (eight) hours as needed for Pain.    LEVOTHYROXINE (SYNTHROID) 50 MCG TABLET    TAKE 1 TABLET BY MOUTH EVERY DAY        Objective:   There were no vitals filed for this visit.    Physical Exam  Constitutional:       Appearance: Normal appearance.   Pulmonary:      Effort: Pulmonary effort is normal.   Neurological:      Mental Status: She is alert.   Psychiatric:         Mood and Affect: Mood normal.         Behavior: Behavior normal.         Thought Content: Thought content normal.         Judgment: Judgment normal.       Assessment:     Problem List Items Addressed This Visit          Hematology    Thrombocytopenia - Primary    Relevant Orders    CBC Auto Differential    Comprehensive Metabolic Panel     Other Visit Diagnoses       Leukopenia, unspecified type        Relevant Orders    CBC Auto Differential            Lab Results   Component Value Date    WBC 3.74 (L) 06/10/2023    RBC 4.14 06/10/2023    HGB 13.8 06/10/2023    HCT 40.7 06/10/2023    MCV 98 06/10/2023    MCH 33.3 (H) 06/10/2023    MCHC 33.9 06/10/2023    RDW 12.5 06/10/2023     (L) 06/10/2023    MPV 9.2 06/10/2023    GRAN 1.8 06/10/2023    GRAN 48.4 06/10/2023    LYMPH 1.4 06/10/2023    LYMPH 37.4 06/10/2023    MONO 0.5 06/10/2023    MONO 12.3 06/10/2023    EOS 0.0 06/10/2023    BASO 0.04 06/10/2023    EOSINOPHIL 0.8 06/10/2023    BASOPHIL 1.1 06/10/2023      Lab Results   Component Value Date     06/10/2023    K 4.8 06/10/2023      06/10/2023    CO2 27 06/10/2023    BUN 12 06/10/2023    CREATININE 0.9 06/10/2023    CALCIUM 10.1 06/10/2023    ANIONGAP 9 06/10/2023    ESTGFRAFRICA >60 05/19/2022    EGFRNONAA >60 05/19/2022     Lab Results   Component Value Date    ALT 16 06/10/2023    AST 26 06/10/2023    GGT 10 04/18/2016    ALKPHOS 46 (L) 06/10/2023    BILITOT 0.6 06/10/2023       Plan:   Thrombocytopenia  -     CBC Auto Differential; Future; Expected date: 06/12/2023  -     Comprehensive Metabolic Panel; Future; Expected date: 06/12/2023    Leukopenia, unspecified type  -     CBC Auto Differential; Future; Expected date: 06/12/2023      Med Onc Chart Routing      Follow up with physician    Follow up with [facility] . F/u in 8 months with labs prior at  on a Saturday and a VV after   Infusion scheduling note   n/a   Injection scheduling note n/a   Labs   Scheduling:  Preferred lab: [facility]  Lab interval:  cbc and cmp in 8 months at  on Saturday prior to VV   Imaging   N/a   Pharmacy appointment No pharmacy appointment needed      Other referrals     No additional referrals needed  n/a          Collaborating Provider:  Dr. Saw Becerra    Thank You,  WILLIAM ElizabethP-C  Hematology Oncology

## 2023-06-15 ENCOUNTER — OFFICE VISIT (OUTPATIENT)
Dept: PRIMARY CARE CLINIC | Facility: CLINIC | Age: 73
End: 2023-06-15
Payer: MEDICARE

## 2023-06-15 VITALS
SYSTOLIC BLOOD PRESSURE: 144 MMHG | TEMPERATURE: 98 F | BODY MASS INDEX: 18.09 KG/M2 | HEART RATE: 77 BPM | OXYGEN SATURATION: 98 % | HEIGHT: 68 IN | DIASTOLIC BLOOD PRESSURE: 78 MMHG | WEIGHT: 119.38 LBS

## 2023-06-15 DIAGNOSIS — F33.41 RECURRENT MAJOR DEPRESSIVE DISORDER, IN PARTIAL REMISSION: ICD-10-CM

## 2023-06-15 DIAGNOSIS — K58.1 IRRITABLE BOWEL SYNDROME WITH CONSTIPATION: Primary | ICD-10-CM

## 2023-06-15 DIAGNOSIS — R63.4 UNINTENDED WEIGHT LOSS: ICD-10-CM

## 2023-06-15 PROBLEM — R50.9 FEVER: Status: ACTIVE | Noted: 2020-01-12

## 2023-06-15 PROCEDURE — 99214 PR OFFICE/OUTPT VISIT, EST, LEVL IV, 30-39 MIN: ICD-10-PCS | Mod: S$PBB,,, | Performed by: FAMILY MEDICINE

## 2023-06-15 PROCEDURE — 99999 PR PBB SHADOW E&M-EST. PATIENT-LVL IV: CPT | Mod: PBBFAC,,, | Performed by: FAMILY MEDICINE

## 2023-06-15 PROCEDURE — 99999 PR PBB SHADOW E&M-EST. PATIENT-LVL IV: ICD-10-PCS | Mod: PBBFAC,,, | Performed by: FAMILY MEDICINE

## 2023-06-15 PROCEDURE — 99214 OFFICE O/P EST MOD 30 MIN: CPT | Mod: PBBFAC,PN | Performed by: FAMILY MEDICINE

## 2023-06-15 PROCEDURE — 99214 OFFICE O/P EST MOD 30 MIN: CPT | Mod: S$PBB,,, | Performed by: FAMILY MEDICINE

## 2023-06-15 RX ORDER — MIRTAZAPINE 7.5 MG/1
7.5 TABLET, FILM COATED ORAL NIGHTLY
Qty: 90 TABLET | Refills: 3 | Status: SHIPPED | OUTPATIENT
Start: 2023-06-15 | End: 2023-11-08

## 2023-06-15 RX ORDER — VENLAFAXINE HYDROCHLORIDE 75 MG/1
75 CAPSULE, EXTENDED RELEASE ORAL DAILY
COMMUNITY
Start: 2023-05-05 | End: 2023-06-15

## 2023-06-15 NOTE — PROGRESS NOTES
Subjective:       Patient ID: Maggie Díaz is a 72 y.o. female.    Chief Complaint: Follow-up (Pt is here for a two month follow up. Pt is here to give update on her stomach issues.)    HPI:     Here for follow up of medical problems. Still with GI symptoms; recently saw GI for IBS with constipation; recommended daily miralax, which she's doing. Still with lots of anxiety; cancelled a trip with her sister due to escalating anxiety about the travel. She acknowledges that she experiences anxious feelings in her gut. Did not try mirtazapine prescriped last time due to fear of side effects. Taking lexapro 10 mg daily wtihout problem. Had CNS side effects from bentyl.   Sleeps well at night 10:30 pm -8:30 am and wakes feeling well-rested. UTD on cancer screenings; follows with hematology for surveillance of thrombocytopenia.   Says appetite is good.  No cp/sob/palp.    No falls recently    Updated/ annual due 4/23:  HM: 10/22 fluvax, 1/21 covid vaccines/booster, 8/19 HAV, 3/15 [id number], 2/14 [id number], 7/18 Td, 1/10 TDaP, 8/12 zoster, 2020 Shingrix x2, 9/21 BMD stable/hx fosamax rep 2y, 8/18 Cscope rep 10y, 4/22 MMG/me, 5/18 pelvic u/s neg.    Objective:     Vitals:    06/15/23 1447   BP: (!) 144/78   Pulse: 77   Temp: 98.2 °F (36.8 °C)     Wt Readings from Last 3 Encounters:   06/15/23 54.2 kg (119 lb 6.4 oz)   06/01/23 55.2 kg (121 lb 11.2 oz)   05/02/23 53.5 kg (118 lb)     Temp Readings from Last 3 Encounters:   06/15/23 98.2 °F (36.8 °C) (Oral)   06/01/23 98.5 °F (36.9 °C) (Oral)   03/22/23 98.2 °F (36.8 °C) (Oral)     BP Readings from Last 3 Encounters:   06/15/23 (!) 144/78   06/01/23 (!) 154/76   05/02/23 120/70     Pulse Readings from Last 3 Encounters:   06/15/23 77   06/01/23 68   05/02/23 83          Physical Exam  Vitals and nursing note reviewed.   Constitutional:       Appearance: She is well-developed.      Comments: thin   HENT:      Head: Normocephalic and atraumatic.   Eyes:      Pupils: Pupils are  equal, round, and reactive to light.   Cardiovascular:      Rate and Rhythm: Normal rate and regular rhythm.   Pulmonary:      Effort: Pulmonary effort is normal.      Breath sounds: Normal breath sounds.   Musculoskeletal:         General: No deformity. Normal range of motion.      Cervical back: Normal range of motion and neck supple.   Skin:     General: Skin is warm and dry.   Neurological:      Mental Status: She is alert and oriented to person, place, and time.      Comments: Pressured speech; anxious mood that improves towards end of visit   Psychiatric:         Behavior: Behavior normal.         Albumin   Date Value Ref Range Status   06/10/2023 4.4 3.5 - 5.2 g/dL Final     eGFR   Date Value Ref Range Status   06/10/2023 >60 >60 mL/min/1.73 m^2 Final       Assessment:       1. Irritable bowel syndrome with constipation    2. Unintended weight loss    3. Recurrent major depressive disorder, in partial remission        Plan:           Problem List Items Addressed This Visit          Psychiatric    Recurrent major depressive disorder, in partial remission    Relevant Medications    mirtazapine (REMERON) 7.5 MG Tab       GI    Irritable bowel syndrome with constipation - Primary    Relevant Orders    Ambulatory referral/consult to [facility]     Other Visit Diagnoses       Unintended weight loss        Relevant Orders    Ambulatory referral/consult to [facility]            Lipitor added for known aortic atherosclerosis on CT on last visit and tolerating well. She is open to starting CBT for psychosomatic symptoms of anxiety and childhood trauma. Will try again with mirtazapine for sleep and appetite. Increase fermented foods, trial of ashwaganda supplement. 3 week f/u

## 2023-06-15 NOTE — PATIENT INSTRUCTIONS
"If you are feeling unwell, we'd like to be the first ones to know here at [facility]! Please give us a call. Same day appointments are our top priority to keep you well and out of the emergency rooms and hospitals. Call [phone] for our direct line. After hours advice is always available. Please call [phone] after hours to speak to the on-call team.     Try adding more fermented foods to your diet: such as Kambucha (look for Synergy Gingerade), Kefir, yogurt, "Olive My Pickle" fermented pickles     The Mind-Gut Connection: How the Hidden Conversation Within Our Bodies Impacts Our Mood, Our Choices, and Our Overall Health Hardcover    AG1 Athletic Greens daily supplement (order online)    Along with Ashwaganda (Clarisse brand) 1 capsule twice daily    Book Recommendation:   What Happened to You?  Conversations on Trauma, Resilience, and Healing  By Kody Renteria · 2021  "

## 2023-06-16 ENCOUNTER — PATIENT MESSAGE (OUTPATIENT)
Dept: PRIMARY CARE CLINIC | Facility: CLINIC | Age: 73
End: 2023-06-16
Payer: MEDICARE

## 2023-06-23 ENCOUNTER — PATIENT MESSAGE (OUTPATIENT)
Dept: PRIMARY CARE CLINIC | Facility: CLINIC | Age: 73
End: 2023-06-23
Payer: MEDICARE

## 2023-07-10 ENCOUNTER — OFFICE VISIT (OUTPATIENT)
Dept: PRIMARY CARE CLINIC | Facility: CLINIC | Age: 73
End: 2023-07-10
Payer: MEDICARE

## 2023-07-10 VITALS
HEIGHT: 68 IN | TEMPERATURE: 98 F | SYSTOLIC BLOOD PRESSURE: 125 MMHG | DIASTOLIC BLOOD PRESSURE: 75 MMHG | HEART RATE: 63 BPM | BODY MASS INDEX: 18.64 KG/M2 | WEIGHT: 123 LBS | OXYGEN SATURATION: 100 %

## 2023-07-10 DIAGNOSIS — I70.0 AORTIC ATHEROSCLEROSIS: ICD-10-CM

## 2023-07-10 DIAGNOSIS — E03.9 ACQUIRED HYPOTHYROIDISM: ICD-10-CM

## 2023-07-10 DIAGNOSIS — R63.6 UNDERWEIGHT: ICD-10-CM

## 2023-07-10 PROCEDURE — 99214 OFFICE O/P EST MOD 30 MIN: CPT | Mod: PBBFAC,PN | Performed by: FAMILY MEDICINE

## 2023-07-10 PROCEDURE — 99999 PR PBB SHADOW E&M-EST. PATIENT-LVL IV: CPT | Mod: PBBFAC,,, | Performed by: FAMILY MEDICINE

## 2023-07-10 PROCEDURE — 99214 OFFICE O/P EST MOD 30 MIN: CPT | Mod: S$PBB,,, | Performed by: FAMILY MEDICINE

## 2023-07-10 PROCEDURE — 99214 PR OFFICE/OUTPT VISIT, EST, LEVL IV, 30-39 MIN: ICD-10-PCS | Mod: S$PBB,,, | Performed by: FAMILY MEDICINE

## 2023-07-10 PROCEDURE — 99999 PR PBB SHADOW E&M-EST. PATIENT-LVL IV: ICD-10-PCS | Mod: PBBFAC,,, | Performed by: FAMILY MEDICINE

## 2023-07-10 NOTE — PATIENT INSTRUCTIONS
If you are feeling unwell, we'd like to be the first ones to know here at [facility]! Please give us a call. Same day appointments are our top priority to keep you well and out of the emergency rooms and hospitals. Call [phone] for our direct line. After hours advice is always available. Please call [phone] after hours to speak to the on-call team.     Consider lowering your clonazepam to 1/2 tab at night and add CBD if needed.     Schedule your first appointment with Lucy as soon as you're ready.     For CBD, try [facility]  M-F.  9:00am - 5:30pm  [id number]  [place] [id number]

## 2023-07-10 NOTE — PROGRESS NOTES
Subjective:       Patient ID: Maggie Díaz is a 72 y.o. female.    Chief Complaint: Follow-up (Three week follow up. No issues or concerns.)    HPI:     Here for f/u of long-standing concerns about abdominal pain as well as increased anxiety. She reports that she feels great lately! We added mirtazapine as well as probiotic foods and ashwaganda root supplement at last visit. Still taking clonazepam at night; has not scheduled with AN Ayala yet, but insists she's planning on it. Overall reports lower stress level, has made some travel plans that she's feeling good about. Reports nl bowel and bladder function.    Updated/ annual due 4/23:  HM: 10/22 fluvax, 1/21 covid vaccines/booster, 8/19 HAV, 3/15 [id number], 2/14 [id number], 7/18 Td, 1/10 TDaP, 8/12 zoster, 2020 Shingrix x2, 9/21 BMD stable/hx fosamax rep 2y, 8/18 Cscope rep 10y, 4/22 MMG/me, 5/18 pelvic u/s neg.      Objective:     Vitals:    07/10/23 1339   BP: 125/75   Pulse: 63   Temp: 97.8 °F (36.6 °C)     Wt Readings from Last 3 Encounters:   07/10/23 55.8 kg (123 lb)   06/15/23 54.2 kg (119 lb 6.4 oz)   06/01/23 55.2 kg (121 lb 11.2 oz)     Temp Readings from Last 3 Encounters:   07/10/23 97.8 °F (36.6 °C) (Oral)   06/15/23 98.2 °F (36.8 °C) (Oral)   06/01/23 98.5 °F (36.9 °C) (Oral)     BP Readings from Last 3 Encounters:   07/10/23 125/75   06/15/23 (!) 144/78   06/01/23 (!) 154/76     Pulse Readings from Last 3 Encounters:   07/10/23 63   06/15/23 77   06/01/23 68          Physical Exam  Vitals and nursing note reviewed.   Constitutional:       Appearance: She is well-developed.   HENT:      Head: Normocephalic and atraumatic.   Eyes:      Pupils: Pupils are equal, round, and reactive to light.   Cardiovascular:      Rate and Rhythm: Normal rate and regular rhythm.   Pulmonary:      Effort: Pulmonary effort is normal.      Breath sounds: Normal breath sounds.   Musculoskeletal:         General: No deformity. Normal range of motion.      Cervical  back: Normal range of motion and neck supple.   Skin:     General: Skin is warm and dry.   Neurological:      Mental Status: She is alert and oriented to person, place, and time.   Psychiatric:         Behavior: Behavior normal.         Albumin   Date Value Ref Range Status   06/10/2023 4.4 3.5 - 5.2 g/dL Final     eGFR   Date Value Ref Range Status   06/10/2023 >60 >60 mL/min/1.73 m^2 Final       Assessment:       1. Acquired hypothyroidism    2. Aortic atherosclerosis    3. Underweight        Plan:           Problem List Items Addressed This Visit          Cardiac/Vascular    Aortic atherosclerosis    Overview     4/15/19 CT Renal Stone Study- There is aortoiliac atherosclerosis.            Current Assessment & Plan     Lab Results   Component Value Date    LDLCALC 63.6 06/10/2023               Endocrine    Acquired hypothyroidism    Current Assessment & Plan     Lab Results   Component Value Date    TSH 1.419 06/10/2023            Underweight    Overview     BMI 16.57         Current Assessment & Plan     Improving; BMI up to BMI 18.7          1 month f/u.   Discussed weaning clonazepam which she'll consider  Will work on getting into LCSW; I think she would really benefit from CBT for anxiety (ADHD?)

## 2023-07-28 ENCOUNTER — DOCUMENTATION ONLY (OUTPATIENT)
Dept: PRIMARY CARE CLINIC | Facility: CLINIC | Age: 73
End: 2023-07-28
Payer: MEDICARE

## 2023-08-14 ENCOUNTER — PATIENT MESSAGE (OUTPATIENT)
Dept: PRIMARY CARE CLINIC | Facility: CLINIC | Age: 73
End: 2023-08-14
Payer: MEDICARE

## 2023-08-16 ENCOUNTER — CLINICAL SUPPORT (OUTPATIENT)
Dept: PRIMARY CARE CLINIC | Facility: CLINIC | Age: 73
End: 2023-08-16
Payer: MEDICARE

## 2023-08-16 ENCOUNTER — OFFICE VISIT (OUTPATIENT)
Dept: PRIMARY CARE CLINIC | Facility: CLINIC | Age: 73
End: 2023-08-16
Payer: MEDICARE

## 2023-08-16 VITALS
WEIGHT: 125.69 LBS | DIASTOLIC BLOOD PRESSURE: 76 MMHG | OXYGEN SATURATION: 99 % | BODY MASS INDEX: 19.11 KG/M2 | HEART RATE: 54 BPM | SYSTOLIC BLOOD PRESSURE: 124 MMHG | TEMPERATURE: 98 F

## 2023-08-16 DIAGNOSIS — F41.9 ANXIETY: Primary | ICD-10-CM

## 2023-08-16 DIAGNOSIS — K58.1 IRRITABLE BOWEL SYNDROME WITH CONSTIPATION: ICD-10-CM

## 2023-08-16 DIAGNOSIS — M81.0 AGE-RELATED OSTEOPOROSIS WITHOUT CURRENT PATHOLOGICAL FRACTURE: ICD-10-CM

## 2023-08-16 DIAGNOSIS — E55.9 VITAMIN D DEFICIENCY DISEASE: Primary | ICD-10-CM

## 2023-08-16 DIAGNOSIS — R63.4 UNINTENDED WEIGHT LOSS: ICD-10-CM

## 2023-08-16 PROCEDURE — 99999 PR PBB SHADOW E&M-EST. PATIENT-LVL III: CPT | Mod: PBBFAC,,, | Performed by: FAMILY MEDICINE

## 2023-08-16 PROCEDURE — 99214 OFFICE O/P EST MOD 30 MIN: CPT | Mod: S$PBB,,, | Performed by: FAMILY MEDICINE

## 2023-08-16 PROCEDURE — 99211 OFF/OP EST MAY X REQ PHY/QHP: CPT | Mod: PBBFAC,27,PN

## 2023-08-16 PROCEDURE — 99499 NO LOS: ICD-10-PCS | Mod: S$PBB,,,

## 2023-08-16 PROCEDURE — 99999 PR PBB SHADOW E&M-EST. PATIENT-LVL I: ICD-10-PCS | Mod: PBBFAC,,,

## 2023-08-16 PROCEDURE — 99999 PR PBB SHADOW E&M-EST. PATIENT-LVL III: ICD-10-PCS | Mod: PBBFAC,,, | Performed by: FAMILY MEDICINE

## 2023-08-16 PROCEDURE — 99999 PR PBB SHADOW E&M-EST. PATIENT-LVL I: CPT | Mod: PBBFAC,,,

## 2023-08-16 PROCEDURE — 99214 PR OFFICE/OUTPT VISIT, EST, LEVL IV, 30-39 MIN: ICD-10-PCS | Mod: S$PBB,,, | Performed by: FAMILY MEDICINE

## 2023-08-16 PROCEDURE — 99213 OFFICE O/P EST LOW 20 MIN: CPT | Mod: PBBFAC,PN | Performed by: FAMILY MEDICINE

## 2023-08-16 PROCEDURE — 99499 UNLISTED E&M SERVICE: CPT | Mod: S$PBB,,,

## 2023-08-16 NOTE — PROGRESS NOTES
Subjective:       Patient ID: Maggie Díaz is a 73 y.o. female.    Chief Complaint: Follow-up (One month follow up. No issues or concerns)    HPI:     Here for one month f/u of long-standing abdominal pain, anxiety, unintended weight loss. She reports that she feels great lately! We added mirtazapine as well as probiotic foods and ashwaganda root supplement at last visit. Still taking clonazepam at night; has seen AN Ayala,  and have f/u appt next month. Overall reports lower stress level, has made some travel plans to [place] that she's feeling good about. Son got  last weekend. Reports nl bowel and bladder function, now not using miralax. Weight up 6#     Updated/ annual due 4/23:  HM: 10/22 fluvax, 1/21 covid vaccines/booster, 8/19 HAV, 3/15 [id number], 2/14 [id number], 7/18 Td, 1/10 TDaP, 8/12 zoster, 2020 Shingrix x2, 9/21 BMD stable/hx fosamax schedule at today's visit for 9/23, 8/18 Cscope rep 10y, 5/23 MMG/me, 5/18 pelvic u/s neg.    Objective:     Vitals:    08/16/23 1035   BP: 124/76   Pulse: (!) 54   Temp: 97.6 °F (36.4 °C)     Wt Readings from Last 3 Encounters:   08/16/23 57 kg (125 lb 11.2 oz)   07/10/23 55.8 kg (123 lb)   06/15/23 54.2 kg (119 lb 6.4 oz)     Temp Readings from Last 3 Encounters:   08/16/23 97.6 °F (36.4 °C) (Oral)   07/10/23 97.8 °F (36.6 °C) (Oral)   06/15/23 98.2 °F (36.8 °C) (Oral)     BP Readings from Last 3 Encounters:   08/16/23 124/76   07/10/23 125/75   06/15/23 (!) 144/78     Pulse Readings from Last 3 Encounters:   08/16/23 (!) 54   07/10/23 63   06/15/23 77          Physical Exam  Vitals and nursing note reviewed.   Constitutional:       Appearance: She is well-developed.   HENT:      Head: Normocephalic and atraumatic.   Eyes:      Pupils: Pupils are equal, round, and reactive to light.   Cardiovascular:      Rate and Rhythm: Normal rate and regular rhythm.   Pulmonary:      Effort: Pulmonary effort is normal.      Breath sounds: Normal breath sounds.    Musculoskeletal:         General: No deformity. Normal range of motion.      Cervical back: Normal range of motion and neck supple.   Skin:     General: Skin is warm and dry.   Neurological:      Mental Status: She is alert and oriented to person, place, and time.   Psychiatric:         Behavior: Behavior normal.           Albumin   Date Value Ref Range Status   06/10/2023 4.4 3.5 - 5.2 g/dL Final     eGFR   Date Value Ref Range Status   06/10/2023 >60 >60 mL/min/1.73 m^2 Final      Latest Reference Range & Units 06/10/23 09:19   WBC 3.90 - 12.70 K/uL 3.74 (L)   RBC 4.00 - 5.40 M/uL 4.14   Hemoglobin 12.0 - 16.0 g/dL 13.8   Hematocrit 37.0 - 48.5 % 40.7   MCV 82 - 98 fL 98   MCH 27.0 - 31.0 pg 33.3 (H)   MCHC 32.0 - 36.0 g/dL 33.9   RDW 11.5 - 14.5 % 12.5   Platelets 150 - 450 K/uL 141 (L)   MPV 9.2 - 12.9 fL 9.2   Gran % 38.0 - 73.0 % 48.4   Lymph % 18.0 - 48.0 % 37.4   Mono % 4.0 - 15.0 % 12.3   Eosinophil % 0.0 - 8.0 % 0.8   Basophil % 0.0 - 1.9 % 1.1   Immature Granulocytes 0.0 - 0.5 % 0.0   Gran # (ANC) 1.8 - 7.7 K/uL 1.8   Lymph # 1.0 - 4.8 K/uL 1.4   Mono # 0.3 - 1.0 K/uL 0.5   Eos # 0.0 - 0.5 K/uL 0.0   Baso # 0.00 - 0.20 K/uL 0.04   Immature Grans (Abs) 0.00 - 0.04 K/uL 0.00   nRBC 0 /100 WBC 0   Differential Method  Automated   Sed Rate 0 - 36 mm/Hr 6   Sodium 136 - 145 mmol/L 138   Potassium 3.5 - 5.1 mmol/L 4.8   Chloride 95 - 110 mmol/L 102   CO2 23 - 29 mmol/L 27   Anion Gap 8 - 16 mmol/L 9   BUN 8 - 23 mg/dL 12   Creatinine 0.5 - 1.4 mg/dL 0.9   eGFR >60 mL/min/1.73 m^2 >60   Glucose 70 - 110 mg/dL 85   Calcium 8.7 - 10.5 mg/dL 10.1   Alkaline Phosphatase 55 - 135 U/L 46 (L)   PROTEIN TOTAL 6.0 - 8.4 g/dL 7.9   Albumin 3.5 - 5.2 g/dL 4.4   BILIRUBIN TOTAL 0.1 - 1.0 mg/dL 0.6   AST 10 - 40 U/L 26   ALT 10 - 44 U/L 16   Cholesterol 120 - 199 mg/dL 153   HDL 40 - 75 mg/dL 76 (H)   HDL/Cholesterol Ratio 20.0 - 50.0 % 49.7   LDL Cholesterol External 63.0 - 159.0 mg/dL 63.6   Non-HDL Cholesterol mg/dL 77    Total Cholesterol/HDL Ratio 2.0 - 5.0  2.0   Triglycerides 30 - 150 mg/dL 67   Vit D, 25-Hydroxy 30 - 96 ng/mL 59   TSH 0.400 - 4.000 uIU/mL 1.419   Free T4 0.71 - 1.51 ng/dL 1.15       Assessment:       1. Vitamin D deficiency disease    2. Age-related osteoporosis without current pathological fracture        Plan:           Problem List Items Addressed This Visit          Endocrine    Vitamin D deficiency disease - Primary    Relevant Orders    DXA Bone Density Axial Skeleton 1 or more sites    Age-related osteoporosis without current pathological fracture    Overview     on drug holiday.         Relevant Orders    DXA Bone Density Axial Skeleton 1 or more sites       Has started slow wean of clonazepam, now on 1 tablet at night  Continue talk therapy   Continue mirtazapine

## 2023-08-16 NOTE — PROGRESS NOTES
[facility] BEHAVIORAL HEALTH INTAKE    DATE:  8/16/2023  REFERRAL SOURCE:  Adeola Berumen MD  TYPE OF VISIT:  In person  LENGTH OF SESSION: 60  .  HISTORY OF PRESENTING ILLNESS:  Maggie Díaz, a 73 y.o. female with history of Anxiety disorders; anxiety, unspecified [F41.9] and IBS and Restless Legs Syndrome .    CHIEF COMPLAINT/REASON FOR ENCOUNTER: Pt presented for initial assessment. Pt's chief complaint includes the following: anxiety and somatic.    PSYCHIATRIC HISTORY:  Patient does not currently have a psychiatrist.    Patient does not currently have a therapist.     Pt is taking clonazepam (Klonopin) 0.5mg once daily for Anxiety. Pt is taking escitalopram (Lexapro) 10mg once daily and mirtazapine (Remeron) 7.5mg once daily for Depression.  She thinks she is being weaned off the Klonopin, currently taking 1 instead of 2 per day.   Previous Psychiatric Hospitalizations:  No  History of Trauma:  No  History of Violence:  No  Access to a gun/weapon:  Yes; spouse has a few guns in the house.  Previous Suicide Attempts:  No    Risk assessment:  Patient reports no suicidal ideation  Patient reports no homicidal ideation  Patient reports no self-injurious behavior  Patient reports no violent behavior    PSYCHIATRIC FAMILY HISTORY: not known patient is adopted and has no information on her biological family. She denies any psychiatric issues in her adoptive family.    SUBSTANCE ABUSE HISTORY:  Tobacco:  Yes - Quit many decades ago   Alcohol: special occasions  Illicit Substances: No  Misuse of Prescription Medications:  No    MEDICAL HISTORY:  Past Medical History:   Diagnosis Date    Anxiety     Benzodiazepine dependence 11/18/2020    Hematuria, microscopic     negative CT    Hepatitis C     slow progression/ Dr. Otoole    Hypertension     Migraines     OP (osteoporosis)     on drug holiday.    Osteoarthritis     Radius fracture     Radius fracture     Restless legs syndrome (RLS)     Thrombocytopenia 2/22/2019     Vitamin D deficiency disease          SOCIAL HISTORY (MARRIAGE, EMPLOYMENT, etc.):  Living Situation: Lives w/ her spouse, Alhaji and their dogLiliya.   Relationship status  to Alhaji for 45 years.  x2 prior to this marriage.  Children/Family: 2 sons, both in town. Sascha and Serenity boo - they just , work in restaurant together. Mahin and leonidas [place]-together a number of years. No grandkids expected.    Supports:Spouse, Alhaji. Sons, Sascha and Mahin. CousinFe (in [place])  Education/Vocation: H.S. degree.  for 30+years. Retired in 2019.   Catholic/Spirituality: Moravian. Mostly nonpracticing.   Hobbies and Interests: Gardening, Cooking, Reading, Dogs, Family.    Current social stressors:   Patient denies any current social stressors. She mentions the recent wedding of her son, Sascha and his fiancee Serenity. The wedding was somewhat last minute and was an outside ceremony. Patient says she had anxiety about making arrangements for the wedding, but it all went well.     Current symptoms:  Depression: denies.  Anxiety: denies.  Insomnia:  denies .  Claritza:  denies.  Psychosis: denies .    MENTAL HEALTH STATUS EXAM  General Appearance:  unremarkable, age appropriate, younger than stated age   Speech: normal tone, normal pitch, loud, pressured      Level of Cooperation: cooperative      Thought Processes: normal and logical   Mood: steady      Thought Content: normal, no suicidality, no homicidality, delusions, or paranoia   Affect: congruent and appropriate   Orientation: Oriented x3   Memory: recent >  intact, remote >  intact   Attention Span & Concentration: intact   Fund of General Knowledge: intact and appropriate to age and level of education   Judgment & Insight: good   Language  intact     Session Content/Presenting Problem Hx:    Patient denies any history of significant anxiety or depression. Says she was feeling well until she developed IBS and Restless Leg Syndrome. Has found  "both somewhat stressful to deal with. She says the Restless Leg no longer bothers her or her  as much as it used to. Says she sleeps very well, including regular short catnaps. Denies any issues with appetite. Discussed the connection between the brain and the gut, and how stress can sometimes be a factor with IBS. She is familiar with this and has done some reading on this subject. Patient says she takes baths a few times a week as a relaxation technique; she listens to her "bathtime playlist" of music. She normally enjoys gardening when the weather is good; she reads a lot; and she likes helping with rescue animals. Patient says she also finds herself helping strangers when the opportunity arises. Says she feels good when helping others.  She relates some history from childhood that she felt may be pertinent. Her father  when she was 9 years old; her mother subsequently remarried and they moved a number of times. Patient says she was the "new kid' in school for 7th through 10th grades. During that time she had some significant stomach pain; she went to the doctor for those pains. Says she never had any stomach issues since that time, until now.  Patient does say she feels like her life is at an 8 on a scale of 1-10 (bad to excellent). She does not have a large social [place] but is very satisfied with her daily life. Reports no significant sources of stress.  She does request to continue seeing the [facility] for counseling.         STRENGTHS AND LIABILITIES: Strength: Patient accepts guidance/feedback, Strength: Patient is expressive/articulate., Strength: Patient is intelligent., Strength: Patient is physically healthy., Strength: Patient has positive support network.    IMPRESSION:   My diagnostic impression is Anxiety disorders; generalized anxiety disorder [F41.1], as evidenced by episodes of worrying too much with difficulty stopping and difficulty relaxing.     TREATMENT GOALS: Anxiety: reducing " physical symptoms of anxiety and reducing time spent worrying (<30 minutes/day)    PLAN: In this session a psychosocial evaluation was conducted to get history and determine a treatment plan. CBT will be utilized in future individual  therapy sessions to increase interaction, insight, and behavior modification.     NEXT APPOINTMENT: 3 Weeks: September 5th

## 2023-08-16 NOTE — PATIENT INSTRUCTIONS
If you are feeling unwell, we'd like to be the first ones to know here at [facility]! Please give us a call. Same day appointments are our top priority to keep you well and out of the emergency rooms and hospitals. Call [phone] for our direct line. After hours advice is always available. Please call [phone] after hours to speak to the on-call team.

## 2023-09-04 ENCOUNTER — PATIENT MESSAGE (OUTPATIENT)
Dept: PRIMARY CARE CLINIC | Facility: CLINIC | Age: 73
End: 2023-09-04
Payer: MEDICARE

## 2023-09-07 ENCOUNTER — OFFICE VISIT (OUTPATIENT)
Dept: PRIMARY CARE CLINIC | Facility: CLINIC | Age: 73
End: 2023-09-07
Payer: MEDICARE

## 2023-09-07 DIAGNOSIS — I70.0 AORTIC ATHEROSCLEROSIS: ICD-10-CM

## 2023-09-07 DIAGNOSIS — F33.41 RECURRENT MAJOR DEPRESSIVE DISORDER, IN PARTIAL REMISSION: ICD-10-CM

## 2023-09-07 DIAGNOSIS — E78.5 HYPERLIPIDEMIA, UNSPECIFIED HYPERLIPIDEMIA TYPE: ICD-10-CM

## 2023-09-07 DIAGNOSIS — R63.6 UNDERWEIGHT: ICD-10-CM

## 2023-09-07 DIAGNOSIS — F41.9 ANXIETY: ICD-10-CM

## 2023-09-07 DIAGNOSIS — D69.6 THROMBOCYTOPENIA: ICD-10-CM

## 2023-09-07 DIAGNOSIS — G25.81 RESTLESS LEGS SYNDROME (RLS): ICD-10-CM

## 2023-09-07 DIAGNOSIS — E03.9 ACQUIRED HYPOTHYROIDISM: ICD-10-CM

## 2023-09-07 DIAGNOSIS — K58.1 IRRITABLE BOWEL SYNDROME WITH CONSTIPATION: ICD-10-CM

## 2023-09-07 DIAGNOSIS — Z00.00 ENCOUNTER FOR PREVENTIVE HEALTH EXAMINATION: Primary | ICD-10-CM

## 2023-09-07 DIAGNOSIS — E55.9 VITAMIN D DEFICIENCY DISEASE: ICD-10-CM

## 2023-09-07 DIAGNOSIS — D70.8 OTHER NEUTROPENIA: ICD-10-CM

## 2023-09-07 DIAGNOSIS — M81.0 AGE-RELATED OSTEOPOROSIS WITHOUT CURRENT PATHOLOGICAL FRACTURE: ICD-10-CM

## 2023-09-07 PROCEDURE — G0439 PR MEDICARE ANNUAL WELLNESS SUBSEQUENT VISIT: ICD-10-PCS | Mod: 95,,, | Performed by: NURSE PRACTITIONER

## 2023-09-07 PROCEDURE — G0439 PPPS, SUBSEQ VISIT: HCPCS | Mod: 95,,, | Performed by: NURSE PRACTITIONER

## 2023-09-07 NOTE — ASSESSMENT & PLAN NOTE
Assess and monitor - has been chronic  Monitor labs  Monitor for any uncontrolled bleeding  F/U Hematology

## 2023-09-07 NOTE — ASSESSMENT & PLAN NOTE
Assess and monitor - has intermittent flares  Monitor dietary intake  Treat constipation  F/U PCP or GI

## 2023-09-07 NOTE — ASSESSMENT & PLAN NOTE
Impression: 2021     Osteoporosis     Consider FDA approved medical therapies in postmenopausal women and men aged 50 years and older, based on the following:     *A hip or vertebral (clinical or morphometric) fracture  *T score less than or equal to -2.5 at the femoral neck or spine after appropriate evaluation to exclude secondary causes.  *Low bone mass -- also known as osteopenia (T score between -1.0 and -2.5 at the femoral neck or spine) and a 10 year probability of hip fracture greater than or equal to 3% or a 10 year probability of major osteoporosis-related fracture greater than or equal to 20% based on the [place]-adapted WHO algorithm.  *Clinicians judgment and/or patient preference may indicate treatment for people with 10 year fracture probabilities is above or below these levels.     Was on drug holiday  New DEXA scan pending  Encouraged weight bearing exercise

## 2023-09-07 NOTE — PATIENT INSTRUCTIONS
Counseling and Referral of Other Preventative  (Italic type indicates deductible and co-insurance are waived)    Patient Name: Maggie Díaz  Today's Date: 9/7/2023    Health Maintenance       Date Due Completion Date    COVID-19 Vaccine (6 - Pfizer series) 03/28/2023 11/28/2022    Influenza Vaccine (1) 09/01/2023 10/5/2022    DEXA Scan 09/10/2023 9/10/2021    Override on 1/3/2013: Done (Osteoporosis; stable BMD; chk vit D; repeat in 2 years; fosamax holiday)    High Dose Statin 08/16/2024 8/16/2023    Lipid Panel 06/10/2028 6/10/2023    TETANUS VACCINE 07/18/2028 7/18/2018    Colorectal Cancer Screening 08/22/2028 8/22/2018    Override on 1/27/2017: Declined (Pt currently refuseing per PCP note 1/21/17)        No orders of the defined types were placed in this encounter.    The following information is provided to all patients.  This information is to help you find resources for any of the problems found today that may be affecting your health:                Living healthy guide: www.[facility].[place].gov      Understanding Diabetes: www.diabetes.org      Eating healthy: www.cdc.gov/healthyweight      CDC home safety checklist: www.cdc.gov/steadi/patient.html      Agency on Aging: www.goea.[place].[facility]      Alcoholics anonymous (AA): www.aa.org      Physical Activity: www.parth.nih.gov/zn9lyqc      Tobacco use: www.quitwithusla.org

## 2023-09-07 NOTE — PROGRESS NOTES
[facility] Medicare Enhanced Annual Wellness Visit          The patient location is: [place]  The chief complaint leading to consultation is: annual wellness visit    Visit type: audiovisual    Face to Face time with patient: 20  60 minutes of total time spent on the encounter, which includes face to face time and non-face to face time preparing to see the patient (eg, review of tests), Obtaining and/or reviewing separately obtained history, Documenting clinical information in the electronic or other health record, Independently interpreting results (not separately reported) and communicating results to the patient/family/caregiver, or Care coordination (not separately reported).         Each patient to whom he or she provides medical services by telemedicine is:  (1) informed of the relationship between the physician and patient and the respective role of any other health care provider with respect to management of the patient; and (2) notified that he or she may decline to receive medical services by telemedicine and may withdraw from such care at any time.    Notes:       Magige Díaz presented for a follow-up Medicare AWV today. The following components were reviewed and updated:    Medical history  Family History  Social history  Allergies and Current Medications  Health Risk Assessment  Health Maintenance  Care Team    **See Completed Assessments for Annual Wellness visit with in the encounter summary    The following assessments were completed:  Depression Screening  Cognitive function Screening  Timed Get Up Test  Whisper Test  Nutrition Screening  PAQ      There were no vitals filed for this visit.  There is no height or weight on file to calculate BMI.         Review of Systems   Constitutional:  Negative for activity change, appetite change, fatigue and fever.   HENT:  Negative for nasal congestion, ear pain, postnasal drip and sore throat.    Eyes:  Negative for pain and visual disturbance.    Respiratory:  Negative for cough, shortness of breath and wheezing.    Cardiovascular:  Negative for chest pain, palpitations and leg swelling.   Gastrointestinal:  Positive for abdominal pain, constipation and diarrhea. Negative for nausea and vomiting.   Endocrine: Negative for polydipsia, polyphagia and polyuria.   Genitourinary:  Negative for difficulty urinating, dysuria, frequency and hematuria.   Musculoskeletal:  Positive for arthralgias. Negative for myalgias.   Integumentary:  Negative for rash and wound.   Neurological:  Negative for dizziness, weakness, light-headedness and headaches.   Psychiatric/Behavioral:  Negative for confusion and dysphoric mood. The patient is nervous/anxious.       Physical Exam  Constitutional:       General: She is not in acute distress.     Appearance: Normal appearance.   HENT:      Head: Normocephalic and atraumatic.      Nose: Nose normal.   Eyes:      General: No scleral icterus.     Conjunctiva/sclera: Conjunctivae normal.   Pulmonary:      Effort: Pulmonary effort is normal.   Abdominal:      Tenderness: There is no abdominal tenderness.   Musculoskeletal:      Cervical back: Normal range of motion.   Skin:     Coloration: Skin is not pale.      Findings: No bruising or erythema.   Neurological:      General: No focal deficit present.      Mental Status: She is alert and oriented to person, place, and time.   Psychiatric:         Mood and Affect: Mood normal.         Thought Content: Thought content normal.            Health Maintenance         Date Due Completion Date    COVID-19 Vaccine (6 - Pfizer series) 03/28/2023 11/28/2022    Influenza Vaccine (1) 09/01/2023 10/5/2022    DEXA Scan 09/10/2023 9/10/2021    Override on 1/3/2013: Done (Osteoporosis; stable BMD; chk vit D; repeat in 2 years; fosamax holiday)    High Dose Statin 08/16/2024 8/16/2023    Lipid Panel 06/10/2028 6/10/2023    TETANUS VACCINE 07/18/2028 7/18/2018    Colorectal Cancer Screening 08/22/2028  8/22/2018    Override on 1/27/2017: Declined (Pt currently refuseing per PCP note 1/21/17)              PROBLEM LIST ITEMS ADDRESSED THIS VISIT:    1. Encounter for preventive health examination  Overview:  Updated/ annual due 4/23:  HM: 10/22 fluvax, 1/21 covid vaccines/booster, 8/19 HAV, 3/15 [id number], 2/14 [id number], 7/18 Td, 1/10 TDaP, 8/12 zoster, 2020 Shingrix x2, 9/21 BMD stable/hx fosamax rep 2y, 8/18 Cscope rep 10y, 4/22 MMG/me, 5/18 pelvic u/s neg.       Assessment & Plan:  Review for Opioid Screening: Pt does not have Rx for Opioids      Review for Substance Use Disorders: Patient does use benzos as prescribed.      2. Anxiety  Assessment & Plan:  Chronic, stable  Continue Lexapro daily and clonazepam prn  F/U PCP      3. Recurrent major depressive disorder, in partial remission  Assessment & Plan:  Chronic,stable  Continue clonazepam prn and lexapro daily  F/U PCP      4. Aortic atherosclerosis  Overview:  4/15/19 CT Renal Stone Study- There is aortoiliac atherosclerosis.       Assessment & Plan:  Chronic, stable  Continue exercise, monitor lipids and control B/P  Continue atorvastatin      5. Thrombocytopenia  Assessment & Plan:  Assess and monitor - has been chronic  Monitor labs  Monitor for any uncontrolled bleeding  F/U Hematology      6. Other neutropenia  Assessment & Plan:  Assess and monitor - has been chronic  Monitor labs  F/U Hematology        7. Vitamin D deficiency disease  Assessment & Plan:  Last level normal  Continue vitamin D supplement  Brief exposure to sunlight  F/U with PCP      8. Age-related osteoporosis without current pathological fracture  Overview:  on drug holiday.    Assessment & Plan:  Impression: 2021     Osteoporosis     Consider FDA approved medical therapies in postmenopausal women and men aged 50 years and older, based on the following:     *A hip or vertebral (clinical or morphometric) fracture  *T score less than or equal to -2.5 at the femoral neck or spine after  appropriate evaluation to exclude secondary causes.  *Low bone mass -- also known as osteopenia (T score between -1.0 and -2.5 at the femoral neck or spine) and a 10 year probability of hip fracture greater than or equal to 3% or a 10 year probability of major osteoporosis-related fracture greater than or equal to 20% based on the [place]-adapted WHO algorithm.  *Clinicians judgment and/or patient preference may indicate treatment for people with 10 year fracture probabilities is above or below these levels.     Was on drug holiday  New DEXA scan pending  Encouraged weight bearing exercise      9. Acquired hypothyroidism  Assessment & Plan:  Lab Results   Component Value Date    TSH 1.419 06/10/2023   continue levothyroxine  F/U PcP      10. Irritable bowel syndrome with constipation  Assessment & Plan:  Assess and monitor - has intermittent flares  Monitor dietary intake  Treat constipation  F/U PCP or GI      11. Restless legs syndrome (RLS)  Assessment & Plan:  Chronic, stable  Continue Remeron  F/U PcP      12. Underweight  Overview:  BMI 16.57    Assessment & Plan:  F/u with PCP  Assess and monitor      13. Hyperlipidemia, unspecified hyperlipidemia type  Assessment & Plan:  Assess and monitor  Continue atorvastatin  Mediterranean diet  F/U PCP                     Provided Maggie with a 5-10 year written screening schedule and personal prevention plan. Recommendations were developed using the USPSTF age appropriate recommendations. Education, counseling, and referrals were provided as needed.  After Visit Summary printed and given to patient which includes a list of additional screenings\tests needed.    Future Appointments   Date Time Provider Department Center   9/18/2023  8:20 AM Lucy Giordano MD [facility]   9/20/2023  8:00 AM [facility] BMD1: BONE DENSITY SCAN [facility] DEXABMD [place]   10/20/2023  8:20 AM Adeola Berumen MD [facility]   3/11/2024 10:00 AM Chuck Collins OD [facility]  \[facility] [place] [facility]\""              NIKHIL Rooney, NP-C  [facility] [address] [place] [address]       I offered to discuss advanced care planning, including how to pick a person who would make decisions for you if you were unable to make them for yourself, called a health care power of , and what kind of decisions you might make such as use of life sustaining treatments such as ventilators and tube feeding when faced with a life limiting illness recorded on a living will that they will need to know. (How you want to be cared for as you near the end of your natural life)     X Patient is interested in learning more about how to make advanced directives.  I provided them paperwork and offered to discuss this with them.

## 2023-09-07 NOTE — ASSESSMENT & PLAN NOTE
Review for Opioid Screening: Pt does not have Rx for Opioids      Review for Substance Use Disorders: Patient does use benzos as prescribed.

## 2023-09-08 ENCOUNTER — PATIENT MESSAGE (OUTPATIENT)
Dept: PRIMARY CARE CLINIC | Facility: CLINIC | Age: 73
End: 2023-09-08
Payer: MEDICARE

## 2023-09-11 ENCOUNTER — PATIENT MESSAGE (OUTPATIENT)
Dept: PRIMARY CARE CLINIC | Facility: CLINIC | Age: 73
End: 2023-09-11

## 2023-09-11 ENCOUNTER — OFFICE VISIT (OUTPATIENT)
Dept: PRIMARY CARE CLINIC | Facility: CLINIC | Age: 73
End: 2023-09-11
Payer: MEDICARE

## 2023-09-11 VITALS
SYSTOLIC BLOOD PRESSURE: 128 MMHG | BODY MASS INDEX: 19.27 KG/M2 | OXYGEN SATURATION: 99 % | HEIGHT: 68 IN | TEMPERATURE: 98 F | DIASTOLIC BLOOD PRESSURE: 72 MMHG | HEART RATE: 63 BPM | WEIGHT: 127.13 LBS

## 2023-09-11 DIAGNOSIS — H00.011 HORDEOLUM EXTERNUM OF RIGHT UPPER EYELID: Primary | ICD-10-CM

## 2023-09-11 PROCEDURE — 99214 OFFICE O/P EST MOD 30 MIN: CPT | Mod: PBBFAC,PN | Performed by: NURSE PRACTITIONER

## 2023-09-11 PROCEDURE — 99213 OFFICE O/P EST LOW 20 MIN: CPT | Mod: S$PBB,,, | Performed by: NURSE PRACTITIONER

## 2023-09-11 PROCEDURE — 99999 PR PBB SHADOW E&M-EST. PATIENT-LVL IV: CPT | Mod: PBBFAC,,, | Performed by: NURSE PRACTITIONER

## 2023-09-11 PROCEDURE — 99213 PR OFFICE/OUTPT VISIT, EST, LEVL III, 20-29 MIN: ICD-10-PCS | Mod: S$PBB,,, | Performed by: NURSE PRACTITIONER

## 2023-09-11 PROCEDURE — 99999 PR PBB SHADOW E&M-EST. PATIENT-LVL IV: ICD-10-PCS | Mod: PBBFAC,,, | Performed by: NURSE PRACTITIONER

## 2023-09-11 RX ORDER — ERYTHROMYCIN 5 MG/G
OINTMENT OPHTHALMIC EVERY 8 HOURS PRN
Qty: 3.5 G | Refills: 0 | Status: SHIPPED | OUTPATIENT
Start: 2023-09-11 | End: 2023-10-20

## 2023-09-11 NOTE — PROGRESS NOTES
Maggie Díaz  2023  [id number]    Adeola Berumen MD  Patient Care Team:  Adeola Berumen MD as PCP - General (Internal Medicine)  Edouard Munson MD as Consulting Physician (Dermatology)  Joce Nur LPN as Care Coordinator      [facility]      Chief Complaint:  Chief Complaint   Patient presents with    Follow-up     Pt is complaining of right eye pain and redness since yesterday.        History of Present Illness:  Yesterday noted slight tenderness to the  inner canthus to right upper eyelid.  Pt wears glasses, denies wearing contacts.  Denies pain to the right eye globe. Denies any trauma. Today, notes a developing erythematous lesion to the upper eye lid.   Watery d/c causes blurring. Does have a cataracts in the right eye. Pt denies any purulent discharge or right visual disturbance. No erythema to the conjunctiva of right eye. She denies any upper respiratory symptoms.       The following were reviewed: Active problem list, medication list, allergies, family history, social history, and Health Maintenance.     History:  Past Medical History:   Diagnosis Date    Anxiety     Benzodiazepine dependence 2020    Hematuria, microscopic     negative CT    Hepatitis C     slow progression/ Dr. Otoole    Hypertension     Migraines     OP (osteoporosis)     on drug holiday.    Osteoarthritis     Radius fracture     Radius fracture     Restless legs syndrome (RLS)     Thrombocytopenia 2019    Vitamin D deficiency disease      Past Surgical History:   Procedure Laterality Date     SECTION, CLASSIC      COLONOSCOPY N/A 2018    Procedure: COLONOSCOPY;  Surgeon: Jose Royal MD;  Location: [facility];  Service: Endoscopy;  Laterality: N/A;    WRIST FRACTURE SURGERY      , Dr. Bunch.     Family History   Adopted: Yes   Family history unknown: Yes     Patient Active Problem List   Diagnosis    Vitamin D deficiency disease    History of hepatitis C     Age-related osteoporosis without current pathological fracture    Restless legs syndrome (RLS)    Allergic rhinitis    Acquired hypothyroidism    Thrombocytopenia    Aortic atherosclerosis    Anxiety    Weight loss    Encounter for preventive health examination    Other neutropenia    Recurrent major depressive disorder, in partial remission    Constipation    Underweight    Irritable bowel syndrome with constipation    Fever    Hyperlipidemia    Hordeolum externum of right upper eyelid     Review of patient's allergies indicates:   Allergen Reactions    Bactrim [sulfamethoxazole-trimethoprim]      hallucination    Latex, natural rubber Swelling    Penicillins Shortness Of Breath       Medications:  Current Outpatient Medications on File Prior to Visit   Medication Sig Dispense Refill    atorvastatin (LIPITOR) 40 MG tablet Take 1 tablet (40 mg total) by mouth once daily. 90 tablet 3    cholecalciferol, vitamin D3, (VITAMIN D3) 50 mcg (2,000 unit) Cap Take 1 capsule by mouth once daily.      clonazePAM (KLONOPIN) 0.5 MG tablet TAKE 1 TO 2 TABLET BY MOUTH EVERY EVENING AS NEEDED 60 tablet 5    EScitalopram oxalate (LEXAPRO) 10 MG tablet Take 1 tablet (10 mg total) by mouth once daily. 90 tablet 3    fluticasone propionate (FLONASE) 50 mcg/actuation nasal spray 2 sprays (100 mcg total) by Each Nostril route once daily. 48 g 3    levothyroxine (SYNTHROID) 50 MCG tablet TAKE 1 TABLET BY MOUTH EVERY DAY 90 tablet 2    mirtazapine (REMERON) 7.5 MG Tab Take 1 tablet (7.5 mg total) by mouth every evening. For sleep and appetite stimulation 90 tablet 3     No current facility-administered medications on file prior to visit.       Medications have been reviewed and reconciled with patient at visit today.      Exam:  Vitals:    09/11/23 1239   BP: 128/72   Pulse: 63   Temp: 98.1 °F (36.7 °C)     Weight: 57.7 kg (127 lb 1.6 oz)   Body mass index is 19.33 kg/m².      BP Readings from Last 3 Encounters:   09/11/23 128/72    08/16/23 124/76   07/10/23 125/75     Wt Readings from Last 3 Encounters:   09/11/23 1239 57.7 kg (127 lb 1.6 oz)   08/16/23 1035 57 kg (125 lb 11.2 oz)   07/10/23 1339 55.8 kg (123 lb)        REVIEW OF SYSTEMS  Review of Systems   Constitutional:  Negative for chills and fever.   HENT:  Negative for congestion, ear discharge and sore throat.         Eye pain   Eyes:  Positive for discharge (watery). Negative for blurred vision and redness.   Respiratory:  Negative for cough.    Cardiovascular:  Negative for chest pain.   Gastrointestinal:  Negative for nausea and vomiting.   Genitourinary:  Negative for dysuria and frequency.   Musculoskeletal:  Negative for myalgias.   Neurological:  Negative for dizziness and headaches.   Psychiatric/Behavioral:  Negative for depression. The patient is not nervous/anxious.        Physical Exam  Vitals reviewed.   Constitutional:       General: She is not in acute distress.     Appearance: Normal appearance.   HENT:      Head: Normocephalic and atraumatic.      Nose: Nose normal.      Mouth/Throat:      Mouth: Mucous membranes are moist.   Eyes:      General: No scleral icterus.        Right eye: Hordeolum present. No discharge.      Extraocular Movements:      Right eye: Normal extraocular motion.      Left eye: Normal extraocular motion.      Conjunctiva/sclera: Conjunctivae normal.      Right eye: Right conjunctiva is not injected.      Left eye: Left conjunctiva is not injected.        Comments: Mildly erythematous right upper eye lid  No purulence noted   Cardiovascular:      Rate and Rhythm: Normal rate and regular rhythm.   Pulmonary:      Effort: Pulmonary effort is normal.      Breath sounds: Normal breath sounds.   Abdominal:      Palpations: There is no mass.   Musculoskeletal:         General: Normal range of motion.      Cervical back: Normal range of motion and neck supple.   Skin:     General: Skin is warm and dry.   Neurological:      Mental Status: She is alert  "and oriented to person, place, and time. Mental status is at baseline.   Psychiatric:         Thought Content: Thought content normal.         Laboratory Reviewed:     Lab Results   Component Value Date    WBC 3.74 (L) 06/10/2023    HGB 13.8 06/10/2023    HCT 40.7 06/10/2023     (L) 06/10/2023    CHOL 153 06/10/2023    TRIG 67 06/10/2023    HDL 76 (H) 06/10/2023    ALT 16 06/10/2023    AST 26 06/10/2023     06/10/2023    K 4.8 06/10/2023     06/10/2023    CREATININE 0.9 06/10/2023    BUN 12 06/10/2023    CO2 27 06/10/2023    TSH 1.419 06/10/2023    INR 1.1 03/14/2016       Screening or Prevention Patient's value Goal Complete/Controlled?   HgA1C Testing and Control   No results found for: "HGBA1C"   Annually/Less than 8% No   Lipid profile : 06/10/2023 Annually Yes   LDL control Lab Results   Component Value Date    LDLCALC 63.6 06/10/2023    Annually/Less than 100 mg/dl  Yes   Nephropathy screening No results found for: "LABMICR"  Lab Results   Component Value Date    PROTEINUA 1+ (A) 04/08/2019    Annually No   Blood pressure BP Readings from Last 1 Encounters:   09/11/23 128/72    Less than 140/90 Yes   Dilated retinal exam Most Recent Eye Exam Date: Not Found Annually Yes   Foot exam   Most Recent Foot Exam Date: Not Found Annually Yes       Health Maintenance  Health Maintenance Topics with due status: Not Due       Topic Last Completion Date    TETANUS VACCINE 07/18/2018    Colorectal Cancer Screening 08/22/2018    Lipid Panel 06/10/2023    High Dose Statin 09/11/2023     Health Maintenance Due   Topic Date Due    COVID-19 Vaccine (6 - Pfizer series) 03/28/2023    Influenza Vaccine (1) 09/01/2023    DEXA Scan  09/10/2023       Assessment and Plan:  1. Hordeolum externum of right upper eyelid  -     erythromycin (ROMYCIN) ophthalmic ointment; Place into the right eye every 8 (eight) hours as needed (eyelid lesion).  Dispense: 3.5 g; Refill: 0      Most hordeola resolve spontaneously over one " to two weeks     Drainage can be facilitated with warm, moist compresses placed on the affected areas frequently (eg, for 5 to 10 minutes four times a day). Massage and gentle wiping of the affected eyelid after the warm compress can also aid in drainage. Patients should discontinue eye makeup to support healing. If, despite management with warm compresses, the lesion does not reduce in size within two weeks, the patient should be referred to an ophthalmologist     -Patient's lab results were reviewed and discussed with patient  -Treatment options and alternatives were discussed with the patient. Patient expressed understanding. Patient was given the opportunity to ask questions and be an active participant in their medical care. Patient had no further questions or concerns at this time.         Future Appointments   Date Time Provider Department Center   9/11/2023  2:00 PM Chuck Collins, OD [facility] [place]   9/18/2023  8:20 AM Lucy Giordano MD [facility]   9/20/2023  8:00 AM [facility] BMD1: BONE DENSITY SCAN [facility] [place]   10/20/2023  8:20 AM Adeola Berumen MD [facility]   3/11/2024 10:00 AM Chuck Collins, OD [facility] [place]          After visit summary printed and given to patient upon discharge.  Patient goals and care plan are included in After visit summary.    Total medical decision making time was 20 min.    The following issues were discussed: The encounter diagnosis was Hordeolum externum of right upper eyelid.    Health maintenance needs, recent test results and goals of care discussed with pt and questions answered.           NIKHIL Rooney, NP-C  [facility] [address] [place] [address]

## 2023-09-18 ENCOUNTER — PATIENT MESSAGE (OUTPATIENT)
Dept: PRIMARY CARE CLINIC | Facility: CLINIC | Age: 73
End: 2023-09-18

## 2023-09-18 ENCOUNTER — OFFICE VISIT (OUTPATIENT)
Dept: PRIMARY CARE CLINIC | Facility: CLINIC | Age: 73
End: 2023-09-18
Payer: MEDICARE

## 2023-09-18 VITALS
TEMPERATURE: 98 F | BODY MASS INDEX: 19.65 KG/M2 | HEIGHT: 68 IN | SYSTOLIC BLOOD PRESSURE: 128 MMHG | WEIGHT: 129.63 LBS | DIASTOLIC BLOOD PRESSURE: 70 MMHG

## 2023-09-18 DIAGNOSIS — K59.00 CONSTIPATION, UNSPECIFIED CONSTIPATION TYPE: ICD-10-CM

## 2023-09-18 PROCEDURE — 99999 PR PBB SHADOW E&M-EST. PATIENT-LVL III: ICD-10-PCS | Mod: PBBFAC,,, | Performed by: FAMILY MEDICINE

## 2023-09-18 PROCEDURE — 99214 OFFICE O/P EST MOD 30 MIN: CPT | Mod: S$PBB,,, | Performed by: FAMILY MEDICINE

## 2023-09-18 PROCEDURE — 99999 PR PBB SHADOW E&M-EST. PATIENT-LVL III: CPT | Mod: PBBFAC,,, | Performed by: FAMILY MEDICINE

## 2023-09-18 PROCEDURE — 99214 PR OFFICE/OUTPT VISIT, EST, LEVL IV, 30-39 MIN: ICD-10-PCS | Mod: S$PBB,,, | Performed by: FAMILY MEDICINE

## 2023-09-18 PROCEDURE — 99213 OFFICE O/P EST LOW 20 MIN: CPT | Mod: PBBFAC,PN | Performed by: FAMILY MEDICINE

## 2023-09-18 NOTE — ASSESSMENT & PLAN NOTE
Likely source of her abdominal pain; recommend increasing physical activity, limiting bread/pasta, increasing fruits, vegetables, probiotic-containing foods (yogurt), and ok to increase miralax to bid. Offered KUB but she prefers to defer. Will ask RN to f/u with her later this week.    Has met with LCSW x 1 and is open to rescheduling for next visit but didn't want to do today. Encouragement provided that symptoms are likely to resolve. She's had great progress with weight gain, sleeping well on mirtazapine. Room to go up on dose if needed.

## 2023-09-18 NOTE — PROGRESS NOTES
Subjective:       Patient ID: Maggie Díaz is a 73 y.o. female.    Chief Complaint: Follow-up (Pt has has stomach pain for about two days. Pt has had bowel movements, denies diarrhea. )    HPI:     73 year old female here due to return of abdominal pain over the past 2 weeks. She was recently seen for a stye to her right eye which has almost completely resolved. She tells me that she was sitting or lying down the majority of the week with the stye going on because of the discomfort and frequent warm compresses. Over the past 2 nights she's awoken with abdominal pain; took bentyl and returned to sleep. She has had only small Bms; taking miralax. Feels bloated and lots of belching. No n/v/d. Mood is depressed today because she is discouraged that her abdominal pain has returned.       Updated/ annual due 4/23:  HM: 10/22 fluvax, 1/21 covid vaccines/booster, 8/19 HAV, 3/15 [id number], 2/14 [id number], 7/18 Td, 1/10 TDaP, 8/12 zoster, 2020 Shingrix x2, 9/21 BMD stable/hx fosamax schedule at today's visit for 9/23, 8/18 Cscope rep 10y, 5/23 MMG/me, 5/18 pelvic u/s neg.    Advance Care Planning     Date: 09/18/2023    Power of   I initiated the process of voluntary advance care planning today and explained the importance of this process to the patient.  I introduced the concept of advance directives to the patient, as well. Then the patient received detailed information about the importance of designating a Health Care Power of  (HCPOA). She was also instructed to communicate with this person about their wishes for future healthcare, should she become sick and lose decision-making capacity. The patient has not previously appointed a HCPOA. After our discussion, the patient has decided to complete a HCPOA and has appointed Devan Díaz [phone] health care agent number:          Objective:     Vitals:    09/18/23 0815   BP: 128/70   Temp: 97.6 °F (36.4 °C)     Wt Readings from Last 3 Encounters:    09/18/23 58.8 kg (129 lb 9.6 oz)   09/11/23 57.7 kg (127 lb 1.6 oz)   08/16/23 57 kg (125 lb 11.2 oz)     Temp Readings from Last 3 Encounters:   09/18/23 97.6 °F (36.4 °C) (Oral)   09/11/23 98.1 °F (36.7 °C) (Oral)   08/16/23 97.6 °F (36.4 °C) (Oral)     BP Readings from Last 3 Encounters:   09/18/23 128/70   09/11/23 128/72   08/16/23 124/76     Pulse Readings from Last 3 Encounters:   09/11/23 63   08/16/23 (!) 54   07/10/23 63          Physical Exam  Vitals and nursing note reviewed.   Constitutional:       Appearance: She is well-developed.   HENT:      Head: Normocephalic and atraumatic.   Eyes:      Pupils: Pupils are equal, round, and reactive to light.   Cardiovascular:      Rate and Rhythm: Normal rate and regular rhythm.   Pulmonary:      Effort: Pulmonary effort is normal.      Breath sounds: Normal breath sounds.   Abdominal:      General: There is no distension.      Palpations: There is no mass.      Tenderness: There is no abdominal tenderness. There is no guarding.      Comments: Hypoactive bowel sounds   Musculoskeletal:         General: No deformity. Normal range of motion.      Cervical back: Normal range of motion and neck supple.   Skin:     General: Skin is warm and dry.   Neurological:      Mental Status: She is alert and oriented to person, place, and time.   Psychiatric:         Behavior: Behavior normal.           Albumin   Date Value Ref Range Status   06/10/2023 4.4 3.5 - 5.2 g/dL Final     eGFR   Date Value Ref Range Status   06/10/2023 >60 >60 mL/min/1.73 m^2 Final       Assessment:       1. Constipation, unspecified constipation type        Plan:           Problem List Items Addressed This Visit          GI    Constipation    Current Assessment & Plan     Likely source of her abdominal pain; recommend increasing physical activity, limiting bread/pasta, increasing fruits, vegetables, probiotic-containing foods (yogurt), and ok to increase miralax to bid. Offered KUB but she prefers  to defer. Will ask RN to f/u with her later this week.    Has met with LCSW x 1 and is open to rescheduling for next visit but didn't want to do today. Encouragement provided that symptoms are likely to resolve. She's had great progress with weight gain, sleeping well on mirtazapine. Room to go up on dose if needed.

## 2023-09-18 NOTE — PATIENT INSTRUCTIONS
Add 2 Tbs of fernando seeds in a liquid once daily.     Try Gunn brand liquid fish oil, 1 Tbs daily     You can take miralax twice a day to help stimulate a bowel movement    Add exercise today like walking, organize an area of your house.

## 2023-09-18 NOTE — Clinical Note
Will you check on her abdominal pain tomorrow or the next day? She has lots of anxiety/depression/IBS and I think she's constipated.

## 2023-09-20 ENCOUNTER — PATIENT MESSAGE (OUTPATIENT)
Dept: PRIMARY CARE CLINIC | Facility: CLINIC | Age: 73
End: 2023-09-20
Payer: MEDICARE

## 2023-09-22 ENCOUNTER — PATIENT MESSAGE (OUTPATIENT)
Dept: PRIMARY CARE CLINIC | Facility: CLINIC | Age: 73
End: 2023-09-22
Payer: MEDICARE

## 2023-09-27 ENCOUNTER — DOCUMENTATION ONLY (OUTPATIENT)
Dept: PRIMARY CARE CLINIC | Facility: CLINIC | Age: 73
End: 2023-09-27
Payer: MEDICARE

## 2023-09-27 NOTE — PROGRESS NOTES
Spoke with patient; she requested an LCSW appointment at the time of her next MD appointment on 10/20/2023.  Scheduled LCSW appt for that date.

## 2023-10-02 ENCOUNTER — PATIENT MESSAGE (OUTPATIENT)
Dept: PRIMARY CARE CLINIC | Facility: CLINIC | Age: 73
End: 2023-10-02
Payer: MEDICARE

## 2023-10-03 ENCOUNTER — TELEPHONE (OUTPATIENT)
Dept: PRIMARY CARE CLINIC | Facility: CLINIC | Age: 73
End: 2023-10-03
Payer: MEDICARE

## 2023-10-03 ENCOUNTER — PATIENT MESSAGE (OUTPATIENT)
Dept: PRIMARY CARE CLINIC | Facility: CLINIC | Age: 73
End: 2023-10-03
Payer: MEDICARE

## 2023-10-06 NOTE — PROGRESS NOTES
"Subjective:      Patient ID: Maggie Díaz is a 73 y.o. female.    Chief Complaint: Follow-up (Two month follow up. Pt is here to go over lab/test results.)      HPI  Here for follow up of medical problems.  Up 9# in past 6 months.  BMs good.  No b/b.  Eating lots fiber and water.  Occas hemorrhoid, no bleeding.  No f/c/n/v.  No current abdominal pain.    8/22 CT abd/pelvis negative.  Not sure if anxiety is controlled on lexapro- "I have good day, then a crappy day."  Sleeping well with mirtaz and clonazepam.  Flonase and claritin for nasal drainage lately.  Walking for exercise, no exertional cp/sob/palp.    No dental issues.  No gum bleeding with brushing teeth.        10/23 BMD:  FINDINGS:  The L1 to L4 vertebral bone mineral density is equal to 1.062 g/cm squared with a T score of -1.1.  There has been no significant change relative to the prior study.     The left femoral neck bone mineral density is equal to 0.687 g/cm squared with a T score of -2.5.  There has been  no significant change relative to the prior study.     The total hip bone mineral density is equal to 0.663 g/cm squared with a T score of -2.7.  There has been a -8.2% statistically significant change relative to the prior study.     Impression:     Osteoporosis       Updated/ annual due 6/24:  HM: 10/23 fluvax, 1/21 covid vaccines/booster, 8/19 HAV, 3/15 [id number], 2/14 [id number], 7/18 Td, 1/10 TDaP, 8/12 zoster, 2020 Shingrix x2, 10/23 BMD stable/hx fosamax will restart 10/23 rep 2y, 8/18 Cscope rep 10y, 5/23 MMG, 5/18 pelvic u/s neg.     Review of Systems   Constitutional:  Negative for chills, diaphoresis and fever.   Respiratory:  Negative for cough and shortness of breath.    Cardiovascular:  Negative for chest pain, palpitations and leg swelling.   Gastrointestinal:  Negative for blood in stool, constipation, diarrhea, nausea and vomiting.   Genitourinary:  Negative for dysuria, frequency and hematuria.   Psychiatric/Behavioral:  The " "patient is not nervous/anxious.          Objective:   /72 (BP Location: Right arm)   Pulse 64   Temp 97.8 °F (36.6 °C) (Oral)   Ht 5' 8" (1.727 m)   Wt 56.7 kg (125 lb)   SpO2 98%   BMI 19.01 kg/m²     Physical Exam  Constitutional:       Appearance: She is well-developed.   Neck:      Thyroid: No thyroid mass.      Vascular: No carotid bruit.   Cardiovascular:      Rate and Rhythm: Normal rate and regular rhythm.      Heart sounds: No murmur heard.     No friction rub. No gallop.   Pulmonary:      Effort: Pulmonary effort is normal.      Breath sounds: Normal breath sounds. No wheezing or rales.   Abdominal:      General: Bowel sounds are normal.      Palpations: Abdomen is soft. There is no mass.      Tenderness: There is no abdominal tenderness.   Musculoskeletal:      Cervical back: Neck supple.   Lymphadenopathy:      Cervical: No cervical adenopathy.   Neurological:      Mental Status: She is alert and oriented to person, place, and time.            Latest Reference Range & Units 06/10/23 09:19   WBC 3.90 - 12.70 K/uL 3.74 (L)   RBC 4.00 - 5.40 M/uL 4.14   Hemoglobin 12.0 - 16.0 g/dL 13.8   Hematocrit 37.0 - 48.5 % 40.7   MCV 82 - 98 fL 98   MCH 27.0 - 31.0 pg 33.3 (H)   MCHC 32.0 - 36.0 g/dL 33.9   RDW 11.5 - 14.5 % 12.5   Platelet Count 150 - 450 K/uL 141 (L)   MPV 9.2 - 12.9 fL 9.2   Gran % 38.0 - 73.0 % 48.4   Lymph % 18.0 - 48.0 % 37.4   Mono % 4.0 - 15.0 % 12.3   Eosinophil % 0.0 - 8.0 % 0.8   Basophil % 0.0 - 1.9 % 1.1   Immature Granulocytes 0.0 - 0.5 % 0.0   Gran # (ANC) 1.8 - 7.7 K/uL 1.8   Lymph # 1.0 - 4.8 K/uL 1.4   Mono # 0.3 - 1.0 K/uL 0.5   Eos # 0.0 - 0.5 K/uL 0.0   Baso # 0.00 - 0.20 K/uL 0.04   Immature Grans (Abs) 0.00 - 0.04 K/uL 0.00   nRBC 0 /100 WBC 0   Differential Method  Automated   Sed Rate 0 - 36 mm/Hr 6   Sodium 136 - 145 mmol/L 138   Potassium 3.5 - 5.1 mmol/L 4.8   Chloride 95 - 110 mmol/L 102   CO2 23 - 29 mmol/L 27   Anion Gap 8 - 16 mmol/L 9   BUN 8 - 23 mg/dL 12 "   Creatinine 0.5 - 1.4 mg/dL 0.9   eGFR >60 mL/min/1.73 m^2 >60   Glucose 70 - 110 mg/dL 85   Calcium 8.7 - 10.5 mg/dL 10.1   ALP 55 - 135 U/L 46 (L)   PROTEIN TOTAL 6.0 - 8.4 g/dL 7.9   Albumin 3.5 - 5.2 g/dL 4.4   BILIRUBIN TOTAL 0.1 - 1.0 mg/dL 0.6   AST 10 - 40 U/L 26   ALT 10 - 44 U/L 16   Cholesterol Total 120 - 199 mg/dL 153   HDL 40 - 75 mg/dL 76 (H)   HDL/Cholesterol Ratio 20.0 - 50.0 % 49.7   LDL Cholesterol External 63.0 - 159.0 mg/dL 63.6   Non-HDL Cholesterol mg/dL 77   Total Cholesterol/HDL Ratio 2.0 - 5.0  2.0   Triglycerides 30 - 150 mg/dL 67   Vit D, 25-Hydroxy 30 - 96 ng/mL 59   TSH 0.400 - 4.000 uIU/mL 1.419   Free T4 0.71 - 1.51 ng/dL 1.15     Assessment:       1. Recurrent major depressive disorder, in partial remission    2. Anxiety    3. Age-related osteoporosis without current pathological fracture    4. Acquired hypothyroidism    5. Constipation, unspecified constipation type    6. Irritable bowel syndrome with constipation    7. Other hyperlipidemia    8. Thrombocytopenia          Plan:     Recurrent major depressive disorder, in partial remission, Anxiety- double lexapro, RTC 6wk.  -     EScitalopram oxalate (LEXAPRO) 20 MG tablet; Take 1 tablet (20 mg total) by mouth once daily.  Dispense: 90 tablet; Refill: 3    Age-related osteoporosis without current pathological fracture- restart fosamax, vit D level is good.  -     alendronate (FOSAMAX) 70 MG tablet; Take 1 tablet (70 mg total) by mouth every 7 days. Take first thing in the morning with glass of water.  Sit upright for 30minutes.  Then ok to take other meds and eat normally.  Dispense: 12 tablet; Refill: 3    Acquired hypothyroidism- Clinically stable, continue present treatment.    Constipation, unspecified constipation type, Irritable bowel syndrome with constipation- double lexapro.    Other hyperlipidemia- doing well, cont statin/exercise.    Thrombocytopenia, no s/s.  Recheck 6wk.  -     CBC Auto Differential; Future; Expected  date: 10/20/2023    Fluvax now.  RTC 6wk.

## 2023-10-17 ENCOUNTER — APPOINTMENT (OUTPATIENT)
Dept: RADIOLOGY | Facility: HOSPITAL | Age: 73
End: 2023-10-17
Attending: FAMILY MEDICINE
Payer: MEDICARE

## 2023-10-17 ENCOUNTER — PATIENT MESSAGE (OUTPATIENT)
Dept: PRIMARY CARE CLINIC | Facility: CLINIC | Age: 73
End: 2023-10-17
Payer: MEDICARE

## 2023-10-17 DIAGNOSIS — M81.0 AGE-RELATED OSTEOPOROSIS WITHOUT CURRENT PATHOLOGICAL FRACTURE: ICD-10-CM

## 2023-10-17 DIAGNOSIS — E55.9 VITAMIN D DEFICIENCY DISEASE: ICD-10-CM

## 2023-10-17 PROCEDURE — 77080 DXA BONE DENSITY AXIAL SKELETON 1 OR MORE SITES: ICD-10-PCS | Mod: 26,,, | Performed by: RADIOLOGY

## 2023-10-17 PROCEDURE — 77080 DXA BONE DENSITY AXIAL: CPT | Mod: TC

## 2023-10-17 PROCEDURE — 77080 DXA BONE DENSITY AXIAL: CPT | Mod: 26,,, | Performed by: RADIOLOGY

## 2023-10-20 ENCOUNTER — OFFICE VISIT (OUTPATIENT)
Dept: PRIMARY CARE CLINIC | Facility: CLINIC | Age: 73
End: 2023-10-20
Payer: MEDICARE

## 2023-10-20 ENCOUNTER — CLINICAL SUPPORT (OUTPATIENT)
Dept: PRIMARY CARE CLINIC | Facility: CLINIC | Age: 73
End: 2023-10-20
Payer: MEDICARE

## 2023-10-20 VITALS
HEART RATE: 64 BPM | OXYGEN SATURATION: 98 % | DIASTOLIC BLOOD PRESSURE: 72 MMHG | TEMPERATURE: 98 F | WEIGHT: 125 LBS | SYSTOLIC BLOOD PRESSURE: 118 MMHG | BODY MASS INDEX: 18.94 KG/M2 | HEIGHT: 68 IN

## 2023-10-20 DIAGNOSIS — K58.1 IRRITABLE BOWEL SYNDROME WITH CONSTIPATION: ICD-10-CM

## 2023-10-20 DIAGNOSIS — F33.41 RECURRENT MAJOR DEPRESSIVE DISORDER, IN PARTIAL REMISSION: Primary | ICD-10-CM

## 2023-10-20 DIAGNOSIS — E03.9 ACQUIRED HYPOTHYROIDISM: ICD-10-CM

## 2023-10-20 DIAGNOSIS — K59.00 CONSTIPATION, UNSPECIFIED CONSTIPATION TYPE: ICD-10-CM

## 2023-10-20 DIAGNOSIS — F41.9 ANXIETY: ICD-10-CM

## 2023-10-20 DIAGNOSIS — E78.49 OTHER HYPERLIPIDEMIA: ICD-10-CM

## 2023-10-20 DIAGNOSIS — D69.6 THROMBOCYTOPENIA: ICD-10-CM

## 2023-10-20 DIAGNOSIS — M81.0 AGE-RELATED OSTEOPOROSIS WITHOUT CURRENT PATHOLOGICAL FRACTURE: ICD-10-CM

## 2023-10-20 PROCEDURE — 99213 OFFICE O/P EST LOW 20 MIN: CPT | Mod: S$PBB,,, | Performed by: INTERNAL MEDICINE

## 2023-10-20 PROCEDURE — 99999 PR PBB SHADOW E&M-EST. PATIENT-LVL III: ICD-10-PCS | Mod: PBBFAC,,, | Performed by: INTERNAL MEDICINE

## 2023-10-20 PROCEDURE — 99999PBSHW FLU VACCINE - QUADRIVALENT - ADJUVANTED: ICD-10-PCS | Mod: PBBFAC,,,

## 2023-10-20 PROCEDURE — 99999 PR PBB SHADOW E&M-EST. PATIENT-LVL III: CPT | Mod: PBBFAC,,, | Performed by: INTERNAL MEDICINE

## 2023-10-20 PROCEDURE — 99213 PR OFFICE/OUTPT VISIT, EST, LEVL III, 20-29 MIN: ICD-10-PCS | Mod: S$PBB,,, | Performed by: INTERNAL MEDICINE

## 2023-10-20 PROCEDURE — G0008 ADMIN INFLUENZA VIRUS VAC: HCPCS | Mod: PBBFAC,PN

## 2023-10-20 PROCEDURE — 99999PBSHW FLU VACCINE - QUADRIVALENT - ADJUVANTED: Mod: PBBFAC,,,

## 2023-10-20 PROCEDURE — 99499 UNLISTED E&M SERVICE: CPT | Mod: S$PBB,,,

## 2023-10-20 PROCEDURE — 99499 NO LOS: ICD-10-PCS | Mod: S$PBB,,,

## 2023-10-20 PROCEDURE — 99213 OFFICE O/P EST LOW 20 MIN: CPT | Mod: PBBFAC,PN | Performed by: INTERNAL MEDICINE

## 2023-10-20 RX ORDER — ESCITALOPRAM OXALATE 20 MG/1
20 TABLET ORAL DAILY
Qty: 90 TABLET | Refills: 3 | Status: SHIPPED | OUTPATIENT
Start: 2023-10-20 | End: 2023-11-08

## 2023-10-20 RX ORDER — ALENDRONATE SODIUM 70 MG/1
70 TABLET ORAL
Qty: 12 TABLET | Refills: 3 | Status: SHIPPED | OUTPATIENT
Start: 2023-10-20 | End: 2024-10-19

## 2023-10-20 NOTE — PROGRESS NOTES
"Individual Psychotherapy ([facility])  Maggie Díaz,  10/20/2023  DATE:  10/20/2023  TYPE OF VISIT:  In person  LENGTH OF SESSION: 60    Therapeutic Intervention: Met with patient for individual psychotherapy.    Chief complaint/reason for encounter: depression, anxiety, somatic- chronic issues with IBS.        Session Content/Presenting Problem:     Patient says she is struggling to deal with stomach issues; says the state of her gut dictates how she feels each day.  Sense of hopelessness in managing symptoms. Says "I am trying to get my head out of my stomach."  LCSW met patient a few months ago; at that time, she denies a need for therapy. She was upbeat, discussed her son's wedding, gardening, etc. At that visit, she was animated and spoke loudly. Today, patient appears distressed and unhappy. She does not want to talk too much about past memories. She says she tries not to dwell on negative memories and thoughts; she tries to push them out of her mind. Patient did talk about her dad's death when she was 9. She and her parents had gone out to a movie. The next day she awakened to learn her dad  of a heart attack later that night. It was shortly after this that she had her first problem with her stomach; says her duodenum was "completely closed." She missed school and lost weight at the time. After dad's death, she and her mom moved frequently across the country. She had to attend different schools at each move. Patient says she felt anger at the time; anger at the universe for doing this to her dad. Patient had few ambitions when young; she spent time "hanging out" with friends in [place]. She met her spouse through a friend when she was bringing her dog to [place]. Her plan was to return to [place] after leaving the dog here. After meeting her  she never left.  Patient eventually started working at the [employer] in the ; she retired in 2017. Says her stomach issues " began around this time.  Patient's dad was 41 when he ; both her mom and mother in law  of cancer.  Patient says she rates her happiness today as a 6/7 out of 10; however, she appears unhappy and agitated. She asks to stop talking about past memories; does not want to touch on difficult subjects more than necessary. LCSW reassured patient that therapy can be focused on the present and the future, and that the past will not be explored more than is comfortable for her. Patient discusses difficulty in accepting her age and current limitations; she appears to have few plans for the future.  Discussed goal setting and focusing on the future. Patient has Goals and Looking Forward Worksheets.    Current symptoms:  Depression: dysphoric mood, hopelessness, and fatigue.  Anxiety: excessive worrying and restlessness.  Insomnia:  denies .  Claritza:  denies.  Psychosis: denies .      Risk parameters:  Patient reports no suicidal ideation  Patient reports no homicidal ideation  Patient reports no self-injurious behavior  Patient reports no violent behavior    MENTAL HEALTH STATUS EXAM  General Appearance:  unremarkable, age appropriate, disheveled   Speech: normal tone, normal rate, normal pitch, normal volume      Level of Cooperation: guarded      Thought Processes: normal and logical   Mood: anxious, depressed      Thought Content: normal, no suicidality, no homicidality, delusions, or paranoia   Affect: anxious   Orientation: Oriented x3   Attention Span & Concentration: intact   Fund of General Knowledge: intact and appropriate to age and level of education   Judgment & Insight: good     Language  intact       STRENGTHS AND LIABILITIES: Strength: Patient is physically healthy., Strength: Patient is stable., Liability: Patient is hesitant to participate in therapy.    IMPRESSION:   My diagnostic impression is Anxiety disorders; anxiety, unspecified [F41.9] and Major Depressive Disorder, Recurrent, Mild (F33.0), as  evidenced by patient report of irritability, worry, difficulty stopping worrisome thoughts, feeling unhappy and hopeless.     TREATMENT GOALS (per patient):  To feel better - suffer with fewer stomach issues; be happier with current life.     Treatment plan:  Target symptoms: recurrent depression, anxiety   Why chosen therapy is appropriate versus another modality: relevant to diagnosis, evidence based practice  Outcome monitoring methods: self-report, observation, checklist/rating scale  Therapeutic intervention type: supportive psychotherapy    Patient's response to intervention:  The patient's response to intervention is guarded.    Progress toward goals and other mental status changes:  The patient's progress toward goals is limited.    Plan: Pt plans to continue individual psychotherapy CBT will be utilized in future individual therapy sessions to increase interaction, insight, and support.     Return to clinic: 2 weeks November 3rd

## 2023-10-23 ENCOUNTER — PATIENT MESSAGE (OUTPATIENT)
Dept: PRIMARY CARE CLINIC | Facility: CLINIC | Age: 73
End: 2023-10-23
Payer: MEDICARE

## 2023-10-31 ENCOUNTER — PATIENT MESSAGE (OUTPATIENT)
Dept: PRIMARY CARE CLINIC | Facility: CLINIC | Age: 73
End: 2023-10-31
Payer: MEDICARE

## 2023-10-31 DIAGNOSIS — F41.9 ANXIETY: ICD-10-CM

## 2023-10-31 RX ORDER — CLONAZEPAM 0.5 MG/1
TABLET ORAL
Qty: 60 TABLET | Refills: 5 | Status: SHIPPED | OUTPATIENT
Start: 2023-10-31

## 2023-11-03 ENCOUNTER — CLINICAL SUPPORT (OUTPATIENT)
Dept: PRIMARY CARE CLINIC | Facility: CLINIC | Age: 73
End: 2023-11-03
Payer: MEDICARE

## 2023-11-03 DIAGNOSIS — F33.41 RECURRENT MAJOR DEPRESSIVE DISORDER, IN PARTIAL REMISSION: Primary | ICD-10-CM

## 2023-11-03 PROCEDURE — 99499 NO LOS: ICD-10-PCS | Mod: S$PBB,,,

## 2023-11-03 PROCEDURE — 99499 UNLISTED E&M SERVICE: CPT | Mod: S$PBB,,,

## 2023-11-03 NOTE — PROGRESS NOTES
"Individual Psychotherapy ([facility])  Maggie Díaz,  11/3/2023  DATE:  11/3/2023  TYPE OF VISIT:  In person  LENGTH OF SESSION: 60    Therapeutic Intervention: Met with patient for individual psychotherapy.    Chief complaint/reason for encounter: depression, anxiety, somatic- chronic issues with IBS.          Session Content/Presenting Problem:     Patient appears agitated and anxious on arrival. She partially filled out her goals work. She says "I'm not an introspective person." She would like to work on accepting that the IBS is a chronic condition that she must learn to live with.  She wants to feel better and have less pain but says "I don't know what that would look like."   She notes that she finds it difficult to remember life before about 5 years ago. Patient reports the onset of her IBS symptoms at about the time she was retiring from working. She is angry with her former hepatologist, Dr. Otoole, for leaving practice. She had to find a new hepatologist who put her on Jarvony (sp?). She believes this is when her stomach problems all started. Noted that patient smiles briefly when talking about kids and dogs.   Patient says her job allowed her to be creative, and it was very rewarding. Discussed her day to day life and where she finds fulfillment and mavis today. Patient says she used to enjoy taking walks with her neighbor, they would walk their dogs together. Patient is very frustrated with her dog, Liliya, at present; the dog is too much for her to handle at times. She denies wanting to resume past creative activities like sewing, knitting and crocheting. She prefers to read a lot, do puzzles and work in her yard. She was very upset by trees in the neighborhood being cut down.  Patient denies any sense of loss from [employer]. She says she also does not miss travel, which used to be important to her.  She denies feelings of social isolation. She does note that when she and her   and for many " "years after, they were very close with his family members here. After her mother in law  the siblings fought over the estate and things have never fully returned to the friendly social relationship they had enjoyed. Her preferred method of stress relief is to take hot baths. She did reach out to her neighbor about resuming their walks.  Patient says she wants to relax, enjoy residential and "learn to love it."     Discussed the possibility of changing her medications per psych recommendations. Administered the Mood Disorder Questionnaire.  Those results do not show evidence of possible Bipolar D/O. Patient will review the book "Gut" and make note of what was important to her in the book. She did not like the other recommended book "Gut-Brain Connection."      Prior Session:  Patient says she is struggling to deal with stomach issues; says the state of her gut dictates how she feels each day.  Sense of hopelessness in managing symptoms. Says "I am trying to get my head out of my stomach."  LCSW met patient a few months ago; at that time, she denies a need for therapy. She was upbeat, discussed her son's wedding, gardening, etc. At that visit, she was animated and spoke loudly. Today, patient appears distressed and unhappy. She does not want to talk too much about past memories. She says she tries not to dwell on negative memories and thoughts; she tries to push them out of her mind. Patient did talk about her dad's death when she was 9. She and her parents had gone out to a movie. The next day she awakened to learn her dad  of a heart attack later that night. It was shortly after this that she had her first problem with her stomach; says her duodenum was "completely closed." She missed school and lost weight at the time. After dad's death, she and her mom moved frequently across the country. She had to attend different schools at each move. Patient says she felt anger at the time; anger at the universe for " "doing this to her dad. Patient had few ambitions when young; she spent time "hanging out" with friends in [place]. She met her spouse through a friend when she was bringing her dog to [place]. Her plan was to return to [place] after leaving the dog here. After meeting her  she never left.  Patient eventually started working at the [employer] in the ; she retired in 2017. Says her stomach issues began around this time.  Patient's dad was 41 when he ; both her mom and mother in law  of cancer.  Patient says she rates her happiness today as a 6/7 out of 10; however, she appears unhappy and agitated. She asks to stop talking about past memories; does not want to touch on difficult subjects more than necessary. LCSW reassured patient that therapy can be focused on the present and the future, and that the past will not be explored more than is comfortable for her. Patient discusses difficulty in accepting her age and current limitations; she appears to have few plans for the future.  Discussed goal setting and focusing on the future. Patient has Goals and Looking Forward Worksheets.    Current symptoms:  Depression: dysphoric mood, hopelessness, and fatigue.  Anxiety: excessive worrying and restlessness.  Insomnia:  denies .  Claritza:  denies.  Psychosis: denies .      Risk parameters:  Patient reports no suicidal ideation  Patient reports no homicidal ideation  Patient reports no self-injurious behavior  Patient reports no violent behavior    MENTAL HEALTH STATUS EXAM  General Appearance:  unremarkable, age appropriate, disheveled   Speech: normal tone, normal rate, normal pitch, normal volume      Level of Cooperation: guarded      Thought Processes: normal and logical   Mood: anxious, depressed      Thought Content: normal, no suicidality, no homicidality, delusions, or paranoia   Affect: anxious   Orientation: Oriented x3   Attention Span & Concentration: intact   Fund of " General Knowledge: intact and appropriate to age and level of education   Judgment & Insight: good     Language  intact       STRENGTHS AND LIABILITIES: Strength: Patient is physically healthy., Strength: Patient is stable., Liability: Patient is hesitant to participate in therapy.    IMPRESSION:   My diagnostic impression is Anxiety disorders; anxiety, unspecified [F41.9] and Major Depressive Disorder, Recurrent, Mild (F33.0), as evidenced by patient report of irritability, worry, difficulty stopping worrisome thoughts, feeling unhappy and hopeless.     TREATMENT GOALS (per patient):  To feel better - suffer with fewer stomach issues; be happier with current life.     Treatment plan:  Target symptoms: recurrent depression, anxiety   Why chosen therapy is appropriate versus another modality: relevant to diagnosis, evidence based practice  Outcome monitoring methods: self-report, observation, checklist/rating scale  Therapeutic intervention type: supportive psychotherapy    Patient's response to intervention:  The patient's response to intervention is guarded.    Progress toward goals and other mental status changes:  The patient's progress toward goals is limited.    Plan: Pt plans to continue individual psychotherapy CBT will be utilized in future individual therapy sessions to increase interaction, insight, and support.     Return to clinic: 3 weeks November 27th

## 2023-11-08 DIAGNOSIS — F33.41 RECURRENT MAJOR DEPRESSIVE DISORDER, IN PARTIAL REMISSION: Primary | ICD-10-CM

## 2023-11-08 RX ORDER — MIRTAZAPINE 30 MG/1
30 TABLET, FILM COATED ORAL NIGHTLY
Qty: 90 TABLET | Refills: 3 | Status: SHIPPED | OUTPATIENT
Start: 2023-11-08 | End: 2024-11-07

## 2023-11-21 ENCOUNTER — PATIENT MESSAGE (OUTPATIENT)
Dept: PRIMARY CARE CLINIC | Facility: CLINIC | Age: 73
End: 2023-11-21
Payer: MEDICARE

## 2023-11-24 ENCOUNTER — LAB VISIT (OUTPATIENT)
Dept: LAB | Facility: HOSPITAL | Age: 73
End: 2023-11-24
Attending: INTERNAL MEDICINE
Payer: MEDICARE

## 2023-11-24 DIAGNOSIS — D69.6 THROMBOCYTOPENIA: ICD-10-CM

## 2023-11-24 LAB
BASOPHILS # BLD AUTO: 0.03 K/UL (ref 0–0.2)
BASOPHILS NFR BLD: 0.8 % (ref 0–1.9)
DIFFERENTIAL METHOD: ABNORMAL
EOSINOPHIL # BLD AUTO: 0.1 K/UL (ref 0–0.5)
EOSINOPHIL NFR BLD: 1.3 % (ref 0–8)
ERYTHROCYTE [DISTWIDTH] IN BLOOD BY AUTOMATED COUNT: 13.2 % (ref 11.5–14.5)
HCT VFR BLD AUTO: 39 % (ref 37–48.5)
HGB BLD-MCNC: 13.2 G/DL (ref 12–16)
IMM GRANULOCYTES # BLD AUTO: 0.01 K/UL (ref 0–0.04)
IMM GRANULOCYTES NFR BLD AUTO: 0.3 % (ref 0–0.5)
LYMPHOCYTES # BLD AUTO: 1.4 K/UL (ref 1–4.8)
LYMPHOCYTES NFR BLD: 35.7 % (ref 18–48)
MCH RBC QN AUTO: 33 PG (ref 27–31)
MCHC RBC AUTO-ENTMCNC: 33.8 G/DL (ref 32–36)
MCV RBC AUTO: 98 FL (ref 82–98)
MONOCYTES # BLD AUTO: 0.4 K/UL (ref 0.3–1)
MONOCYTES NFR BLD: 11 % (ref 4–15)
NEUTROPHILS # BLD AUTO: 2 K/UL (ref 1.8–7.7)
NEUTROPHILS NFR BLD: 50.9 % (ref 38–73)
NRBC BLD-RTO: 0 /100 WBC
PLATELET # BLD AUTO: 124 K/UL (ref 150–450)
PMV BLD AUTO: 10.4 FL (ref 9.2–12.9)
RBC # BLD AUTO: 4 M/UL (ref 4–5.4)
WBC # BLD AUTO: 3.92 K/UL (ref 3.9–12.7)

## 2023-11-24 PROCEDURE — 36415 COLL VENOUS BLD VENIPUNCTURE: CPT | Performed by: INTERNAL MEDICINE

## 2023-11-24 PROCEDURE — 85025 COMPLETE CBC W/AUTO DIFF WBC: CPT | Performed by: INTERNAL MEDICINE

## 2023-11-27 ENCOUNTER — TELEPHONE (OUTPATIENT)
Dept: PRIMARY CARE CLINIC | Facility: CLINIC | Age: 73
End: 2023-11-27
Payer: MEDICARE

## 2023-11-27 ENCOUNTER — CLINICAL SUPPORT (OUTPATIENT)
Dept: PRIMARY CARE CLINIC | Facility: CLINIC | Age: 73
End: 2023-11-27
Payer: MEDICARE

## 2023-11-27 ENCOUNTER — OFFICE VISIT (OUTPATIENT)
Dept: PRIMARY CARE CLINIC | Facility: CLINIC | Age: 73
End: 2023-11-27
Payer: MEDICARE

## 2023-11-27 VITALS
HEART RATE: 74 BPM | SYSTOLIC BLOOD PRESSURE: 118 MMHG | TEMPERATURE: 98 F | BODY MASS INDEX: 19.15 KG/M2 | OXYGEN SATURATION: 100 % | WEIGHT: 126.38 LBS | HEIGHT: 68 IN | DIASTOLIC BLOOD PRESSURE: 72 MMHG

## 2023-11-27 DIAGNOSIS — F41.1 GAD (GENERALIZED ANXIETY DISORDER): ICD-10-CM

## 2023-11-27 DIAGNOSIS — F33.41 RECURRENT MAJOR DEPRESSIVE DISORDER, IN PARTIAL REMISSION: Primary | ICD-10-CM

## 2023-11-27 DIAGNOSIS — K58.1 IRRITABLE BOWEL SYNDROME WITH CONSTIPATION: ICD-10-CM

## 2023-11-27 DIAGNOSIS — F33.41 RECURRENT MAJOR DEPRESSIVE DISORDER, IN PARTIAL REMISSION: ICD-10-CM

## 2023-11-27 DIAGNOSIS — E03.9 ACQUIRED HYPOTHYROIDISM: Primary | ICD-10-CM

## 2023-11-27 PROCEDURE — 99999 PR PBB SHADOW E&M-EST. PATIENT-LVL IV: ICD-10-PCS | Mod: PBBFAC,,, | Performed by: FAMILY MEDICINE

## 2023-11-27 PROCEDURE — 99999 PR PBB SHADOW E&M-EST. PATIENT-LVL IV: CPT | Mod: PBBFAC,,, | Performed by: FAMILY MEDICINE

## 2023-11-27 PROCEDURE — 99214 OFFICE O/P EST MOD 30 MIN: CPT | Mod: S$PBB,,, | Performed by: FAMILY MEDICINE

## 2023-11-27 PROCEDURE — 99214 PR OFFICE/OUTPT VISIT, EST, LEVL IV, 30-39 MIN: ICD-10-PCS | Mod: S$PBB,,, | Performed by: FAMILY MEDICINE

## 2023-11-27 PROCEDURE — 99499 UNLISTED E&M SERVICE: CPT | Mod: S$PBB,,,

## 2023-11-27 PROCEDURE — 99214 OFFICE O/P EST MOD 30 MIN: CPT | Mod: PBBFAC,PN | Performed by: FAMILY MEDICINE

## 2023-11-27 PROCEDURE — 99499 NO LOS: ICD-10-PCS | Mod: S$PBB,,,

## 2023-11-27 NOTE — PROGRESS NOTES
"Individual Psychotherapy ([facility])  Maggie Díaz,  11/27/2023  DATE:  11/27/2023  TYPE OF VISIT:  In person  LENGTH OF SESSION: 45    Therapeutic Intervention: Met with patient for individual psychotherapy.    Chief complaint/reason for encounter: depression, anxiety, somatic- chronic issues with IBS.        Session Content/Presenting Problem:     Met with patient after her MD appointment. Patient says she continues to have good days and bad days. She again appears unhappy and agitated. Says she does not feel that counseling is helping much; says she hoped there would be something sw would say that would make it better.  Discussed her apparent onset of symptoms shortly after retiring. She says "I didn't mean to stop working." She now describes feeling very limited by the IBS; if it's not a good day then she won't do anything. Discussed self limiting thoughts; discussed trying to do anyway despite not feeling well. Patient's medications are being changed; she is hopeful they will help but also says she does not trust the medication. She often does not take the recommended dose because of her mistrust.  Encouraged patient to find things that bring mavis and meaning to her life. Encouraged her to increase her exercise.  Patient is willing to see LCSW at the time of her next MD appointment in January.     Prior Session:   Patient appears agitated and anxious on arrival. She partially filled out her goals work. She says "I'm not an introspective person." She would like to work on accepting that the IBS is a chronic condition that she must learn to live with.  She wants to feel better and have less pain but says "I don't know what that would look like."   She notes that she finds it difficult to remember life before about 5 years ago. Patient reports the onset of her IBS symptoms at about the time she was retiring from working. She is angry with her former hepatologist, Dr. Otoole, for leaving practice. She had to find a " "new hepatologist who put her on Jarelvirany (sp?). She believes this is when her stomach problems all started. Noted that patient smiles briefly when talking about kids and dogs.   Patient says her job allowed her to be creative, and it was very rewarding. Discussed her day to day life and where she finds fulfillment and mavis today. Patient says she used to enjoy taking walks with her neighbor, they would walk their dogs together. Patient is very frustrated with her dog, Liliya, at present; the dog is too much for her to handle at times. She denies wanting to resume past creative activities like sewing, knitting and crocheting. She prefers to read a lot, do puzzles and work in her yard. She was very upset by trees in the neighborhood being cut down.  Patient denies any sense of loss from longterm. She says she also does not miss travel, which used to be important to her.  She denies feelings of social isolation. She does note that when she and her   and for many years after, they were very close with his family members here. After her mother in law  the siblings fought over the estate and things have never fully returned to the friendly social relationship they had enjoyed. Her preferred method of stress relief is to take hot baths. She did reach out to her neighbor about resuming their walks.  Patient says she wants to relax, enjoy longterm and "learn to love it."     Discussed the possibility of changing her medications per psych recommendations. Administered the Mood Disorder Questionnaire.  Those results do not show evidence of possible Bipolar D/O. Patient will review the book "Gut" and make note of what was important to her in the book. She did not like the other recommended book "Gut-Brain Connection."    Current symptoms:  Depression: dysphoric mood, hopelessness, and fatigue.  Anxiety: excessive worrying and restlessness.  Insomnia:  denies .  Claritza:  denies.  Psychosis: denies .      Risk " parameters:  Patient reports no suicidal ideation  Patient reports no homicidal ideation  Patient reports no self-injurious behavior  Patient reports no violent behavior    MENTAL HEALTH STATUS EXAM  General Appearance:  unremarkable, age appropriate, disheveled   Speech: normal tone, normal rate, normal pitch, normal volume      Level of Cooperation: guarded      Thought Processes: normal and logical   Mood: anxious, depressed      Thought Content: normal, no suicidality, no homicidality, delusions, or paranoia   Affect: anxious   Orientation: Oriented x3   Attention Span & Concentration: intact   Fund of General Knowledge: intact and appropriate to age and level of education   Judgment & Insight: good     Language  intact       STRENGTHS AND LIABILITIES: Strength: Patient is physically healthy., Strength: Patient is stable., Liability: Patient is hesitant to participate in therapy.    IMPRESSION:   My diagnostic impression is Anxiety disorders; anxiety, unspecified [F41.9] and Major Depressive Disorder, Recurrent, Mild (F33.0), as evidenced by patient report of irritability, worry, difficulty stopping worrisome thoughts, feeling unhappy and hopeless.     TREATMENT GOALS (per patient):  To feel better - suffer with fewer stomach issues; be happier with current life.     Treatment plan:  Target symptoms: recurrent depression, anxiety   Why chosen therapy is appropriate versus another modality: relevant to diagnosis, evidence based practice  Outcome monitoring methods: self-report, observation, checklist/rating scale  Therapeutic intervention type: supportive psychotherapy    Patient's response to intervention:  The patient's response to intervention is guarded.    Progress toward goals and other mental status changes:  The patient's progress toward goals is limited.    Plan: Pt plans to continue individual psychotherapy CBT will be utilized in future individual therapy sessions to increase interaction, insight, and  support.     Return to clinic: 2 months January 29th

## 2023-11-27 NOTE — PATIENT INSTRUCTIONS
If you are feeling unwell, we'd like to be the first ones to know here at [facility]! Please give us a call. Same day appointments are our top priority to keep you well and out of the emergency rooms and hospitals. Call [phone] for our direct line. After hours advice is always available. Please call [phone] after hours to speak to the on-call team.     Stop the lexapro completely. Start mirtazapine 15 mg nightly; after a week try taking an extra 7.5 mg tablet nightly. The psychiatrist I consulted with suggested stopping Lexapro because of GI side effects, maximizing the mirtazapine (goal is 30 mg nightly). Her other advice, if this was not totally effective, as to add low dose Abilify.

## 2023-11-27 NOTE — PROGRESS NOTES
Subjective:       Patient ID: Maggie Díaz is a 73 y.o. female.    Chief Complaint: Follow-up (6 week f/u)    HPI:     Here for follow up of medical problems. She is still battling SARAHY and lots of IBS symptoms associated. She feels all her symptoms relate to the stomach problems and has less insight into her anxiety. We discussed psych recommendation to stop lexapro and increase mirtazapine with goal of 30 mg per day. She had only been taking 1/2 15 mg tab at the time and went up to 15 mg nightly and did well but stopped the dose after waking with panic attack symptoms on the 3rd day. Weaning off of lexapro slowly. Continues to report good days, bad days with abdominal cramping and diarrhea. Symptoms recently resolved with bone broth, pasta and chicken diet. Weight down just 3#.  No f/c/n/v.  No current abdominal pain.    8/22 CT abd/pelvis negative.  Still taking clonazepam at night; discussed trying to wean this off with increasing QHS mirtazapine.     Walking for exercise, no exertional cp/sob/palp.    No dental issues.  No gum bleeding with brushing teeth.    Updated/ annual due 6/24:  HM: 10/23 fluvax, 1/21 covid vaccines/booster, 8/19 HAV, 3/15 [id number], 2/14 [id number], 7/18 Td, 1/10 TDaP, 8/12 zoster, 2020 Shingrix x2, 10/23 BMD stable/hx fosamax will restart 10/23 rep 2y, 8/18 Cscope rep 10y, 5/23 MMG, 5/18 pelvic u/s neg    Objective:     Vitals:    11/27/23 0841   BP: 118/72   Pulse: 74   Temp: 97.8 °F (36.6 °C)     Wt Readings from Last 3 Encounters:   11/27/23 57.3 kg (126 lb 6.4 oz)   10/20/23 56.7 kg (125 lb)   09/18/23 58.8 kg (129 lb 9.6 oz)     Temp Readings from Last 3 Encounters:   11/27/23 97.8 °F (36.6 °C) (Oral)   10/20/23 97.8 °F (36.6 °C) (Oral)   09/18/23 97.6 °F (36.4 °C) (Oral)     BP Readings from Last 3 Encounters:   11/27/23 118/72   10/20/23 118/72   09/18/23 128/70     Pulse Readings from Last 3 Encounters:   11/27/23 74   10/20/23 64   09/11/23 63          Physical Exam  Vitals  and nursing note reviewed.   Constitutional:       General: She is not in acute distress.     Appearance: She is well-developed. She is not ill-appearing.   HENT:      Head: Normocephalic and atraumatic.      Nose: Nose normal.      Mouth/Throat:      Mouth: Mucous membranes are moist.      Pharynx: No oropharyngeal exudate.   Eyes:      General: No scleral icterus.     Pupils: Pupils are equal, round, and reactive to light.   Cardiovascular:      Rate and Rhythm: Normal rate and regular rhythm.   Pulmonary:      Effort: Pulmonary effort is normal.      Breath sounds: Normal breath sounds.   Abdominal:      General: Bowel sounds are normal.      Palpations: Abdomen is soft. There is no mass.      Tenderness: There is no abdominal tenderness.   Musculoskeletal:         General: No deformity. Normal range of motion.      Cervical back: Normal range of motion and neck supple.   Skin:     General: Skin is warm and dry.   Neurological:      General: No focal deficit present.      Mental Status: She is alert and oriented to person, place, and time. Mental status is at baseline.      Gait: Gait normal.   Psychiatric:         Mood and Affect: Mood normal.         Behavior: Behavior normal.         Thought Content: Thought content normal.           Albumin   Date Value Ref Range Status   06/10/2023 4.4 3.5 - 5.2 g/dL Final     eGFR   Date Value Ref Range Status   06/10/2023 >60 >60 mL/min/1.73 m^2 Final       Assessment:       1. Acquired hypothyroidism    2. Irritable bowel syndrome with constipation    3. Recurrent major depressive disorder, in partial remission    4. SARAHY (generalized anxiety disorder)        Plan:           Problem List Items Addressed This Visit          Psychiatric    Recurrent major depressive disorder, in partial remission    Current Assessment & Plan     Working with psych on med management. She has been compliant with seeing [facility] for therapy         SARAHY (generalized anxiety disorder)    Current  Assessment & Plan     Continue medication management and CBT            Endocrine    Acquired hypothyroidism - Primary    Current Assessment & Plan     Due for 6 month level check; continue levothyroxine and f/u labs         Relevant Orders    TSH    T4, Free       GI    Irritable bowel syndrome with constipation    Current Assessment & Plan     Stop lexapro and titrate up mirtazapine per psych recommendations. Continue dietary modifications  that have been helpful            Lab ([facility]) and virtual visit with hematology ([facility]aya) are scheduled--due in February; f/u visit in 2 months.  Psych recommends adding abilify if symptoms remain after titration of mirtazapine to 30 mg.

## 2023-11-29 PROBLEM — F41.1 GAD (GENERALIZED ANXIETY DISORDER): Status: ACTIVE | Noted: 2023-11-29

## 2023-11-29 NOTE — ASSESSMENT & PLAN NOTE
Working with psych on med management. She has been compliant with seeing [facility] for therapy

## 2023-11-29 NOTE — ASSESSMENT & PLAN NOTE
Stop lexapro and titrate up mirtazapine per psych recommendations. Continue dietary modifications  that have been helpful

## 2023-12-01 ENCOUNTER — PATIENT MESSAGE (OUTPATIENT)
Dept: PRIMARY CARE CLINIC | Facility: CLINIC | Age: 73
End: 2023-12-01
Payer: MEDICARE

## 2023-12-07 ENCOUNTER — PATIENT MESSAGE (OUTPATIENT)
Dept: PRIMARY CARE CLINIC | Facility: CLINIC | Age: 73
End: 2023-12-07
Payer: MEDICARE

## 2023-12-07 RX ORDER — LEVOTHYROXINE SODIUM 50 UG/1
TABLET ORAL
Qty: 90 TABLET | Refills: 1 | Status: SHIPPED | OUTPATIENT
Start: 2023-12-07

## 2023-12-07 NOTE — TELEPHONE ENCOUNTER
Care Due:                  Date            Visit Type   Department     Provider  --------------------------------------------------------------------------------                                SVETA Smith  Last Visit: 10-      PATIENT      None Found     Ata  Next Visit: None Scheduled  None         None Found                                                            Last  Test          Frequency    Reason                     Performed    Due Date  --------------------------------------------------------------------------------    Mg Level....  12 months..  alendronate..............  Not Found    Overdue    Phosphate...  12 months..  alendronate..............  Not Found    Overdue    [employer] Embedded Care Due Messages. Reference number: [id number].   12/07/2023 4:04:12 AM CST

## 2023-12-07 NOTE — TELEPHONE ENCOUNTER
Refill Decision Note   Maggie Díaz  is requesting a refill authorization.  Brief Assessment and Rationale for Refill:  Approve     Medication Therapy Plan:         Comments:     Note composed:3:49 PM 12/07/2023

## 2023-12-11 PROBLEM — Z00.00 ENCOUNTER FOR PREVENTIVE HEALTH EXAMINATION: Status: RESOLVED | Noted: 2022-10-19 | Resolved: 2023-12-11

## 2023-12-19 ENCOUNTER — PATIENT MESSAGE (OUTPATIENT)
Dept: PRIMARY CARE CLINIC | Facility: CLINIC | Age: 73
End: 2023-12-19
Payer: MEDICARE

## 2024-01-29 ENCOUNTER — LAB VISIT (OUTPATIENT)
Dept: LAB | Facility: HOSPITAL | Age: 74
End: 2024-01-29
Payer: MEDICARE

## 2024-01-29 ENCOUNTER — TELEPHONE (OUTPATIENT)
Dept: PRIMARY CARE CLINIC | Facility: CLINIC | Age: 74
End: 2024-01-29
Payer: MEDICARE

## 2024-01-29 ENCOUNTER — OFFICE VISIT (OUTPATIENT)
Dept: PRIMARY CARE CLINIC | Facility: CLINIC | Age: 74
End: 2024-01-29
Payer: MEDICARE

## 2024-01-29 VITALS
HEART RATE: 70 BPM | SYSTOLIC BLOOD PRESSURE: 140 MMHG | WEIGHT: 125.25 LBS | HEIGHT: 68 IN | OXYGEN SATURATION: 99 % | BODY MASS INDEX: 18.98 KG/M2 | TEMPERATURE: 98 F | DIASTOLIC BLOOD PRESSURE: 64 MMHG

## 2024-01-29 DIAGNOSIS — D70.8 OTHER NEUTROPENIA: ICD-10-CM

## 2024-01-29 DIAGNOSIS — K58.0 IRRITABLE BOWEL SYNDROME WITH DIARRHEA: ICD-10-CM

## 2024-01-29 DIAGNOSIS — R10.13 EPIGASTRIC PAIN: ICD-10-CM

## 2024-01-29 DIAGNOSIS — I70.0 AORTIC ATHEROSCLEROSIS: ICD-10-CM

## 2024-01-29 DIAGNOSIS — R10.13 EPIGASTRIC PAIN: Primary | ICD-10-CM

## 2024-01-29 DIAGNOSIS — D69.6 THROMBOCYTOPENIA: ICD-10-CM

## 2024-01-29 DIAGNOSIS — F33.41 RECURRENT MAJOR DEPRESSIVE DISORDER, IN PARTIAL REMISSION: ICD-10-CM

## 2024-01-29 PROCEDURE — 82705 FATS/LIPIDS FECES QUAL: CPT | Performed by: FAMILY MEDICINE

## 2024-01-29 PROCEDURE — 83993 ASSAY FOR CALPROTECTIN FECAL: CPT | Performed by: FAMILY MEDICINE

## 2024-01-29 PROCEDURE — 99214 OFFICE O/P EST MOD 30 MIN: CPT | Mod: PBBFAC,PN | Performed by: FAMILY MEDICINE

## 2024-01-29 PROCEDURE — 99214 OFFICE O/P EST MOD 30 MIN: CPT | Mod: S$PBB,,, | Performed by: FAMILY MEDICINE

## 2024-01-29 PROCEDURE — 82653 EL-1 FECAL QUANTITATIVE: CPT | Performed by: FAMILY MEDICINE

## 2024-01-29 PROCEDURE — 99999 PR PBB SHADOW E&M-EST. PATIENT-LVL IV: CPT | Mod: PBBFAC,,, | Performed by: FAMILY MEDICINE

## 2024-01-29 NOTE — TELEPHONE ENCOUNTER
Patient was unable to stay for LCSW appt today due to not feeling well. Called and spoke with her; rescheduled LCSW appt to follow her next MD appt on 4/1/2024.

## 2024-01-29 NOTE — PATIENT INSTRUCTIONS
If you are feeling unwell, we'd like to be the first ones to know here at [facility]! Please give us a call. Same day appointments are our top priority to keep you well and out of the emergency rooms and hospitals. Call [phone] for our direct line. After hours advice is always available. Please call [phone] after hours to speak to the on-call team.

## 2024-01-29 NOTE — PROGRESS NOTES
Subjective:       Patient ID: Maggie Díaz is a 73 y.o. female.    Chief Complaint: Follow-up (2 month f.u)    HPI:     Here for follow up of medical problems. Follows gluten-free diet and drinks lactose free milk. She is still battling SARAHY and lots of IBS symptoms associated. She feels all her symptoms relate to the stomach problems and has less insight into her anxiety. At our last visit we discussed psych recommendation to stop lexapro and increase mirtazapine with goal of 30 mg per day. She has just gotten up to the 30 mg dose; next recommendation was to add Abilify, which she is reluctant to do right now. She is distressed today because she woke around 4 am with stomach pain which was relieved by a bowel movement. She was able to return to sleep. No f/c/n/v.  No current abdominal pain.    8/22 CT abd/pelvis negative.  Still taking clonazepam at night; discussed trying to wean this off with increasing QHS mirtazapine.     Walking for exercise, no exertional cp/sob/palp.    No dental issues.  No gum bleeding with brushing teeth.    Updated/ annual due 6/24:  HM: 10/23 fluvax, 1/21 covid vaccines/booster, 8/19 HAV, 3/15 exoloa39, 2/14 [id number], 7/18 Td, 1/10 TDaP, 8/12 zoster, 2020 Shingrix x2, 10/23 BMD stable/hx fosamax will restart 10/23 rep 2y, 8/18 Cscope rep 10y, 5/23 MMG, 5/18 pelvic u/s neg    Health Maintenance Due   Topic Date Due    RSV Vaccine (Age 60+ and Pregnant patients) (1 - 1-dose 60+ series) Never done       Objective:     Vitals:    01/29/24 0819   BP: (!) 140/64   Pulse: 70   Temp: 98.1 °F (36.7 °C)     Wt Readings from Last 3 Encounters:   01/29/24 56.8 kg (125 lb 3.5 oz)   11/27/23 57.3 kg (126 lb 6.4 oz)   10/20/23 56.7 kg (125 lb)     Temp Readings from Last 3 Encounters:   01/29/24 98.1 °F (36.7 °C) (Oral)   11/27/23 97.8 °F (36.6 °C) (Oral)   10/20/23 97.8 °F (36.6 °C) (Oral)     BP Readings from Last 3 Encounters:   01/29/24 (!) 140/64   11/27/23 118/72   10/20/23 118/72     Pulse  Readings from Last 3 Encounters:   01/29/24 70   11/27/23 74   10/20/23 64          Physical Exam  Vitals and nursing note reviewed.   Constitutional:       Appearance: She is well-developed.   HENT:      Head: Normocephalic and atraumatic.   Eyes:      Pupils: Pupils are equal, round, and reactive to light.   Cardiovascular:      Rate and Rhythm: Normal rate and regular rhythm.   Pulmonary:      Effort: Pulmonary effort is normal.      Breath sounds: Normal breath sounds.   Musculoskeletal:         General: No deformity. Normal range of motion.      Cervical back: Normal range of motion and neck supple.   Skin:     General: Skin is warm and dry.   Neurological:      Mental Status: She is alert and oriented to person, place, and time.   Psychiatric:         Attention and Perception: She does not perceive auditory or visual hallucinations.         Mood and Affect: Mood is anxious. Affect is not blunt or angry.         Speech: Speech is rapid and pressured.         Behavior: Behavior is hyperactive.         Thought Content: Thought content is not paranoid. Thought content does not include homicidal or suicidal ideation.         Cognition and Memory: Cognition is not impaired. Memory is not impaired.           Albumin   Date Value Ref Range Status   06/10/2023 4.4 3.5 - 5.2 g/dL Final     eGFR   Date Value Ref Range Status   06/10/2023 >60 >60 mL/min/1.73 m^2 Final           Assessment:       1. Epigastric pain    2. Other neutropenia    3. Thrombocytopenia    4. Recurrent major depressive disorder, in partial remission    5. Aortic atherosclerosis    6. Irritable bowel syndrome with diarrhea        Plan:           Problem List Items Addressed This Visit          Psychiatric    Recurrent major depressive disorder, in partial remission    Current Assessment & Plan     Continue mirtazapine 30 mg; add abilify if symptoms are inadequately improved; continue meetings with LCSW             Cardiac/Vascular    Aortic  atherosclerosis    Overview     4/15/19 CT Renal Stone Study- There is aortoiliac atherosclerosis.               Hematology    Thrombocytopenia    Current Assessment & Plan     F/u with heme onc scheduled this month            Oncology    Other neutropenia    Current Assessment & Plan     Lab Results   Component Value Date    WBC 3.92 11/24/2023    HGB 13.2 11/24/2023    HCT 39.0 11/24/2023    MCV 98 11/24/2023     (L) 11/24/2023     F/u with heme/onc scheduled this month          Other Visit Diagnoses       Epigastric pain    -  Primary    Relevant Orders    H. PYLORI ANTIBODY, IGG    Pancreatic elastase, fecal (Completed)    Fecal fat, qualitative (Completed)    Calprotectin, Stool (Completed)    Sedimentation rate    Irritable bowel syndrome with diarrhea        Relevant Orders    H. PYLORI ANTIBODY, IGG    Pancreatic elastase, fecal (Completed)    Fecal fat, qualitative (Completed)    Calprotectin, Stool (Completed)

## 2024-01-30 ENCOUNTER — PATIENT MESSAGE (OUTPATIENT)
Dept: HEMATOLOGY/ONCOLOGY | Facility: CLINIC | Age: 74
End: 2024-01-30
Payer: MEDICARE

## 2024-01-31 ENCOUNTER — PATIENT MESSAGE (OUTPATIENT)
Dept: PRIMARY CARE CLINIC | Facility: CLINIC | Age: 74
End: 2024-01-31
Payer: MEDICARE

## 2024-01-31 DIAGNOSIS — D69.6 THROMBOCYTOPENIA: Primary | ICD-10-CM

## 2024-02-01 LAB
CALPROTECTIN STL-MCNT: 40.8 MCG/G
ELASTASE 1, FECAL: 416 MCG/G
FAT STL QL: NORMAL
NEUTRAL FAT STL QL: NORMAL

## 2024-02-05 ENCOUNTER — PATIENT MESSAGE (OUTPATIENT)
Dept: PRIMARY CARE CLINIC | Facility: CLINIC | Age: 74
End: 2024-02-05
Payer: MEDICARE

## 2024-02-05 NOTE — ASSESSMENT & PLAN NOTE
Continue mirtazapine 30 mg; add abilify if symptoms are inadequately improved; continue meetings with LCSW

## 2024-02-05 NOTE — ASSESSMENT & PLAN NOTE
Lab Results   Component Value Date    WBC 3.92 11/24/2023    HGB 13.2 11/24/2023    HCT 39.0 11/24/2023    MCV 98 11/24/2023     (L) 11/24/2023       F/u with heme/onc scheduled this month

## 2024-02-13 ENCOUNTER — PATIENT MESSAGE (OUTPATIENT)
Dept: PRIMARY CARE CLINIC | Facility: CLINIC | Age: 74
End: 2024-02-13
Payer: MEDICARE

## 2024-02-14 ENCOUNTER — PATIENT MESSAGE (OUTPATIENT)
Dept: PRIMARY CARE CLINIC | Facility: CLINIC | Age: 74
End: 2024-02-14
Payer: MEDICARE

## 2024-02-14 DIAGNOSIS — R10.84 GENERALIZED ABDOMINAL PAIN: Primary | ICD-10-CM

## 2024-02-15 ENCOUNTER — PATIENT MESSAGE (OUTPATIENT)
Dept: PRIMARY CARE CLINIC | Facility: CLINIC | Age: 74
End: 2024-02-15
Payer: MEDICARE

## 2024-02-15 DIAGNOSIS — N90.89 VULVAR MASS: Primary | ICD-10-CM

## 2024-02-19 ENCOUNTER — PATIENT MESSAGE (OUTPATIENT)
Dept: PRIMARY CARE CLINIC | Facility: CLINIC | Age: 74
End: 2024-02-19

## 2024-02-19 ENCOUNTER — OFFICE VISIT (OUTPATIENT)
Dept: PRIMARY CARE CLINIC | Facility: CLINIC | Age: 74
End: 2024-02-19
Payer: MEDICARE

## 2024-02-19 ENCOUNTER — PATIENT MESSAGE (OUTPATIENT)
Dept: HEMATOLOGY/ONCOLOGY | Facility: CLINIC | Age: 74
End: 2024-02-19
Payer: MEDICARE

## 2024-02-19 VITALS
WEIGHT: 125.25 LBS | HEART RATE: 65 BPM | DIASTOLIC BLOOD PRESSURE: 68 MMHG | BODY MASS INDEX: 18.98 KG/M2 | HEIGHT: 68 IN | OXYGEN SATURATION: 99 % | SYSTOLIC BLOOD PRESSURE: 130 MMHG | TEMPERATURE: 98 F

## 2024-02-19 DIAGNOSIS — N75.1 BARTHOLIN'S GLAND ABSCESS: Primary | ICD-10-CM

## 2024-02-19 PROCEDURE — 99999 PR PBB SHADOW E&M-EST. PATIENT-LVL IV: CPT | Mod: PBBFAC,,, | Performed by: NURSE PRACTITIONER

## 2024-02-19 PROCEDURE — 99213 OFFICE O/P EST LOW 20 MIN: CPT | Mod: S$PBB,,, | Performed by: NURSE PRACTITIONER

## 2024-02-19 PROCEDURE — 99214 OFFICE O/P EST MOD 30 MIN: CPT | Mod: PBBFAC,PN | Performed by: NURSE PRACTITIONER

## 2024-02-19 RX ORDER — DOXYCYCLINE 100 MG/1
100 CAPSULE ORAL 2 TIMES DAILY
Qty: 14 CAPSULE | Refills: 0 | Status: SHIPPED | OUTPATIENT
Start: 2024-02-19 | End: 2024-02-26

## 2024-02-19 RX ORDER — METRONIDAZOLE 500 MG/1
500 TABLET ORAL 3 TIMES DAILY
Qty: 21 TABLET | Refills: 0 | Status: SHIPPED | OUTPATIENT
Start: 2024-02-19 | End: 2024-02-26

## 2024-02-19 NOTE — PROGRESS NOTES
"Maggie Díaz  2024  [id number]    Adeola Berumen MD  Patient Care Team:  Adeola Berumen MD as PCP - General (Internal Medicine)  Edouard Munson MD as Consulting Physician (Dermatology)  Joce Nur LPN as Care Coordinator    Worry Score: 2    [facility]      Chief Complaint:  Chief Complaint   Patient presents with    Abscess         Assessment/Plan:  1. Bartholin's gland abscess  -     doxycycline (VIBRAMYCIN) 100 MG Cap; Take 1 capsule (100 mg total) by mouth 2 (two) times daily. for 7 days  Dispense: 14 capsule; Refill: 0  -     metroNIDAZOLE (FLAGYL) 500 MG tablet; Take 1 tablet (500 mg total) by mouth 3 (three) times daily. for 7 days  Dispense: 21 tablet; Refill: 0    Continue warm, moist soaks    Start the antibiotic today    Keep the scheduled appointment with Gynecology      Okay to take Tylenol as needed for pain        History of Present Illness:    Ms. Díaz returns with c/o tender lesion to left labia x 5 days. Initially, felt a "pea size" area which has slightly enlarged. She denies any drainage, fever, chills or other constitutional symptoms. She has a scheduled appointment in 3 days with GYN.        The following were reviewed: Active problem list, medication list, allergies, family history, social history, and Health Maintenance.     History:  Past Medical History:   Diagnosis Date    Anxiety     Benzodiazepine dependence 2020    Hematuria, microscopic     negative CT    Hepatitis C     slow progression/ Dr. Otoole    Hypertension     Migraines     OP (osteoporosis)     on drug holiday.    Osteoarthritis     Radius fracture     Radius fracture     Restless legs syndrome (RLS)     Thrombocytopenia 2019    Vitamin D deficiency disease      Past Surgical History:   Procedure Laterality Date     SECTION, CLASSIC  1982    COLONOSCOPY N/A 2018    Procedure: COLONOSCOPY;  Surgeon: Jose Royal MD;  Location: [facility];  Service: " Endoscopy;  Laterality: N/A;    WRIST FRACTURE SURGERY      7/18, Dr. Bunch.     Family History   Adopted: Yes   Family history unknown: Yes     Patient Active Problem List   Diagnosis    Vitamin D deficiency disease    History of hepatitis C    Age-related osteoporosis without current pathological fracture    Restless legs syndrome (RLS)    Allergic rhinitis    Acquired hypothyroidism    Thrombocytopenia    Aortic atherosclerosis    Anxiety    Weight loss    Other neutropenia    Recurrent major depressive disorder, in partial remission    Constipation    Underweight    Irritable bowel syndrome with constipation    Fever    Other hyperlipidemia    Hordeolum externum of right upper eyelid    SARAHY (generalized anxiety disorder)     Review of patient's allergies indicates:   Allergen Reactions    Bactrim [sulfamethoxazole-trimethoprim]      hallucination    Latex, natural rubber Swelling    Penicillins Shortness Of Breath       Medications:  Current Outpatient Medications on File Prior to Visit   Medication Sig Dispense Refill    alendronate (FOSAMAX) 70 MG tablet Take 1 tablet (70 mg total) by mouth every 7 days. Take first thing in the morning with glass of water.  Sit upright for 30minutes.  Then ok to take other meds and eat normally. 12 tablet 3    atorvastatin (LIPITOR) 40 MG tablet Take 1 tablet (40 mg total) by mouth once daily. 90 tablet 3    cholecalciferol, vitamin D3, (VITAMIN D3) 50 mcg (2,000 unit) Cap Take 1 capsule by mouth once daily.      clonazePAM (KLONOPIN) 0.5 MG tablet TAKE 1 TO 2 TABLET BY MOUTH EVERY EVENING AS NEEDED 60 tablet 5    fluticasone propionate (FLONASE) 50 mcg/actuation nasal spray 2 sprays (100 mcg total) by Each Nostril route once daily. 48 g 3    levothyroxine (SYNTHROID) 50 MCG tablet TAKE 1 TABLET BY MOUTH EVERY DAY 90 tablet 1    mirtazapine (REMERON) 30 MG tablet Take 1 tablet (30 mg total) by mouth every evening. 90 tablet 3     No current facility-administered medications on  file prior to visit.       Medications have been reviewed and reconciled with patient at visit today.      Exam:  Vitals:    02/19/24 0849   BP: 130/68   Pulse: 65   Temp: 97.8 °F (36.6 °C)     Weight: 56.8 kg (125 lb 3.5 oz)   Body mass index is 19.04 kg/m².      BP Readings from Last 3 Encounters:   02/19/24 130/68   01/29/24 (!) 140/64   11/27/23 118/72     Wt Readings from Last 3 Encounters:   02/19/24 0849 56.8 kg (125 lb 3.5 oz)   01/29/24 0819 56.8 kg (125 lb 3.5 oz)   11/27/23 0841 57.3 kg (126 lb 6.4 oz)        REVIEW OF SYSTEMS  Review of Systems   Constitutional:  Negative for chills, fatigue and fever.   HENT: Negative.     Respiratory:  Negative for cough and shortness of breath.    Cardiovascular:  Negative for chest pain.   Gastrointestinal:  Negative for nausea and vomiting.   Genitourinary:         Labial pain   Skin:  Positive for wound.   Neurological: Negative.         Physical Exam  Vitals reviewed.   Constitutional:       General: She is not in acute distress.     Appearance: Normal appearance.   HENT:      Head: Normocephalic and atraumatic.   Eyes:      General: No scleral icterus.     Conjunctiva/sclera: Conjunctivae normal.   Cardiovascular:      Rate and Rhythm: Regular rhythm.   Pulmonary:      Effort: Pulmonary effort is normal. No respiratory distress.   Genitourinary:      Musculoskeletal:      Right lower leg: No edema.      Left lower leg: No edema.   Skin:     General: Skin is warm and dry.   Neurological:      Mental Status: She is alert and oriented to person, place, and time. Mental status is at baseline.   Psychiatric:         Mood and Affect: Mood normal.         Thought Content: Thought content normal.          Laboratory Reviewed:     Lab Results   Component Value Date    WBC 3.92 11/24/2023    HGB 13.2 11/24/2023    HCT 39.0 11/24/2023     (L) 11/24/2023    CHOL 153 06/10/2023    TRIG 67 06/10/2023    HDL 76 (H) 06/10/2023    ALT 16 06/10/2023    AST 26 06/10/2023     " 06/10/2023    K 4.8 06/10/2023     06/10/2023    CREATININE 0.9 06/10/2023    BUN 12 06/10/2023    CO2 27 06/10/2023    TSH 1.419 06/10/2023    INR 1.1 03/14/2016       Screening or Prevention Patient's value Goal Complete/Controlled?   HgA1C Testing and Control   No results found for: "HGBA1C"   Annually/Less than 8% No   Lipid profile : 06/10/2023 Annually Yes   LDL control Lab Results   Component Value Date    LDLCALC 63.6 06/10/2023    Annually/Less than 100 mg/dl  Yes   Nephropathy screening No results found for: "LABMICR"  Lab Results   Component Value Date    PROTEINUA 1+ (A) 04/08/2019    Annually No   Blood pressure BP Readings from Last 1 Encounters:   02/19/24 130/68    Less than 140/90 Yes   Dilated retinal exam Most Recent Eye Exam Date: Not Found Annually Yes   Foot exam   Most Recent Foot Exam Date: Not Found Annually Yes       Health Maintenance  Health Maintenance Topics with due status: Not Due       Topic Last Completion Date    TETANUS VACCINE 07/18/2018    Colorectal Cancer Screening 08/22/2018    Lipid Panel 06/10/2023    DEXA Scan 10/17/2023    High Dose Statin 02/19/2024     Health Maintenance Due   Topic Date Due    RSV Vaccine (Age 60+ and Pregnant patients) (1 - 1-dose 60+ series) Never done               -Patient's lab results were reviewed and discussed with patient  -Treatment options and alternatives were discussed with the patient. Patient expressed understanding. Patient was given the opportunity to ask questions and be an active participant in their medical care. Patient had no further questions or concerns at this time.         Future Appointments   Date Time Provider Department Center   2/22/2024 10:15 AM Malathi Powell, NP [facility] [place]   3/7/2024  4:00 PM Corrie Minaya, NP [facility] [place]   3/11/2024 10:00 AM Chuck Collins, OD [facility] [place]   3/14/2024  9:30 AM Candace Agee, NP [facility] [place]   4/1/2024  9:00 AM Pasquale, " Lucy SPIVEY MD [facility]          After visit summary printed and given to patient upon discharge.  Patient goals and care plan are included in After visit summary.      The following issues were discussed: The encounter diagnosis was Bartholin's gland abscess.    Health maintenance needs, recent test results and goals of care discussed with pt and questions answered.           NIKHIL Rooney, NP-C  [facility] [address] [place] [address]

## 2024-02-19 NOTE — PATIENT INSTRUCTIONS
Continue warm, moist soaks    Start the antibiotic today    Keep the scheduled appointment with Gynecology      Okay to take Tylenol as needed for pain

## 2024-02-21 ENCOUNTER — PATIENT MESSAGE (OUTPATIENT)
Dept: PRIMARY CARE CLINIC | Facility: CLINIC | Age: 74
End: 2024-02-21
Payer: MEDICARE

## 2024-02-22 ENCOUNTER — OFFICE VISIT (OUTPATIENT)
Dept: OBSTETRICS AND GYNECOLOGY | Facility: CLINIC | Age: 74
End: 2024-02-22
Payer: MEDICARE

## 2024-02-22 VITALS
BODY MASS INDEX: 18.33 KG/M2 | DIASTOLIC BLOOD PRESSURE: 72 MMHG | HEIGHT: 68 IN | SYSTOLIC BLOOD PRESSURE: 118 MMHG | WEIGHT: 120.94 LBS

## 2024-02-22 DIAGNOSIS — N90.89 VULVAR MASS: ICD-10-CM

## 2024-02-22 DIAGNOSIS — N76.4 LEFT GENITAL LABIAL ABSCESS: Primary | ICD-10-CM

## 2024-02-22 PROCEDURE — 99214 OFFICE O/P EST MOD 30 MIN: CPT | Mod: PBBFAC,PN | Performed by: NURSE PRACTITIONER

## 2024-02-22 PROCEDURE — 99999 PR PBB SHADOW E&M-EST. PATIENT-LVL IV: CPT | Mod: PBBFAC,,, | Performed by: NURSE PRACTITIONER

## 2024-02-22 PROCEDURE — 99214 OFFICE O/P EST MOD 30 MIN: CPT | Mod: S$PBB,,, | Performed by: NURSE PRACTITIONER

## 2024-02-22 RX ORDER — CIPROFLOXACIN 500 MG/1
500 TABLET ORAL 2 TIMES DAILY
Qty: 14 TABLET | Refills: 0 | Status: SHIPPED | OUTPATIENT
Start: 2024-02-22 | End: 2024-02-29

## 2024-02-22 RX ORDER — MUPIROCIN 20 MG/G
OINTMENT TOPICAL 3 TIMES DAILY
Qty: 22 G | Refills: 1 | Status: SHIPPED | OUTPATIENT
Start: 2024-02-22 | End: 2024-03-14

## 2024-02-22 NOTE — PROGRESS NOTES
"Maggie Díaz is a 73 y.o. female  presents with complaint of abscess to vaginal area for about 9 days.  [facility] at [facility] and started on antibiotics but they did not agree with her so she is currently off of them  Was doing warms soaks in tub and it started to drain so stopped the warm soaks.   Pt has sulfa and penicillin allergy     Past Medical History:   Diagnosis Date    Anxiety     Benzodiazepine dependence 2020    Hematuria, microscopic     negative CT    Hepatitis C     slow progression/ Dr. Otoole    Hypertension     Migraines     OP (osteoporosis)     on drug holiday.    Osteoarthritis     Radius fracture     Radius fracture     Restless legs syndrome (RLS)     Thrombocytopenia 2019    Vitamin D deficiency disease      Past Surgical History:   Procedure Laterality Date     SECTION, CLASSIC      COLONOSCOPY N/A 2018    Procedure: COLONOSCOPY;  Surgeon: Jose Royal MD;  Location: [facility];  Service: Endoscopy;  Laterality: N/A;    WRIST FRACTURE SURGERY      , Dr. Bunch.     Social History     Tobacco Use    Smoking status: Former     Current packs/day: 0.00     Average packs/day: 0.5 packs/day for 10.0 years (5.0 ttl pk-yrs)     Types: Cigarettes     Start date: 3/3/1969     Quit date: 3/3/1979     Years since quittin.0    Smokeless tobacco: Never   Substance Use Topics    Alcohol use: Not Currently     Alcohol/week: 0.0 standard drinks of alcohol     Comment: occasionally    Drug use: No     Family History   Adopted: Yes   Family history unknown: Yes     OB History    Para Term  AB Living   6 3 3   3 3   SAB IAB Ectopic Multiple Live Births   3       3      # Outcome Date GA Lbr Evelio/2nd Weight Sex Delivery Anes PTL Lv   6 SAB            5 SAB            4 SAB            3 Term            2 Term            1 Term                /72 (BP Location: Left arm, Patient Position: Sitting, BP Method: Small (Manual))   Ht 5' 8" (1.727 m)   Wt " 54.9 kg (120 lb 14.8 oz)   BMI 18.39 kg/m²     ROS:  Per hpi  PHYSICAL EXAM:  VULVA: left mid labial abscess - hard with out fluid noted, tender appears had some drainage but not at this time    Physical Exam     ASSESSMENT and PLAN:  1. Left genital labial abscess  ciprofloxacin HCl (CIPRO) 500 MG tablet    mupirocin (BACTROBAN) 2 % ointment      2. Vulvar mass  Ambulatory referral/consult to Gynecology          Diagnoses and all orders for this visit:    Left genital labial abscess  -     ciprofloxacin HCl (CIPRO) 500 MG tablet; Take 1 tablet (500 mg total) by mouth 2 (two) times daily. for 7 days  -     mupirocin (BACTROBAN) 2 % ointment; Apply topically 3 (three) times daily.    Vulvar mass  -     Ambulatory referral/consult to Gynecology      Do not feel I&D is appropriate at this time as is just hardened nodule and has started to drain  Encouraged pt that warm soaks is good for the abscess and want her to rt doing them at least 3 x day  Apply bactroban after soaking and at bedtime  Will try cipro - if pt has intolerance she can reach out to me   See back in 1 week for recheck        Answers submitted by the patient for this visit:  Mass Questionnaire (Submitted on 2/21/2024)  Chief Complaint: Pulmonary/Chest Tumor/Mass  Chronicity: new  Onset: in the past 7 days  Frequency: intermittently  Progression since onset: rapidly worsening  abdominal pain: Yes  anorexia: No  change in bowel habit: No  chest pain: No  chills: No  congestion: No  cough: No  diaphoresis: No  fatigue: No  fever: No  headaches: No  myalgias: No  nausea: No  neck pain: No  numbness: No  rash: No  swollen glands: No  urinary symptoms: No  vertigo: No  visual change: No  vomiting: No  weakness: No  Aggravated by: nothing  treatments tried: acetaminophen, heat

## 2024-02-24 ENCOUNTER — PATIENT MESSAGE (OUTPATIENT)
Dept: PRIMARY CARE CLINIC | Facility: CLINIC | Age: 74
End: 2024-02-24
Payer: MEDICARE

## 2024-02-26 ENCOUNTER — TELEPHONE (OUTPATIENT)
Dept: PRIMARY CARE CLINIC | Facility: CLINIC | Age: 74
End: 2024-02-26
Payer: MEDICARE

## 2024-02-26 ENCOUNTER — PATIENT MESSAGE (OUTPATIENT)
Dept: PRIMARY CARE CLINIC | Facility: CLINIC | Age: 74
End: 2024-02-26

## 2024-02-26 ENCOUNTER — OFFICE VISIT (OUTPATIENT)
Dept: PRIMARY CARE CLINIC | Facility: CLINIC | Age: 74
End: 2024-02-26
Payer: MEDICARE

## 2024-02-26 ENCOUNTER — HOSPITAL ENCOUNTER (OUTPATIENT)
Dept: RADIOLOGY | Facility: HOSPITAL | Age: 74
Discharge: HOME | End: 2024-02-26
Attending: NURSE PRACTITIONER
Payer: MEDICARE

## 2024-02-26 DIAGNOSIS — K59.00 CONSTIPATION, UNSPECIFIED CONSTIPATION TYPE: ICD-10-CM

## 2024-02-26 DIAGNOSIS — R10.84 GENERALIZED ABDOMINAL PAIN: ICD-10-CM

## 2024-02-26 DIAGNOSIS — K58.1 IRRITABLE BOWEL SYNDROME WITH CONSTIPATION: ICD-10-CM

## 2024-02-26 DIAGNOSIS — K58.1 IRRITABLE BOWEL SYNDROME WITH CONSTIPATION: Primary | ICD-10-CM

## 2024-02-26 PROBLEM — R10.9 ABDOMINAL PAIN: Status: ACTIVE | Noted: 2024-02-26

## 2024-02-26 PROCEDURE — 99215 OFFICE O/P EST HI 40 MIN: CPT | Mod: 95,,, | Performed by: NURSE PRACTITIONER

## 2024-02-26 PROCEDURE — 74018 RADEX ABDOMEN 1 VIEW: CPT | Mod: 26,,, | Performed by: RADIOLOGY

## 2024-02-26 PROCEDURE — 74018 RADEX ABDOMEN 1 VIEW: CPT | Mod: TC

## 2024-02-26 NOTE — TELEPHONE ENCOUNTER
Spoke with Patient offered her an appointment today with Sweta, pt said she would call back , she wants to see Dr. Berumen. Pt was reminded that  is only in the office on Wed afternoon, Thursdays and Fridays

## 2024-02-26 NOTE — PROGRESS NOTES
Primary Care Telemedicine Appointment      The patient location is:  Patient Home   The chief complaint leading to consultation is: abdominal pain  Total time spent on medical decision making was 20 min.    Visit type: Virtual visit with synchronous audio only and video  Each patient to whom he or she provides medical services by telemedicine is:  (1) informed of the relationship between the physician and patient and the respective role of any other health care provider with respect to management of the patient; and (2) notified that he or she may decline to receive medical services by telemedicine and may withdraw from such care at any time.      Subjective:      Patient ID: Maggie Díaz is a 73 y.o. female     Chief Complaint: Abdominal Pain    Prior to this visit, patient's last encounter with PCP was 2024.    Ms. Díaz visits virtually with reports of abdominal cramping, bloating and constipation. She does have hx of IBS with constipation. She denies any nausea/vomiting. Did have small BM yesterday - straining denied. She describes pressure sensation in the rectum.  Pt associated the cramping with the ABX she took for the labial abscess. She denies fever, chills, urinary symptoms, flank pain, diarrhea.     Past Surgical History:   Procedure Laterality Date     SECTION, CLASSIC      COLONOSCOPY N/A 2018    Procedure: COLONOSCOPY;  Surgeon: Jose Royal MD;  Location: [facility];  Service: Endoscopy;  Laterality: N/A;    WRIST FRACTURE SURGERY      , Dr. Bunch.       Past Medical History:   Diagnosis Date    Anxiety     Benzodiazepine dependence 2020    Hematuria, microscopic     negative CT    Hepatitis C     slow progression/ Dr. Otoole    Hypertension     Migraines     OP (osteoporosis)     on drug holiday.    Osteoarthritis     Radius fracture     Radius fracture     Restless legs syndrome (RLS)     Thrombocytopenia 2019    Vitamin D deficiency disease        Review of  Systems   Constitutional:  Negative for chills, fatigue and fever.   HENT:  Negative for congestion, postnasal drip and sore throat.    Respiratory:  Negative for cough and shortness of breath.    Cardiovascular:  Negative for chest pain.   Gastrointestinal:  Positive for abdominal distention, abdominal pain and constipation. Negative for diarrhea, nausea and vomiting.        Rectal pressure   Genitourinary:  Negative for dysuria and frequency.   Musculoskeletal:  Negative for arthralgias and myalgias.   Skin:  Negative for rash and wound.   Neurological:  Negative for dizziness, weakness, light-headedness and headaches.   Hematological:  Negative for adenopathy.   Psychiatric/Behavioral:  Negative for dysphoric mood. The patient is not nervous/anxious.        Objective:   There were no vitals filed for this visit.      There is no height or weight on file to calculate BMI.  BP Readings from Last 3 Encounters:   02/22/24 118/72   02/19/24 130/68   01/29/24 (!) 140/64      Wt Readings from Last 3 Encounters:   02/22/24 1004 54.9 kg (120 lb 14.8 oz)   02/19/24 0849 56.8 kg (125 lb 3.5 oz)   01/29/24 0819 56.8 kg (125 lb 3.5 oz)        Physical Exam  Constitutional:       General: She is not in acute distress.     Appearance: She is not ill-appearing.   HENT:      Head: Normocephalic.   Eyes:      Conjunctiva/sclera: Conjunctivae normal.   Pulmonary:      Effort: Pulmonary effort is normal. No respiratory distress.      Breath sounds: No stridor. No wheezing.   Abdominal:      Tenderness: There is no abdominal tenderness.   Musculoskeletal:         General: No signs of injury.   Skin:     Coloration: Skin is not jaundiced or pale.   Neurological:      Mental Status: She is alert and oriented to person, place, and time.         Lab Results   Component Value Date    WBC 3.92 11/24/2023    HGB 13.2 11/24/2023    HCT 39.0 11/24/2023     (L) 11/24/2023    CHOL 153 06/10/2023    TRIG 67 06/10/2023    HDL 76 (H)  06/10/2023    ALT 16 06/10/2023    AST 26 06/10/2023     06/10/2023    K 4.8 06/10/2023     06/10/2023    CREATININE 0.9 06/10/2023    BUN 12 06/10/2023    CO2 27 06/10/2023    TSH 1.419 06/10/2023    INR 1.1 03/14/2016         Assessment:       Plan:     Problem List Items Addressed This Visit       Abdominal pain    Constipation     Pt with hx of IBS with alternating diarrhea and constipation.  Reviewed abdominal xray - appears large volume stool in right abdomen  Mag Citrate then Miralax  F/U with  PCP regarding abdominal cramping         Irritable bowel syndrome with constipation - Primary    Relevant Orders    X-Ray Abdomen AP 1 View (Completed)       Health Maintenance         Date Due Completion Date    RSV Vaccine (Age 60+ and Pregnant patients) (1 - 1-dose 60+ series) Never done ---    High Dose Statin 02/22/2025 2/22/2024    DEXA Scan 10/17/2025 10/17/2023    Override on 1/3/2013: Done (Osteoporosis; stable BMD; chk vit D; repeat in 2 years; fosamax holiday)    Lipid Panel 06/10/2028 6/10/2023    TETANUS VACCINE 07/18/2028 7/18/2018    Colorectal Cancer Screening 08/22/2028 8/22/2018    Override on 1/27/2017: Declined (Pt currently refuseing per PCP note 1/21/17)            1. Irritable bowel syndrome with constipation  -     X-Ray Abdomen AP 1 View; Future; Expected date: 02/26/2024    2. Constipation, unspecified constipation type  Assessment & Plan:  Pt with hx of IBS with alternating diarrhea and constipation.  Reviewed abdominal xray - appears large volume stool in right abdomen  Mag Citrate then Miralax  F/U with  PCP regarding abdominal cramping      3. Generalized abdominal pain                      Sweta Powers, APRN, NP-C  [facility] [address] [place] [address] [phone] ph  [phone] fax

## 2024-02-27 ENCOUNTER — PATIENT MESSAGE (OUTPATIENT)
Dept: PRIMARY CARE CLINIC | Facility: CLINIC | Age: 74
End: 2024-02-27
Payer: MEDICARE

## 2024-02-27 DIAGNOSIS — R10.84 GENERALIZED ABDOMINAL PAIN: Primary | ICD-10-CM

## 2024-02-27 RX ORDER — DICYCLOMINE HYDROCHLORIDE 10 MG/1
10 CAPSULE ORAL 3 TIMES DAILY PRN
Qty: 42 CAPSULE | Refills: 0 | Status: SHIPPED | OUTPATIENT
Start: 2024-02-27 | End: 2024-03-28

## 2024-02-27 NOTE — ASSESSMENT & PLAN NOTE
Pt with hx of IBS with alternating diarrhea and constipation.  Reviewed abdominal xray - appears large volume stool in right abdomen  Mag Citrate then Miralax  F/U with  PCP regarding abdominal cramping

## 2024-02-28 ENCOUNTER — PATIENT MESSAGE (OUTPATIENT)
Dept: PRIMARY CARE CLINIC | Facility: CLINIC | Age: 74
End: 2024-02-28
Payer: MEDICARE

## 2024-02-29 ENCOUNTER — OFFICE VISIT (OUTPATIENT)
Dept: OBSTETRICS AND GYNECOLOGY | Facility: CLINIC | Age: 74
End: 2024-02-29
Payer: MEDICARE

## 2024-02-29 ENCOUNTER — TELEPHONE (OUTPATIENT)
Dept: PRIMARY CARE CLINIC | Facility: CLINIC | Age: 74
End: 2024-02-29
Payer: MEDICARE

## 2024-02-29 VITALS
DIASTOLIC BLOOD PRESSURE: 78 MMHG | BODY MASS INDEX: 18.12 KG/M2 | WEIGHT: 119.19 LBS | SYSTOLIC BLOOD PRESSURE: 114 MMHG

## 2024-02-29 DIAGNOSIS — N76.4 LEFT GENITAL LABIAL ABSCESS: Primary | ICD-10-CM

## 2024-02-29 PROCEDURE — 99999 PR PBB SHADOW E&M-EST. PATIENT-LVL III: CPT | Mod: PBBFAC,,, | Performed by: NURSE PRACTITIONER

## 2024-02-29 PROCEDURE — 99213 OFFICE O/P EST LOW 20 MIN: CPT | Mod: PBBFAC,PN | Performed by: NURSE PRACTITIONER

## 2024-02-29 PROCEDURE — 99213 OFFICE O/P EST LOW 20 MIN: CPT | Mod: S$PBB,,, | Performed by: NURSE PRACTITIONER

## 2024-02-29 NOTE — PROGRESS NOTES
Maggie Díaz is a 73 y.o. female  presents for recheck of left labial abscess.  Pt was only able to take 2 days of the cipro then stopped due to stomach issues.  Pt states abscess has almost cleared.       Past Medical History:   Diagnosis Date    Anxiety     Benzodiazepine dependence 2020    Hematuria, microscopic     negative CT    Hepatitis C     slow progression/ Dr. Otoole    Hypertension     Migraines     OP (osteoporosis)     on drug holiday.    Osteoarthritis     Radius fracture     Radius fracture     Restless legs syndrome (RLS)     Thrombocytopenia 2019    Vitamin D deficiency disease      Past Surgical History:   Procedure Laterality Date     SECTION, CLASSIC      COLONOSCOPY N/A 2018    Procedure: COLONOSCOPY;  Surgeon: Jose Royal MD;  Location: [facility];  Service: Endoscopy;  Laterality: N/A;    WRIST FRACTURE SURGERY      , Dr. Bunch.     Social History     Tobacco Use    Smoking status: Former     Current packs/day: 0.00     Average packs/day: 0.5 packs/day for 10.0 years (5.0 ttl pk-yrs)     Types: Cigarettes     Start date: 3/3/1969     Quit date: 3/3/1979     Years since quittin.0    Smokeless tobacco: Never   Substance Use Topics    Alcohol use: Not Currently     Alcohol/week: 0.0 standard drinks of alcohol     Comment: occasionally    Drug use: No     Family History   Adopted: Yes   Family history unknown: Yes     OB History    Para Term  AB Living   6 3 3   3 3   SAB IAB Ectopic Multiple Live Births   3       3      # Outcome Date GA Lbr Evelio/2nd Weight Sex Delivery Anes PTL Lv   6 SAB            5 SAB            4 SAB            3 Term            2 Term            1 Term                /78   Wt 54.1 kg (119 lb 2.5 oz)   BMI 18.12 kg/m²     ROS:  Per hpi    PHYSICAL EXAM:  VULVA: left labial abscess has decreased in size -still with induration which may not resolve completely and pt is aware    Physical Exam     ASSESSMENT  and PLAN:  1. Left genital labial abscess            Maggie was seen today for abscess and follow-up.    Diagnoses and all orders for this visit:    Left genital labial abscess     Can soak in tub few times a week  Can keep applying bactroban   No recheck needed at this time

## 2024-02-29 NOTE — TELEPHONE ENCOUNTER
PC with pt    No abdominal pain lately.  No more diarrhea.  Thinks Mirtaz is doing well for her.  SM

## 2024-03-04 ENCOUNTER — LAB VISIT (OUTPATIENT)
Dept: LAB | Facility: HOSPITAL | Age: 74
End: 2024-03-04
Attending: NURSE PRACTITIONER
Payer: MEDICARE

## 2024-03-04 DIAGNOSIS — D69.6 THROMBOCYTOPENIA: ICD-10-CM

## 2024-03-04 LAB
ALBUMIN SERPL BCP-MCNC: 4.7 G/DL (ref 3.5–5.2)
ALP SERPL-CCNC: 34 U/L (ref 55–135)
ALT SERPL W/O P-5'-P-CCNC: 23 U/L (ref 10–44)
ANION GAP SERPL CALC-SCNC: 6 MMOL/L (ref 8–16)
AST SERPL-CCNC: 27 U/L (ref 10–40)
BASOPHILS # BLD AUTO: 0.03 K/UL (ref 0–0.2)
BASOPHILS NFR BLD: 0.7 % (ref 0–1.9)
BILIRUB SERPL-MCNC: 0.6 MG/DL (ref 0.1–1)
BUN SERPL-MCNC: 10 MG/DL (ref 8–23)
CALCIUM SERPL-MCNC: 9.7 MG/DL (ref 8.7–10.5)
CHLORIDE SERPL-SCNC: 99 MMOL/L (ref 95–110)
CO2 SERPL-SCNC: 28 MMOL/L (ref 23–29)
CREAT SERPL-MCNC: 0.9 MG/DL (ref 0.5–1.4)
DIFFERENTIAL METHOD BLD: ABNORMAL
EOSINOPHIL # BLD AUTO: 0 K/UL (ref 0–0.5)
EOSINOPHIL NFR BLD: 0.4 % (ref 0–8)
ERYTHROCYTE [DISTWIDTH] IN BLOOD BY AUTOMATED COUNT: 12.7 % (ref 11.5–14.5)
EST. GFR  (NO RACE VARIABLE): >60 ML/MIN/1.73 M^2
GLUCOSE SERPL-MCNC: 94 MG/DL (ref 70–110)
HCT VFR BLD AUTO: 39.3 % (ref 37–48.5)
HGB BLD-MCNC: 13.6 G/DL (ref 12–16)
IMM GRANULOCYTES # BLD AUTO: 0.01 K/UL (ref 0–0.04)
IMM GRANULOCYTES NFR BLD AUTO: 0.2 % (ref 0–0.5)
LYMPHOCYTES # BLD AUTO: 1.2 K/UL (ref 1–4.8)
LYMPHOCYTES NFR BLD: 25.6 % (ref 18–48)
MCH RBC QN AUTO: 33.2 PG (ref 27–31)
MCHC RBC AUTO-ENTMCNC: 34.6 G/DL (ref 32–36)
MCV RBC AUTO: 96 FL (ref 82–98)
MONOCYTES # BLD AUTO: 0.5 K/UL (ref 0.3–1)
MONOCYTES NFR BLD: 10 % (ref 4–15)
NEUTROPHILS # BLD AUTO: 2.8 K/UL (ref 1.8–7.7)
NEUTROPHILS NFR BLD: 63.1 % (ref 38–73)
NRBC BLD-RTO: 0 /100 WBC
PLATELET # BLD AUTO: 121 K/UL (ref 150–450)
PMV BLD AUTO: 9 FL (ref 9.2–12.9)
POTASSIUM SERPL-SCNC: 5 MMOL/L (ref 3.5–5.1)
PROT SERPL-MCNC: 7.8 G/DL (ref 6–8.4)
RBC # BLD AUTO: 4.1 M/UL (ref 4–5.4)
SODIUM SERPL-SCNC: 133 MMOL/L (ref 136–145)
WBC # BLD AUTO: 4.5 K/UL (ref 3.9–12.7)

## 2024-03-04 PROCEDURE — 36415 COLL VENOUS BLD VENIPUNCTURE: CPT | Performed by: NURSE PRACTITIONER

## 2024-03-04 PROCEDURE — 80053 COMPREHEN METABOLIC PANEL: CPT | Performed by: NURSE PRACTITIONER

## 2024-03-04 PROCEDURE — 85025 COMPLETE CBC W/AUTO DIFF WBC: CPT | Performed by: NURSE PRACTITIONER

## 2024-03-06 ENCOUNTER — TELEPHONE (OUTPATIENT)
Dept: HEMATOLOGY/ONCOLOGY | Facility: CLINIC | Age: 74
End: 2024-03-06
Payer: MEDICARE

## 2024-03-06 NOTE — TELEPHONE ENCOUNTER
N/a nurse spoke with pt in regards to sooner available appts, nurse advised the pt to call back with any questions or concerns.

## 2024-03-07 ENCOUNTER — OFFICE VISIT (OUTPATIENT)
Dept: HEMATOLOGY/ONCOLOGY | Facility: CLINIC | Age: 74
End: 2024-03-07
Payer: MEDICARE

## 2024-03-07 DIAGNOSIS — D69.6 THROMBOCYTOPENIA: Primary | ICD-10-CM

## 2024-03-07 DIAGNOSIS — Z86.19 HISTORY OF HEPATITIS C: ICD-10-CM

## 2024-03-07 PROCEDURE — 99214 OFFICE O/P EST MOD 30 MIN: CPT | Mod: 95,,, | Performed by: NURSE PRACTITIONER

## 2024-03-07 NOTE — PROGRESS NOTES
The patient location is: home  The chief complaint leading to consultation is: no complaints    Visit type: audiovisual    Face to Face time with patient: 15  30 minutes of total time spent on the encounter, which includes face to face time and non-face to face time preparing to see the patient (eg, review of tests), Obtaining and/or reviewing separately obtained history, Documenting clinical information in the electronic or other health record, Independently interpreting results (not separately reported) and communicating results to the patient/family/caregiver, or Care coordination (not separately reported).     Each patient to whom he or she provides medical services by telemedicine is:  (1) informed of the relationship between the physician and patient and the respective role of any other health care provider with respect to management of the patient; and (2) notified that he or she may decline to receive medical services by telemedicine and may withdraw from such care at any time.    Patient ID: Maggie Díaz is a 73 y.o. female.    Chief Complaint: no complaints    HPI:  73 year old female who presents today for further evaluation and management of thrombocytopenia.  Thrombocytopenia has been off/on since 2018 ranging from 124-149K.     Other diagnosis include RLS, anxiety, aortic atherosclerosis, vitamin D deficiency, hypothyroidism, history of Hep C, osteoporosis without fracture, and allergic rhinitis. History of Hep C - treated and cleared per patient.        Former cigarette smoker- quit in 1979.  Smoked for 41 years- smoked 1/2 pack per day  8/2019 Low dose CT chest negative for malignancy  04/2022 mammogram - negative for malignancy  8/2018 colonoscopy - negative with recs to repeat in 10 years  4/2019 PAP negative for malignancy     Alcohol use socially on occasion  Smoke marijuana on weekends - edibles     Has lost 9 lbs unintentionally in the past 6 months due to digestive issues.  Diagnosed with  IBS with constipation.  States that she has gotten her appetite back.      2022 CT abdomen pelvis without contrast Impression:No acute abnormality in the abdomen or pelvis     Fm hx of cancer - states that she is adopted      Interval History:     2022 Present today for continued monitoring of thrombocytopenia - Labs from  2022 platelets 135K stable.  On of stable neutropenia for many years.      Interval History:  2023 States that she fell last Friday walking her dog.  Has stitches to her chin and soreness around rib area.  Was evaluated in the ED and found with no fractures or breaks.  Labs continue to remain stable.       Interval History:  2023 Patient present today for continued monitoring of thrombocytopenia/leukopenia.  No complaints of abnormal bleeding.  Differential WBC in normal ranges.    Interval History:  3/7/2024  Currently with platelet count of 121 K.  Denies any known abnormal bleeding,  WBC and RBC normal.  Normal differential     I have reviewed all of the patient's relevant lab work available in the medical record and have utilized this in my evaluation and management recommendations today.      Social History     Socioeconomic History    Marital status:    Occupational History     Employer: [employer]   Tobacco Use    Smoking status: Former     Current packs/day: 0.00     Average packs/day: 0.5 packs/day for 10.0 years (5.0 ttl pk-yrs)     Types: Cigarettes     Start date: 3/3/1969     Quit date: 3/3/1979     Years since quittin.0    Smokeless tobacco: Never   Substance and Sexual Activity    Alcohol use: Not Currently     Alcohol/week: 0.0 standard drinks of alcohol     Comment: occasionally    Drug use: No    Sexual activity: Not Currently     Partners: Male   Social History Narrative    Live with      Social Determinants of Health     Financial Resource Strain: Low Risk  (2023)    Overall Financial Resource Strain (CARDIA)     Difficulty  of Paying Living Expenses: Not hard at all   Food Insecurity: No Food Insecurity (2023)    Hunger Vital Sign     Worried About Running Out of Food in the Last Year: Never true     Ran Out of Food in the Last Year: Never true   Transportation Needs: No Transportation Needs (2023)    PRAPARE - Transportation     Lack of Transportation (Medical): No     Lack of Transportation (Non-Medical): No   Physical Activity: Sufficiently Active (2023)    Exercise Vital Sign     Days of Exercise per Week: 5 days     Minutes of Exercise per Session: 60 min   Stress: Stress Concern Present (2023)    Citizen of [place] [employer]ational [employer] - Occupational Stress Questionnaire     Feeling of Stress : To some extent   Social Connections: Moderately Isolated (2023)    Social Connection and Isolation Panel [NHANES]     Frequency of Communication with Friends and Family: More than three times a week     Frequency of Social Gatherings with Friends and Family: Twice a week     Attends Cheondoism Services: Never     Active Member of Clubs or Organizations: No     Attends Club or Organization Meetings: Never     Marital Status:    Housing Stability: Low Risk  (2023)    Housing Stability Vital Sign     Unable to Pay for Housing in the Last Year: No     Number of Places Lived in the Last Year: 1     Unstable Housing in the Last Year: No       Family History   Adopted: Yes   Family history unknown: Yes       Past Surgical History:   Procedure Laterality Date     SECTION, CLASSIC      COLONOSCOPY N/A 2018    Procedure: COLONOSCOPY;  Surgeon: Jose Royal MD;  Location: [facility];  Service: Endoscopy;  Laterality: N/A;    WRIST FRACTURE SURGERY      , Dr. Bunch.       Past Medical History:   Diagnosis Date    Anxiety     Benzodiazepine dependence 2020    Hematuria, microscopic     negative CT    Hepatitis C     slow progression/ Dr. Otoole    Hypertension     Migraines     OP (osteoporosis)      on drug holiday.    Osteoarthritis     Radius fracture     Radius fracture     Restless legs syndrome (RLS)     Thrombocytopenia 2/22/2019    Vitamin D deficiency disease        Review of Systems   Constitutional: Negative.    HENT: Negative.     Eyes: Negative.    Respiratory: Negative.     Cardiovascular: Negative.    Gastrointestinal: Negative.    Endocrine: Negative.    Genitourinary: Negative.    Musculoskeletal: Negative.    Skin: Negative.    Allergic/Immunologic: Negative.    Neurological: Negative.    Hematological: Negative.    Psychiatric/Behavioral: Negative.            Medication List with Changes/Refills   Current Medications    ALENDRONATE (FOSAMAX) 70 MG TABLET    Take 1 tablet (70 mg total) by mouth every 7 days. Take first thing in the morning with glass of water.  Sit upright for 30minutes.  Then ok to take other meds and eat normally.    ATORVASTATIN (LIPITOR) 40 MG TABLET    Take 1 tablet (40 mg total) by mouth once daily.    CHOLECALCIFEROL, VITAMIN D3, (VITAMIN D3) 50 MCG (2,000 UNIT) CAP    Take 1 capsule by mouth once daily.    CLONAZEPAM (KLONOPIN) 0.5 MG TABLET    TAKE 1 TO 2 TABLET BY MOUTH EVERY EVENING AS NEEDED    DICYCLOMINE (BENTYL) 10 MG CAPSULE    Take 1 capsule (10 mg total) by mouth 3 (three) times daily as needed (abdominal cramping).    LEVOTHYROXINE (SYNTHROID) 50 MCG TABLET    TAKE 1 TABLET BY MOUTH EVERY DAY    MIRTAZAPINE (REMERON) 30 MG TABLET    Take 1 tablet (30 mg total) by mouth every evening.    MUPIROCIN (BACTROBAN) 2 % OINTMENT    Apply topically 3 (three) times daily.        Objective:   There were no vitals filed for this visit.    Physical Exam  Constitutional:       Appearance: Normal appearance.   Pulmonary:      Effort: Pulmonary effort is normal.   Neurological:      Mental Status: She is alert and oriented to person, place, and time.   Psychiatric:         Mood and Affect: Mood normal.         Behavior: Behavior normal.         Thought Content: Thought  "content normal.         Judgment: Judgment normal.         Assessment:     Problem List Items Addressed This Visit          Hematology    Thrombocytopenia - Primary    Relevant Orders    CBC Auto Differential    Comprehensive Metabolic Panel       GI    History of hepatitis C    Relevant Orders    Comprehensive Metabolic Panel       Lab Results   Component Value Date    WBC 4.50 03/04/2024    RBC 4.10 03/04/2024    HGB 13.6 03/04/2024    HCT 39.3 03/04/2024    MCV 96 03/04/2024    MCH 33.2 (H) 03/04/2024    MCHC 34.6 03/04/2024    RDW 12.7 03/04/2024     (L) 03/04/2024    MPV 9.0 (L) 03/04/2024    GRAN 2.8 03/04/2024    GRAN 63.1 03/04/2024    LYMPH 1.2 03/04/2024    LYMPH 25.6 03/04/2024    MONO 0.5 03/04/2024    MONO 10.0 03/04/2024    EOS 0.0 03/04/2024    BASO 0.03 03/04/2024    EOSINOPHIL 0.4 03/04/2024    BASOPHIL 0.7 03/04/2024      Lab Results   Component Value Date     (L) 03/04/2024    K 5.0 03/04/2024    CL 99 03/04/2024    CO2 28 03/04/2024    BUN 10 03/04/2024    CREATININE 0.9 03/04/2024    CALCIUM 9.7 03/04/2024    ANIONGAP 6 (L) 03/04/2024    ESTGFRAFRICA >60 05/19/2022    EGFRNONAA >60 05/19/2022     Lab Results   Component Value Date    ALT 23 03/04/2024    AST 27 03/04/2024    GGT 10 04/18/2016    ALKPHOS 34 (L) 03/04/2024    BILITOT 0.6 03/04/2024     No results found for: "IRON", "TRANSFERRIN", "TIBC", "FESATURATED", "FERRITIN"     Plan:   Thrombocytopenia  -     CBC Auto Differential; Future; Expected date: 03/07/2024  -     Comprehensive Metabolic Panel; Future; Expected date: 03/07/2024    History of hepatitis C  -     Comprehensive Metabolic Panel; Future; Expected date: 03/07/2024      Med Onc Chart Routing      Follow up with physician    Follow up with KODI 6 months. VV   Infusion scheduling note   n/a   Injection scheduling note n/a   Labs   Scheduling:  Preferred lab: [facility]  Lab interval:  cbc, cmp prior to f/u   Imaging   N/a   Pharmacy appointment No pharmacy " appointment needed      Other referrals       No additional referrals needed  n/a              Collaborating Provider:  Dr. Saw Becerra    Thank You,  Vicki Minaya, WILLIAMP-C  Benign Hematology

## 2024-03-11 ENCOUNTER — TELEPHONE (OUTPATIENT)
Dept: OPHTHALMOLOGY | Facility: CLINIC | Age: 74
End: 2024-03-11

## 2024-03-11 ENCOUNTER — OFFICE VISIT (OUTPATIENT)
Dept: OPHTHALMOLOGY | Facility: CLINIC | Age: 74
End: 2024-03-11
Payer: MEDICARE

## 2024-03-11 DIAGNOSIS — H52.13 MYOPIA WITH ASTIGMATISM AND PRESBYOPIA, BILATERAL: ICD-10-CM

## 2024-03-11 DIAGNOSIS — H02.9 EYELID LESION: Primary | ICD-10-CM

## 2024-03-11 DIAGNOSIS — H52.4 MYOPIA WITH ASTIGMATISM AND PRESBYOPIA, BILATERAL: ICD-10-CM

## 2024-03-11 DIAGNOSIS — H52.203 MYOPIA WITH ASTIGMATISM AND PRESBYOPIA, BILATERAL: ICD-10-CM

## 2024-03-11 DIAGNOSIS — H25.813 COMBINED FORM OF AGE-RELATED CATARACT, BOTH EYES: ICD-10-CM

## 2024-03-11 PROCEDURE — 92014 COMPRE OPH EXAM EST PT 1/>: CPT | Mod: S$PBB,,, | Performed by: OPTOMETRIST

## 2024-03-11 PROCEDURE — 99213 OFFICE O/P EST LOW 20 MIN: CPT | Mod: PBBFAC | Performed by: OPTOMETRIST

## 2024-03-11 PROCEDURE — 99999 PR PBB SHADOW E&M-EST. PATIENT-LVL III: CPT | Mod: PBBFAC,,, | Performed by: OPTOMETRIST

## 2024-03-11 NOTE — TELEPHONE ENCOUNTER
Called pt to let her know, candido will call by thurs or Friday this week to get her scheduled. SRB      ----- Message from Yanira Baeza sent at 3/11/2024  3:50 PM CDT -----  Collins referred for cataract cons  [phone]

## 2024-03-11 NOTE — Clinical Note
Please call to schedule patient for CE/IOL OD>OS.  Referral in [facility].  Thanks, DT

## 2024-03-11 NOTE — PROGRESS NOTES
HPI     Annual Exam            Comments: Patient here today for yearly eye exam          Comments    Vision changes since last eye exam?: Yes at distance and near  No correction      Any eye pain today: No    Other ocular symptoms: Bump LLL been present for about 1 month    Interested in contact lens fitting today? No    1. NSC OU            Last edited by Renetta Westfall, PCT on 3/11/2024 10:07 AM.            Assessment /Plan     For exam results, see Encounter Report.    Eyelid lesion left lower lid  Continue warm compress with lid scrubs BID OU. Will f/u with Dr Sorto for eval and removal if indicated.     Combined form of age-related cataract, both eyes  -     Ambulatory referral/consult to Ophthalmology; Future; Expected date: 03/18/2024  OD>OS visually significant, RTC with Dr. JACKSON for CE/IOL.    Myopia with astigmatism and presbyopia, bilateral  Eyeglass Final Rx       Eyeglass Final Rx         Sphere Cylinder Axis Add    Right -2.00 +1.25 180 +2.50    Left -0.50 +1.00 005 +2.50      Type: PAL    Expiration Date: 3/11/2025                      RTC with Dr. Sorto for CE/IOL, sooner if changes to vision or worsening symptoms.  Discussed above and answered questions.

## 2024-03-12 ENCOUNTER — PATIENT MESSAGE (OUTPATIENT)
Dept: PRIMARY CARE CLINIC | Facility: CLINIC | Age: 74
End: 2024-03-12
Payer: MEDICARE

## 2024-03-12 DIAGNOSIS — J30.1 SEASONAL ALLERGIC RHINITIS DUE TO POLLEN: Primary | ICD-10-CM

## 2024-03-12 DIAGNOSIS — J30.1 SEASONAL ALLERGIC RHINITIS DUE TO POLLEN: ICD-10-CM

## 2024-03-12 RX ORDER — FLUTICASONE PROPIONATE 50 MCG
2 SPRAY, SUSPENSION (ML) NASAL DAILY
Qty: 16 G | Refills: 1 | Status: SHIPPED | OUTPATIENT
Start: 2024-03-12 | End: 2024-03-13

## 2024-03-13 DIAGNOSIS — I70.0 AORTIC ATHEROSCLEROSIS: ICD-10-CM

## 2024-03-13 RX ORDER — FLUTICASONE PROPIONATE 50 MCG
SPRAY, SUSPENSION (ML) NASAL
Qty: 48 G | Refills: 11 | Status: SHIPPED | OUTPATIENT
Start: 2024-03-13

## 2024-03-13 RX ORDER — ATORVASTATIN CALCIUM 40 MG/1
40 TABLET, FILM COATED ORAL
Qty: 90 TABLET | Refills: 3 | Status: SHIPPED | OUTPATIENT
Start: 2024-03-13

## 2024-03-14 ENCOUNTER — OFFICE VISIT (OUTPATIENT)
Dept: GASTROENTEROLOGY | Facility: CLINIC | Age: 74
End: 2024-03-14
Payer: MEDICARE

## 2024-03-14 VITALS
HEART RATE: 79 BPM | WEIGHT: 119.5 LBS | BODY MASS INDEX: 18.11 KG/M2 | DIASTOLIC BLOOD PRESSURE: 79 MMHG | HEIGHT: 68 IN | SYSTOLIC BLOOD PRESSURE: 128 MMHG

## 2024-03-14 DIAGNOSIS — K64.9 HEMORRHOIDS, UNSPECIFIED HEMORRHOID TYPE: ICD-10-CM

## 2024-03-14 DIAGNOSIS — K62.89 RECTAL PAIN: Primary | ICD-10-CM

## 2024-03-14 DIAGNOSIS — R10.84 GENERALIZED ABDOMINAL PAIN: ICD-10-CM

## 2024-03-14 PROCEDURE — 99999 PR PBB SHADOW E&M-EST. PATIENT-LVL V: CPT | Mod: PBBFAC,,, | Performed by: NURSE PRACTITIONER

## 2024-03-14 PROCEDURE — 99215 OFFICE O/P EST HI 40 MIN: CPT | Mod: PBBFAC | Performed by: NURSE PRACTITIONER

## 2024-03-14 PROCEDURE — 99213 OFFICE O/P EST LOW 20 MIN: CPT | Mod: S$PBB,,, | Performed by: NURSE PRACTITIONER

## 2024-03-14 RX ORDER — HYDROCORTISONE 25 MG/G
CREAM TOPICAL 2 TIMES DAILY
Qty: 30 G | Refills: 0 | Status: SHIPPED | OUTPATIENT
Start: 2024-03-14 | End: 2024-03-14

## 2024-03-14 NOTE — PROGRESS NOTES
Clinic Follow Up:  [facility] Follow Up Note    Reason for Follow Up:  The primary encounter diagnosis was Rectal pain. Diagnoses of Generalized abdominal pain and Hemorrhoids, unspecified hemorrhoid type were also pertinent to this visit.    PCP: Adeola Berumen   [id number] / [place] [id number]    HPI:  This is a 73 y.o. female here for follow up of the above.   She has bowel movements daily after drinking coffee. She does take Miralax every day and fiber supplement.   She does get abdominal cramping and gas. She takes Bentyl PRN and it does help.   She has also been having rectal pain. This has also been there for a while but is intermittent. She gets this pain after a bowel movement. This can happen after soft or hard stools. Not hematochezia or melena. She had hemorrhoids on previous scope. Has been using hydrocortisone cream daily for a while now.     Has tried Linzess and Amitiza in the past which were too strong.    Colonoscopy in 2018- hemorrhoids. Tics in sigmoid and descending colon.   CT abdomen pelvis in 2022 that was normal.   Xray abdomen (2023) with constipation  Xray abdomen (2/2024)- normal stool burden       Review of Systems   Constitutional:  Negative for activity change and appetite change.        As per interval history above   Respiratory:  Negative for cough and shortness of breath.    Cardiovascular:  Negative for chest pain.   Gastrointestinal:  Positive for abdominal pain, constipation and rectal pain. Negative for anal bleeding, blood in stool, diarrhea, nausea and vomiting.   Skin:  Negative for color change and rash.       Medical History:  Past Medical History:   Diagnosis Date    Anxiety     Benzodiazepine dependence 11/18/2020    Hematuria, microscopic     negative CT    Hepatitis C     slow progression/ Dr. Otoole    Hypertension     Migraines     OP (osteoporosis)     on drug holiday.    Osteoarthritis     Radius fracture     Radius  fracture     Restless legs syndrome (RLS)     Thrombocytopenia 2019    Vitamin D deficiency disease        Surgical History:   Past Surgical History:   Procedure Laterality Date     SECTION, CLASSIC      COLONOSCOPY N/A 2018    Procedure: COLONOSCOPY;  Surgeon: Jose Royal MD;  Location: [facility];  Service: Endoscopy;  Laterality: N/A;    WRIST FRACTURE SURGERY      , Dr. Bunch.       Family History:   Family History   Adopted: Yes   Family history unknown: Yes       Social History:   Social History     Tobacco Use    Smoking status: Former     Current packs/day: 0.00     Average packs/day: 0.5 packs/day for 10.0 years (5.0 ttl pk-yrs)     Types: Cigarettes     Start date: 3/3/1969     Quit date: 3/3/1979     Years since quittin.0    Smokeless tobacco: Never   Substance Use Topics    Alcohol use: Not Currently     Alcohol/week: 0.0 standard drinks of alcohol     Comment: occasionally    Drug use: No       Allergies:   Review of patient's allergies indicates:   Allergen Reactions    Bactrim [sulfamethoxazole-trimethoprim]      hallucination    Latex, natural rubber Swelling    Penicillins Shortness Of Breath       Home Medications:  Current Outpatient Medications on File Prior to Visit   Medication Sig Dispense Refill    alendronate (FOSAMAX) 70 MG tablet Take 1 tablet (70 mg total) by mouth every 7 days. Take first thing in the morning with glass of water.  Sit upright for 30minutes.  Then ok to take other meds and eat normally. 12 tablet 3    atorvastatin (LIPITOR) 40 MG tablet TAKE ONE TABLET BY MOUTH ONE TIME DAILY 90 tablet 3    cholecalciferol, vitamin D3, (VITAMIN D3) 50 mcg (2,000 unit) Cap Take 1 capsule by mouth once daily.      clonazePAM (KLONOPIN) 0.5 MG tablet TAKE 1 TO 2 TABLET BY MOUTH EVERY EVENING AS NEEDED 60 tablet 5    dicyclomine (BENTYL) 10 MG capsule Take 1 capsule (10 mg total) by mouth 3 (three) times daily as needed (abdominal cramping). 42 capsule 0     "fluticasone propionate (FLONASE) 50 mcg/actuation nasal spray SHAKE LIQUID AND USE 2 SPRAYS(100 MCG) IN EACH NOSTRIL DAILY 48 g 11    levothyroxine (SYNTHROID) 50 MCG tablet TAKE 1 TABLET BY MOUTH EVERY DAY 90 tablet 1    mirtazapine (REMERON) 30 MG tablet Take 1 tablet (30 mg total) by mouth every evening. 90 tablet 3    [DISCONTINUED] mupirocin (BACTROBAN) 2 % ointment Apply topically 3 (three) times daily. 22 g 1     No current facility-administered medications on file prior to visit.       /79 (BP Location: Left arm, Patient Position: Sitting, BP Method: Medium (Automatic))   Pulse 79   Ht 5' 8" (1.727 m)   Wt 54.2 kg (119 lb 7.8 oz)   BMI 18.17 kg/m²   Body mass index is 18.17 kg/m².  Physical Exam  Constitutional:       General: She is not in acute distress.  HENT:      Head: Normocephalic.   Genitourinary:     Rectum: External hemorrhoid present. No mass, tenderness or anal fissure. Normal anal tone.   Neurological:      General: No focal deficit present.      Mental Status: She is alert.   Psychiatric:         Mood and Affect: Mood normal.         Judgment: Judgment normal.         Labs: Pertinent labs reviewed.  CRC Screening: up to date     Assessment:   1. Rectal pain    2. Generalized abdominal pain    3. Hemorrhoids, unspecified hemorrhoid type    Intermittent rectal pain. Will only happen after a bowel movement. Possible 2/2 hemorrhoid. J LUIS without mass.     Recommendations:   - continue Miralax and fiber supplement  - offered colonoscopy to evaluate further-- she declined.   - warm soaks in the bathtub.   - decrease the frequency of hydrocortisone cream.     Return to Clinic:  Follow up if symptoms worsen or fail to improve.    Thank you for the opportunity to participate in the care of this patient.  LUCIO Gold        "

## 2024-03-16 ENCOUNTER — PATIENT MESSAGE (OUTPATIENT)
Dept: PRIMARY CARE CLINIC | Facility: CLINIC | Age: 74
End: 2024-03-16
Payer: MEDICARE

## 2024-03-17 ENCOUNTER — PATIENT MESSAGE (OUTPATIENT)
Dept: PRIMARY CARE CLINIC | Facility: CLINIC | Age: 74
End: 2024-03-17
Payer: MEDICARE

## 2024-03-21 ENCOUNTER — TELEPHONE (OUTPATIENT)
Dept: PRIMARY CARE CLINIC | Facility: CLINIC | Age: 74
End: 2024-03-21
Payer: MEDICARE

## 2024-03-25 ENCOUNTER — PATIENT MESSAGE (OUTPATIENT)
Dept: GASTROENTEROLOGY | Facility: CLINIC | Age: 74
End: 2024-03-25
Payer: MEDICARE

## 2024-03-27 ENCOUNTER — PATIENT MESSAGE (OUTPATIENT)
Dept: PRIMARY CARE CLINIC | Facility: CLINIC | Age: 74
End: 2024-03-27
Payer: MEDICARE

## 2024-04-15 ENCOUNTER — OFFICE VISIT (OUTPATIENT)
Dept: PRIMARY CARE CLINIC | Facility: CLINIC | Age: 74
End: 2024-04-15
Payer: MEDICARE

## 2024-04-15 DIAGNOSIS — K58.1 IRRITABLE BOWEL SYNDROME WITH CONSTIPATION: ICD-10-CM

## 2024-04-15 DIAGNOSIS — M81.0 AGE-RELATED OSTEOPOROSIS WITHOUT CURRENT PATHOLOGICAL FRACTURE: ICD-10-CM

## 2024-04-15 DIAGNOSIS — E03.9 ACQUIRED HYPOTHYROIDISM: ICD-10-CM

## 2024-04-15 DIAGNOSIS — R63.6 UNDERWEIGHT: ICD-10-CM

## 2024-04-15 DIAGNOSIS — F41.1 GAD (GENERALIZED ANXIETY DISORDER): Primary | ICD-10-CM

## 2024-04-15 DIAGNOSIS — I70.0 AORTIC ATHEROSCLEROSIS: ICD-10-CM

## 2024-04-15 PROCEDURE — 99214 OFFICE O/P EST MOD 30 MIN: CPT | Mod: 95,,, | Performed by: FAMILY MEDICINE

## 2024-04-15 NOTE — PROGRESS NOTES
"Subjective:       Patient ID: Maggie Díaz is a 73 y.o. female.    Chief Complaint: No chief complaint on file.    HPI:     The patient location is: [place]  The chief complaint leading to consultation is: f/u    Visit type: audiovisual    Face to Face time with patient: 20  30 minutes of total time spent on the encounter, which includes face to face time and non-face to face time preparing to see the patient (eg, review of tests), Obtaining and/or reviewing separately obtained history, Documenting clinical information in the electronic or other health record, Independently interpreting results (not separately reported) and communicating results to the patient/family/caregiver, or Care coordination (not separately reported).         Each patient to whom he or she provides medical services by telemedicine is:  (1) informed of the relationship between the physician and patient and the respective role of any other health care provider with respect to management of the patient; and (2) notified that he or she may decline to receive medical services by telemedicine and may withdraw from such care at any time.    Notes:     Seen for f/u of anxiety and GI issues. She reports feeling much better lately, feels the mirtazapine has "kicked in." Appetite has returned and she is working on gaining weight. Now weighs about 118 lbs. Enjoying gardening and being outdoors. Had GI consult 3/14/24, offered cscope which she decided against doing.     "Assessment:   1. Rectal pain    2. Generalized abdominal pain    3. Hemorrhoids, unspecified hemorrhoid type    Intermittent rectal pain. Will only happen after a bowel movement. Possible 2/2 hemorrhoid. J LUIS without mass.      Recommendations:   - continue Miralax and fiber supplement  - offered colonoscopy to evaluate further-- she declined.   - warm soaks in the bathtub.   - decrease the frequency of hydrocortisone cream."        Objective:   There were no vitals filed for this visit.   "   Physical Exam  Constitutional:       General: She is not in acute distress.     Appearance: She is well-developed.   HENT:      Head: Normocephalic and atraumatic.      Nose: No congestion or rhinorrhea.   Eyes:      General: No scleral icterus.     Extraocular Movements: Extraocular movements intact.   Pulmonary:      Effort: Pulmonary effort is normal. No respiratory distress.   Musculoskeletal:         General: No signs of injury. Normal range of motion.      Cervical back: Normal range of motion and neck supple.   Skin:     General: Skin is dry.      Findings: No rash.   Neurological:      Mental Status: She is alert and oriented to person, place, and time. Mental status is at baseline.   Psychiatric:         Behavior: Behavior normal.         Thought Content: Thought content normal.           Albumin   Date Value Ref Range Status   03/04/2024 4.7 3.5 - 5.2 g/dL Final     eGFR   Date Value Ref Range Status   03/04/2024 >60 >60 mL/min/1.73 m^2 Final       Assessment:       1. SARAHY (generalized anxiety disorder)    2. Irritable bowel syndrome with constipation    3. Aortic atherosclerosis    4. Age-related osteoporosis without current pathological fracture    5. Acquired hypothyroidism    6. Underweight        Plan:           Problem List Items Addressed This Visit          Psychiatric    SARAHY (generalized anxiety disorder) - Primary    Current Assessment & Plan     Feeling better lately on mirtazapine; continue current med regimen and f/u with LCSW            Cardiac/Vascular    Aortic atherosclerosis    Overview     4/15/19 CT Renal Stone Study- There is aortoiliac atherosclerosis.            Relevant Orders    Lipid Panel       Endocrine    Age-related osteoporosis without current pathological fracture    Overview     on drug holiday.         Relevant Orders    Vitamin D    Acquired hypothyroidism    Relevant Orders    T4, Free    TSH    Underweight    Overview     BMI 16.57         Relevant Orders     Comprehensive Metabolic Panel       GI    Irritable bowel syndrome with constipation    Current Assessment & Plan     Using miralax and fiber supplement daily as well as probiotic foods          1 month telemed vist  2 month labs with f/u after

## 2024-04-15 NOTE — PATIENT INSTRUCTIONS
If you are feeling unwell, we'd like to be the first ones to know here at [facility]! Please give us a call. Same day appointments are our top priority to keep you well and out of the emergency rooms and hospitals. Call [phone] for our direct line. After hours advice is always available. Please call [phone] after hours to speak to the on-call team.

## 2024-04-16 ENCOUNTER — OFFICE VISIT (OUTPATIENT)
Dept: OPHTHALMOLOGY | Facility: CLINIC | Age: 74
End: 2024-04-16
Payer: MEDICARE

## 2024-04-16 ENCOUNTER — DOCUMENTATION ONLY (OUTPATIENT)
Dept: OPHTHALMOLOGY | Facility: CLINIC | Age: 74
End: 2024-04-16

## 2024-04-16 DIAGNOSIS — H25.813 COMBINED FORM OF AGE-RELATED CATARACT, BOTH EYES: ICD-10-CM

## 2024-04-16 DIAGNOSIS — H02.9 EYELID LESION: ICD-10-CM

## 2024-04-16 DIAGNOSIS — H25.11 NUCLEAR SCLEROSIS OF RIGHT EYE: Primary | ICD-10-CM

## 2024-04-16 DIAGNOSIS — H25.12 NUCLEAR SCLEROSIS OF LEFT EYE: ICD-10-CM

## 2024-04-16 PROCEDURE — 92136 OPHTHALMIC BIOMETRY: CPT | Mod: PBBFAC | Performed by: OPHTHALMOLOGY

## 2024-04-16 PROCEDURE — 99213 OFFICE O/P EST LOW 20 MIN: CPT | Mod: PBBFAC | Performed by: OPHTHALMOLOGY

## 2024-04-16 PROCEDURE — 99999 PR PBB SHADOW E&M-EST. PATIENT-LVL III: CPT | Mod: PBBFAC,,, | Performed by: OPHTHALMOLOGY

## 2024-04-16 PROCEDURE — 99214 OFFICE O/P EST MOD 30 MIN: CPT | Mod: S$PBB,,, | Performed by: OPHTHALMOLOGY

## 2024-04-16 RX ORDER — PREDNISOLONE ACETATE 10 MG/ML
1 SUSPENSION/ DROPS OPHTHALMIC 4 TIMES DAILY
Qty: 5 ML | Refills: 1 | Status: SHIPPED | OUTPATIENT
Start: 2024-04-16

## 2024-04-16 RX ORDER — NEOMYCIN SULFATE, POLYMYXIN B SULFATE, AND DEXAMETHASONE 3.5; 10000; 1 MG/G; [USP'U]/G; MG/G
OINTMENT OPHTHALMIC 2 TIMES DAILY
Qty: 3.5 G | Refills: 3 | Status: SHIPPED | OUTPATIENT
Start: 2024-04-16

## 2024-04-16 NOTE — PROGRESS NOTES
Short Stay Record    CC: Blurry Vision     Impression: Visually significant cataract with reasonable expectation for visual improvement Right Eye    Base Eye Exam    Visual Acuity (Snellen - Linear)     Right Left   Dist cc 20/40 - 20/25 -   Dist ph cc ni    Correction: Glasses     Tonometry (Applanation, 8:43 AM)     Right Left   Pressure 17 15    Pupils     Pupils Dark Light Shape React APD   Right PERRL 4 2 Round 2 0   Left PERRL 4 2 Round 2 2     Visual Fields (Counting fingers)     Right Left    Full Full     Extraocular Movement     Right Left    Full, Ortho Full, Ortho     Neuro/Psych    Oriented x3: Yes   Mood/Affect: Normal     Dilation    Both eyes: 1% Mydriacyl, 0.5% Mydriacyl @ 9:01 AM      Edited by: Mook Plummer; Adam Sorto MD        Additional Tests      Glare Testing     Medium   Right 20/100   Left 20/80   Edited by: Adam Sorto MD        Slit Lamp and Fundus Exam      External Exam     Right Left   External Normal Normal     Slit Lamp Exam     Right Left   Lids/Lashes Normal Papilloma LLL; 2.5mmv x 1.8mmh   Conjunctiva/Sclera White and quiet White and quiet   Cornea Clear Clear   Anterior Chamber Deep and quiet Deep and quiet   Iris Round and reactive Round and reactive   Lens 2+ Nuclear sclerosis, 1+ Cortical cataract 1+ Nuclear sclerosis   Vitreous Normal Normal     Fundus Exam     Right Left   Disc Normal Normal   C/D Ratio 0.35 0.3   Macula Normal Normal   Vessels Normal Normal   Periphery Normal Normal   Edited by: Adam Sorto MD        Refraction      Wearing Rx     Sphere Cylinder Axis Add   Right -2.00 +1.25 180 +2.50   Left -0.50 +1.00 005 +2.50   Age: 2m   Type: PAL     Manifest Refraction     Sphere Cylinder McGraw Dist VA   Right -2.00 +1.00 180 20/40   Left -0.50 +1.00 005 20/25   Edited by: Mook Plummer       Planned Procedure:   Topography Reviewed: Yes  History of Refractive Surgery: No      Phaco right eye, +23.0 SY60WF  Block  Monofocal -  Distance  Prescriptions sent for preoperative medications  Prednisolone Acetate- 4xs daily   Anticoagulant status: None          Lens Selection OD verified _____           Previous IOL OS  N/A    Patient cleared for ophthalmic surgery.     Discharge Summary:    Admitting Diagnosis: Nuclear Sclerosis OD    Discharge Diagnosis: Pseudophakia OD    Procedure: Phacoemulsification of cataract with intraocular lens implant OD    Complications: None  Discharge Condition: Stable    Discharge instructions: Follow post-operative discharge instruction sheet given by provider.    Follow-up: Return to clinic next day or as scheduled.       HPI:  Maggie Díaz is a 73 y.o. female who presents for evaluation prior to ophthalmic surgery, left eye. Patient reports of blurred vision at distance and near with and without correction. Patient reports of glare at night reducing function and safety, and complains of needed additional light to read.     Past Medical History:   Diagnosis Date    Anxiety     Benzodiazepine dependence 2020    Hematuria, microscopic     negative CT    Hepatitis C     slow progression/ Dr. Otoole    Hypertension     Migraines     OP (osteoporosis)     on drug holiday.    Osteoarthritis     Radius fracture     Radius fracture     Restless legs syndrome (RLS)     Thrombocytopenia 2019    Vitamin D deficiency disease      Past Surgical History:   Procedure Laterality Date     SECTION, CLASSIC      COLONOSCOPY N/A 2018    Procedure: COLONOSCOPY;  Surgeon: Jose Royal MD;  Location: [facility];  Service: Endoscopy;  Laterality: N/A;    WRIST FRACTURE SURGERY      , Dr. Bunch.     Review of patient's allergies indicates:   Allergen Reactions    Bactrim [sulfamethoxazole-trimethoprim]      hallucination    Latex, natural rubber Swelling    Penicillins Shortness Of Breath       Current Outpatient Medications:     alendronate (FOSAMAX) 70 MG tablet, Take 1 tablet (70 mg total) by mouth  every 7 days. Take first thing in the morning with glass of water.  Sit upright for 30minutes.  Then ok to take other meds and eat normally., Disp: 12 tablet, Rfl: 3    atorvastatin (LIPITOR) 40 MG tablet, TAKE ONE TABLET BY MOUTH ONE TIME DAILY, Disp: 90 tablet, Rfl: 3    cholecalciferol, vitamin D3, (VITAMIN D3) 50 mcg (2,000 unit) Cap, Take 1 capsule by mouth once daily., Disp: , Rfl:     clonazePAM (KLONOPIN) 0.5 MG tablet, TAKE 1 TO 2 TABLET BY MOUTH EVERY EVENING AS NEEDED, Disp: 60 tablet, Rfl: 5    fluticasone propionate (FLONASE) 50 mcg/actuation nasal spray, SHAKE LIQUID AND USE 2 SPRAYS(100 MCG) IN EACH NOSTRIL DAILY, Disp: 48 g, Rfl: 11    levothyroxine (SYNTHROID) 50 MCG tablet, TAKE 1 TABLET BY MOUTH EVERY DAY, Disp: 90 tablet, Rfl: 1    mirtazapine (REMERON) 30 MG tablet, Take 1 tablet (30 mg total) by mouth every evening., Disp: 90 tablet, Rfl: 3    neomycin-polymyxin-dexamethasone (MAXITROL) 3.5 mg/g-10,000 unit/g-0.1 % Oint, Place into the right eye 2 (two) times a day., Disp: 3.5 g, Rfl: 3    prednisoLONE acetate (PRED FORTE) 1 % DrpS, Place 1 drop into the right eye 4 (four) times daily., Disp: 5 mL, Rfl: 1    Review of Systems:  A comprehensive review of systems was negative.    Physical Exam:  General Appearance:    A&Ox3, no distress, appears stated age   Head:    Normocephalic, without obvious abnormality, atraumatic   Eyes:    PERRL, EOM's intact   Back:     Symmetric, no curvature   Lungs:     Respirations unlabored   Chest Wall:    No tenderness or deformity    Heart:  Abdomen:  Extremities:  Skin:    S1 and S2 present    Soft, non-tender    Extremities normal, atraumatic    Skin color, texture, turgor normal

## 2024-04-16 NOTE — PROGRESS NOTES
HPI     Cataract            Laterality: right eye    Associated symptoms: blurred vision    Severity: moderate    Onset: gradual    Frequency: constant    Context: distance vision, reading and night driving    Activities affected: reading and night driving    Comments: Pt co lid lesion OS LL still there since dr Collins visit  Pt co sticky lids           Comments    [facility] REFERRAL      1. CATS OU             Last edited by Adam Sorto MD on 4/16/2024  9:28 AM.            Assessment /Plan     For exam results, see Encounter Report.      ICD-10-CM ICD-9-CM    1. Nuclear sclerosis of right eye  H25.11 366.16 Visually Significant Cataract OD  Patient reports decreased vision in the fellow eye consistent with the clinical amount of lenticular opacity, which reaches the level of visual significance and affects activities of daily living including reading and glare. Risks, benefits, and alternatives to cataract surgery were discussed and patient desired to schedule cataract surgery. Patient was consented and the biometry and lens options were reviewed.    Discussed IOL options and refractive outcomes for this patient.    Topography Reviewed: Yes  History of Refractive Surgery: No     Phaco right eye, +23.0 SY60WF  Block  Monofocal - Distance  Prescriptions sent for preoperative medications  Prednisolone Acetate- 4xs daily   Anticoagulant status: None      Explained that patient may need glasses after surgery.  Discussed that vision may be limited by: None      Referral to [facility] for Ophthalmic [facility]      Schedule post op visit #2 with DKT       2. Nuclear sclerosis of left eye  H25.12 366.16 Minimal on exam       3. Eyelid lesion  H02.9 374.9 Probably papilloma, possible pyogenic granuloma  Present for 1 month according to pt  Start maxitrol ointment, if no improvement with steroid will need excision with biopsy      4. Combined form of age-related cataract, both eyes  H25.813 366.19 Ambulatory  referral/consult to Ophthalmology

## 2024-04-25 ENCOUNTER — PATIENT MESSAGE (OUTPATIENT)
Dept: PRIMARY CARE CLINIC | Facility: CLINIC | Age: 74
End: 2024-04-25
Payer: MEDICARE

## 2024-04-25 DIAGNOSIS — F41.9 ANXIETY: ICD-10-CM

## 2024-04-25 RX ORDER — CLONAZEPAM 0.5 MG/1
TABLET ORAL
Qty: 60 TABLET | Refills: 3 | Status: SHIPPED | OUTPATIENT
Start: 2024-04-25

## 2024-05-13 ENCOUNTER — PATIENT MESSAGE (OUTPATIENT)
Dept: PRIMARY CARE CLINIC | Facility: CLINIC | Age: 74
End: 2024-05-13
Payer: MEDICARE

## 2024-05-13 ENCOUNTER — PATIENT MESSAGE (OUTPATIENT)
Dept: OPHTHALMOLOGY | Facility: CLINIC | Age: 74
End: 2024-05-13
Payer: MEDICARE

## 2024-05-15 ENCOUNTER — OFFICE VISIT (OUTPATIENT)
Dept: PRIMARY CARE CLINIC | Facility: CLINIC | Age: 74
End: 2024-05-15
Payer: MEDICARE

## 2024-05-15 DIAGNOSIS — R63.4 WEIGHT LOSS: ICD-10-CM

## 2024-05-15 DIAGNOSIS — K58.1 IRRITABLE BOWEL SYNDROME WITH CONSTIPATION: ICD-10-CM
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
## 2024-05-15 DIAGNOSIS — K59.01 SLOW TRANSIT CONSTIPATION: ICD-10-CM

## 2024-05-15 DIAGNOSIS — F41.1 GAD (GENERALIZED ANXIETY DISORDER): Primary | ICD-10-CM

## 2024-05-15 PROCEDURE — 99213 OFFICE O/P EST LOW 20 MIN: CPT | Mod: 95,,, | Performed by: NURSE PRACTITIONER

## 2024-05-15 NOTE — ASSESSMENT & PLAN NOTE
Saw GI in March  Assessment:   1. Rectal pain    2. Generalized abdominal pain    3. Hemorrhoids, unspecified hemorrhoid type    Intermittent rectal pain. Will only happen after a bowel movement. Possible 2/2 hemorrhoid. J LUIS without mass.      Recommendations:   - continue Miralax and fiber supplement  - offered colonoscopy to evaluate further-- she declined.   - warm soaks in the bathtub.   - decrease the frequency of hydrocortisone cream.

## 2024-05-15 NOTE — PROGRESS NOTES
Primary Care Telemedicine Appointment      The patient location is:  Patient Home   The chief complaint leading to consultation is: abdominal pain, recent fall  Total time spent on medical decision making was 20 min.    Visit type: Virtual visit with synchronous audio only and video  Each patient to whom he or she provides medical services by telemedicine is:  (1) informed of the relationship between the physician and patient and the respective role of any other health care provider with respect to management of the patient; and (2) notified that he or she may decline to receive medical services by telemedicine and may withdraw from such care at any time.      Subjective:      Patient ID: Maggie Díaz is a 73 y.o. female     Chief Complaint: Fall (Pt had a fall on 24.)    Ms. íDaz is seen virtually for F/U of IBS and Anxiety    She reports that her GI symptoms have improved. She started a fiber capsule daily and now she is having less gas/bloating and constipation. Has not gained further weight as she hurt her back 2 weeks ago.  She voices frustration in not being able to perform her normal activities and go outside. She continues taking mirtazapine and clonazepam prn.   2 weeks ago she slipped in wet grass falling on her right hip. She was able to get up without assistance. Now reports lumbar back pain and right hip pain. She denies any bruising. Saw an Orthopedist at [facility] and images were negative for fracture. They found an old compression fracture. Intermittent Tylenol helps with pain.    Tomorrow she has right cataract surgery schedule with Dr. Sorto. Slightly anxious.  Allergy symptoms controlled currently.     Denies fever, chills, chest pain, SOB, palpitations, vomiting, diarrhea, no gross neuro deficits, urinary symptoms and leg swelling     Prior to this visit, patient's last encounter with PCP was 4/15/2024.    Past Surgical History:   Procedure Laterality Date     SECTION, CLASSIC       COLONOSCOPY N/A 8/22/2018    Procedure: COLONOSCOPY;  Surgeon: Jose Royal MD;  Location: [facility];  Service: Endoscopy;  Laterality: N/A;    WRIST FRACTURE SURGERY      7/18, Dr. Bunch.       Past Medical History:   Diagnosis Date    Anxiety     Benzodiazepine dependence 11/18/2020    Hematuria, microscopic     negative CT    Hepatitis C     slow progression/ Dr. Otoole    Hypertension     Migraines     OP (osteoporosis)     on drug holiday.    Osteoarthritis     Radius fracture     Radius fracture     Restless legs syndrome (RLS)     Thrombocytopenia 2/22/2019    Vitamin D deficiency disease        Review of Systems   Constitutional:  Negative for chills, fatigue and fever.   HENT:  Negative for congestion and postnasal drip.    Respiratory:  Negative for cough and shortness of breath.    Cardiovascular:  Negative for chest pain.   Gastrointestinal:  Positive for abdominal pain and constipation. Negative for diarrhea, nausea and vomiting.   Genitourinary:  Negative for dysuria and frequency.   Musculoskeletal:  Positive for back pain and gait problem.   Skin:  Negative for wound.   Neurological:  Negative for dizziness and light-headedness.   Psychiatric/Behavioral:  The patient is nervous/anxious.        Objective:   There were no vitals filed for this visit.      There is no height or weight on file to calculate BMI.  BP Readings from Last 3 Encounters:   03/14/24 128/79   02/29/24 114/78   02/22/24 118/72      Wt Readings from Last 3 Encounters:   03/14/24 0918 54.2 kg (119 lb 7.8 oz)   02/29/24 0947 54.1 kg (119 lb 2.5 oz)   02/22/24 1004 54.9 kg (120 lb 14.8 oz)        Physical Exam  Constitutional:       General: She is not in acute distress.     Appearance: She is not ill-appearing.   HENT:      Head: Normocephalic.   Eyes:      Conjunctiva/sclera: Conjunctivae normal.   Pulmonary:      Effort: Pulmonary effort is normal. No respiratory distress.      Breath sounds: No stridor. No wheezing.    Abdominal:      Tenderness: There is no abdominal tenderness.   Musculoskeletal:         General: No signs of injury.   Skin:     Coloration: Skin is not jaundiced or pale.   Neurological:      Mental Status: She is alert and oriented to person, place, and time.         Lab Results   Component Value Date    WBC 4.50 03/04/2024    HGB 13.6 03/04/2024    HCT 39.3 03/04/2024     (L) 03/04/2024    CHOL 153 06/10/2023    TRIG 67 06/10/2023    HDL 76 (H) 06/10/2023    ALT 23 03/04/2024    AST 27 03/04/2024     (L) 03/04/2024    K 5.0 03/04/2024    CL 99 03/04/2024    CREATININE 0.9 03/04/2024    BUN 10 03/04/2024    CO2 28 03/04/2024    TSH 1.419 06/10/2023    INR 1.1 03/14/2016         Assessment:       Plan:     Problem List Items Addressed This Visit       Constipation     Reduced with daily fiber supplement         Fall     Resultant lumbar strain and right hip contusion  Continue Tylenol  Apply warm heating pad  Topical Lidocaine patches         SARAHY (generalized anxiety disorder) - Primary     Waxing and waning  Continue mirtazapine and clonazepam         Irritable bowel syndrome with constipation     Saw GI in March  Assessment:   1. Rectal pain    2. Generalized abdominal pain    3. Hemorrhoids, unspecified hemorrhoid type    Intermittent rectal pain. Will only happen after a bowel movement. Possible 2/2 hemorrhoid. J LUIS without mass.      Recommendations:   - continue Miralax and fiber supplement  - offered colonoscopy to evaluate further-- she declined.   - warm soaks in the bathtub.   - decrease the frequency of hydrocortisone cream.          Weight loss     Trying to continue weight gain            Health Maintenance         Date Due Completion Date    RSV Vaccine (Age 60+ and Pregnant patients) (1 - 1-dose 60+ series) Never done ---    COVID-19 Vaccine (7 - 2023-24 season) 11/23/2023 9/28/2023    DEXA Scan 10/17/2025 10/17/2023    Override on 1/3/2013: Done (Osteoporosis; stable BMD; chk vit D;  repeat in 2 years; fosamax holiday)    Lipid Panel 06/10/2028 6/10/2023    TETANUS VACCINE 07/18/2028 7/18/2018    Colorectal Cancer Screening 08/22/2028 8/22/2018    Override on 1/27/2017: Declined (Pt currently refuseing per PCP note 1/21/17)            1. SARAHY (generalized anxiety disorder)  Assessment & Plan:  Waxing and waning  Continue mirtazapine and clonazepam      2. Irritable bowel syndrome with constipation  Assessment & Plan:  Saw GI in March  Assessment:   1. Rectal pain    2. Generalized abdominal pain    3. Hemorrhoids, unspecified hemorrhoid type    Intermittent rectal pain. Will only happen after a bowel movement. Possible 2/2 hemorrhoid. J LUIS without mass.      Recommendations:   - continue Miralax and fiber supplement  - offered colonoscopy to evaluate further-- she declined.   - warm soaks in the bathtub.   - decrease the frequency of hydrocortisone cream.       3. Slow transit constipation  Assessment & Plan:  Reduced with daily fiber supplement      4. Weight loss  Assessment & Plan:  Trying to continue weight gain      5. Fall, initial encounter  Assessment & Plan:  Resultant lumbar strain and right hip contusion  Continue Tylenol  Apply warm heating pad  Topical Lidocaine patches           No follow-ups on file.              NIKHIL Rooney, NP-C  [facility] [address] [place] [id number] [phone] ph  [phone] fax

## 2024-05-15 NOTE — ASSESSMENT & PLAN NOTE
Resultant lumbar strain and right hip contusion  Continue Tylenol  Apply warm heating pad  Topical Lidocaine patches

## 2024-05-16 ENCOUNTER — OUTSIDE PLACE OF SERVICE (OUTPATIENT)
Dept: OPHTHALMOLOGY | Facility: CLINIC | Age: 74
End: 2024-05-16
Payer: MEDICARE

## 2024-05-17 ENCOUNTER — OFFICE VISIT (OUTPATIENT)
Dept: OPHTHALMOLOGY | Facility: CLINIC | Age: 74
End: 2024-05-17
Payer: MEDICARE

## 2024-05-17 DIAGNOSIS — H04.552 NASOLACRIMAL DUCT OBSTRUCTION, ACQUIRED, LEFT: ICD-10-CM

## 2024-05-17 DIAGNOSIS — Z98.41 CATARACT EXTRACTION STATUS, RIGHT EYE: ICD-10-CM

## 2024-05-17 DIAGNOSIS — Z98.890 POST-OPERATIVE STATE: Primary | ICD-10-CM

## 2024-05-17 PROCEDURE — 99999 PR PBB SHADOW E&M-EST. PATIENT-LVL III: CPT | Mod: PBBFAC,,, | Performed by: OPHTHALMOLOGY

## 2024-05-17 PROCEDURE — 99024 POSTOP FOLLOW-UP VISIT: CPT | Mod: POP,,, | Performed by: OPHTHALMOLOGY

## 2024-05-17 PROCEDURE — 99213 OFFICE O/P EST LOW 20 MIN: CPT | Mod: PBBFAC | Performed by: OPHTHALMOLOGY

## 2024-05-17 NOTE — PROGRESS NOTES
HPI     Post-op Evaluation            Comments: 1 Day S/P PC IOL OD: Pt states vision is good since surgery.   States no pain. Using drops as directed, last used yesterday.          Comments    DKT REFERRAL      1. PCIOL OD 5/16/24 +23.0 SY60WF/CDE: 18.44      Pred QID OD             Last edited by Ana Rodriguez on 5/17/2024  8:18 AM.            Assessment /Plan     For exam results, see Encounter Report.      ICD-10-CM ICD-9-CM    1. Post-operative state  Z98.890 V45.89 PO Day 1 S/P Phaco/IOL left eye  Doing well.    Use Prednisolone Acetate- 4xs daily      Reinstructed in importance of absolute compliance with Post-OP instructions including medications, shield at bedtime, and limitation of activities. Follow up appointments in approximately one and six weeks or call immediately for increased pain, redness or vision loss.             2. Cataract extraction status, right eye  Z98.41 V45.61       3. Nasolacrimal duct obstruction, acquired, left  H04.552 375.56 Based on increased lacrimal lake

## 2024-05-20 ENCOUNTER — TELEPHONE (OUTPATIENT)
Dept: PRIMARY CARE CLINIC | Facility: CLINIC | Age: 74
End: 2024-05-20
Payer: MEDICARE

## 2024-05-20 NOTE — TELEPHONE ENCOUNTER
Pt states she has had diarrhea for 1 day- Advised pt to stop fiber pills for now- pt states that she has bentyl to take tid if needed.     States she has been under stress due to the bad weather     Advised pt to let us know tomorrow how she is doing - stay hydrated

## 2024-05-20 NOTE — TELEPHONE ENCOUNTER
----- Message from Mely Garcia sent at 5/20/2024 10:48 AM CDT -----  Contact: Pt [phone]  1MEDICALADVICE     Patient is calling for Medical Advice regarding:Diarrhea     How long has patient had these symptoms: 1 day     Pharmacy name and phone#:   [id number] - [place] - [address]  [address] [place] [address]  Phone: [phone] Fax: [phone]       Would like response via [employer]:  Call Back     Comments:

## 2024-05-20 NOTE — TELEPHONE ENCOUNTER
----- Message from Mely Garcia sent at 5/20/2024 10:48 AM CDT -----  Contact: Pt [phone]  1MEDICALADVICE     Patient is calling for Medical Advice regarding:Diarrhea     How long has patient had these symptoms: 1 day     Pharmacy name and phone#:   [id number] - [place] - [address]  [address] [place] [address]  Phone: [phone] Fax: [phone]       Would like response via [employer]:  Call Back     Comments:

## 2024-05-22 ENCOUNTER — PATIENT MESSAGE (OUTPATIENT)
Dept: PRIMARY CARE CLINIC | Facility: CLINIC | Age: 74
End: 2024-05-22
Payer: MEDICARE

## 2024-05-22 ENCOUNTER — OFFICE VISIT (OUTPATIENT)
Dept: OPHTHALMOLOGY | Facility: CLINIC | Age: 74
End: 2024-05-22
Payer: MEDICARE

## 2024-05-22 DIAGNOSIS — Z98.890 POST-OPERATIVE STATE: Primary | ICD-10-CM

## 2024-05-22 PROCEDURE — 99999 PR PBB SHADOW E&M-EST. PATIENT-LVL III: CPT | Mod: PBBFAC,,, | Performed by: OPTOMETRIST

## 2024-05-22 PROCEDURE — 99213 OFFICE O/P EST LOW 20 MIN: CPT | Mod: PBBFAC | Performed by: OPTOMETRIST

## 2024-05-22 PROCEDURE — 99024 POSTOP FOLLOW-UP VISIT: CPT | Mod: POP,,, | Performed by: OPTOMETRIST

## 2024-05-22 NOTE — PROGRESS NOTES
HPI     Post-op Evaluation            Comments: 1 week post op PCIOL OD 05/16/24 Dr. Sorto. VA is stable. No   pain or irritation. Compliant with gtt.          Comments    DKT REFERRAL      1. PCIOL OD 5/16/24 +23.0 SY60WF/CDE: 18.44      Pred QID OD             Last edited by Gabriel Mendoza on 5/22/2024  9:52 AM.            Assessment /Plan     For exam results, see Encounter Report.    Post-operative state      Impression: 1 week post-op phaco with PCIOL OD : Doing well    Continue all post op drops as directed on post-op instruction sheet  Ok to resume normal activities and discontinue eye shield   RTC as scheduled for next post-op visit or PRN if any pain, redness, vision changes or any other concerns.  Discussed above and answered questions.

## 2024-05-23 ENCOUNTER — PATIENT MESSAGE (OUTPATIENT)
Dept: PRIMARY CARE CLINIC | Facility: CLINIC | Age: 74
End: 2024-05-23
Payer: MEDICARE

## 2024-05-23 DIAGNOSIS — R63.4 WEIGHT LOSS: Primary | ICD-10-CM

## 2024-05-30 ENCOUNTER — TELEPHONE (OUTPATIENT)
Dept: PRIMARY CARE CLINIC | Facility: CLINIC | Age: 74
End: 2024-05-30
Payer: MEDICARE

## 2024-05-30 ENCOUNTER — OFFICE VISIT (OUTPATIENT)
Dept: PRIMARY CARE CLINIC | Facility: CLINIC | Age: 74
End: 2024-05-30
Payer: MEDICARE

## 2024-05-30 ENCOUNTER — PATIENT MESSAGE (OUTPATIENT)
Dept: PRIMARY CARE CLINIC | Facility: CLINIC | Age: 74
End: 2024-05-30

## 2024-05-30 VITALS — WEIGHT: 114 LBS | BODY MASS INDEX: 17.33 KG/M2

## 2024-05-30 DIAGNOSIS — K58.1 IRRITABLE BOWEL SYNDROME WITH CONSTIPATION: ICD-10-CM

## 2024-05-30 DIAGNOSIS — F41.9 ANXIETY: ICD-10-CM

## 2024-05-30 DIAGNOSIS — K59.00 CONSTIPATION, UNSPECIFIED CONSTIPATION TYPE: Primary | ICD-10-CM

## 2024-05-30 PROCEDURE — 99212 OFFICE O/P EST SF 10 MIN: CPT | Mod: 95,,, | Performed by: NURSE PRACTITIONER

## 2024-05-30 NOTE — PROGRESS NOTES
Maggie Díaz  05/31/2024  [id number]    Adeola Berumen MD  Patient Care Team:  Adeola Berumen MD as PCP - General (Internal Medicine)  Edouard Munson MD as Consulting Physician (Dermatology)  Joce Nur LPN as Care Coordinator        [facility]      Chief Complaint:  Chief Complaint   Patient presents with    Medicare AW         Assessment/Plan:  1. Constipation, unspecified constipation type  Assessment & Plan:  KUB pending  Continue eating normal diet unless vomiting, severe abdominal pain  Resume fiber pill or Miralax.     Orders:  -     X-Ray Abdomen AP 1 View; Future; Expected date: 05/31/2024    2. Anxiety    3. Irritable bowel syndrome with constipation          Worry Score: 2  History of Present Illness:    Last visit with me on 5/15/24.  Ms. Díaz visits regarding continued stomach discomfort. Hx of IBS with chronic constipation. She was regular with daily fiber supplement then developed diarrhea.She was advised to stop the fiber supplement.  Around 5/21/24.   On 5/23/24 - complaints of bloating and gas pain.   5/30/24 - feelings of constipation, rectal pressure, drank mag citrate.    5/30/24 Pt reports she awakened with stomach pain - had BM but didn't feel empty. Felt she had firm stool at her rectum.    Intermittent stomach cramping, pressure sensation to move bowel movement - passed brown water. Still feels rectal pressure  Has not been able to gain weight - mostly eating 3 times a day. Drinking Ensure daily. Has been eating 3 meals daily.             The following were reviewed: Active problem list, medication list, allergies, family history, social history, and Health Maintenance.     History:  Past Medical History:   Diagnosis Date    Anxiety     Benzodiazepine dependence 11/18/2020    Hematuria, microscopic     negative CT    Hepatitis C     slow progression/ Dr. Otoole    Hypertension     Migraines     OP (osteoporosis)     on drug holiday.     Osteoarthritis     Radius fracture     Radius fracture     Restless legs syndrome (RLS)     Thrombocytopenia 2019    Vitamin D deficiency disease      Past Surgical History:   Procedure Laterality Date     SECTION, CLASSIC  1982    COLONOSCOPY N/A 2018    Procedure: COLONOSCOPY;  Surgeon: Jose Royal MD;  Location: [facility];  Service: Endoscopy;  Laterality: N/A;    WRIST FRACTURE SURGERY      , Dr. Bunch.     Family History   Adopted: Yes   Family history unknown: Yes     Patient Active Problem List   Diagnosis    Vitamin D deficiency disease    History of hepatitis C    Age-related osteoporosis without current pathological fracture    Restless legs syndrome (RLS)    Allergic rhinitis    Acquired hypothyroidism    Thrombocytopenia    Aortic atherosclerosis    Anxiety    Weight loss    Other neutropenia    Recurrent major depressive disorder, in partial remission    Constipation    Underweight    Irritable bowel syndrome with constipation    Fever    Other hyperlipidemia    Hordeolum externum of right upper eyelid    SARAHY (generalized anxiety disorder)    Abdominal pain    Fall     Review of patient's allergies indicates:   Allergen Reactions    Bactrim [sulfamethoxazole-trimethoprim]      hallucination    Latex, natural rubber Swelling    Penicillins Shortness Of Breath       Medications:  Current Outpatient Medications on File Prior to Visit   Medication Sig Dispense Refill    alendronate (FOSAMAX) 70 MG tablet Take 1 tablet (70 mg total) by mouth every 7 days. Take first thing in the morning with glass of water.  Sit upright for 30minutes.  Then ok to take other meds and eat normally. 12 tablet 3    atorvastatin (LIPITOR) 40 MG tablet TAKE ONE TABLET BY MOUTH ONE TIME DAILY 90 tablet 3    cholecalciferol, vitamin D3, (VITAMIN D3) 50 mcg (2,000 unit) Cap Take 1 capsule by mouth once daily.      clonazePAM (KLONOPIN) 0.5 MG tablet TAKE 1 TO 2 TABLET BY MOUTH EVERY EVENING AS NEEDED 60 tablet 3     fluticasone propionate (FLONASE) 50 mcg/actuation nasal spray SHAKE LIQUID AND USE 2 SPRAYS(100 MCG) IN EACH NOSTRIL DAILY 48 g 11    levothyroxine (SYNTHROID) 50 MCG tablet TAKE 1 TABLET BY MOUTH EVERY DAY 90 tablet 1    mirtazapine (REMERON) 30 MG tablet Take 1 tablet (30 mg total) by mouth every evening. 90 tablet 3    neomycin-polymyxin-dexamethasone (MAXITROL) 3.5 mg/g-10,000 unit/g-0.1 % Oint Place into the right eye 2 (two) times a day. 3.5 g 3    prednisoLONE acetate (PRED FORTE) 1 % DrpS Place 1 drop into the right eye 4 (four) times daily. 5 mL 1     No current facility-administered medications on file prior to visit.       Medications have been reviewed and reconciled with patient at visit today.      Exam:  There were no vitals filed for this visit.  Weight: 51.7 kg (114 lb)   Body mass index is 17.33 kg/m².      BP Readings from Last 3 Encounters:   03/14/24 128/79   02/29/24 114/78   02/22/24 118/72     Wt Readings from Last 3 Encounters:   05/30/24 1102 51.7 kg (114 lb)   03/14/24 0918 54.2 kg (119 lb 7.8 oz)   02/29/24 0947 54.1 kg (119 lb 2.5 oz)        REVIEW OF SYSTEMS  Review of Systems   Constitutional:  Negative for appetite change, chills and fever.   HENT: Negative.     Respiratory:  Negative for cough.    Cardiovascular:  Negative for chest pain.   Gastrointestinal:  Positive for abdominal distention, abdominal pain and constipation. Negative for diarrhea, nausea and vomiting.   Genitourinary:  Negative for difficulty urinating.   Musculoskeletal: Negative.    Neurological: Negative.    Psychiatric/Behavioral:  The patient is nervous/anxious.         Physical Exam  Constitutional:       General: She is not in acute distress.     Appearance: She is not ill-appearing.   HENT:      Head: Normocephalic.   Eyes:      Conjunctiva/sclera: Conjunctivae normal.   Pulmonary:      Effort: Pulmonary effort is normal. No respiratory distress.   Musculoskeletal:         General: No signs of injury.  "  Skin:     Coloration: Skin is not jaundiced or pale.   Neurological:      Mental Status: She is alert and oriented to person, place, and time.          Laboratory Reviewed:     Lab Results   Component Value Date    WBC 4.50 03/04/2024    HGB 13.6 03/04/2024    HCT 39.3 03/04/2024     (L) 03/04/2024    CHOL 153 06/10/2023    TRIG 67 06/10/2023    HDL 76 (H) 06/10/2023    ALT 23 03/04/2024    AST 27 03/04/2024     (L) 03/04/2024    K 5.0 03/04/2024    CL 99 03/04/2024    CREATININE 0.9 03/04/2024    BUN 10 03/04/2024    CO2 28 03/04/2024    TSH 1.419 06/10/2023    INR 1.1 03/14/2016       Screening or Prevention Patient's value Goal Complete/Controlled?   HgA1C Testing and Control   No results found for: "HGBA1C"   Annually/Less than 8% No   Lipid profile : 06/10/2023 Annually Yes   LDL control Lab Results   Component Value Date    LDLCALC 63.6 06/10/2023    Annually/Less than 100 mg/dl  Yes   Nephropathy screening No results found for: "LABMICR"  Lab Results   Component Value Date    PROTEINUA 1+ (A) 04/08/2019    Annually No   Blood pressure BP Readings from Last 1 Encounters:   03/14/24 128/79    Less than 140/90 Yes   Dilated retinal exam Most Recent Eye Exam Date: Not Found Annually Yes   Foot exam   Most Recent Foot Exam Date: Not Found Annually Yes       Health Maintenance  Health Maintenance Topics with due status: Not Due       Topic Last Completion Date    TETANUS VACCINE 07/18/2018    Colorectal Cancer Screening 08/22/2018    Lipid Panel 06/10/2023    DEXA Scan 10/17/2023     Health Maintenance Due   Topic Date Due    RSV Vaccine (Age 60+ and Pregnant patients) (1 - 1-dose 60+ series) Never done    COVID-19 Vaccine (7 - 2023-24 season) 11/23/2023               -Patient's lab results were reviewed and discussed with patient  -Treatment options and alternatives were discussed with the patient. Patient expressed understanding. Patient was given the opportunity to ask questions and be an active " participant in their medical care. Patient had no further questions or concerns at this time.         Future Appointments   Date Time Provider Department Center   6/3/2024 10:40 AM Sweta Powers NP [facility]   6/14/2024 10:45 AM LABORATORY, [facility]   6/17/2024  9:00 AM Sweta Powers NP [facility]   6/24/2024 10:45 AM Chuck Collins OD [facility]   7/5/2024  8:20 AM Adeola Berumen MD [facility]          After visit summary printed and given to patient upon discharge.  Patient goals and care plan are included in After visit summary.      The following issues were discussed: The primary encounter diagnosis was Constipation, unspecified constipation type. Diagnoses of Anxiety and Irritable bowel syndrome with constipation were also pertinent to this visit.    Health maintenance needs, recent test results and goals of care discussed with pt and questions answered.           Sweta Powers, NIKHIL, NP-C  [facility] [address] [place] [address]

## 2024-05-31 ENCOUNTER — PATIENT MESSAGE (OUTPATIENT)
Dept: PRIMARY CARE CLINIC | Facility: CLINIC | Age: 74
End: 2024-05-31
Payer: MEDICARE

## 2024-05-31 ENCOUNTER — HOSPITAL ENCOUNTER (OUTPATIENT)
Dept: RADIOLOGY | Facility: HOSPITAL | Age: 74
Discharge: HOME | End: 2024-05-31
Attending: NURSE PRACTITIONER
Payer: MEDICARE

## 2024-05-31 DIAGNOSIS — K59.00 CONSTIPATION, UNSPECIFIED CONSTIPATION TYPE: ICD-10-CM

## 2024-05-31 PROCEDURE — 74018 RADEX ABDOMEN 1 VIEW: CPT | Mod: 26,,, | Performed by: RADIOLOGY

## 2024-05-31 PROCEDURE — 74018 RADEX ABDOMEN 1 VIEW: CPT | Mod: TC

## 2024-05-31 NOTE — ASSESSMENT & PLAN NOTE
KUB pending  Continue eating normal diet unless vomiting, severe abdominal pain  Resume fiber pill or Miralax.

## 2024-06-03 ENCOUNTER — PATIENT MESSAGE (OUTPATIENT)
Dept: GASTROENTEROLOGY | Facility: CLINIC | Age: 74
End: 2024-06-03
Payer: MEDICARE

## 2024-06-03 ENCOUNTER — TELEPHONE (OUTPATIENT)
Dept: PRIMARY CARE CLINIC | Facility: CLINIC | Age: 74
End: 2024-06-03
Payer: MEDICARE

## 2024-06-03 RX ORDER — LEVOTHYROXINE SODIUM 50 UG/1
TABLET ORAL
Qty: 90 TABLET | Refills: 1 | Status: SHIPPED | OUTPATIENT
Start: 2024-06-03

## 2024-06-04 NOTE — PROGRESS NOTES
Primary Care Telemedicine Appointment      The patient location is:  Patient Home   The chief complaint leading to consultation is: weight loss,   Total time spent on medical decision making was 20 min.    Visit type: Virtual visit with synchronous audio only and video  Each patient to whom he or she provides medical services by telemedicine is:  (1) informed of the relationship between the physician and patient and the respective role of any other health care provider with respect to management of the patient; and (2) notified that he or she may decline to receive medical services by telemedicine and may withdraw from such care at any time.      Subjective:      Patient ID: Maggie Díaz is a 73 y.o. female     Chief Complaint: Weight loss and rectal pressure    Prior to this visit, patient's last encounter with PCP was 2024 - constipation, rectal pressure    Today, Ms. Díaz discusses her concerns about symptoms noted mostly in the AM including continued rectal pressure after having a bowel movement. Does the rectal pressure have anything to do with week pelvic floor?  Pt reports bulky loosely formed stool - taking daily fiber supplement. No recent diarrhea, N/V  Improvement voiced as decreased abdominal cramping and less gas - she eats a light meal at nighttime.   Continues to report weight loss and inability to gain weight.  Common daily meal:  Oatmeal in AM  Egg sandwich, bagel, drinks yogurt  3 to 4 ounces of meat at night with vegetable  Does not like Ensure - feels like there is a coating in the mouth.   Intolerant to Lactose    Currently denies further symptoms.         Past Surgical History:   Procedure Laterality Date     SECTION, CLASSIC      COLONOSCOPY N/A 2018    Procedure: COLONOSCOPY;  Surgeon: Jose Royal MD;  Location: [facility];  Service: Endoscopy;  Laterality: N/A;    WRIST FRACTURE SURGERY      , Dr. Bunch.       Past Medical History:   Diagnosis Date    Anxiety      Benzodiazepine dependence 11/18/2020    Hematuria, microscopic     negative CT    Hepatitis C     slow progression/ Dr. Otoole    Hypertension     Migraines     OP (osteoporosis)     on drug holiday.    Osteoarthritis     Radius fracture     Radius fracture     Restless legs syndrome (RLS)     Thrombocytopenia 2/22/2019    Vitamin D deficiency disease          Objective:   There were no vitals filed for this visit.  Weight: 51.7 kg (113 lb 15.7 oz)   Body mass index is 17.33 kg/m².  BP Readings from Last 3 Encounters:   03/14/24 128/79   02/29/24 114/78   02/22/24 118/72      Wt Readings from Last 3 Encounters:   06/05/24 1118 51.7 kg (113 lb 15.7 oz)   05/30/24 1102 51.7 kg (114 lb)   03/14/24 0918 54.2 kg (119 lb 7.8 oz)        Physical Exam  Constitutional:       General: She is not in acute distress.     Appearance: She is not ill-appearing.   HENT:      Head: Normocephalic.   Eyes:      Conjunctiva/sclera: Conjunctivae normal.   Pulmonary:      Effort: Pulmonary effort is normal.   Abdominal:      Tenderness: There is no abdominal tenderness.   Musculoskeletal:         General: No signs of injury.   Skin:     Coloration: Skin is not pale.   Neurological:      Mental Status: She is alert and oriented to person, place, and time.   Psychiatric:         Mood and Affect: Mood normal.         Thought Content: Thought content normal.         Lab Results   Component Value Date    WBC 4.50 03/04/2024    HGB 13.6 03/04/2024    HCT 39.3 03/04/2024     (L) 03/04/2024    CHOL 153 06/10/2023    TRIG 67 06/10/2023    HDL 76 (H) 06/10/2023    ALT 23 03/04/2024    AST 27 03/04/2024     (L) 03/04/2024    K 5.0 03/04/2024    CL 99 03/04/2024    CREATININE 0.9 03/04/2024    BUN 10 03/04/2024    CO2 28 03/04/2024    TSH 1.419 06/10/2023    INR 1.1 03/14/2016         Assessment:       Plan:     Problem List Items Addressed This Visit       Irritable bowel syndrome with constipation     Continue daily fiber  supplement  Last colonoscopy 2018 - Hemorrhoids found on perianal exam.                         - Diverticulosis in the sigmoid colon and in the                         descending colon.          Underweight    Weight loss - Primary       Health Maintenance         Date Due Completion Date    RSV Vaccine (Age 60+ and Pregnant patients) (1 - 1-dose 60+ series) Never done ---    COVID-19 Vaccine (7 - 2023-24 season) 11/23/2023 9/28/2023    DEXA Scan 10/17/2025 10/17/2023    Override on 1/3/2013: Done (Osteoporosis; stable BMD; chk vit D; repeat in 2 years; fosamax holiday)    Lipid Panel 06/10/2028 6/10/2023    TETANUS VACCINE 07/18/2028 7/18/2018    Colorectal Cancer Screening 08/22/2028 8/22/2018    Override on 1/27/2017: Declined (Pt currently refuseing per PCP note 1/21/17)            1. Weight loss    2. Underweight  Overview:  BMI 17.33    Sent patient literature with high caloric foods that are healthy  Increase protein in diet      3. Irritable bowel syndrome with constipation  Assessment & Plan:  Continue daily fiber supplement  Last colonoscopy 2018 - Hemorrhoids found on perianal exam.                         - Diverticulosis in the sigmoid colon and in the                         descending colon.            No follow-ups on file.              Sweta Powers, NIKHIL, NP-C  [facility] [address] [place] [address]  [phone] ph  [phone] fax

## 2024-06-05 ENCOUNTER — PATIENT MESSAGE (OUTPATIENT)
Dept: PRIMARY CARE CLINIC | Facility: CLINIC | Age: 74
End: 2024-06-05

## 2024-06-05 ENCOUNTER — OFFICE VISIT (OUTPATIENT)
Dept: PRIMARY CARE CLINIC | Facility: CLINIC | Age: 74
End: 2024-06-05
Payer: MEDICARE

## 2024-06-05 ENCOUNTER — PATIENT MESSAGE (OUTPATIENT)
Dept: PRIMARY CARE CLINIC | Facility: CLINIC | Age: 74
End: 2024-06-05
Payer: MEDICARE

## 2024-06-05 VITALS — BODY MASS INDEX: 17.33 KG/M2 | WEIGHT: 114 LBS

## 2024-06-05 DIAGNOSIS — R63.4 WEIGHT LOSS: Primary | ICD-10-CM

## 2024-06-05 DIAGNOSIS — R63.6 UNDERWEIGHT: ICD-10-CM

## 2024-06-05 DIAGNOSIS — K58.1 IRRITABLE BOWEL SYNDROME WITH CONSTIPATION: ICD-10-CM

## 2024-06-05 PROCEDURE — 99213 OFFICE O/P EST LOW 20 MIN: CPT | Mod: 95,,, | Performed by: NURSE PRACTITIONER

## 2024-06-05 NOTE — ASSESSMENT & PLAN NOTE
Continue daily fiber supplement  Last colonoscopy 2018 - Hemorrhoids found on perianal exam.                         - Diverticulosis in the sigmoid colon and in the                         descending colon.

## 2024-06-12 ENCOUNTER — PATIENT MESSAGE (OUTPATIENT)
Dept: PRIMARY CARE CLINIC | Facility: CLINIC | Age: 74
End: 2024-06-12
Payer: MEDICARE

## 2024-06-13 ENCOUNTER — PATIENT MESSAGE (OUTPATIENT)
Dept: PRIMARY CARE CLINIC | Facility: CLINIC | Age: 74
End: 2024-06-13
Payer: MEDICARE

## 2024-06-13 DIAGNOSIS — K58.1 IRRITABLE BOWEL SYNDROME WITH CONSTIPATION: Primary | ICD-10-CM

## 2024-06-13 RX ORDER — DICYCLOMINE HYDROCHLORIDE 10 MG/1
10 CAPSULE ORAL 3 TIMES DAILY PRN
Qty: 60 CAPSULE | Refills: 0 | Status: SHIPPED | OUTPATIENT
Start: 2024-06-13 | End: 2024-07-13

## 2024-06-14 ENCOUNTER — LAB VISIT (OUTPATIENT)
Dept: LAB | Facility: HOSPITAL | Age: 74
End: 2024-06-14
Attending: FAMILY MEDICINE
Payer: MEDICARE

## 2024-06-14 DIAGNOSIS — I70.0 AORTIC ATHEROSCLEROSIS: ICD-10-CM

## 2024-06-14 DIAGNOSIS — M81.0 AGE-RELATED OSTEOPOROSIS WITHOUT CURRENT PATHOLOGICAL FRACTURE: ICD-10-CM

## 2024-06-14 DIAGNOSIS — R63.6 UNDERWEIGHT: ICD-10-CM

## 2024-06-14 DIAGNOSIS — E03.9 ACQUIRED HYPOTHYROIDISM: ICD-10-CM

## 2024-06-14 LAB
25(OH)D3+25(OH)D2 SERPL-MCNC: 69 NG/ML (ref 30–96)
ALBUMIN SERPL BCP-MCNC: 4.4 G/DL (ref 3.5–5.2)
ALP SERPL-CCNC: 41 U/L (ref 55–135)
ALT SERPL W/O P-5'-P-CCNC: 16 U/L (ref 10–44)
ANION GAP SERPL CALC-SCNC: 12 MMOL/L (ref 8–16)
AST SERPL-CCNC: 25 U/L (ref 10–40)
BILIRUB SERPL-MCNC: 0.7 MG/DL (ref 0.1–1)
BUN SERPL-MCNC: 18 MG/DL (ref 8–23)
CALCIUM SERPL-MCNC: 9.5 MG/DL (ref 8.7–10.5)
CHLORIDE SERPL-SCNC: 99 MMOL/L (ref 95–110)
CHOLEST SERPL-MCNC: 125 MG/DL (ref 120–199)
CHOLEST/HDLC SERPL: 2 {RATIO} (ref 2–5)
CO2 SERPL-SCNC: 25 MMOL/L (ref 23–29)
CREAT SERPL-MCNC: 0.9 MG/DL (ref 0.5–1.4)
EST. GFR  (NO RACE VARIABLE): >60 ML/MIN/1.73 M^2
GLUCOSE SERPL-MCNC: 91 MG/DL (ref 70–110)
HDLC SERPL-MCNC: 64 MG/DL (ref 40–75)
HDLC SERPL: 51.2 % (ref 20–50)
LDLC SERPL CALC-MCNC: 51.6 MG/DL (ref 63–159)
NONHDLC SERPL-MCNC: 61 MG/DL
POTASSIUM SERPL-SCNC: 3.9 MMOL/L (ref 3.5–5.1)
PROT SERPL-MCNC: 7.3 G/DL (ref 6–8.4)
SODIUM SERPL-SCNC: 136 MMOL/L (ref 136–145)
T4 FREE SERPL-MCNC: 1.01 NG/DL (ref 0.71–1.51)
TRIGL SERPL-MCNC: 47 MG/DL (ref 30–150)
TSH SERPL DL<=0.005 MIU/L-ACNC: 1.34 UIU/ML (ref 0.4–4)

## 2024-06-14 PROCEDURE — 84443 ASSAY THYROID STIM HORMONE: CPT | Performed by: FAMILY MEDICINE

## 2024-06-14 PROCEDURE — 80053 COMPREHEN METABOLIC PANEL: CPT | Performed by: FAMILY MEDICINE

## 2024-06-14 PROCEDURE — 84439 ASSAY OF FREE THYROXINE: CPT | Performed by: FAMILY MEDICINE

## 2024-06-14 PROCEDURE — 36415 COLL VENOUS BLD VENIPUNCTURE: CPT | Performed by: FAMILY MEDICINE

## 2024-06-14 PROCEDURE — 80061 LIPID PANEL: CPT | Performed by: FAMILY MEDICINE

## 2024-06-14 PROCEDURE — 82306 VITAMIN D 25 HYDROXY: CPT | Performed by: FAMILY MEDICINE

## 2024-06-21 NOTE — PROGRESS NOTES
"Subjective:      Patient ID: Maggie Díaz is a 73 y.o. female.    Chief Complaint: Follow-up (2 month f/u ), Abdominal Pain (Cramps in stomach at least 2 times a week), and Results (Lab test)      HPI  Here for f/u medical problems and preventive exam.  Down 12# in past 6mo.  Awakens during the night with abdominal cramping that is painful, does not produce BM at that time.  Cannot gain weight.  Has painful hemorrhoids, but no bleeding.  Outside gluten testing negative, but feels better avoiding gluten.  No f/c/n/v.  Appetite is "good". No cp/sob/palp.  BMs formed, no b/b.  Urine normal.  Feels like anxiety is under control, until she has the nocturnal cramping.        HM: 10/23 fluvax, 1/21 covid vaccines/booster, 8/19 HAV, 3/15 [id number], 2/14 [id number], 7/18 Td, 1/10 Tdap, 8/12 zoster, 2020 Shingrix x2, 10/23 BMD stable/hx fosamax will restart 10/23 rep 2y, 8/18 Cscope rep 10y, 5/23 MMG, 5/18 pelvic u/s neg.     Review of Systems   Constitutional:  Negative for appetite change, chills, diaphoresis and fever.   HENT:  Negative for congestion, ear pain, rhinorrhea, sinus pressure and sore throat.    Respiratory:  Negative for cough, chest tightness and shortness of breath.    Cardiovascular:  Negative for chest pain and palpitations.   Gastrointestinal:  Negative for blood in stool, constipation, diarrhea, nausea and vomiting.   Genitourinary:  Negative for dysuria, frequency, hematuria, menstrual problem, urgency and vaginal discharge.   Musculoskeletal:  Negative for arthralgias.   Skin:  Negative for rash.   Neurological:  Negative for dizziness and headaches.   Psychiatric/Behavioral:  Negative for sleep disturbance. The patient is not nervous/anxious.          Objective:   /62 (BP Location: Right arm, Patient Position: Sitting)   Pulse 75   Temp 97.5 °F (36.4 °C) (Skin)   Ht 5' 8" (1.727 m)   Wt 51.9 kg (114 lb 4.9 oz)   SpO2 95%   BMI 17.38 kg/m²     Physical Exam  Constitutional:       " Appearance: She is well-developed.   HENT:      Right Ear: External ear normal. Tympanic membrane is not injected.      Left Ear: External ear normal. Tympanic membrane is not injected.   Eyes:      Conjunctiva/sclera: Conjunctivae normal.   Neck:      Thyroid: No thyromegaly.   Cardiovascular:      Rate and Rhythm: Normal rate and regular rhythm.      Heart sounds: No murmur heard.     No friction rub. No gallop.   Pulmonary:      Effort: Pulmonary effort is normal.      Breath sounds: Normal breath sounds. No wheezing or rales.   Abdominal:      General: Bowel sounds are normal.      Palpations: Abdomen is soft. There is no mass.      Tenderness: There is no abdominal tenderness.   Musculoskeletal:      Cervical back: Normal range of motion and neck supple.   Lymphadenopathy:      Cervical: No cervical adenopathy.   Skin:     General: Skin is warm.      Findings: No rash.   Neurological:      Mental Status: She is alert and oriented to person, place, and time.            Latest Reference Range & Units 03/04/24 10:54 06/14/24 10:46   WBC 3.90 - 12.70 K/uL 4.50    RBC 4.00 - 5.40 M/uL 4.10    Hemoglobin 12.0 - 16.0 g/dL 13.6    Hematocrit 37.0 - 48.5 % 39.3    MCV 82 - 98 fL 96    MCH 27.0 - 31.0 pg 33.2 (H)    MCHC 32.0 - 36.0 g/dL 34.6    RDW 11.5 - 14.5 % 12.7    Platelet Count 150 - 450 K/uL 121 (L)    MPV 9.2 - 12.9 fL 9.0 (L)    Gran % 38.0 - 73.0 % 63.1    Lymph % 18.0 - 48.0 % 25.6    Mono % 4.0 - 15.0 % 10.0    Eos % 0.0 - 8.0 % 0.4    Basophil % 0.0 - 1.9 % 0.7    Immature Granulocytes 0.0 - 0.5 % 0.2    Gran # (ANC) 1.8 - 7.7 K/uL 2.8    Lymph # 1.0 - 4.8 K/uL 1.2    Mono # 0.3 - 1.0 K/uL 0.5    Eos # 0.0 - 0.5 K/uL 0.0    Baso # 0.00 - 0.20 K/uL 0.03    Immature Grans (Abs) 0.00 - 0.04 K/uL 0.01    nRBC 0 /100 WBC 0    Differential Method  Automated    Sodium 136 - 145 mmol/L 133 (L) 136   Potassium 3.5 - 5.1 mmol/L 5.0 3.9   Chloride 95 - 110 mmol/L 99 99   CO2 23 - 29 mmol/L 28 25   Anion Gap 8 - 16  mmol/L 6 (L) 12   BUN 8 - 23 mg/dL 10 18   Creatinine 0.5 - 1.4 mg/dL 0.9 0.9   eGFR >60 mL/min/1.73 m^2 >60 >60.0   Glucose 70 - 110 mg/dL 94 91   Calcium 8.7 - 10.5 mg/dL 9.7 9.5   ALP 55 - 135 U/L 34 (L) 41 (L)   PROTEIN TOTAL 6.0 - 8.4 g/dL 7.8 7.3   Albumin 3.5 - 5.2 g/dL 4.7 4.4   BILIRUBIN TOTAL 0.1 - 1.0 mg/dL 0.6 0.7   AST 10 - 40 U/L 27 25   ALT 10 - 44 U/L 23 16   Cholesterol Total 120 - 199 mg/dL  125   HDL 40 - 75 mg/dL  64   HDL/Cholesterol Ratio 20.0 - 50.0 %  51.2 (H)   Non-HDL Cholesterol mg/dL  61   Total Cholesterol/HDL Ratio 2.0 - 5.0   2.0   Triglycerides 30 - 150 mg/dL  47   LDL Cholesterol 63.0 - 159.0 mg/dL  51.6 (L)   Vitamin D 30 - 96 ng/mL  69   TSH 0.400 - 4.000 uIU/mL  1.344   Free T4 0.71 - 1.51 ng/dL  1.01     Assessment:       1. Irritable bowel syndrome with constipation    2. SARAHY (generalized anxiety disorder)    3. Recurrent major depressive disorder, in partial remission    4. Underweight    5. Weight loss    6. Other hyperlipidemia    7. Aortic atherosclerosis    8. Age-related osteoporosis without current pathological fracture    9. Acquired hypothyroidism    10. Encounter for preventive health examination    11. Encounter for screening mammogram for malignant neoplasm of breast    12. Thrombocytopenia    13. Restless legs syndrome (RLS)          Plan:     Irritable bowel syndrome with constipation- more carbs to intake, take 2 bentyl at HS, RTC 1mo.    SARAHY (generalized anxiety disorder), Recurrent major depressive disorder, in partial remission- cont remeron/clonaz.    Underweight, Weight loss- down ab out 15# from baseline, will follow.    Other hyperlipidemia, Aortic atherosclerosis- stable on atorva, cont.    Age-related osteoporosis without current pathological fracture- on bisphos, likely repeat 10/24.    Acquired hypothyroidism- Clinically stable, continue present treatment.    Encounter for preventive health examination    Encounter for screening mammogram for malignant  neoplasm of breast  -     Mammo Digital Screening Bilat w/ Yonatan; Future; Expected date: 07/05/2024    Thrombocytopenia, no s/s, will follow.    Restless legs syndrome (RLS)- stable on bz.

## 2024-06-24 ENCOUNTER — OFFICE VISIT (OUTPATIENT)
Dept: OPHTHALMOLOGY | Facility: CLINIC | Age: 74
End: 2024-06-24
Payer: MEDICARE

## 2024-06-24 DIAGNOSIS — H02.9 EYELID LESION: ICD-10-CM

## 2024-06-24 DIAGNOSIS — Z98.890 POST-OPERATIVE STATE: Primary | ICD-10-CM

## 2024-06-24 PROCEDURE — 99999 PR PBB SHADOW E&M-EST. PATIENT-LVL II: CPT | Mod: PBBFAC,,, | Performed by: OPTOMETRIST

## 2024-06-24 PROCEDURE — 99024 POSTOP FOLLOW-UP VISIT: CPT | Mod: POP,,, | Performed by: OPTOMETRIST

## 2024-06-24 PROCEDURE — 99212 OFFICE O/P EST SF 10 MIN: CPT | Mod: PBBFAC | Performed by: OPTOMETRIST

## 2024-06-24 RX ORDER — NEOMYCIN SULFATE, POLYMYXIN B SULFATE, AND DEXAMETHASONE 3.5; 10000; 1 MG/G; [USP'U]/G; MG/G
OINTMENT OPHTHALMIC
Qty: 3.5 G | Refills: 0 | Status: SHIPPED | OUTPATIENT
Start: 2024-06-24

## 2024-06-24 NOTE — PROGRESS NOTES
HPI     Post-op Evaluation            Comments: 1 mo post op SP pciol OD 5/16/24  No pain  Pt finished pred QD OD 6/12/24          Comments    DKT REFERRAL      1. PCIOL OD 5/16/24 +23.0 SY60WF/CDE: 18.44      Pred QID OD             Last edited by Mook Plummer on 6/24/2024 10:43 AM.            Assessment /Plan     For exam results, see Encounter Report.    1. Post-operative state      Assessment:    S/P CEIOL OD : Doing Well and completing healing process. Final visual correction options discussed and appropriate prescriptions and/or OTC reading glass recommendations offered to patient. Mrx offered. Pt instructed to follow up in 6 months for next eye exam or PRN any pain, redness, vision changes or other concerns.     2. Eyelid lesion  -     neomycin-polymyxin-dexamethasone (MAXITROL) 3.5 mg/g-10,000 unit/g-0.1 % Oint; Apply ointment to lids and lashes on left eyelid twice daily for 10 days then stop.  Dispense: 3.5 g; Refill: 0   Trace improvement compared to baseline, will restart maxitrol rosalinda tid x 10 days. Pt will message us back in 10 days of still persists, if so, will consult Dr JACKSON for removal.     RTC in 4 months for DFE, sooner if changes to vision or worsening symptoms.  Discussed above and answered questions.                    
Statement Selected

## 2024-07-04 ENCOUNTER — PATIENT MESSAGE (OUTPATIENT)
Dept: OPHTHALMOLOGY | Facility: CLINIC | Age: 74
End: 2024-07-04
Payer: MEDICARE

## 2024-07-05 ENCOUNTER — OFFICE VISIT (OUTPATIENT)
Dept: PRIMARY CARE CLINIC | Facility: CLINIC | Age: 74
End: 2024-07-05
Payer: MEDICARE

## 2024-07-05 VITALS
HEART RATE: 75 BPM | OXYGEN SATURATION: 95 % | SYSTOLIC BLOOD PRESSURE: 122 MMHG | TEMPERATURE: 98 F | DIASTOLIC BLOOD PRESSURE: 62 MMHG | WEIGHT: 114.31 LBS | BODY MASS INDEX: 17.32 KG/M2 | HEIGHT: 68 IN

## 2024-07-05 DIAGNOSIS — Z00.00 ENCOUNTER FOR PREVENTIVE HEALTH EXAMINATION: ICD-10-CM

## 2024-07-05 DIAGNOSIS — F33.41 RECURRENT MAJOR DEPRESSIVE DISORDER, IN PARTIAL REMISSION: ICD-10-CM

## 2024-07-05 DIAGNOSIS — D69.6 THROMBOCYTOPENIA: ICD-10-CM

## 2024-07-05 DIAGNOSIS — R63.6 UNDERWEIGHT: ICD-10-CM

## 2024-07-05 DIAGNOSIS — K58.1 IRRITABLE BOWEL SYNDROME WITH CONSTIPATION: Primary | ICD-10-CM

## 2024-07-05 DIAGNOSIS — I70.0 AORTIC ATHEROSCLEROSIS: ICD-10-CM

## 2024-07-05 DIAGNOSIS — M81.0 AGE-RELATED OSTEOPOROSIS WITHOUT CURRENT PATHOLOGICAL FRACTURE: ICD-10-CM

## 2024-07-05 DIAGNOSIS — E78.49 OTHER HYPERLIPIDEMIA: ICD-10-CM

## 2024-07-05 DIAGNOSIS — F41.1 GAD (GENERALIZED ANXIETY DISORDER): ICD-10-CM

## 2024-07-05 DIAGNOSIS — E03.9 ACQUIRED HYPOTHYROIDISM: ICD-10-CM

## 2024-07-05 DIAGNOSIS — G25.81 RESTLESS LEGS SYNDROME (RLS): ICD-10-CM

## 2024-07-05 DIAGNOSIS — R63.4 WEIGHT LOSS: ICD-10-CM

## 2024-07-05 DIAGNOSIS — Z12.31 ENCOUNTER FOR SCREENING MAMMOGRAM FOR MALIGNANT NEOPLASM OF BREAST: ICD-10-CM

## 2024-07-05 PROBLEM — F41.9 ANXIETY: Status: RESOLVED | Noted: 2020-04-09 | Resolved: 2024-07-05

## 2024-07-05 PROCEDURE — 99214 OFFICE O/P EST MOD 30 MIN: CPT | Mod: PBBFAC,PN | Performed by: INTERNAL MEDICINE

## 2024-07-05 PROCEDURE — 99999 PR PBB SHADOW E&M-EST. PATIENT-LVL IV: CPT | Mod: PBBFAC,,, | Performed by: INTERNAL MEDICINE

## 2024-07-08 ENCOUNTER — PATIENT MESSAGE (OUTPATIENT)
Dept: PRIMARY CARE CLINIC | Facility: CLINIC | Age: 74
End: 2024-07-08
Payer: MEDICARE

## 2024-07-08 ENCOUNTER — PATIENT MESSAGE (OUTPATIENT)
Dept: HEMATOLOGY/ONCOLOGY | Facility: CLINIC | Age: 74
End: 2024-07-08
Payer: MEDICARE

## 2024-07-19 ENCOUNTER — LAB VISIT (OUTPATIENT)
Dept: LAB | Facility: HOSPITAL | Age: 74
End: 2024-07-19
Attending: NURSE PRACTITIONER
Payer: MEDICARE

## 2024-07-19 DIAGNOSIS — Z86.19 HISTORY OF HEPATITIS C: ICD-10-CM

## 2024-07-19 DIAGNOSIS — D69.6 THROMBOCYTOPENIA: ICD-10-CM

## 2024-07-19 LAB
ALBUMIN SERPL BCP-MCNC: 4.9 G/DL (ref 3.5–5.2)
ALP SERPL-CCNC: 40 U/L (ref 55–135)
ALT SERPL W/O P-5'-P-CCNC: 26 U/L (ref 10–44)
ANION GAP SERPL CALC-SCNC: 11 MMOL/L (ref 8–16)
AST SERPL-CCNC: 28 U/L (ref 10–40)
BASOPHILS # BLD AUTO: 0.03 K/UL (ref 0–0.2)
BASOPHILS NFR BLD: 0.8 % (ref 0–1.9)
BILIRUB SERPL-MCNC: 0.8 MG/DL (ref 0.1–1)
BUN SERPL-MCNC: 13 MG/DL (ref 8–23)
CALCIUM SERPL-MCNC: 10 MG/DL (ref 8.7–10.5)
CHLORIDE SERPL-SCNC: 99 MMOL/L (ref 95–110)
CO2 SERPL-SCNC: 26 MMOL/L (ref 23–29)
CREAT SERPL-MCNC: 0.9 MG/DL (ref 0.5–1.4)
DIFFERENTIAL METHOD BLD: ABNORMAL
EOSINOPHIL # BLD AUTO: 0 K/UL (ref 0–0.5)
EOSINOPHIL NFR BLD: 0.8 % (ref 0–8)
ERYTHROCYTE [DISTWIDTH] IN BLOOD BY AUTOMATED COUNT: 12.8 % (ref 11.5–14.5)
EST. GFR  (NO RACE VARIABLE): >60 ML/MIN/1.73 M^2
GLUCOSE SERPL-MCNC: 89 MG/DL (ref 70–110)
HCT VFR BLD AUTO: 43.2 % (ref 37–48.5)
HGB BLD-MCNC: 15 G/DL (ref 12–16)
IMM GRANULOCYTES # BLD AUTO: 0 K/UL (ref 0–0.04)
IMM GRANULOCYTES NFR BLD AUTO: 0 % (ref 0–0.5)
LYMPHOCYTES # BLD AUTO: 1.3 K/UL (ref 1–4.8)
LYMPHOCYTES NFR BLD: 33.4 % (ref 18–48)
MCH RBC QN AUTO: 34.1 PG (ref 27–31)
MCHC RBC AUTO-ENTMCNC: 34.7 G/DL (ref 32–36)
MCV RBC AUTO: 98 FL (ref 82–98)
MONOCYTES # BLD AUTO: 0.5 K/UL (ref 0.3–1)
MONOCYTES NFR BLD: 13.1 % (ref 4–15)
NEUTROPHILS # BLD AUTO: 1.9 K/UL (ref 1.8–7.7)
NEUTROPHILS NFR BLD: 51.9 % (ref 38–73)
NRBC BLD-RTO: 0 /100 WBC
PLATELET # BLD AUTO: 117 K/UL (ref 150–450)
PMV BLD AUTO: 9.3 FL (ref 9.2–12.9)
POTASSIUM SERPL-SCNC: 5 MMOL/L (ref 3.5–5.1)
PROT SERPL-MCNC: 8.2 G/DL (ref 6–8.4)
RBC # BLD AUTO: 4.4 M/UL (ref 4–5.4)
SODIUM SERPL-SCNC: 136 MMOL/L (ref 136–145)
WBC # BLD AUTO: 3.74 K/UL (ref 3.9–12.7)

## 2024-07-19 PROCEDURE — 80053 COMPREHEN METABOLIC PANEL: CPT | Performed by: NURSE PRACTITIONER

## 2024-07-19 PROCEDURE — 36415 COLL VENOUS BLD VENIPUNCTURE: CPT | Performed by: NURSE PRACTITIONER

## 2024-07-19 PROCEDURE — 85025 COMPLETE CBC W/AUTO DIFF WBC: CPT | Performed by: NURSE PRACTITIONER

## 2024-07-25 ENCOUNTER — OFFICE VISIT (OUTPATIENT)
Dept: HEMATOLOGY/ONCOLOGY | Facility: CLINIC | Age: 74
End: 2024-07-25
Payer: MEDICARE

## 2024-07-25 ENCOUNTER — PATIENT MESSAGE (OUTPATIENT)
Dept: HEMATOLOGY/ONCOLOGY | Facility: CLINIC | Age: 74
End: 2024-07-25

## 2024-07-25 DIAGNOSIS — D69.6 THROMBOCYTOPENIA: ICD-10-CM

## 2024-07-25 DIAGNOSIS — Z86.19 HISTORY OF HEPATITIS C: ICD-10-CM

## 2024-07-25 DIAGNOSIS — R63.4 UNINTENTIONAL WEIGHT LOSS: Primary | ICD-10-CM

## 2024-07-25 NOTE — PROGRESS NOTES
The patient location is: home  The chief complaint leading to consultation is: unintentional weight loss    Visit type: audiovisual    Face to Face time with patient: 20  30 minutes of total time spent on the encounter, which includes face to face time and non-face to face time preparing to see the patient (eg, review of tests), Obtaining and/or reviewing separately obtained history, Documenting clinical information in the electronic or other health record, Independently interpreting results (not separately reported) and communicating results to the patient/family/caregiver, or Care coordination (not separately reported).     Each patient to whom he or she provides medical services by telemedicine is:  (1) informed of the relationship between the physician and patient and the respective role of any other health care provider with respect to management of the patient; and (2) notified that he or she may decline to receive medical services by telemedicine and may withdraw from such care at any time.    Patient ID: Maggie Díaz is a 74 y.o. female.    Chief Complaint: unintentional weight loss    HPI:  74 year old female who presents today for further evaluation and management of thrombocytopenia.  Thrombocytopenia has been off/on since 2018 ranging from 124-149K.     Other diagnosis include RLS, anxiety, aortic atherosclerosis, vitamin D deficiency, hypothyroidism, history of Hep C, osteoporosis without fracture, and allergic rhinitis. History of Hep C - treated and cleared per patient.        Former cigarette smoker- quit in 1979.  Smoked for 41 years- smoked 1/2 pack per day  8/2019 Low dose CT chest negative for malignancy  04/2022 mammogram - negative for malignancy  8/2018 colonoscopy - negative with recs to repeat in 10 years  4/2019 PAP negative for malignancy     Alcohol use socially on occasion  Smoke marijuana on weekends - edibles     Has lost 9 lbs unintentionally in the past 6 months due to digestive  issues.  Diagnosed with IBS with constipation.  States that she has gotten her appetite back.      2022 CT abdomen pelvis without contrast Impression:No acute abnormality in the abdomen or pelvis     Fm hx of cancer - states that she is adopted      Interval History:     2022 Present today for continued monitoring of thrombocytopenia - Labs from  2022 platelets 135K stable.  On of stable neutropenia for many years.      Interval History:  2023 States that she fell last Friday walking her dog.  Has stitches to her chin and soreness around rib area.  Was evaluated in the ED and found with no fractures or breaks.  Labs continue to remain stable.       Interval History:  2023 Patient present today for continued monitoring of thrombocytopenia/leukopenia.  No complaints of abnormal bleeding.  Differential WBC in normal ranges.     Interval History:  3/7/2024  Currently with platelet count of 121 K.  Denies any known abnormal bleeding,  WBC and RBC normal.  Normal differential    Interval History:  2024  States that she still cannot gain any weight.  States she is continue to lose weight.  In 2024 weighed 125 and not unintentional weight loss currently 114 lbs. Has lost 11 lbs in 7 months. States that she bruises easily.    I have reviewed all of the patient's relevant lab work available in the medical record and have utilized this in my evaluation and management recommendations today.      Social History     Socioeconomic History    Marital status:    Occupational History     Employer: [employer]   Tobacco Use    Smoking status: Former     Current packs/day: 0.00     Average packs/day: 0.5 packs/day for 10.0 years (5.0 ttl pk-yrs)     Types: Cigarettes     Start date: 3/3/1969     Quit date: 3/3/1979     Years since quittin.4    Smokeless tobacco: Never   Substance and Sexual Activity    Alcohol use: Not Currently     Alcohol/week: 0.0 standard drinks of alcohol      Comment: occasionally    Drug use: No    Sexual activity: Not Currently     Partners: Male   Social History Narrative    Live with      Social Determinants of Health     Financial Resource Strain: Low Risk  (2023)    Overall Financial Resource Strain (CARDIA)     Difficulty of Paying Living Expenses: Not hard at all   Food Insecurity: No Food Insecurity (2023)    Hunger Vital Sign     Worried About Running Out of Food in the Last Year: Never true     Ran Out of Food in the Last Year: Never true   Transportation Needs: No Transportation Needs (2023)    PRAPARE - Transportation     Lack of Transportation (Medical): No     Lack of Transportation (Non-Medical): No   Physical Activity: Sufficiently Active (2023)    Exercise Vital Sign     Days of Exercise per Week: 5 days     Minutes of Exercise per Session: 60 min   Stress: Stress Concern Present (2023)    [employer] - Occupational Stress Questionnaire     Feeling of Stress : To some extent   Housing Stability: Low Risk  (2023)    Housing Stability Vital Sign     Unable to Pay for Housing in the Last Year: No     Number of Places Lived in the Last Year: 1     Unstable Housing in the Last Year: No       Family History   Adopted: Yes   Family history unknown: Yes       Past Surgical History:   Procedure Laterality Date     SECTION, CLASSIC      COLONOSCOPY N/A 2018    Procedure: COLONOSCOPY;  Surgeon: Jose Royal MD;  Location: [facility];  Service: Endoscopy;  Laterality: N/A;    WRIST FRACTURE SURGERY      , Dr. Bunch.       Past Medical History:   Diagnosis Date    Anxiety     Benzodiazepine dependence 2020    Hematuria, microscopic     negative CT    Hepatitis C     slow progression/ Dr. Otoole    Hypertension     Migraines     OP (osteoporosis)     on drug holiday.    Osteoarthritis     Radius fracture     Radius fracture     Restless legs syndrome (RLS)     Thrombocytopenia  2/22/2019    Vitamin D deficiency disease        Review of Systems   Constitutional:  Positive for unexpected weight change. Negative for appetite change.   HENT:  Negative for mouth sores and nosebleeds.    Eyes:  Negative for visual disturbance.   Respiratory:  Negative for cough and shortness of breath.    Cardiovascular:  Negative for chest pain.   Gastrointestinal:  Positive for abdominal pain (resolved since starting 2 bently prior to going to bed.). Negative for anal bleeding, blood in stool and diarrhea.   Endocrine: Negative.    Genitourinary:  Negative for frequency, hematuria and vaginal bleeding.   Musculoskeletal:  Negative for back pain.   Skin:  Negative for rash.   Allergic/Immunologic: Negative.    Neurological:  Negative for headaches.   Hematological:  Negative for adenopathy. Bruises/bleeds easily.   Psychiatric/Behavioral:  The patient is nervous/anxious.           Medication List with Changes/Refills   Current Medications    ALENDRONATE (FOSAMAX) 70 MG TABLET    Take 1 tablet (70 mg total) by mouth every 7 days. Take first thing in the morning with glass of water.  Sit upright for 30minutes.  Then ok to take other meds and eat normally.    ATORVASTATIN (LIPITOR) 40 MG TABLET    TAKE ONE TABLET BY MOUTH ONE TIME DAILY    CHOLECALCIFEROL, VITAMIN D3, (VITAMIN D3) 50 MCG (2,000 UNIT) CAP    Take 1 capsule by mouth once daily.    CLONAZEPAM (KLONOPIN) 0.5 MG TABLET    TAKE 1 TO 2 TABLET BY MOUTH EVERY EVENING AS NEEDED    FLUTICASONE PROPIONATE (FLONASE) 50 MCG/ACTUATION NASAL SPRAY    SHAKE LIQUID AND USE 2 SPRAYS(100 MCG) IN EACH NOSTRIL DAILY    LEVOTHYROXINE (SYNTHROID) 50 MCG TABLET    TAKE 1 TABLET BY MOUTH EVERY DAY    MIRTAZAPINE (REMERON) 30 MG TABLET    Take 1 tablet (30 mg total) by mouth every evening.    NEOMYCIN-POLYMYXIN-DEXAMETHASONE (MAXITROL) 3.5 MG/G-10,000 UNIT/G-0.1 % OINT    Apply ointment to lids and lashes on left eyelid twice daily for 10 days then stop.        Objective:  "  There were no vitals filed for this visit.    Physical Exam  Constitutional:       Appearance: Normal appearance.   Pulmonary:      Effort: Pulmonary effort is normal.   Neurological:      Mental Status: She is alert and oriented to person, place, and time.   Psychiatric:         Mood and Affect: Mood normal.         Behavior: Behavior normal.         Thought Content: Thought content normal.         Judgment: Judgment normal.         Assessment:     Problem List Items Addressed This Visit          Hematology    Thrombocytopenia    Relevant Orders    US Abdomen Complete    PROTIME-INR    APTT    IRON AND TIBC    Ferritin       GI    History of hepatitis C    Relevant Orders    US Abdomen Complete    PROTIME-INR    APTT    IRON AND TIBC    Ferritin     Other Visit Diagnoses       Unintentional weight loss    -  Primary    Relevant Orders    US Abdomen Complete    PROTIME-INR    APTT    IRON AND TIBC    Ferritin            Lab Results   Component Value Date    WBC 3.74 (L) 07/19/2024    RBC 4.40 07/19/2024    HGB 15.0 07/19/2024    HCT 43.2 07/19/2024    MCV 98 07/19/2024    MCH 34.1 (H) 07/19/2024    MCHC 34.7 07/19/2024    RDW 12.8 07/19/2024     (L) 07/19/2024    MPV 9.3 07/19/2024    GRAN 1.9 07/19/2024    GRAN 51.9 07/19/2024    LYMPH 1.3 07/19/2024    LYMPH 33.4 07/19/2024    MONO 0.5 07/19/2024    MONO 13.1 07/19/2024    EOS 0.0 07/19/2024    BASO 0.03 07/19/2024    EOSINOPHIL 0.8 07/19/2024    BASOPHIL 0.8 07/19/2024      Lab Results   Component Value Date     07/19/2024    K 5.0 07/19/2024    CL 99 07/19/2024    CO2 26 07/19/2024    BUN 13 07/19/2024    CREATININE 0.9 07/19/2024    CALCIUM 10.0 07/19/2024    ANIONGAP 11 07/19/2024    ESTGFRAFRICA >60 05/19/2022    EGFRNONAA >60 05/19/2022     Lab Results   Component Value Date    ALT 26 07/19/2024    AST 28 07/19/2024    GGT 10 04/18/2016    ALKPHOS 40 (L) 07/19/2024    BILITOT 0.8 07/19/2024     No results found for: "IRON", "TRANSFERRIN", " ""TIBC", "FESATURATED", "FERRITIN"     Plan:   Unintentional weight loss  -     US Abdomen Complete; Future; Expected date: 07/25/2024  -     PROTIME-INR; Future; Expected date: 07/25/2024  -     APTT; Future; Expected date: 07/25/2024  -     IRON AND TIBC; Future; Expected date: 07/25/2024  -     Ferritin; Future; Expected date: 07/25/2024    Thrombocytopenia  -     US Abdomen Complete; Future; Expected date: 07/25/2024  -     PROTIME-INR; Future; Expected date: 07/25/2024  -     APTT; Future; Expected date: 07/25/2024  -     IRON AND TIBC; Future; Expected date: 07/25/2024  -     Ferritin; Future; Expected date: 07/25/2024    History of hepatitis C  -     US Abdomen Complete; Future; Expected date: 07/25/2024  -     PROTIME-INR; Future; Expected date: 07/25/2024  -     APTT; Future; Expected date: 07/25/2024  -     IRON AND TIBC; Future; Expected date: 07/25/2024  -     Ferritin; Future; Expected date: 07/25/2024      Med Onc Chart Routing      Follow up with physician    Follow up with KODI 4 months. VV   Infusion scheduling note   n/a   Injection scheduling note n/a   Labs   Scheduling:  Preferred lab: [facility]  Lab interval:  pt/inr, ptt, iron studies next coordinate with abdominal US at the [place]. cbc, cmp prior to next visit   Imaging   Abdominal US at the [place] [facility] - coordinate with labs next week in the morning at the [place]   Pharmacy appointment No pharmacy appointment needed      Other referrals       No additional referrals needed  n/a              Collaborating Provider:  Dr. Saw Becerra    Thank You,  Vicki Minaya, FNP-C  Benign Hematology    "

## 2024-07-29 ENCOUNTER — PATIENT MESSAGE (OUTPATIENT)
Dept: PRIMARY CARE CLINIC | Facility: CLINIC | Age: 74
End: 2024-07-29
Payer: MEDICARE

## 2024-07-31 ENCOUNTER — PATIENT MESSAGE (OUTPATIENT)
Dept: HEMATOLOGY/ONCOLOGY | Facility: CLINIC | Age: 74
End: 2024-07-31
Payer: MEDICARE

## 2024-07-31 ENCOUNTER — PATIENT MESSAGE (OUTPATIENT)
Dept: PRIMARY CARE CLINIC | Facility: CLINIC | Age: 74
End: 2024-07-31
Payer: MEDICARE

## 2024-07-31 RX ORDER — DICYCLOMINE HYDROCHLORIDE 10 MG/1
10 CAPSULE ORAL 3 TIMES DAILY PRN
COMMUNITY
End: 2024-07-31 | Stop reason: SDUPTHER

## 2024-08-01 ENCOUNTER — PATIENT MESSAGE (OUTPATIENT)
Dept: GASTROENTEROLOGY | Facility: CLINIC | Age: 74
End: 2024-08-01
Payer: MEDICARE

## 2024-08-01 RX ORDER — DICYCLOMINE HYDROCHLORIDE 10 MG/1
10 CAPSULE ORAL 3 TIMES DAILY PRN
Qty: 90 CAPSULE | Refills: 0 | Status: SHIPPED | OUTPATIENT
Start: 2024-08-01

## 2024-08-06 ENCOUNTER — PATIENT MESSAGE (OUTPATIENT)
Dept: HEMATOLOGY/ONCOLOGY | Facility: CLINIC | Age: 74
End: 2024-08-06
Payer: MEDICARE

## 2024-08-08 ENCOUNTER — OFFICE VISIT (OUTPATIENT)
Dept: PRIMARY CARE CLINIC | Facility: CLINIC | Age: 74
End: 2024-08-08
Payer: MEDICARE

## 2024-08-08 ENCOUNTER — PATIENT MESSAGE (OUTPATIENT)
Dept: PRIMARY CARE CLINIC | Facility: CLINIC | Age: 74
End: 2024-08-08

## 2024-08-08 VITALS
DIASTOLIC BLOOD PRESSURE: 62 MMHG | HEIGHT: 68 IN | SYSTOLIC BLOOD PRESSURE: 118 MMHG | TEMPERATURE: 97 F | OXYGEN SATURATION: 96 % | HEART RATE: 73 BPM | WEIGHT: 114 LBS | BODY MASS INDEX: 17.28 KG/M2

## 2024-08-08 DIAGNOSIS — F41.1 GAD (GENERALIZED ANXIETY DISORDER): ICD-10-CM

## 2024-08-08 DIAGNOSIS — E78.49 OTHER HYPERLIPIDEMIA: ICD-10-CM

## 2024-08-08 DIAGNOSIS — D69.6 THROMBOCYTOPENIA: ICD-10-CM

## 2024-08-08 DIAGNOSIS — R63.4 WEIGHT LOSS: ICD-10-CM

## 2024-08-08 DIAGNOSIS — R63.6 UNDERWEIGHT: ICD-10-CM

## 2024-08-08 DIAGNOSIS — E03.9 ACQUIRED HYPOTHYROIDISM: ICD-10-CM

## 2024-08-08 DIAGNOSIS — M81.0 AGE-RELATED OSTEOPOROSIS WITHOUT CURRENT PATHOLOGICAL FRACTURE: ICD-10-CM

## 2024-08-08 DIAGNOSIS — F33.41 RECURRENT MAJOR DEPRESSIVE DISORDER, IN PARTIAL REMISSION: ICD-10-CM

## 2024-08-08 DIAGNOSIS — K58.1 IRRITABLE BOWEL SYNDROME WITH CONSTIPATION: Primary | ICD-10-CM

## 2024-08-08 PROBLEM — R50.9 FEVER: Status: RESOLVED | Noted: 2020-01-12 | Resolved: 2024-08-08

## 2024-08-08 PROCEDURE — 99213 OFFICE O/P EST LOW 20 MIN: CPT | Mod: PBBFAC,PN | Performed by: INTERNAL MEDICINE

## 2024-08-08 PROCEDURE — 99999 PR PBB SHADOW E&M-EST. PATIENT-LVL III: CPT | Mod: PBBFAC,,, | Performed by: INTERNAL MEDICINE

## 2024-08-08 RX ORDER — ESCITALOPRAM OXALATE 10 MG/1
10 TABLET ORAL DAILY
Qty: 90 TABLET | Refills: 3 | Status: SHIPPED | OUTPATIENT
Start: 2024-08-08 | End: 2025-08-08

## 2024-08-09 ENCOUNTER — NURSE TRIAGE (OUTPATIENT)
Dept: ADMINISTRATIVE | Facility: CLINIC | Age: 74
End: 2024-08-09
Payer: MEDICARE

## 2024-08-09 ENCOUNTER — PATIENT MESSAGE (OUTPATIENT)
Dept: PRIMARY CARE CLINIC | Facility: CLINIC | Age: 74
End: 2024-08-09
Payer: MEDICARE

## 2024-08-13 ENCOUNTER — PATIENT MESSAGE (OUTPATIENT)
Dept: PRIMARY CARE CLINIC | Facility: CLINIC | Age: 74
End: 2024-08-13
Payer: MEDICARE

## 2024-08-14 ENCOUNTER — PATIENT MESSAGE (OUTPATIENT)
Dept: PRIMARY CARE CLINIC | Facility: CLINIC | Age: 74
End: 2024-08-14
Payer: MEDICARE

## 2024-08-14 ENCOUNTER — HOSPITAL ENCOUNTER (OUTPATIENT)
Dept: RADIOLOGY | Facility: HOSPITAL | Age: 74
Discharge: HOME | End: 2024-08-14
Attending: NURSE PRACTITIONER
Payer: MEDICARE

## 2024-08-14 DIAGNOSIS — R63.4 WEIGHT LOSS: ICD-10-CM

## 2024-08-14 DIAGNOSIS — K59.00 CONSTIPATION, UNSPECIFIED CONSTIPATION TYPE: Primary | ICD-10-CM

## 2024-08-14 DIAGNOSIS — Z86.19 HISTORY OF HEPATITIS C: ICD-10-CM

## 2024-08-14 DIAGNOSIS — D69.6 THROMBOCYTOPENIA: ICD-10-CM

## 2024-08-14 DIAGNOSIS — R63.4 UNINTENTIONAL WEIGHT LOSS: ICD-10-CM

## 2024-08-14 PROCEDURE — 76700 US EXAM ABDOM COMPLETE: CPT | Mod: 26,,, | Performed by: RADIOLOGY

## 2024-08-14 PROCEDURE — 76700 US EXAM ABDOM COMPLETE: CPT | Mod: TC

## 2024-08-19 ENCOUNTER — LAB VISIT (OUTPATIENT)
Dept: LAB | Facility: HOSPITAL | Age: 74
End: 2024-08-19
Attending: FAMILY MEDICINE
Payer: MEDICARE

## 2024-08-19 ENCOUNTER — OFFICE VISIT (OUTPATIENT)
Dept: INTERNAL MEDICINE | Facility: CLINIC | Age: 74
End: 2024-08-19
Payer: MEDICARE

## 2024-08-19 VITALS
HEART RATE: 73 BPM | SYSTOLIC BLOOD PRESSURE: 118 MMHG | TEMPERATURE: 95 F | DIASTOLIC BLOOD PRESSURE: 68 MMHG | RESPIRATION RATE: 73 BRPM | HEIGHT: 68 IN | OXYGEN SATURATION: 98 % | WEIGHT: 113.31 LBS | BODY MASS INDEX: 17.17 KG/M2

## 2024-08-19 DIAGNOSIS — E44.1 PROTEIN-CALORIE MALNUTRITION, MILD: Chronic | ICD-10-CM

## 2024-08-19 DIAGNOSIS — D70.8 OTHER NEUTROPENIA: Chronic | ICD-10-CM

## 2024-08-19 DIAGNOSIS — K58.1 IRRITABLE BOWEL SYNDROME WITH CONSTIPATION: Primary | Chronic | ICD-10-CM

## 2024-08-19 DIAGNOSIS — R63.4 WEIGHT LOSS: Chronic | ICD-10-CM

## 2024-08-19 DIAGNOSIS — D69.6 THROMBOCYTOPENIA: Chronic | ICD-10-CM

## 2024-08-19 LAB — PREALB SERPL-MCNC: 19 MG/DL (ref 20–43)

## 2024-08-19 PROCEDURE — G2211 COMPLEX E/M VISIT ADD ON: HCPCS | Mod: S$PBB,,, | Performed by: FAMILY MEDICINE

## 2024-08-19 PROCEDURE — 36415 COLL VENOUS BLD VENIPUNCTURE: CPT | Performed by: FAMILY MEDICINE

## 2024-08-19 PROCEDURE — 84134 ASSAY OF PREALBUMIN: CPT | Performed by: FAMILY MEDICINE

## 2024-08-19 PROCEDURE — 99214 OFFICE O/P EST MOD 30 MIN: CPT | Mod: S$PBB,,, | Performed by: FAMILY MEDICINE

## 2024-08-19 PROCEDURE — 99215 OFFICE O/P EST HI 40 MIN: CPT | Mod: PBBFAC | Performed by: FAMILY MEDICINE

## 2024-08-19 PROCEDURE — 99999 PR PBB SHADOW E&M-EST. PATIENT-LVL V: CPT | Mod: PBBFAC,,, | Performed by: FAMILY MEDICINE

## 2024-08-19 RX ORDER — NORTRIPTYLINE HYDROCHLORIDE 25 MG/1
50 CAPSULE ORAL NIGHTLY
Qty: 30 CAPSULE | Refills: 0 | Status: SHIPPED | OUTPATIENT
Start: 2024-08-19 | End: 2024-09-06 | Stop reason: SDUPTHER

## 2024-08-19 RX ORDER — NORTRIPTYLINE HYDROCHLORIDE 10 MG/1
10 CAPSULE ORAL NIGHTLY
Qty: 5 CAPSULE | Refills: 0 | Status: SHIPPED | OUTPATIENT
Start: 2024-08-19 | End: 2024-08-24

## 2024-08-19 NOTE — PROGRESS NOTES
OFFICE VISIT 8/19/24  9:40 AM CDT    History of Present Illness    Maggie presents today for abdominal pain and GI issues.    She reports ongoing constipation with recent use of laxatives due to severity. She experiences difficulty emptying her bowels completely, gas, bloating, and abdominal pain that worsens with constipation, including cramping at night. Recently, she has had watery, brown stools. She denies fever or vomiting. She follows a low FODMAP diet strictly, eliminating gluten and lactose, and cooks separately from her . Despite dietary adherence, symptom improvements are inconsistent. She has discussed pelvic floor exercises with a healthcare provider (Candace Beaulieu) as a potential treatment but has not yet implemented them due to other health concerns.    She reports a 12-pound weight loss over the past year, currently weighing 113 lbs. She expresses concern about this weight loss, noting it's only the second time she's been this low. Despite eating 3 times daily, she has been unable to gain weight.    She drinks Ensure for protein supplementation. Yesterday, she had Ensure for breakfast and canned tuna with crackers in the evening. She has not seen a dietitian, as Dr. Berumen was unable to arrange an appointment.    Two years ago, she had a CT of abdomen and pelvis to evaluate generalized abdominal pain, bloating, and irritable bowel syndrome with constipation. Recently, she underwent an ultrasound and labs, which were reported as unremarkable. All her organs besides her intestines seem to be fine based on these recent tests.    She has a history of low platelet count and low white blood cell count, which continue to fluctuate and trend downward. She is followed by hematologist Dr. Shreya Franco, referred by Dr. dAam Velasquez. Recent labs was performed, but results have not yet been discussed. The condition is described as possibly genetic, with no current interventions. She denies any current  symptoms or complications related to these hematological findings.    She is currently taking Lexapro. She was previously on mirtazapine before being switched back to Lexapro. She denies that the medication change is the reason for her current symptoms.    She reports having a sedentary job.       Review of Systems: Except as noted herein, ROS is otherwise negative.     Assessment & Plan     Assessed ongoing IBS symptoms, noting exacerbation of constipation and abdominal pain   Reviewed recent ultrasound results, which showed no significant findings   Considered low FODMAP diet adherence and its potential impact on symptom management   Will initiate nortriptyline for IBS symptom relief   Planned to start with a low dose and gradually increase to therapeutic level   Determined need to assess protein nutritional status via pre-albumin lab test  R53.83 OTHER FATIGUE:   The patient reports feeling exhausted.  R10.84 GENERALIZED ABDOMINAL PAIN:   The patient reports ongoing abdominal pain, discomfort, and cramping.   Examined the patient's abdomen, noting overactive bowel sounds and soreness.   Reviewed previous CT results for abdominal pain.   Prescribed nortriptyline for pain management.  R14.0 ABDOMINAL DISTENSION (GASEOUS):   The patient reports excessive gas and bloating.  K59.00 CONSTIPATION, UNSPECIFIED:   The patient reports ongoing constipation and incomplete bowel movements, with soft, stringy stools and recent watery stools.   Discussed pelvic floor exercises for bowel emptying.   The patient took laxatives to relieve constipation.  R63.4 ABNORMAL WEIGHT LOSS:   The patient reports significant weight loss over the past year.   Ordered pre-albumin lab test to assess protein nutritional status.   Referred to dietitian for nutritional consultation (estimated out-of-pocket cost: $33).   Recommend the patient continue drinking Ensure as a protein supplement.  D69.6 THROMBOCYTOPENIA, UNSPECIFIED:   The patient has a  history of low platelet count.   Assessed the low platelet count as likely a genetic condition.   Referred the patient to hematologist Nazanin Reyes for evaluation.  D72.819 DECREASED WHITE BLOOD CELL COUNT, UNSPECIFIED:   The patient has a history of low white blood cell count.   Assessed the low white blood cell count as likely a genetic condition.  K58.9 IRRITABLE BOWEL SYNDROME WITHOUT DIARRHEA:   The patient reports ongoing IBS symptoms including abdominal pain, constipation, and gas.   Examined the patient's abdomen, noting overactive bowel sounds.   Discussed low FODMAP diet for IBS management.   Explained that nortriptyline, while in the antidepressant family, works differently from Lexapro and can be helpful for IBS.   Prescribed nortriptyline 10 mg daily at bedtime for 5 days, then increase to 25 mg daily at bedtime after completing the 5-day course.   Noted that the patient has an upcoming appointment with a gastroenterologist.  LIFESTYLE CHANGES:   Discussed the importance of strict adherence to the low FODMAP diet for symptom management.   Maggie to continue adherence to low FODMAP diet.   Clarified that negative test results, while frustrating, are preferable to potentially concerning findings.   Follow up with Dr. Berumen on September 6th (about 3 weeks from current visit).       1. Irritable bowel syndrome with constipation  Assessment & Plan:  This is a chronic problem, with exacerbation or progression.    Orders:  -     nortriptyline (PAMELOR) 10 MG capsule; Take 1 capsule (10 mg total) by mouth every evening for 5 days. This is a STARTER prescription. A separate prescription will be provided for continuation of this medicine.  Dispense: 5 capsule; Refill: 0  -     nortriptyline (PAMELOR) 25 MG capsule; Take 2 capsules (50 mg total) by mouth every evening. Begin this prescription AFTER completion of initial 5 days starter dose.  Dispense: 30 capsule; Refill: 0    2. Weight loss  Assessment &  "Plan:  Estimated body mass index is 17.23 kg/m² as calculated from the following:    Height as of this encounter: 5' 8" (1.727 m).    Weight as of this encounter: 51.4 kg (113 lb 5.1 oz).   Wt Readings from Last 12 Encounters:   08/19/24 51.4 kg (113 lb 5.1 oz)   08/08/24 51.7 kg (113 lb 15.7 oz)   07/05/24 51.9 kg (114 lb 4.9 oz)   06/05/24 51.7 kg (113 lb 15.7 oz)   05/30/24 51.7 kg (114 lb)   03/14/24 54.2 kg (119 lb 7.8 oz)   02/29/24 54.1 kg (119 lb 2.5 oz)   02/22/24 54.9 kg (120 lb 14.8 oz)   02/19/24 56.8 kg (125 lb 3.5 oz)   01/29/24 56.8 kg (125 lb 3.5 oz)   11/27/23 57.3 kg (126 lb 6.4 oz)   10/20/23 56.7 kg (125 lb)        Orders:  -     Ambulatory referral/consult to Nutrition Services; Future; Expected date: 08/26/2024    3. Thrombocytopenia    4. Other neutropenia    5. Protein-calorie malnutrition, mild  -     PREALBUMIN; Future; Expected date: 08/19/2024  -     Ambulatory referral/consult to Nutrition Services; Future; Expected date: 08/26/2024    No other significant complaints or concerns were reported.  Today's visit involved the intricate management of episodic problem(s) and the ongoing care for the patient's serious or complex condition(s) listed above, reflecting the inherent complexity of providing longitudinal, comprehensive evaluation and management as the central hub for the patient's primary care services.  Vitals:    08/19/24 1006   BP: 118/68   BP Location: Left arm   Patient Position: Sitting   BP Method: Small (Manual)   Pulse: 73   Resp: (!) 73   Temp: (!) 95.1 °F (35.1 °C)   SpO2: 98%   Weight: 51.4 kg (113 lb 5.1 oz)   Height: 5' 8" (1.727 m)   Physical Exam  Vitals reviewed.   Constitutional:       General: She is not in acute distress.     Appearance: Normal appearance. She is not ill-appearing, toxic-appearing or diaphoretic.   HENT:      Head: Normocephalic and atraumatic.   Eyes:      General: No scleral icterus.     Conjunctiva/sclera: Conjunctivae normal.   Cardiovascular: " "     Rate and Rhythm: Normal rate and regular rhythm.   Pulmonary:      Effort: Pulmonary effort is normal.      Breath sounds: Normal breath sounds.   Abdominal:      General: Bowel sounds are normal. There is no distension.      Palpations: Abdomen is soft.      Tenderness: There is abdominal tenderness (minimal generalized tenderness). There is no guarding or rebound.   Skin:     General: Skin is warm and dry.   Neurological:      Mental Status: She is alert and oriented to person, place, and time. Mental status is at baseline.   Psychiatric:         Mood and Affect: Mood normal.         Behavior: Behavior normal.         Judgment: Judgment normal.         This note was generated with the assistance of ambient listening technology. Verbal consent was obtained by the patient and accompanying visitor(s) for the recording of patient appointment to facilitate this note. I attest to having reviewed and edited the generated note for accuracy, though some syntax or spelling errors may persist. Please contact the author of this note for any clarification.    Documentation entered by me for this encounter may have been done in part using speech-recognition technology. Although I have made an effort to ensure accuracy, "sound like" errors may exist and should be interpreted in context.     Future Appointments   Date Time Provider Department Center   9/6/2024  1:40 PM Adeola Berumen MD [id number]   10/2/2024  8:30 AM Candace Agee NP [facility]       "

## 2024-08-19 NOTE — ASSESSMENT & PLAN NOTE
"Estimated body mass index is 17.23 kg/m² as calculated from the following:    Height as of this encounter: 5' 8" (1.727 m).    Weight as of this encounter: 51.4 kg (113 lb 5.1 oz).   Wt Readings from Last 12 Encounters:   08/19/24 51.4 kg (113 lb 5.1 oz)   08/08/24 51.7 kg (113 lb 15.7 oz)   07/05/24 51.9 kg (114 lb 4.9 oz)   06/05/24 51.7 kg (113 lb 15.7 oz)   05/30/24 51.7 kg (114 lb)   03/14/24 54.2 kg (119 lb 7.8 oz)   02/29/24 54.1 kg (119 lb 2.5 oz)   02/22/24 54.9 kg (120 lb 14.8 oz)   02/19/24 56.8 kg (125 lb 3.5 oz)   01/29/24 56.8 kg (125 lb 3.5 oz)   11/27/23 57.3 kg (126 lb 6.4 oz)   10/20/23 56.7 kg (125 lb)      "

## 2024-08-22 ENCOUNTER — PATIENT MESSAGE (OUTPATIENT)
Dept: PRIMARY CARE CLINIC | Facility: CLINIC | Age: 74
End: 2024-08-22
Payer: MEDICARE

## 2024-08-25 DIAGNOSIS — F41.9 ANXIETY: ICD-10-CM

## 2024-08-26 ENCOUNTER — PATIENT MESSAGE (OUTPATIENT)
Dept: INTERNAL MEDICINE | Facility: CLINIC | Age: 74
End: 2024-08-26
Payer: MEDICARE

## 2024-08-26 ENCOUNTER — PATIENT MESSAGE (OUTPATIENT)
Dept: PRIMARY CARE CLINIC | Facility: CLINIC | Age: 74
End: 2024-08-26
Payer: MEDICARE

## 2024-08-26 RX ORDER — CLONAZEPAM 0.5 MG/1
TABLET ORAL
Qty: 60 TABLET | Refills: 2 | Status: SHIPPED | OUTPATIENT
Start: 2024-08-26

## 2024-08-27 NOTE — PROGRESS NOTES
"Subjective:      Patient ID: Maggie Díaz is a 74 y.o. female.    Chief Complaint: Follow-up (Pt is complaining of abdominal pain. )      HPI  Here for follow up of medical problems.  Saw Dr. Rivera last week, started nortriptyline and increased to 50mg about 4 days ago.  Has "globby" BMs, but "doesn't get it all out."  Takes fiber supplement, supplemental protein shakes.  Definitely is better than prior to this medication, just is not perfect and is distressed.  Drinking fluids daily.  No f/c/n/v.  No cp/sob/palp.  No b/b in stool.    Updated/ annual due 7/25:  HM: 10/23 fluvax, 1/21 covid vaccines/booster, 8/19 HAV, 3/15 [id number], 2/14 [id number], 7/18 Td, 1/10 Tdap, 8/12 zoster, 2020 Shingrix x2, 10/23 BMD stable/hx fosamax will restart 10/23 rep 2y, 8/18 Cscope rep 10y, 5/23 MMG, 5/18 pelvic u/s neg.     Review of Systems   Constitutional:  Negative for chills, diaphoresis and fever.   Respiratory:  Negative for cough and shortness of breath.    Cardiovascular:  Negative for chest pain, palpitations and leg swelling.   Gastrointestinal:  Negative for blood in stool, constipation, diarrhea, nausea and vomiting.   Genitourinary:  Negative for dysuria, frequency and hematuria.   Psychiatric/Behavioral:  The patient is not nervous/anxious.          Objective:   /62 (BP Location: Right arm, Patient Position: Sitting)   Pulse 91   Temp 96.6 °F (35.9 °C) (Temporal)   Ht 5' 8" (1.727 m)   Wt 51.5 kg (113 lb 6.8 oz)   SpO2 97%   BMI 17.25 kg/m²     Physical Exam  Constitutional:       Appearance: She is well-developed.   Neck:      Thyroid: No thyroid mass.      Vascular: No carotid bruit.   Cardiovascular:      Rate and Rhythm: Normal rate and regular rhythm.      Heart sounds: No murmur heard.     No friction rub. No gallop.   Pulmonary:      Effort: Pulmonary effort is normal.      Breath sounds: Normal breath sounds. No wheezing or rales.   Abdominal:      General: Bowel sounds are normal.      " Palpations: Abdomen is soft. There is no mass.      Tenderness: There is no abdominal tenderness.   Musculoskeletal:      Cervical back: Neck supple.   Lymphadenopathy:      Cervical: No cervical adenopathy.   Neurological:      Mental Status: She is alert and oriented to person, place, and time.             Assessment:       1. Constipation, unspecified constipation type    2. Irritable bowel syndrome with constipation    3. SARAHY (generalized anxiety disorder)    4. Underweight          Plan:     1. Constipation, unspecified constipation type  -     X-Ray Abdomen AP 1 View; Future; Expected date: 09/06/2024    2. Irritable bowel syndrome with constipation  -     nortriptyline (PAMELOR) 50 MG capsule; Take 1 capsule (50 mg total) by mouth every evening. Begin this prescription AFTER completion of initial 5 days starter dose.  Dispense: 90 capsule; Refill: 3    3. SARAHY (generalized anxiety disorder)- cont lexapro.    4. Underweight- to see dietician soon.  Overview:  BMI 17.33    Sent patient literature with high caloric foods that are healthy  Increase protein in diet    RTC 3mo.  Told her may increase nortrip in 1mo to 75mg.

## 2024-09-03 NOTE — PROGRESS NOTES
Low prealbumin suggests insufficient dietary protein intake.    Choose lean protein sources  When adding protein to your diet, opt for lean sources like poultry, fish, and plant-based options. These choices provide high-quality protein with minimal saturated fat. Some examples include:  Skinless chicken or turkey  Fish such as salmon, tuna, or cod  Legumes like beans, lentils, or chickpeas  Tofu or tempeh  Low-fat dairy products like yogurt, milk, or cheese    Include a variety of protein sources  To ensure that you receive a complete range of essential amino acids, include various protein sources in your diet. Rotate between animal-based proteins like lean meats and fish and plant-based proteins like legumes, seeds, and nuts.    Incorporate protein-rich snacks  Adding protein-rich snacks to your daily routine can help increase your overall protein intake. Some examples of protein-packed snacks are:  Greek yogurt with fruit or honey  Cottage cheese with fresh berries  Hummus with whole-grain crackers or vegetables  A small handful of nuts or seeds  Sliced turkey or chicken wrapped around cucumber or bell pepper    Opt for protein-rich breakfast options  Starting your day with a protein-rich breakfast can help set the stage for a more balanced diet throughout the day. Some protein-packed breakfast choices include:  Greek yogurt with granola and fruit  Oatmeal with nuts, seeds, or protein powder  Smoothies made with protein powder, yogurt, or nut butter  Eggs or egg whites with vegetables and whole-grain toast    Utilize protein powders and supplements  If you struggle to meet your protein needs through whole foods, consider incorporating protein powders or supplements into your diet. Look for options made from high-quality sources such as whey, casein, soy, or pea protein. You can add protein powder to smoothies, oatmeal, or yogurt for an easy protein boost.    Track your protein intake  Keep a food diary to monitor  your daily protein intake and ensure you are meeting your goals. Many mobile apps can help track your macronutrients, including protein, carbs, and fat.    Consult with a registered dietitian  If you need further assistance in increasing your protein intake, consider consulting with a registered dietitian. They can provide personalized guidance and meal plans tailored to your specific needs and preferences.    Conclusion  Increasing your protein intake can be a simple and effective way to address mild protein insufficiency. By choosing lean protein sources, incorporating a variety of proteins, and utilizing protein-rich snacks and breakfast options, you can boost your protein consumption while maintaining a balanced and nutritious diet. Remember to consult with a healthcare professional or registered dietitian for personalized advice and guidance.

## 2024-09-04 ENCOUNTER — PATIENT MESSAGE (OUTPATIENT)
Dept: PRIMARY CARE CLINIC | Facility: CLINIC | Age: 74
End: 2024-09-04
Payer: MEDICARE

## 2024-09-06 ENCOUNTER — OFFICE VISIT (OUTPATIENT)
Dept: PRIMARY CARE CLINIC | Facility: CLINIC | Age: 74
End: 2024-09-06
Payer: MEDICARE

## 2024-09-06 VITALS
HEIGHT: 68 IN | DIASTOLIC BLOOD PRESSURE: 62 MMHG | WEIGHT: 113.44 LBS | SYSTOLIC BLOOD PRESSURE: 110 MMHG | TEMPERATURE: 97 F | HEART RATE: 91 BPM | BODY MASS INDEX: 17.19 KG/M2 | OXYGEN SATURATION: 97 %

## 2024-09-06 DIAGNOSIS — K59.00 CONSTIPATION, UNSPECIFIED CONSTIPATION TYPE: Primary | ICD-10-CM

## 2024-09-06 DIAGNOSIS — R63.6 UNDERWEIGHT: ICD-10-CM

## 2024-09-06 DIAGNOSIS — K58.1 IRRITABLE BOWEL SYNDROME WITH CONSTIPATION: Chronic | ICD-10-CM

## 2024-09-06 DIAGNOSIS — F41.1 GAD (GENERALIZED ANXIETY DISORDER): ICD-10-CM

## 2024-09-06 PROCEDURE — 99999 PR PBB SHADOW E&M-EST. PATIENT-LVL III: CPT | Mod: PBBFAC,,, | Performed by: INTERNAL MEDICINE

## 2024-09-06 PROCEDURE — 99213 OFFICE O/P EST LOW 20 MIN: CPT | Mod: PBBFAC,PN | Performed by: INTERNAL MEDICINE

## 2024-09-06 RX ORDER — NORTRIPTYLINE HYDROCHLORIDE 50 MG/1
50 CAPSULE ORAL NIGHTLY
Qty: 90 CAPSULE | Refills: 3 | Status: SHIPPED | OUTPATIENT
Start: 2024-09-06

## 2024-09-09 ENCOUNTER — PATIENT MESSAGE (OUTPATIENT)
Dept: PRIMARY CARE CLINIC | Facility: CLINIC | Age: 74
End: 2024-09-09
Payer: MEDICARE

## 2024-09-10 ENCOUNTER — HOSPITAL ENCOUNTER (OUTPATIENT)
Dept: RADIOLOGY | Facility: HOSPITAL | Age: 74
Discharge: HOME | End: 2024-09-10
Attending: INTERNAL MEDICINE
Payer: MEDICARE

## 2024-09-10 DIAGNOSIS — K59.00 CONSTIPATION, UNSPECIFIED CONSTIPATION TYPE: ICD-10-CM

## 2024-09-10 PROCEDURE — 74018 RADEX ABDOMEN 1 VIEW: CPT | Mod: TC

## 2024-09-10 PROCEDURE — 74018 RADEX ABDOMEN 1 VIEW: CPT | Mod: 26,,, | Performed by: RADIOLOGY

## 2024-09-11 ENCOUNTER — PATIENT MESSAGE (OUTPATIENT)
Dept: PRIMARY CARE CLINIC | Facility: CLINIC | Age: 74
End: 2024-09-11
Payer: MEDICARE

## 2024-09-19 ENCOUNTER — TELEPHONE (OUTPATIENT)
Dept: RADIOLOGY | Facility: HOSPITAL | Age: 74
End: 2024-09-19

## 2024-09-19 ENCOUNTER — HOSPITAL ENCOUNTER (OUTPATIENT)
Dept: RADIOLOGY | Facility: HOSPITAL | Age: 74
Discharge: HOME | End: 2024-09-19
Attending: INTERNAL MEDICINE
Payer: MEDICARE

## 2024-09-19 DIAGNOSIS — Z12.31 ENCOUNTER FOR SCREENING MAMMOGRAM FOR MALIGNANT NEOPLASM OF BREAST: ICD-10-CM

## 2024-09-19 PROCEDURE — 77067 SCR MAMMO BI INCL CAD: CPT | Mod: 26,,, | Performed by: RADIOLOGY

## 2024-09-19 PROCEDURE — 77063 BREAST TOMOSYNTHESIS BI: CPT | Mod: 26,,, | Performed by: RADIOLOGY

## 2024-09-19 PROCEDURE — 77067 SCR MAMMO BI INCL CAD: CPT | Mod: TC

## 2024-09-25 ENCOUNTER — HOSPITAL ENCOUNTER (OUTPATIENT)
Dept: RADIOLOGY | Facility: HOSPITAL | Age: 74
Discharge: HOME | End: 2024-09-25
Attending: INTERNAL MEDICINE
Payer: MEDICARE

## 2024-09-25 DIAGNOSIS — R92.8 ABNORMAL MAMMOGRAM: ICD-10-CM

## 2024-09-25 DIAGNOSIS — Z00.00 ENCOUNTER FOR MEDICARE ANNUAL WELLNESS EXAM: ICD-10-CM

## 2024-09-25 PROCEDURE — 77065 DX MAMMO INCL CAD UNI: CPT | Mod: TC,LT

## 2024-09-25 PROCEDURE — 77061 BREAST TOMOSYNTHESIS UNI: CPT | Mod: TC,LT

## 2024-09-25 PROCEDURE — 77061 BREAST TOMOSYNTHESIS UNI: CPT | Mod: 26,LT,, | Performed by: RADIOLOGY

## 2024-09-25 PROCEDURE — 77065 DX MAMMO INCL CAD UNI: CPT | Mod: 26,LT,, | Performed by: RADIOLOGY

## 2024-10-02 ENCOUNTER — OFFICE VISIT (OUTPATIENT)
Dept: GASTROENTEROLOGY | Facility: CLINIC | Age: 74
End: 2024-10-02
Payer: MEDICARE

## 2024-10-02 VITALS
BODY MASS INDEX: 17.64 KG/M2 | DIASTOLIC BLOOD PRESSURE: 73 MMHG | SYSTOLIC BLOOD PRESSURE: 127 MMHG | HEIGHT: 68 IN | WEIGHT: 116.38 LBS

## 2024-10-02 DIAGNOSIS — R63.4 WEIGHT LOSS: ICD-10-CM

## 2024-10-02 DIAGNOSIS — K58.1 IRRITABLE BOWEL SYNDROME WITH CONSTIPATION: ICD-10-CM

## 2024-10-02 PROCEDURE — 99214 OFFICE O/P EST MOD 30 MIN: CPT | Mod: PBBFAC | Performed by: NURSE PRACTITIONER

## 2024-10-02 PROCEDURE — 99999 PR PBB SHADOW E&M-EST. PATIENT-LVL IV: CPT | Mod: PBBFAC,,, | Performed by: NURSE PRACTITIONER

## 2024-10-02 PROCEDURE — 99214 OFFICE O/P EST MOD 30 MIN: CPT | Mod: S$PBB,,, | Performed by: NURSE PRACTITIONER

## 2024-10-02 NOTE — PROGRESS NOTES
Clinic Follow Up:  [facility] Follow Up Note    Reason for Follow Up:  Diagnoses of Irritable bowel syndrome with constipation and Weight loss were pertinent to this visit.    PCP: Adeola Berumen   [id number] / [place] [id number]    HPI:  This is a 74 y.o. female here for follow up of IBS-C.  This is a chronic issue.  Since her last visit, she has started pelvic floor therapy at [facility].  This has significantly improved her symptoms.  Current regimen is MiraLax 1 dose in the morning and a half dose in the evening.  She is also following a high-fiber diet and water intake.  She was recently started on nortriptyline nightly.  This has also significantly helped with her abdominal pain.  She was previously on Bentyl which was effective, however, she does not like to take.  She has not needed Bentyl since taking the nortriptyline.    She has also had weight loss.  She has a documented weight loss of 13 lb over the last year, however, she has gained 3 lb since last month.    Review of Systems   Constitutional:  Negative for activity change and appetite change.        As per interval history above   Respiratory:  Negative for cough and shortness of breath.    Cardiovascular:  Negative for chest pain.   Gastrointestinal:  Positive for abdominal pain and constipation. Negative for anal bleeding, blood in stool, diarrhea, nausea, rectal pain and vomiting.   Skin:  Negative for color change and rash.       Medical History:  Past Medical History:   Diagnosis Date    Anxiety     Benzodiazepine dependence 11/18/2020    Hematuria, microscopic     negative CT    Hepatitis C     slow progression/ Dr. Otoole    Hypertension     Migraines     OP (osteoporosis)     on drug holiday.    Osteoarthritis     Radius fracture     Radius fracture     Restless legs syndrome (RLS)     Thrombocytopenia 2/22/2019    Vitamin D deficiency disease        Surgical History:   Past Surgical History:   Procedure  Laterality Date     SECTION, CLASSIC      COLONOSCOPY N/A 2018    Procedure: COLONOSCOPY;  Surgeon: Jose Royal MD;  Location: [facility];  Service: Endoscopy;  Laterality: N/A;    WRIST FRACTURE SURGERY      , Dr. Bunch.       Family History:   Family History   Adopted: Yes   Family history unknown: Yes       Social History:   Social History     Tobacco Use    Smoking status: Former     Current packs/day: 0.00     Average packs/day: 0.5 packs/day for 10.0 years (5.0 ttl pk-yrs)     Types: Cigarettes     Start date: 3/3/1969     Quit date: 3/3/1979     Years since quittin.6    Smokeless tobacco: Never   Substance Use Topics    Alcohol use: Not Currently     Alcohol/week: 0.0 standard drinks of alcohol     Comment: occasionally    Drug use: No       Allergies:   Review of patient's allergies indicates:   Allergen Reactions    Bactrim [sulfamethoxazole-trimethoprim]      hallucination    Latex, natural rubber Swelling    Penicillins Shortness Of Breath       Home Medications:  Current Outpatient Medications on File Prior to Visit   Medication Sig Dispense Refill    atorvastatin (LIPITOR) 40 MG tablet TAKE ONE TABLET BY MOUTH ONE TIME DAILY 90 tablet 3    cholecalciferol, vitamin D3, (VITAMIN D3) 50 mcg (2,000 unit) Cap Take 1 capsule by mouth once daily.      clonazePAM (KLONOPIN) 0.5 MG tablet TAKE 1 TO 2 TABLETS BY MOUTH EVERY EVENING AS NEEDED 60 tablet 2    EScitalopram oxalate (LEXAPRO) 10 MG tablet Take 1 tablet (10 mg total) by mouth once daily. 90 tablet 3    fluticasone propionate (FLONASE) 50 mcg/actuation nasal spray SHAKE LIQUID AND USE 2 SPRAYS(100 MCG) IN EACH NOSTRIL DAILY 48 g 11    levothyroxine (SYNTHROID) 50 MCG tablet TAKE 1 TABLET BY MOUTH EVERY DAY 90 tablet 1    nortriptyline (PAMELOR) 50 MG capsule Take 1 capsule (50 mg total) by mouth every evening. Begin this prescription AFTER completion of initial 5 days starter dose. 90 capsule 3     No current  "facility-administered medications on file prior to visit.       /73 (BP Location: Left arm, Patient Position: Sitting)   Ht 5' 8" (1.727 m)   Wt 52.8 kg (116 lb 6.5 oz)   BMI 17.70 kg/m²   Body mass index is 17.7 kg/m².  Physical Exam  Constitutional:       General: She is not in acute distress.  Cardiovascular:      Rate and Rhythm: Normal rate and regular rhythm.      Heart sounds: Normal heart sounds. No murmur heard.  Pulmonary:      Effort: Pulmonary effort is normal. No respiratory distress.      Breath sounds: Normal breath sounds.   Neurological:      Mental Status: She is alert.   Psychiatric:         Mood and Affect: Mood normal.         Behavior: Behavior normal.         Labs: Pertinent labs reviewed.  CRC Screening: up to date (lat done in 2018)    Assessment:   1. Irritable bowel syndrome with constipation - controlled on Miralax, fiber, and TCA   2. Weight loss - 13 lb over last 12 months. Gained 3 lbs since last month.        Recommendations:   - continue pelvic floor therapy  - continue Pamelor nightly  - continue fiber and water  - will monitor weight for now-- follow up in 3 months for weight check. If still losing, would recommend repeat colonoscopy +/- CT scan.     Irritable bowel syndrome with constipation  -     Ambulatory referral/consult to Gastroenterology    Weight loss  -     Ambulatory referral/consult to Gastroenterology    Return to Clinic:  Follow up in 3 months (on 1/6/2025).    Thank you for the opportunity to participate in the care of this patient.  LUCIO Gold        "

## 2024-10-28 ENCOUNTER — PATIENT MESSAGE (OUTPATIENT)
Dept: PRIMARY CARE CLINIC | Facility: CLINIC | Age: 74
End: 2024-10-28
Payer: MEDICARE

## 2024-10-28 ENCOUNTER — OFFICE VISIT (OUTPATIENT)
Dept: OPHTHALMOLOGY | Facility: CLINIC | Age: 74
End: 2024-10-28
Payer: MEDICARE

## 2024-10-28 DIAGNOSIS — Z96.1 PSEUDOPHAKIA OF RIGHT EYE: Primary | ICD-10-CM

## 2024-10-28 DIAGNOSIS — H52.4 MYOPIA WITH ASTIGMATISM AND PRESBYOPIA, BILATERAL: ICD-10-CM

## 2024-10-28 DIAGNOSIS — H52.13 MYOPIA WITH ASTIGMATISM AND PRESBYOPIA, BILATERAL: ICD-10-CM

## 2024-10-28 DIAGNOSIS — H25.12 NUCLEAR SCLEROSIS OF LEFT EYE: ICD-10-CM

## 2024-10-28 DIAGNOSIS — H02.9 EYELID LESION: ICD-10-CM

## 2024-10-28 DIAGNOSIS — H52.203 MYOPIA WITH ASTIGMATISM AND PRESBYOPIA, BILATERAL: ICD-10-CM

## 2024-10-28 PROCEDURE — 99999 PR PBB SHADOW E&M-EST. PATIENT-LVL I: CPT | Mod: PBBFAC,,, | Performed by: OPTOMETRIST

## 2024-10-28 PROCEDURE — 99211 OFF/OP EST MAY X REQ PHY/QHP: CPT | Mod: PBBFAC | Performed by: OPTOMETRIST

## 2024-10-28 PROCEDURE — 92015 DETERMINE REFRACTIVE STATE: CPT | Mod: ,,, | Performed by: OPTOMETRIST

## 2024-10-28 PROCEDURE — 92014 COMPRE OPH EXAM EST PT 1/>: CPT | Mod: S$PBB,,, | Performed by: OPTOMETRIST

## 2024-10-31 ENCOUNTER — PATIENT MESSAGE (OUTPATIENT)
Dept: PRIMARY CARE CLINIC | Facility: CLINIC | Age: 74
End: 2024-10-31
Payer: MEDICARE

## 2024-11-19 ENCOUNTER — PATIENT MESSAGE (OUTPATIENT)
Dept: PRIMARY CARE CLINIC | Facility: CLINIC | Age: 74
End: 2024-11-19
Payer: MEDICARE

## 2024-11-25 ENCOUNTER — PATIENT MESSAGE (OUTPATIENT)
Dept: PRIMARY CARE CLINIC | Facility: CLINIC | Age: 74
End: 2024-11-25
Payer: MEDICARE

## 2024-11-25 ENCOUNTER — LAB VISIT (OUTPATIENT)
Dept: LAB | Facility: HOSPITAL | Age: 74
End: 2024-11-25
Attending: NURSE PRACTITIONER
Payer: MEDICARE

## 2024-11-25 DIAGNOSIS — Z86.19 HISTORY OF HEPATITIS C: ICD-10-CM

## 2024-11-25 DIAGNOSIS — F41.9 ANXIETY: ICD-10-CM

## 2024-11-25 DIAGNOSIS — D69.6 THROMBOCYTOPENIA: ICD-10-CM

## 2024-11-25 DIAGNOSIS — R63.4 UNINTENTIONAL WEIGHT LOSS: ICD-10-CM

## 2024-11-25 LAB
ALBUMIN SERPL BCP-MCNC: 4.3 G/DL (ref 3.5–5.2)
ALP SERPL-CCNC: 41 U/L (ref 40–150)
ALT SERPL W/O P-5'-P-CCNC: 17 U/L (ref 10–44)
ANION GAP SERPL CALC-SCNC: 12 MMOL/L (ref 8–16)
AST SERPL-CCNC: 25 U/L (ref 10–40)
BASOPHILS # BLD AUTO: 0.05 K/UL (ref 0–0.2)
BASOPHILS NFR BLD: 0.8 % (ref 0–1.9)
BILIRUB SERPL-MCNC: 0.6 MG/DL (ref 0.1–1)
BUN SERPL-MCNC: 23 MG/DL (ref 8–23)
CALCIUM SERPL-MCNC: 9.6 MG/DL (ref 8.7–10.5)
CHLORIDE SERPL-SCNC: 100 MMOL/L (ref 95–110)
CO2 SERPL-SCNC: 26 MMOL/L (ref 23–29)
CREAT SERPL-MCNC: 0.9 MG/DL (ref 0.5–1.4)
DIFFERENTIAL METHOD BLD: ABNORMAL
EOSINOPHIL # BLD AUTO: 0 K/UL (ref 0–0.5)
EOSINOPHIL NFR BLD: 0.7 % (ref 0–8)
ERYTHROCYTE [DISTWIDTH] IN BLOOD BY AUTOMATED COUNT: 12.4 % (ref 11.5–14.5)
EST. GFR  (NO RACE VARIABLE): >60 ML/MIN/1.73 M^2
GLUCOSE SERPL-MCNC: 137 MG/DL (ref 70–110)
HCT VFR BLD AUTO: 40.2 % (ref 37–48.5)
HGB BLD-MCNC: 13.5 G/DL (ref 12–16)
IMM GRANULOCYTES # BLD AUTO: 0.01 K/UL (ref 0–0.04)
IMM GRANULOCYTES NFR BLD AUTO: 0.2 % (ref 0–0.5)
LYMPHOCYTES # BLD AUTO: 1.2 K/UL (ref 1–4.8)
LYMPHOCYTES NFR BLD: 20.8 % (ref 18–48)
MCH RBC QN AUTO: 33.6 PG (ref 27–31)
MCHC RBC AUTO-ENTMCNC: 33.6 G/DL (ref 32–36)
MCV RBC AUTO: 100 FL (ref 82–98)
MONOCYTES # BLD AUTO: 0.5 K/UL (ref 0.3–1)
MONOCYTES NFR BLD: 8.4 % (ref 4–15)
NEUTROPHILS # BLD AUTO: 4.1 K/UL (ref 1.8–7.7)
NEUTROPHILS NFR BLD: 69.1 % (ref 38–73)
NRBC BLD-RTO: 0 /100 WBC
PLATELET # BLD AUTO: 140 K/UL (ref 150–450)
PMV BLD AUTO: 9 FL (ref 9.2–12.9)
POTASSIUM SERPL-SCNC: 4.4 MMOL/L (ref 3.5–5.1)
PROT SERPL-MCNC: 7.6 G/DL (ref 6–8.4)
RBC # BLD AUTO: 4.02 M/UL (ref 4–5.4)
SODIUM SERPL-SCNC: 138 MMOL/L (ref 136–145)
WBC # BLD AUTO: 5.95 K/UL (ref 3.9–12.7)

## 2024-11-25 PROCEDURE — 36415 COLL VENOUS BLD VENIPUNCTURE: CPT | Performed by: NURSE PRACTITIONER

## 2024-11-25 PROCEDURE — 85025 COMPLETE CBC W/AUTO DIFF WBC: CPT | Performed by: NURSE PRACTITIONER

## 2024-11-25 PROCEDURE — 80053 COMPREHEN METABOLIC PANEL: CPT | Performed by: NURSE PRACTITIONER

## 2024-11-26 RX ORDER — CLONAZEPAM 0.5 MG/1
TABLET ORAL
Qty: 60 TABLET | Refills: 0 | Status: SHIPPED | OUTPATIENT
Start: 2024-11-26

## 2024-11-29 ENCOUNTER — LAB VISIT (OUTPATIENT)
Dept: LAB | Facility: HOSPITAL | Age: 74
End: 2024-11-29
Attending: NURSE PRACTITIONER
Payer: MEDICARE

## 2024-11-29 ENCOUNTER — OFFICE VISIT (OUTPATIENT)
Dept: HEMATOLOGY/ONCOLOGY | Facility: CLINIC | Age: 74
End: 2024-11-29
Payer: MEDICARE

## 2024-11-29 VITALS
HEART RATE: 71 BPM | HEIGHT: 68 IN | SYSTOLIC BLOOD PRESSURE: 138 MMHG | WEIGHT: 128.06 LBS | DIASTOLIC BLOOD PRESSURE: 77 MMHG | BODY MASS INDEX: 19.41 KG/M2

## 2024-11-29 DIAGNOSIS — D75.89 MACROCYTOSIS WITHOUT ANEMIA: Primary | ICD-10-CM

## 2024-11-29 DIAGNOSIS — D75.89 MACROCYTOSIS WITHOUT ANEMIA: ICD-10-CM

## 2024-11-29 DIAGNOSIS — D69.6 THROMBOCYTOPENIA: ICD-10-CM

## 2024-11-29 DIAGNOSIS — R79.89 ELEVATED HOMOCYSTEINE: ICD-10-CM

## 2024-11-29 DIAGNOSIS — E53.8 FOLIC ACID DEFICIENCY: ICD-10-CM

## 2024-11-29 LAB
FOLATE SERPL-MCNC: 8.1 NG/ML (ref 4–24)
HCYS SERPL-SCNC: 23.9 UMOL/L (ref 4–15.5)
VIT B12 SERPL-MCNC: 700 PG/ML (ref 210–950)

## 2024-11-29 PROCEDURE — 83921 ORGANIC ACID SINGLE QUANT: CPT | Performed by: NURSE PRACTITIONER

## 2024-11-29 PROCEDURE — 99214 OFFICE O/P EST MOD 30 MIN: CPT | Mod: S$PBB,,, | Performed by: NURSE PRACTITIONER

## 2024-11-29 PROCEDURE — G2211 COMPLEX E/M VISIT ADD ON: HCPCS | Mod: S$PBB,,, | Performed by: NURSE PRACTITIONER

## 2024-11-29 PROCEDURE — 82607 VITAMIN B-12: CPT | Performed by: NURSE PRACTITIONER

## 2024-11-29 PROCEDURE — 99999 PR PBB SHADOW E&M-EST. PATIENT-LVL III: CPT | Mod: PBBFAC,,, | Performed by: NURSE PRACTITIONER

## 2024-11-29 PROCEDURE — 83090 ASSAY OF HOMOCYSTEINE: CPT | Performed by: NURSE PRACTITIONER

## 2024-11-29 PROCEDURE — 99213 OFFICE O/P EST LOW 20 MIN: CPT | Mod: PBBFAC | Performed by: NURSE PRACTITIONER

## 2024-11-29 PROCEDURE — 82746 ASSAY OF FOLIC ACID SERUM: CPT | Performed by: NURSE PRACTITIONER

## 2024-11-29 NOTE — PROGRESS NOTES
Patient ID: Maggie Díaz is a 74 y.o. female.    Chief Complaint: no complaints    HPI:  74 year old female who presents today for further evaluation and management of thrombocytopenia.  Thrombocytopenia has been off/on since 2018 ranging from 124-149K.     Other diagnosis include RLS, anxiety, aortic atherosclerosis, vitamin D deficiency, hypothyroidism, history of Hep C, osteoporosis without fracture, and allergic rhinitis. History of Hep C - treated and cleared per patient.        Former cigarette smoker- quit in 1979.  Smoked for 41 years- smoked 1/2 pack per day  8/2019 Low dose CT chest negative for malignancy  04/2022 mammogram - negative for malignancy  8/2018 colonoscopy - negative with recs to repeat in 10 years  4/2019 PAP negative for malignancy     Alcohol use socially on occasion  Smoke marijuana on weekends - edibles     Has lost 9 lbs unintentionally in the past 6 months due to digestive issues.  Diagnosed with IBS with constipation.  States that she has gotten her appetite back.      8/9/2022 CT abdomen pelvis without contrast Impression:No acute abnormality in the abdomen or pelvis     Fm hx of cancer - states that she is adopted      Interval History:     9/29/2022 Present today for continued monitoring of thrombocytopenia - Labs from  9/28/2022 platelets 135K stable.  On of stable neutropenia for many years.      Interval History:  2/6/2023 States that she fell last Friday walking her dog.  Has stitches to her chin and soreness around rib area.  Was evaluated in the ED and found with no fractures or breaks.  Labs continue to remain stable.       Interval History:  6/12/2023 Patient present today for continued monitoring of thrombocytopenia/leukopenia.  No complaints of abnormal bleeding.  Differential WBC in normal ranges.     Interval History:  3/7/2024  Currently with platelet count of 121 K.  Denies any known abnormal bleeding,  WBC and RBC normal.  Normal differential    Interval  History:  2024  States that she still cannot gain any weight.  States she is continue to lose weight.  In 2024 weighed 125 and not unintentional weight loss currently 114 lbs. Has lost 11 lbs in 7 months. States that she bruises easily.      Interval History:  2024  Has gained 13 lbs back.  Abdominal US done showed normal liver with normal spleen.  Stable thrombocytopenia - currently 140 K. Macrocytosis without anemia.     I have reviewed all of the patient's relevant lab work available in the medical record and have utilized this in my evaluation and management recommendations today.      Social History     Socioeconomic History    Marital status:    Occupational History     Employer: [employer]   Tobacco Use    Smoking status: Former     Current packs/day: 0.00     Average packs/day: 0.5 packs/day for 10.0 years (5.0 ttl pk-yrs)     Types: Cigarettes     Start date: 3/3/1969     Quit date: 3/3/1979     Years since quittin.7    Smokeless tobacco: Never   Substance and Sexual Activity    Alcohol use: Not Currently     Alcohol/week: 0.0 standard drinks of alcohol     Comment: occasionally    Drug use: No    Sexual activity: Not Currently     Partners: Male   Social History Narrative    Live with      Social Drivers of Health     Financial Resource Strain: Patient Declined (2024)    Overall Financial Resource Strain (CARDIA)     Difficulty of Paying Living Expenses: Patient declined   Food Insecurity: No Food Insecurity (2024)    Hunger Vital Sign     Worried About Running Out of Food in the Last Year: Never true     Ran Out of Food in the Last Year: Never true   Transportation Needs: No Transportation Needs (2023)    PRAPARE - Transportation     Lack of Transportation (Medical): No     Lack of Transportation (Non-Medical): No   Physical Activity: Unknown (2024)    Exercise Vital Sign     Days of Exercise per Week: Patient declined   Stress: Patient Declined  (2024)    [employer] - Occupational Stress Questionnaire     Feeling of Stress : Patient declined   Housing Stability: Low Risk  (2023)    Housing Stability Vital Sign     Unable to Pay for Housing in the Last Year: No     Number of Places Lived in the Last Year: 1     Unstable Housing in the Last Year: No       Family History   Adopted: Yes   Family history unknown: Yes       Past Surgical History:   Procedure Laterality Date     SECTION, CLASSIC  1982    COLONOSCOPY N/A 2018    Procedure: COLONOSCOPY;  Surgeon: Jose Royal MD;  Location: [facility];  Service: Endoscopy;  Laterality: N/A;    WRIST FRACTURE SURGERY      , Dr. Bunch.       Past Medical History:   Diagnosis Date    Anxiety     Benzodiazepine dependence 2020    Hematuria, microscopic     negative CT    Hepatitis C     slow progression/ Dr. Otoole    Hypertension     Migraines     OP (osteoporosis)     on drug holiday.    Osteoarthritis     Radius fracture     Radius fracture     Restless legs syndrome (RLS)     Thrombocytopenia 2019    Vitamin D deficiency disease        Review of Systems   Constitutional:  Negative for appetite change and unexpected weight change.   HENT:  Negative for mouth sores and nosebleeds.    Eyes:  Negative for visual disturbance.   Respiratory:  Negative for cough and shortness of breath.    Cardiovascular:  Negative for chest pain.   Gastrointestinal:  Negative for abdominal pain, anal bleeding, blood in stool and diarrhea.   Endocrine: Negative.    Genitourinary:  Negative for frequency, hematuria and vaginal bleeding.   Musculoskeletal:  Negative for back pain.   Skin:  Negative for rash.   Allergic/Immunologic: Negative.    Neurological:  Negative for headaches.   Hematological:  Negative for adenopathy. Bruises/bleeds easily.   Psychiatric/Behavioral:  The patient is nervous/anxious.           Medication List with Changes/Refills   New Medications    FOLIC  ACID (FOLVITE) 1 MG TABLET    Take 1 tablet (1 mg total) by mouth once daily.   Current Medications    ATORVASTATIN (LIPITOR) 40 MG TABLET    TAKE ONE TABLET BY MOUTH ONE TIME DAILY    CHOLECALCIFEROL, VITAMIN D3, (VITAMIN D3) 50 MCG (2,000 UNIT) CAP    Take 1 capsule by mouth once daily.    CLONAZEPAM (KLONOPIN) 0.5 MG TABLET    TAKE 1 TO 2 TABLETS BY MOUTH EVERY EVENING AS NEEDED    ESCITALOPRAM OXALATE (LEXAPRO) 10 MG TABLET    Take 1 tablet (10 mg total) by mouth once daily.    FLUTICASONE PROPIONATE (FLONASE) 50 MCG/ACTUATION NASAL SPRAY    SHAKE LIQUID AND USE 2 SPRAYS(100 MCG) IN EACH NOSTRIL DAILY    NORTRIPTYLINE (PAMELOR) 50 MG CAPSULE    Take 1 capsule (50 mg total) by mouth every evening. Begin this prescription AFTER completion of initial 5 days starter dose.   Changed and/or Refilled Medications    Modified Medication Previous Medication    LEVOTHYROXINE (SYNTHROID) 50 MCG TABLET levothyroxine (SYNTHROID) 50 MCG tablet       TAKE 1 TABLET BY MOUTH EVERY DAY    TAKE 1 TABLET BY MOUTH EVERY DAY        Objective:     Vitals:    11/29/24 1311   BP: 138/77   Pulse: 71       Physical Exam  Vitals reviewed.   Constitutional:       Appearance: Normal appearance.   HENT:      Head: Normocephalic and atraumatic.      Right Ear: External ear normal.      Left Ear: External ear normal.   Cardiovascular:      Rate and Rhythm: Normal rate and regular rhythm.      Heart sounds: Normal heart sounds, S1 normal and S2 normal.   Pulmonary:      Effort: Pulmonary effort is normal.      Breath sounds: Normal breath sounds.   Abdominal:      General: There is no distension.   Musculoskeletal:         General: Normal range of motion.      Cervical back: Normal range of motion.   Skin:     General: Skin is warm and dry.   Neurological:      General: No focal deficit present.      Mental Status: She is alert and oriented to person, place, and time.   Psychiatric:         Attention and Perception: Attention and perception  normal.         Mood and Affect: Mood and affect normal.         Speech: Speech normal.         Behavior: Behavior normal. Behavior is cooperative.         Thought Content: Thought content normal.         Cognition and Memory: Cognition and memory normal.         Judgment: Judgment normal.         Assessment:     Problem List Items Addressed This Visit       Thrombocytopenia (Chronic)    Relevant Orders    Vitamin B12 (Completed)    METHYLMALONIC ACID, SERUM    Folate (Completed)    HOMOCYSTEINE, SERUM (Completed)    CBC Auto Differential    Comprehensive Metabolic Panel     Other Visit Diagnoses       Macrocytosis without anemia    -  Primary    Relevant Medications    folic acid (FOLVITE) 1 MG tablet    Other Relevant Orders    Vitamin B12 (Completed)    METHYLMALONIC ACID, SERUM    Folate (Completed)    HOMOCYSTEINE, SERUM (Completed)    CBC Auto Differential    Comprehensive Metabolic Panel    Elevated homocysteine        Relevant Medications    folic acid (FOLVITE) 1 MG tablet    Folic acid deficiency        Relevant Medications    folic acid (FOLVITE) 1 MG tablet              Lab Results   Component Value Date    WBC 5.95 11/25/2024    RBC 4.02 11/25/2024    HGB 13.5 11/25/2024    HCT 40.2 11/25/2024     (H) 11/25/2024    MCH 33.6 (H) 11/25/2024    MCHC 33.6 11/25/2024    RDW 12.4 11/25/2024     (L) 11/25/2024    MPV 9.0 (L) 11/25/2024    GRAN 4.1 11/25/2024    GRAN 69.1 11/25/2024    LYMPH 1.2 11/25/2024    LYMPH 20.8 11/25/2024    MONO 0.5 11/25/2024    MONO 8.4 11/25/2024    EOS 0.0 11/25/2024    BASO 0.05 11/25/2024    EOSINOPHIL 0.7 11/25/2024    BASOPHIL 0.8 11/25/2024      Lab Results   Component Value Date     11/25/2024    K 4.4 11/25/2024     11/25/2024    CO2 26 11/25/2024    BUN 23 11/25/2024    CREATININE 0.9 11/25/2024    CALCIUM 9.6 11/25/2024    ANIONGAP 12 11/25/2024    ESTGFRAFRICA >60 05/19/2022    EGFRNONAA >60 05/19/2022     Lab Results   Component Value Date     ALT 17 11/25/2024    AST 25 11/25/2024    GGT 10 04/18/2016    ALKPHOS 41 11/25/2024    BILITOT 0.6 11/25/2024     Lab Results   Component Value Date    IRON 115 08/14/2024    TRANSFERRIN 270 08/14/2024    TIBC 400 08/14/2024    FESATURATED 29 08/14/2024    FERRITIN 178 08/14/2024        Plan:   Macrocytosis without anemia  -     Vitamin B12; Future; Expected date: 11/29/2024  -     METHYLMALONIC ACID, SERUM; Future; Expected date: 11/29/2024  -     Folate; Future; Expected date: 11/29/2024  -     HOMOCYSTEINE, SERUM; Future; Expected date: 11/29/2024  -     CBC Auto Differential; Future; Expected date: 11/29/2024  -     Comprehensive Metabolic Panel; Future; Expected date: 11/29/2024  -     folic acid (FOLVITE) 1 MG tablet; Take 1 tablet (1 mg total) by mouth once daily.  Dispense: 90 tablet; Refill: 1    Thrombocytopenia  -     Vitamin B12; Future; Expected date: 11/29/2024  -     METHYLMALONIC ACID, SERUM; Future; Expected date: 11/29/2024  -     Folate; Future; Expected date: 11/29/2024  -     HOMOCYSTEINE, SERUM; Future; Expected date: 11/29/2024  -     CBC Auto Differential; Future; Expected date: 11/29/2024  -     Comprehensive Metabolic Panel; Future; Expected date: 11/29/2024    Elevated homocysteine  -     folic acid (FOLVITE) 1 MG tablet; Take 1 tablet (1 mg total) by mouth once daily.  Dispense: 90 tablet; Refill: 1    Folic acid deficiency  -     folic acid (FOLVITE) 1 MG tablet; Take 1 tablet (1 mg total) by mouth once daily.  Dispense: 90 tablet; Refill: 1        Med Onc Chart Routing      Follow up with physician    Follow up with KODI 4 months. labs prior - VV   Infusion scheduling note   n/a   Injection scheduling note n/a   Labs   Scheduling:  Preferred lab: [facility]  Lab interval:  b12, mma, folate, homocysteine.   Cbc and cmp prior to f/u   Imaging   N/a   Pharmacy appointment No pharmacy appointment needed      Other referrals       No additional referrals needed  n/a               Collaborating Provider:  Dr. Saw Becerra    Thank You,  TAJ Elizabeth-JOSE  [facility]

## 2024-12-02 ENCOUNTER — PATIENT MESSAGE (OUTPATIENT)
Dept: HEMATOLOGY/ONCOLOGY | Facility: CLINIC | Age: 74
End: 2024-12-02
Payer: MEDICARE

## 2024-12-02 RX ORDER — FOLIC ACID 1 MG/1
1 TABLET ORAL DAILY
Qty: 90 TABLET | Refills: 1 | Status: SHIPPED | OUTPATIENT
Start: 2024-12-02 | End: 2025-12-02

## 2024-12-02 RX ORDER — LEVOTHYROXINE SODIUM 50 UG/1
TABLET ORAL
Qty: 90 TABLET | Refills: 1 | Status: SHIPPED | OUTPATIENT
Start: 2024-12-02

## 2024-12-03 LAB — METHYLMALONATE SERPL-SCNC: 0.53 UMOL/L

## 2024-12-04 NOTE — PROGRESS NOTES
Subjective:      Patient ID: Maggie Díaz is a 74 y.o. female.    Chief Complaint: Follow-up      HPI  Here for follow up of medical problems.  BMs now normal, attributes to pelvic PT training and diet training.  Up 12#!.  On some miralax also.  No Bentyl in 3 months.  Depression going well on lexapro/nortryp.  RLS good on clonazepam, all other treatments were not effective.  No f/c/sw/cough.  No cp/sob/palp.  BMs normal.      Advance Care Planning     Date: 12/05/2024    ACP Reviewed/No Changes  Voluntary advance care planning discussion had today with patient. Previously completed HCPOA in electronic medical record is current, no changes made.      A total of 10 min was spent on advance care planning, goals of care discussion, emotional support, formulating and communicating prognosis and exploring burden/benefit of various approaches of treatment. This discussion occurred on a fully voluntary basis with the verbal consent of the patient and/or family.           Updated/ annual due 7/25:  HM: 10/24 fluvax, 1/21 covid vaccines/booster, 11/24 RSV, 8/19 HAV, 3/15 [id number], 2/14 [id number], 7/18 Td, 1/10 Tdap, 8/12 zoster, 2020 Shingrix x2, 10/23 BMD stable/hx fosamax will restart 10/23 rep 2y, 8/18 Cscope rep 10y, 9/24 MMG, 5/18 pelvic u/s neg.     Review of Systems   Constitutional:  Negative for chills, diaphoresis and fever.   Respiratory:  Negative for cough and shortness of breath.    Cardiovascular:  Negative for chest pain, palpitations and leg swelling.   Gastrointestinal:  Negative for blood in stool, constipation, diarrhea, nausea and vomiting.   Genitourinary:  Negative for dysuria, frequency and hematuria.   Psychiatric/Behavioral:  The patient is not nervous/anxious.          Objective:   /80   Pulse 78   Wt 56.8 kg (125 lb 5.3 oz)   SpO2 97%   BMI 19.06 kg/m²     Physical Exam  Constitutional:       Appearance: She is well-developed.   Neck:      Thyroid: No thyroid mass.      Vascular: No  carotid bruit.   Cardiovascular:      Rate and Rhythm: Normal rate and regular rhythm.      Heart sounds: No murmur heard.     No friction rub. No gallop.   Pulmonary:      Effort: Pulmonary effort is normal.      Breath sounds: Normal breath sounds. No wheezing or rales.   Abdominal:      General: Bowel sounds are normal.      Palpations: Abdomen is soft. There is no mass.      Tenderness: There is no abdominal tenderness.   Musculoskeletal:      Cervical back: Neck supple.   Lymphadenopathy:      Cervical: No cervical adenopathy.   Neurological:      Mental Status: She is alert and oriented to person, place, and time.          Latest Reference Range & Units 11/25/24 11:26 11/29/24 13:49   WBC 3.90 - 12.70 K/uL 5.95    RBC 4.00 - 5.40 M/uL 4.02    Hemoglobin 12.0 - 16.0 g/dL 13.5    Hematocrit 37.0 - 48.5 % 40.2    MCV 82 - 98 fL 100 (H)    MCH 27.0 - 31.0 pg 33.6 (H)    MCHC 32.0 - 36.0 g/dL 33.6    RDW 11.5 - 14.5 % 12.4    Platelet Count 150 - 450 K/uL 140 (L)    MPV 9.2 - 12.9 fL 9.0 (L)    Gran % 38.0 - 73.0 % 69.1    Lymph % 18.0 - 48.0 % 20.8    Mono % 4.0 - 15.0 % 8.4    Eos % 0.0 - 8.0 % 0.7    Basophil % 0.0 - 1.9 % 0.8    Immature Granulocytes 0.0 - 0.5 % 0.2    Gran # (ANC) 1.8 - 7.7 K/uL 4.1    Lymph # 1.0 - 4.8 K/uL 1.2    Mono # 0.3 - 1.0 K/uL 0.5    Eos # 0.0 - 0.5 K/uL 0.0    Baso # 0.00 - 0.20 K/uL 0.05    Immature Grans (Abs) 0.00 - 0.04 K/uL 0.01    nRBC 0 /100 WBC 0    Differential Method  Automated    Folate 4.0 - 24.0 ng/mL  8.1   Vitamin B12 210 - 950 pg/mL  700   Methlymalonic Acid <0.40 umol/L  0.53 (H)   Sodium 136 - 145 mmol/L 138    Potassium 3.5 - 5.1 mmol/L 4.4    Chloride 95 - 110 mmol/L 100    CO2 23 - 29 mmol/L 26    Anion Gap 8 - 16 mmol/L 12    BUN 8 - 23 mg/dL 23    Creatinine 0.5 - 1.4 mg/dL 0.9    eGFR >60 mL/min/1.73 m^2 >60    Glucose 70 - 110 mg/dL 137 (H)    Calcium 8.7 - 10.5 mg/dL 9.6    ALP 40 - 150 U/L 41    PROTEIN TOTAL 6.0 - 8.4 g/dL 7.6    Albumin 3.5 - 5.2 g/dL  "4.3    BILIRUBIN TOTAL 0.1 - 1.0 mg/dL 0.6    AST 10 - 40 U/L 25    ALT 10 - 44 U/L 17    Homocysteine 4.0 - 15.5 umol/L  23.9 (H)       Assessment:       1. Recurrent major depressive disorder, in partial remission    2. Restless legs syndrome (RLS)    3. Other hyperlipidemia    4. Acquired hypothyroidism    5. Age-related osteoporosis without current pathological fracture    6. Thrombocytopenia    7. Irritable bowel syndrome with constipation    8. Asymptomatic postmenopausal state          Plan:     1. Recurrent major depressive disorder, in partial remission- doing well, "I found my happy again."  Overview:  Lexapro 10mg daily,  Nortriptyline 50mg nightly.      2. Restless legs syndrome (RLS)- cont rx.  Overview:  Clonazepam 0.5mg qhs prn.      3. Other hyperlipidemia- LDL 51, cont statin.  Overview:  Atorva 40mg daily, LDL 51 6/2024.      4. Acquired hypothyroidism- Clinically stable, continue present treatment.  Overview:  Levothyrox 50mcg daily, 6/24 TSH 1.344      5. Age-related osteoporosis without current pathological fracture- on fosamax holiday, recheck BMD 3mo.  Overview:  S/p fosamax, stop 8/8/24, BMD due 10/24.      6. Thrombocytopenia- being followed by [facility] for now, 14mo.    7. Irritable bowel syndrome with constipation- doing well now    8. Asymptomatic postmenopausal state-   -     DXA Bone Density Axial Skeleton 1 or more sites; Future; Expected date: 12/05/2024     RTC 3mo.    "

## 2024-12-05 ENCOUNTER — OFFICE VISIT (OUTPATIENT)
Dept: PRIMARY CARE CLINIC | Facility: CLINIC | Age: 74
End: 2024-12-05
Payer: MEDICARE

## 2024-12-05 VITALS
DIASTOLIC BLOOD PRESSURE: 80 MMHG | OXYGEN SATURATION: 97 % | HEART RATE: 78 BPM | WEIGHT: 125.31 LBS | SYSTOLIC BLOOD PRESSURE: 136 MMHG | BODY MASS INDEX: 19.06 KG/M2

## 2024-12-05 DIAGNOSIS — E78.49 OTHER HYPERLIPIDEMIA: ICD-10-CM

## 2024-12-05 DIAGNOSIS — M81.0 AGE-RELATED OSTEOPOROSIS WITHOUT CURRENT PATHOLOGICAL FRACTURE: ICD-10-CM

## 2024-12-05 DIAGNOSIS — E03.9 ACQUIRED HYPOTHYROIDISM: ICD-10-CM

## 2024-12-05 DIAGNOSIS — K58.1 IRRITABLE BOWEL SYNDROME WITH CONSTIPATION: Chronic | ICD-10-CM

## 2024-12-05 DIAGNOSIS — D69.6 THROMBOCYTOPENIA: Chronic | ICD-10-CM

## 2024-12-05 DIAGNOSIS — G25.81 RESTLESS LEGS SYNDROME (RLS): ICD-10-CM

## 2024-12-05 DIAGNOSIS — Z78.0 ASYMPTOMATIC POSTMENOPAUSAL STATE: ICD-10-CM

## 2024-12-05 DIAGNOSIS — F33.41 RECURRENT MAJOR DEPRESSIVE DISORDER, IN PARTIAL REMISSION: Primary | ICD-10-CM

## 2024-12-05 PROCEDURE — 99999 PR PBB SHADOW E&M-EST. PATIENT-LVL III: CPT | Mod: PBBFAC,,, | Performed by: INTERNAL MEDICINE

## 2024-12-05 PROCEDURE — 99213 OFFICE O/P EST LOW 20 MIN: CPT | Mod: PBBFAC,PN | Performed by: INTERNAL MEDICINE

## 2024-12-10 ENCOUNTER — PATIENT MESSAGE (OUTPATIENT)
Dept: PRIMARY CARE CLINIC | Facility: CLINIC | Age: 74
End: 2024-12-10
Payer: MEDICARE

## 2024-12-17 ENCOUNTER — PATIENT MESSAGE (OUTPATIENT)
Dept: PRIMARY CARE CLINIC | Facility: CLINIC | Age: 74
End: 2024-12-17
Payer: MEDICARE

## 2024-12-24 ENCOUNTER — OFFICE VISIT (OUTPATIENT)
Dept: PRIMARY CARE CLINIC | Facility: CLINIC | Age: 74
End: 2024-12-24
Payer: MEDICARE

## 2024-12-24 VITALS
OXYGEN SATURATION: 96 % | DIASTOLIC BLOOD PRESSURE: 68 MMHG | SYSTOLIC BLOOD PRESSURE: 124 MMHG | HEART RATE: 91 BPM | HEIGHT: 68 IN | BODY MASS INDEX: 19.34 KG/M2 | WEIGHT: 127.63 LBS | TEMPERATURE: 98 F

## 2024-12-24 DIAGNOSIS — E03.9 ACQUIRED HYPOTHYROIDISM: ICD-10-CM

## 2024-12-24 DIAGNOSIS — E78.49 OTHER HYPERLIPIDEMIA: ICD-10-CM

## 2024-12-24 DIAGNOSIS — F41.1 GAD (GENERALIZED ANXIETY DISORDER): ICD-10-CM

## 2024-12-24 DIAGNOSIS — D70.8 OTHER NEUTROPENIA: Chronic | ICD-10-CM

## 2024-12-24 DIAGNOSIS — F41.9 ANXIETY: ICD-10-CM

## 2024-12-24 DIAGNOSIS — M81.0 AGE-RELATED OSTEOPOROSIS WITHOUT CURRENT PATHOLOGICAL FRACTURE: ICD-10-CM

## 2024-12-24 DIAGNOSIS — Z00.00 ENCOUNTER FOR PREVENTIVE HEALTH EXAMINATION: ICD-10-CM

## 2024-12-24 DIAGNOSIS — I70.0 AORTIC ATHEROSCLEROSIS: ICD-10-CM

## 2024-12-24 DIAGNOSIS — D69.6 THROMBOCYTOPENIA: Chronic | ICD-10-CM

## 2024-12-24 DIAGNOSIS — E44.1 PROTEIN-CALORIE MALNUTRITION, MILD: Chronic | ICD-10-CM

## 2024-12-24 DIAGNOSIS — Z00.00 ENCOUNTER FOR MEDICARE ANNUAL WELLNESS EXAM: Primary | ICD-10-CM

## 2024-12-24 DIAGNOSIS — K58.1 IRRITABLE BOWEL SYNDROME WITH CONSTIPATION: Chronic | ICD-10-CM

## 2024-12-24 DIAGNOSIS — F33.41 RECURRENT MAJOR DEPRESSIVE DISORDER, IN PARTIAL REMISSION: ICD-10-CM

## 2024-12-24 PROCEDURE — 99999 PR PBB SHADOW E&M-EST. PATIENT-LVL IV: CPT | Mod: PBBFAC,,, | Performed by: NURSE PRACTITIONER

## 2024-12-24 PROCEDURE — 99214 OFFICE O/P EST MOD 30 MIN: CPT | Mod: PBBFAC,PN | Performed by: NURSE PRACTITIONER

## 2024-12-24 RX ORDER — CLONAZEPAM 0.5 MG/1
TABLET ORAL
Qty: 60 TABLET | Refills: 0 | Status: SHIPPED | OUTPATIENT
Start: 2024-12-24

## 2024-12-24 NOTE — PROGRESS NOTES
"  Maggie Díaz presented for a follow-up Medicare AWV today. The following components were reviewed and updated:    Medical history  Family History  Social history  Allergies and Current Medications  Health Risk Assessment  Health Maintenance  Care Team    **See Completed Assessments for Annual Wellness visit with in the encounter summary    The following assessments were completed:  Depression Screening  Cognitive function Screening  Timed Get Up Test  Whisper Test  Nutrition Screening    Vitals:    12/24/24 0925   BP: 124/68   BP Location: Right arm   Patient Position: Sitting   Pulse: 91   Temp: 97.5 °F (36.4 °C)   TempSrc: Skin   SpO2: 96%   Weight: 57.9 kg (127 lb 10.3 oz)   Height: 5' 8" (1.727 m)     Body mass index is 19.41 kg/m².            Review of Systems   Constitutional:  Negative for appetite change, chills and fever.   HENT:  Positive for congestion. Negative for ear pain.         Seasonal allergies   Respiratory:  Negative for cough and shortness of breath.    Cardiovascular:  Negative for chest pain and palpitations.   Gastrointestinal:  Positive for abdominal pain and constipation. Negative for nausea and vomiting.   Genitourinary:  Negative for dysuria and hematuria.   Musculoskeletal:  Positive for arthralgias.   Neurological:  Negative for dizziness and light-headedness.   Psychiatric/Behavioral:  Negative for sleep disturbance. The patient is nervous/anxious.            Physical Exam  Vitals reviewed.   Constitutional:       General: She is not in acute distress.     Appearance: Normal appearance.   HENT:      Head: Normocephalic and atraumatic.      Nose: Nose normal.      Mouth/Throat:      Mouth: Mucous membranes are moist.   Eyes:      General: No scleral icterus.     Conjunctiva/sclera: Conjunctivae normal.   Cardiovascular:      Rate and Rhythm: Normal rate and regular rhythm.      Heart sounds: No murmur heard.  Pulmonary:      Effort: Pulmonary effort is normal. No respiratory " distress.      Breath sounds: Normal breath sounds.   Abdominal:      Palpations: Abdomen is soft. There is no mass.      Tenderness: There is no abdominal tenderness.   Musculoskeletal:      Cervical back: Normal range of motion and neck supple.      Right lower leg: No edema.      Left lower leg: No edema.   Lymphadenopathy:      Cervical: No cervical adenopathy.   Skin:     General: Skin is warm and dry.   Neurological:      Mental Status: She is alert and oriented to person, place, and time. Mental status is at baseline.   Psychiatric:         Mood and Affect: Mood normal.         Thought Content: Thought content normal.            Health Maintenance         Date Due Completion Date    COVID-19 Vaccine (7 - 2024-25 season) 09/01/2024 9/28/2023    DEXA Scan 10/17/2025 10/17/2023    Override on 1/3/2013: Done (Osteoporosis; stable BMD; chk vit D; repeat in 2 years; fosamax holiday)    TETANUS VACCINE 07/18/2028 7/18/2018    Colonoscopy 08/22/2028 8/22/2018    Override on 1/27/2017: Declined (Pt currently refuseing per PCP note 1/21/17)    Lipid Panel 06/14/2029 6/14/2024              PROBLEM LIST ITEMS ADDRESSED THIS VISIT:    1. Encounter for Medicare annual wellness exam  Assessment & Plan:  2024 - Hep C Screening pending      Review for Opioid Screening: Pt does have Rx for clonazepam for anxiety     Review for Substance Use Disorders: Patient does not use illicit substances as reported      Orders:  -     Ambulatory Referral/Consult to Enhanced Annual Wellness Visit (eAWV)    2. Anxiety  -     clonazePAM (KLONOPIN) 0.5 MG tablet; TAKE 1 TO 2 TABLETS BY MOUTH EVERY EVENING AS NEEDED  Dispense: 60 tablet; Refill: 0    3. Encounter for preventive health examination    4. Recurrent major depressive disorder, in partial remission  Overview:  Lexapro 10mg daily,  Nortriptyline 50mg nightly.    Assessment & Plan:  Stable  Has not seen LCSW since 8/2023      5. SARAHY (generalized anxiety disorder)  Assessment &  Plan:  Stable  Continue clonazepam      6. Other hyperlipidemia  Overview:  Atorva 40mg daily, LDL 51 6/2024.    Assessment & Plan:  Lab Results   Component Value Date    LDLCALC 51.6 (L) 06/14/2024    Encouraged activity  F/U with PCP      7. Aortic atherosclerosis  Overview:  4/15/19 CT Renal Stone Study- There is aortoiliac atherosclerosis.       Assessment & Plan:  Lab Results   Component Value Date    LDLCALC 51.6 (L) 06/14/2024    Continue atorvastatin 40 mg daily      8. Thrombocytopenia  Assessment & Plan:  Last levels 140, 117, 121  Denies abnormal bleeding  Hem Onc prescribed folic acid 1 mg daily      9. Other neutropenia  Assessment & Plan:   Latest Reference Range & Units 07/19/24 10:04 11/25/24 11:26   WBC 3.90 - 12.70 K/uL 3.74 (L) 5.95   ANC - normal  F/U with Hem/Onc      10. Protein-calorie malnutrition, mild  Assessment & Plan:  Assess and monitor  Had unusual weight loss due to appetite loss from constipation but now weight gain in progress  Albumin level normal      11. Age-related osteoporosis without current pathological fracture  Overview:  S/p fosamax, stop 8/8/24, BMD due 10/24.    Assessment & Plan:  On drug holiday  Repeat DEXA scan 2/25      12. Acquired hypothyroidism  Overview:  Levothyrox 50mcg daily, 6/24 TSH 1.344    Assessment & Plan:  Lab Results   Component Value Date    TSH 1.344 06/14/2024    Continue levothyroxine 50 mcg daily      13. Irritable bowel syndrome with constipation  Assessment & Plan:  Stable currently but chronic  Manage anxiety  F/U with GI            Provided Maggie with a 5-10 year written screening schedule and personal prevention plan. Recommendations were developed using the USPSTF age appropriate recommendations. Education, counseling, and referrals were provided as needed.  After Visit Summary printed and given to patient which includes a list of additional screenings\tests needed.    Future Appointments   Date Time Provider Department Center   1/29/2025   9:30 AM Candace Agee, NP [facility] GASTRO [facility]   2/24/2025 12:00 PM [facility] BMD1: BONE DENSITY SCAN [facility] DEXABMD [facility]   3/26/2025 12:35 PM LABORATORY, [facility] HG LAB [facility]   3/28/2025  1:00 PM Corrie Minaya NP [facility] BHEMON [facility]   5/7/2025  1:40 PM Adeola Berumen MD [facility]          Sweta Powers, APRN, NP-C  [facility]  [address], [place] [address]    I offered to discuss advanced care planning, including how to pick a person who would make decisions for you if you were unable to make them for yourself, called a health care power of , and what kind of decisions you might make such as use of life sustaining treatments such as ventilators and tube feeding when faced with a life limiting illness recorded on a living will that they will need to know. (How you want to be cared for as you near the end of your natural life)     X Patient is interested in learning more about how to make advanced directives.  I provided them paperwork and offered to discuss this with them.I offered to discuss advanced care planning, including how to pick a person who would make decisions for you if you were unable to make them for yourself, called a health care power of , and what kind of decisions you might make such as use of life sustaining treatments such as ventilators and tube feeding when faced with a life limiting illness recorded on a living will that they will need to know. (How you want to be cared for as you near the end of your natural life)     X  Patient has advanced directives on file, which we reviewed, and they do not wish to make changes.

## 2024-12-26 NOTE — PATIENT INSTRUCTIONS
Counseling and Referral of Other Preventative  (Italic type indicates deductible and co-insurance are waived)    Patient Name: Maggie Díaz  Today's Date: 1/6/2025    Health Maintenance       Date Due Completion Date    COVID-19 Vaccine (7 - 2024-25 season) 09/01/2024 9/28/2023    DEXA Scan 10/17/2025 10/17/2023    Override on 1/3/2013: Done (Osteoporosis; stable BMD; chk vit D; repeat in 2 years; fosamax holiday)    TETANUS VACCINE 07/18/2028 7/18/2018    Colonoscopy 08/22/2028 8/22/2018    Override on 1/27/2017: Declined (Pt currently refuseing per PCP note 1/21/17)    Lipid Panel 06/14/2029 6/14/2024        No orders of the defined types were placed in this encounter.    The following information is provided to all patients.  This information is to help you find resources for any of the problems found today that may be affecting your health:                  Living healthy guide: www.[facility].[place].[facility]      Understanding Diabetes: www.diabetes.org      Eating healthy: www.cdc.gov/healthyweight      [facility] home safety checklist: www.cdc.gov/steadi/patient.html      Agency on Aging: www.goea.[place].gov      Alcoholics anonymous (AA): www.aa.org      Physical Activity: www.parth.nih.gov/oj4bmrh      Tobacco use: www.quitwithusla.org         Counseling and Referral of Other Preventative  (Italic type indicates deductible and co-insurance are waived)    Patient Name: Maggie Díaz  Today's Date: 1/6/2025    Health Maintenance       Date Due Completion Date    COVID-19 Vaccine (7 - 2024-25 season) 09/01/2024 9/28/2023    DEXA Scan 10/17/2025 10/17/2023    Override on 1/3/2013: Done (Osteoporosis; stable BMD; chk vit D; repeat in 2 years; fosamax holiday)    TETANUS VACCINE 07/18/2028 7/18/2018    Colonoscopy 08/22/2028 8/22/2018    Override on 1/27/2017: Declined (Pt currently refuseing per PCP note 1/21/17)    Lipid Panel 06/14/2029 6/14/2024        No orders of the defined types were placed in this encounter.    The  following information is provided to all patients.  This information is to help you find resources for any of the problems found today that may be affecting your health:                  Living healthy guide: www.[facility].[place].[facility]      Understanding Diabetes: www.diabetes.org      Eating healthy: www.cdc.gov/healthyweight      CDC home safety checklist: www.cdc.gov/steadi/patient.html      Agency on Aging: www.goea.[place].[facility]      Alcoholics anonymous (AA): www.aa.org      Physical Activity: www.parth.nih.gov/kt2ykzf      Tobacco use: www.quitwithusla.org

## 2024-12-26 NOTE — ASSESSMENT & PLAN NOTE
Assess and monitor  Had unusual weight loss due to appetite loss from constipation but now weight gain in progress  Albumin level normal

## 2024-12-26 NOTE — ASSESSMENT & PLAN NOTE
Latest Reference Range & Units 07/19/24 10:04 11/25/24 11:26   WBC 3.90 - 12.70 K/uL 3.74 (L) 5.95   ANC - normal  F/U with Hem/Onc

## 2025-01-06 ENCOUNTER — TELEPHONE (OUTPATIENT)
Dept: GASTROENTEROLOGY | Facility: CLINIC | Age: 75
End: 2025-01-06
Payer: MEDICARE

## 2025-01-06 PROBLEM — Z00.00 ENCOUNTER FOR MEDICARE ANNUAL WELLNESS EXAM: Status: ACTIVE | Noted: 2025-01-06

## 2025-01-06 NOTE — ASSESSMENT & PLAN NOTE
2024 - Hep C Screening pending      Review for Opioid Screening: Pt does have Rx for clonazepam for anxiety     Review for Substance Use Disorders: Patient does not use illicit substances as reported

## 2025-01-06 NOTE — ASSESSMENT & PLAN NOTE
Lab Results   Component Value Date    LDLCALC 51.6 (L) 06/14/2024    Encouraged activity  F/U with PCP

## 2025-01-06 NOTE — ASSESSMENT & PLAN NOTE
Lab Results   Component Value Date    LDLCALC 51.6 (L) 06/14/2024    Continue atorvastatin 40 mg daily

## 2025-01-27 ENCOUNTER — PATIENT MESSAGE (OUTPATIENT)
Dept: PRIMARY CARE CLINIC | Facility: CLINIC | Age: 75
End: 2025-01-27
Payer: MEDICARE

## 2025-01-27 DIAGNOSIS — F41.9 ANXIETY: ICD-10-CM

## 2025-01-27 RX ORDER — CLONAZEPAM 0.5 MG/1
TABLET ORAL
Qty: 60 TABLET | Refills: 5 | Status: SHIPPED | OUTPATIENT
Start: 2025-01-27

## 2025-01-29 ENCOUNTER — OFFICE VISIT (OUTPATIENT)
Dept: GASTROENTEROLOGY | Facility: CLINIC | Age: 75
End: 2025-01-29
Payer: MEDICARE

## 2025-01-29 VITALS
WEIGHT: 127.63 LBS | BODY MASS INDEX: 19.34 KG/M2 | SYSTOLIC BLOOD PRESSURE: 132 MMHG | HEART RATE: 83 BPM | HEIGHT: 68 IN | DIASTOLIC BLOOD PRESSURE: 64 MMHG

## 2025-01-29 DIAGNOSIS — K58.1 IRRITABLE BOWEL SYNDROME WITH CONSTIPATION: Primary | Chronic | ICD-10-CM

## 2025-01-29 PROBLEM — E44.1 PROTEIN-CALORIE MALNUTRITION, MILD: Chronic | Status: RESOLVED | Noted: 2024-08-19 | Resolved: 2025-01-29

## 2025-01-29 PROBLEM — K59.00 CONSTIPATION: Status: RESOLVED | Noted: 2023-02-21 | Resolved: 2025-01-29

## 2025-01-29 PROCEDURE — 99213 OFFICE O/P EST LOW 20 MIN: CPT | Mod: PBBFAC | Performed by: NURSE PRACTITIONER

## 2025-01-29 PROCEDURE — 99213 OFFICE O/P EST LOW 20 MIN: CPT | Mod: S$PBB,,, | Performed by: NURSE PRACTITIONER

## 2025-01-29 PROCEDURE — 99999 PR PBB SHADOW E&M-EST. PATIENT-LVL III: CPT | Mod: PBBFAC,,, | Performed by: NURSE PRACTITIONER

## 2025-01-29 NOTE — PROGRESS NOTES
Clinic Follow Up:  [facility] Follow Up Note    Reason for Follow Up:  The encounter diagnosis was Irritable bowel syndrome with constipation.    PCP: Adeola Berumen       HPI:  This is a 74 y.o. female here for follow up of IBS.     She takes Miralax every morning and every other night and psyllium every morning.  She takes nortriptyline nightly.   Has completed pelvic floor therapy. She responded very well and has since graduated. She does have about four bowel movements per morning but feels complete after that. She is feeling well otherwise. She has been able to gain weight that was previously lost.     Review of Systems   Constitutional:  Negative for activity change and appetite change.        As per interval history above   Respiratory:  Negative for cough and shortness of breath.    Cardiovascular:  Negative for chest pain.   Gastrointestinal:  Positive for constipation. Negative for abdominal pain, diarrhea, nausea and vomiting.   Skin:  Negative for color change and rash.       Medical History:  Past Medical History:   Diagnosis Date    Anxiety     Benzodiazepine dependence 2020    Hematuria, microscopic     negative CT    Hepatitis C     slow progression/ Dr. Otoole    Hypertension     Migraines     OP (osteoporosis)     on drug holiday.    Osteoarthritis     Radius fracture     Radius fracture     Restless legs syndrome (RLS)     Thrombocytopenia 2019    Vitamin D deficiency disease        Surgical History:   Past Surgical History:   Procedure Laterality Date     SECTION, CLASSIC      COLONOSCOPY N/A 2018    Procedure: COLONOSCOPY;  Surgeon: Jose Royal MD;  Location: [facility];  Service: Endoscopy;  Laterality: N/A;    WRIST FRACTURE SURGERY      , Dr. Bunch.       Family History:   Family History   Adopted: Yes   Family history unknown: Yes       Social History:   Social History     Tobacco Use    Smoking status: Former     Current packs/day:  "0.00     Average packs/day: 0.5 packs/day for 10.0 years (5.0 ttl pk-yrs)     Types: Cigarettes     Start date: 3/3/1969     Quit date: 3/3/1979     Years since quittin.9    Smokeless tobacco: Never   Substance Use Topics    Alcohol use: Not Currently     Alcohol/week: 0.0 standard drinks of alcohol     Comment: occasionally    Drug use: No       Allergies:   Review of patient's allergies indicates:   Allergen Reactions    Bactrim [sulfamethoxazole-trimethoprim]      hallucination    Latex, natural rubber Swelling    Penicillins Shortness Of Breath       Home Medications:  Current Outpatient Medications on File Prior to Visit   Medication Sig Dispense Refill    atorvastatin (LIPITOR) 40 MG tablet TAKE ONE TABLET BY MOUTH ONE TIME DAILY 90 tablet 3    cholecalciferol, vitamin D3, (VITAMIN D3) 50 mcg (2,000 unit) Cap Take 1 capsule by mouth once daily.      clonazePAM (KLONOPIN) 0.5 MG tablet TAKE 1 TO 2 TABLETS BY MOUTH EVERY EVENING AS NEEDED 60 tablet 5    EScitalopram oxalate (LEXAPRO) 10 MG tablet Take 1 tablet (10 mg total) by mouth once daily. 90 tablet 3    fluticasone propionate (FLONASE) 50 mcg/actuation nasal spray SHAKE LIQUID AND USE 2 SPRAYS(100 MCG) IN EACH NOSTRIL DAILY 48 g 11    folic acid (FOLVITE) 1 MG tablet Take 1 tablet (1 mg total) by mouth once daily. 90 tablet 1    levothyroxine (SYNTHROID) 50 MCG tablet TAKE 1 TABLET BY MOUTH EVERY DAY 90 tablet 1    nortriptyline (PAMELOR) 50 MG capsule Take 1 capsule (50 mg total) by mouth every evening. Begin this prescription AFTER completion of initial 5 days starter dose. 90 capsule 3     No current facility-administered medications on file prior to visit.       /64 (BP Location: Left arm, Patient Position: Sitting)   Pulse 83   Ht 5' 8" (1.727 m)   Wt 57.9 kg (127 lb 10.3 oz)   BMI 19.41 kg/m²   Body mass index is 19.41 kg/m².  Physical Exam  Constitutional:       General: She is not in acute distress.  HENT:      Head: Normocephalic. "   Neurological:      General: No focal deficit present.      Mental Status: She is alert.   Psychiatric:         Mood and Affect: Mood normal.         Judgment: Judgment normal.         Labs: Pertinent labs reviewed.  CRC Screening: NA    Assessment:   1. Irritable bowel syndrome with constipation    Well controlled on Miralax and psyllium     Recommendations:   - continue current medications    Irritable bowel syndrome with constipation    Return to Clinic:  Follow up if symptoms worsen or fail to improve.    Thank you for the opportunity to participate in the care of this patient.  LUCIO Gold

## 2025-03-02 DIAGNOSIS — I70.0 AORTIC ATHEROSCLEROSIS: ICD-10-CM

## 2025-03-03 RX ORDER — ATORVASTATIN CALCIUM 40 MG/1
40 TABLET, FILM COATED ORAL
Qty: 90 TABLET | Refills: 0 | Status: SHIPPED | OUTPATIENT
Start: 2025-03-03

## 2025-03-14 ENCOUNTER — PATIENT MESSAGE (OUTPATIENT)
Dept: OPHTHALMOLOGY | Facility: CLINIC | Age: 75
End: 2025-03-14
Payer: MEDICARE

## 2025-03-14 ENCOUNTER — PATIENT MESSAGE (OUTPATIENT)
Dept: PRIMARY CARE CLINIC | Facility: CLINIC | Age: 75
End: 2025-03-14
Payer: MEDICARE

## 2025-03-14 ENCOUNTER — TELEPHONE (OUTPATIENT)
Dept: PRIMARY CARE CLINIC | Facility: CLINIC | Age: 75
End: 2025-03-14
Payer: MEDICARE

## 2025-03-17 ENCOUNTER — PATIENT MESSAGE (OUTPATIENT)
Dept: HEMATOLOGY/ONCOLOGY | Facility: CLINIC | Age: 75
End: 2025-03-17
Payer: MEDICARE

## 2025-03-17 ENCOUNTER — OFFICE VISIT (OUTPATIENT)
Dept: OPHTHALMOLOGY | Facility: CLINIC | Age: 75
End: 2025-03-17
Payer: MEDICARE

## 2025-03-17 DIAGNOSIS — H02.9 EYELID LESION: ICD-10-CM

## 2025-03-17 DIAGNOSIS — H00.015 HORDEOLUM EXTERNUM OF LEFT LOWER EYELID: Primary | ICD-10-CM

## 2025-03-17 PROCEDURE — 99213 OFFICE O/P EST LOW 20 MIN: CPT | Mod: PBBFAC | Performed by: OPTOMETRIST

## 2025-03-17 PROCEDURE — 99214 OFFICE O/P EST MOD 30 MIN: CPT | Mod: S$PBB,,, | Performed by: OPTOMETRIST

## 2025-03-17 PROCEDURE — 99999 PR PBB SHADOW E&M-EST. PATIENT-LVL III: CPT | Mod: PBBFAC,,, | Performed by: OPTOMETRIST

## 2025-03-17 RX ORDER — NEOMYCIN SULFATE, POLYMYXIN B SULFATE, AND DEXAMETHASONE 3.5; 10000; 1 MG/G; [USP'U]/G; MG/G
OINTMENT OPHTHALMIC
Qty: 3.5 G | Refills: 0 | Status: SHIPPED | OUTPATIENT
Start: 2025-03-17 | End: 2025-03-24

## 2025-03-17 NOTE — PROGRESS NOTES
HPI     Marlene            Comments: DKT REFERRAL      1. PCIOL OD 5/16/24 +23.0 SY60WF/CDE: 18.44      Pred QID OD            Comments    Pt here with C/O of stsudhir LLL since Friday 03/14/2025  Painful, red, white discharge on towel this morning  Pain level 2 this morning   Discomfort level 5-6  Getting in the way of her vision   Used warm compress and old rosalinda she had at home              Last edited by Nelida Nash on 3/17/2025 10:30 AM.            Assessment /Plan     For exam results, see Encounter Report.    1. Hordeolum externum of left lower eyelid  -     neomycin-polymyxin-dexamethasone (MAXITROL) 3.5 mg/g-10,000 unit/g-0.1 % Oint; Apply ointment to lids and lashes on left eyelid 3 times daily for 7 days.  Dispense: 3.5 g; Refill: 0  Start Maxitrol TID x 7 days.   RTC in 3 days for lid f/u.  Strict return precautions reviewed.   Discussed above and answered questions.

## 2025-03-19 ENCOUNTER — PATIENT MESSAGE (OUTPATIENT)
Dept: OPHTHALMOLOGY | Facility: CLINIC | Age: 75
End: 2025-03-19

## 2025-04-24 NOTE — PROGRESS NOTES
Subjective:      Patient ID: Maggie Díaz is a 74 y.o. female.    Chief Complaint: No chief complaint on file.      HPI  Here for follow up of medical problems.      Updated/ annual due 7/25:  HM: 10/24 fluvax, 1/21 covid vaccines/booster, 11/24 RSV, 8/19 HAV, 3/15 [id number], 2/14 [id number], 7/18 Td, 1/10 Tdap, 8/12 zoster, 2020 Shingrix x2, 10/23 BMD stable/hx fosamax will restart 10/23 rep 2y, 8/18 Cscope rep 10y, 9/24 MMG, 5/18 pelvic u/s neg.     Review of Systems      Objective:   There were no vitals taken for this visit.    Physical Exam        Assessment:       No diagnosis found.      Plan:     {There are no diagnoses linked to this encounter. (Refresh or delete this SmartLink)}

## 2025-05-01 ENCOUNTER — HOSPITAL ENCOUNTER (OUTPATIENT)
Dept: RADIOLOGY | Facility: HOSPITAL | Age: 75
Discharge: HOME | End: 2025-05-01
Attending: INTERNAL MEDICINE
Payer: MEDICARE

## 2025-05-01 ENCOUNTER — PATIENT MESSAGE (OUTPATIENT)
Dept: PRIMARY CARE CLINIC | Facility: CLINIC | Age: 75
End: 2025-05-01
Payer: MEDICARE

## 2025-05-01 DIAGNOSIS — R10.84 ABDOMINAL PAIN, GENERALIZED: Primary | ICD-10-CM

## 2025-05-01 DIAGNOSIS — R10.84 ABDOMINAL PAIN, GENERALIZED: ICD-10-CM

## 2025-05-01 PROCEDURE — 74018 RADEX ABDOMEN 1 VIEW: CPT | Mod: 26,,, | Performed by: RADIOLOGY

## 2025-05-01 PROCEDURE — 74018 RADEX ABDOMEN 1 VIEW: CPT | Mod: TC

## 2025-05-02 ENCOUNTER — PATIENT MESSAGE (OUTPATIENT)
Dept: RESEARCH | Facility: HOSPITAL | Age: 75
End: 2025-05-02
Payer: MEDICARE

## 2025-05-06 ENCOUNTER — PATIENT MESSAGE (OUTPATIENT)
Dept: RESEARCH | Facility: HOSPITAL | Age: 75
End: 2025-05-06
Payer: MEDICARE

## 2025-05-07 ENCOUNTER — PATIENT MESSAGE (OUTPATIENT)
Dept: PRIMARY CARE CLINIC | Facility: CLINIC | Age: 75
End: 2025-05-07

## 2025-05-07 ENCOUNTER — OFFICE VISIT (OUTPATIENT)
Dept: PRIMARY CARE CLINIC | Facility: CLINIC | Age: 75
End: 2025-05-07
Payer: MEDICARE

## 2025-05-07 VITALS — BODY MASS INDEX: 20.37 KG/M2 | WEIGHT: 134 LBS

## 2025-05-07 DIAGNOSIS — F33.41 RECURRENT MAJOR DEPRESSIVE DISORDER, IN PARTIAL REMISSION: ICD-10-CM

## 2025-05-07 DIAGNOSIS — E03.9 ACQUIRED HYPOTHYROIDISM: Primary | ICD-10-CM

## 2025-05-07 DIAGNOSIS — E78.49 OTHER HYPERLIPIDEMIA: ICD-10-CM

## 2025-05-12 ENCOUNTER — PATIENT MESSAGE (OUTPATIENT)
Dept: HEMATOLOGY/ONCOLOGY | Facility: CLINIC | Age: 75
End: 2025-05-12
Payer: MEDICARE

## 2025-05-16 ENCOUNTER — OFFICE VISIT (OUTPATIENT)
Dept: HEMATOLOGY/ONCOLOGY | Facility: CLINIC | Age: 75
End: 2025-05-16
Payer: MEDICARE

## 2025-05-16 ENCOUNTER — LAB VISIT (OUTPATIENT)
Dept: LAB | Facility: HOSPITAL | Age: 75
End: 2025-05-16
Attending: NURSE PRACTITIONER
Payer: MEDICARE

## 2025-05-16 VITALS
HEIGHT: 68 IN | OXYGEN SATURATION: 97 % | TEMPERATURE: 98 F | SYSTOLIC BLOOD PRESSURE: 139 MMHG | BODY MASS INDEX: 19.68 KG/M2 | WEIGHT: 129.88 LBS | HEART RATE: 74 BPM | DIASTOLIC BLOOD PRESSURE: 85 MMHG

## 2025-05-16 DIAGNOSIS — E53.8 B12 DEFICIENCY: ICD-10-CM

## 2025-05-16 DIAGNOSIS — D75.89 MACROCYTOSIS WITHOUT ANEMIA: ICD-10-CM

## 2025-05-16 DIAGNOSIS — D69.6 THROMBOCYTOPENIA: ICD-10-CM

## 2025-05-16 DIAGNOSIS — D75.89 MACROCYTOSIS: Primary | ICD-10-CM

## 2025-05-16 DIAGNOSIS — E53.8 FOLATE DEFICIENCY: ICD-10-CM

## 2025-05-16 PROBLEM — D70.8 OTHER NEUTROPENIA: Chronic | Status: RESOLVED | Noted: 2023-01-19 | Resolved: 2025-05-16

## 2025-05-16 LAB
ABSOLUTE EOSINOPHIL (OHS): 0.04 K/UL
ABSOLUTE MONOCYTE (OHS): 0.39 K/UL (ref 0.3–1)
ABSOLUTE NEUTROPHIL COUNT (OHS): 1.84 K/UL (ref 1.8–7.7)
ALBUMIN SERPL BCP-MCNC: 4.8 G/DL (ref 3.5–5.2)
ALP SERPL-CCNC: 39 UNIT/L (ref 40–150)
ALT SERPL W/O P-5'-P-CCNC: 15 UNIT/L (ref 10–44)
ANION GAP (OHS): 12 MMOL/L (ref 8–16)
AST SERPL-CCNC: 22 UNIT/L (ref 11–45)
BASOPHILS # BLD AUTO: 0.03 K/UL
BASOPHILS NFR BLD AUTO: 0.7 %
BILIRUB SERPL-MCNC: 0.8 MG/DL (ref 0.1–1)
BUN SERPL-MCNC: 10 MG/DL (ref 8–23)
CALCIUM SERPL-MCNC: 9.8 MG/DL (ref 8.7–10.5)
CHLORIDE SERPL-SCNC: 104 MMOL/L (ref 95–110)
CO2 SERPL-SCNC: 25 MMOL/L (ref 23–29)
CREAT SERPL-MCNC: 0.9 MG/DL (ref 0.5–1.4)
ERYTHROCYTE [DISTWIDTH] IN BLOOD BY AUTOMATED COUNT: 12.6 % (ref 11.5–14.5)
GFR SERPLBLD CREATININE-BSD FMLA CKD-EPI: >60 ML/MIN/1.73/M2
GLUCOSE SERPL-MCNC: 105 MG/DL (ref 70–110)
HCT VFR BLD AUTO: 43.2 % (ref 37–48.5)
HGB BLD-MCNC: 14.6 GM/DL (ref 12–16)
IMM GRANULOCYTES # BLD AUTO: 0.01 K/UL (ref 0–0.04)
IMM GRANULOCYTES NFR BLD AUTO: 0.2 % (ref 0–0.5)
LYMPHOCYTES # BLD AUTO: 1.76 K/UL (ref 1–4.8)
MCH RBC QN AUTO: 33.3 PG (ref 27–31)
MCHC RBC AUTO-ENTMCNC: 33.8 G/DL (ref 32–36)
MCV RBC AUTO: 99 FL (ref 82–98)
NUCLEATED RBC (/100WBC) (OHS): 0 /100 WBC
PLATELET # BLD AUTO: 138 K/UL (ref 150–450)
PMV BLD AUTO: 8.8 FL (ref 9.2–12.9)
POTASSIUM SERPL-SCNC: 5.2 MMOL/L (ref 3.5–5.1)
PROT SERPL-MCNC: 8.4 GM/DL (ref 6–8.4)
RBC # BLD AUTO: 4.38 M/UL (ref 4–5.4)
RELATIVE EOSINOPHIL (OHS): 1 %
RELATIVE LYMPHOCYTE (OHS): 43.2 % (ref 18–48)
RELATIVE MONOCYTE (OHS): 9.6 % (ref 4–15)
RELATIVE NEUTROPHIL (OHS): 45.3 % (ref 38–73)
SODIUM SERPL-SCNC: 141 MMOL/L (ref 136–145)
WBC # BLD AUTO: 4.07 K/UL (ref 3.9–12.7)

## 2025-05-16 PROCEDURE — 85025 COMPLETE CBC W/AUTO DIFF WBC: CPT

## 2025-05-16 PROCEDURE — 99214 OFFICE O/P EST MOD 30 MIN: CPT | Mod: PBBFAC | Performed by: NURSE PRACTITIONER

## 2025-05-16 PROCEDURE — 36415 COLL VENOUS BLD VENIPUNCTURE: CPT

## 2025-05-16 PROCEDURE — 99999 PR PBB SHADOW E&M-EST. PATIENT-LVL IV: CPT | Mod: PBBFAC,,, | Performed by: NURSE PRACTITIONER

## 2025-05-16 PROCEDURE — 80053 COMPREHEN METABOLIC PANEL: CPT

## 2025-05-16 NOTE — PROGRESS NOTES
Patient ID: Maggie Díaz is a 74 y.o. female.    Chief Complaint: no complaints    HPI:  74 year old female who presents today for further evaluation and management of thrombocytopenia.  Thrombocytopenia has been off/on since 2018 ranging from 124-149K.     Other diagnosis include RLS, anxiety, aortic atherosclerosis, vitamin D deficiency, hypothyroidism, history of Hep C, osteoporosis without fracture, and allergic rhinitis. History of Hep C - treated and cleared per patient.        Former cigarette smoker- quit in 1979.  Smoked for 41 years- smoked 1/2 pack per day  8/2019 Low dose CT chest negative for malignancy  04/2022 mammogram - negative for malignancy  8/2018 colonoscopy - negative with recs to repeat in 10 years  4/2019 PAP negative for malignancy     Alcohol use socially on occasion  Smoke marijuana on weekends - edibles     Has lost 9 lbs unintentionally in the past 6 months due to digestive issues.  Diagnosed with IBS with constipation.  States that she has gotten her appetite back.      8/9/2022 CT abdomen pelvis without contrast Impression:No acute abnormality in the abdomen or pelvis     Fm hx of cancer - states that she is adopted      Interval History:     9/29/2022 Present today for continued monitoring of thrombocytopenia - Labs from  9/28/2022 platelets 135K stable.  On of stable neutropenia for many years.      Interval History:  2/6/2023 States that she fell last Friday walking her dog.  Has stitches to her chin and soreness around rib area.  Was evaluated in the ED and found with no fractures or breaks.  Labs continue to remain stable.       Interval History:  6/12/2023 Patient present today for continued monitoring of thrombocytopenia/leukopenia.  No complaints of abnormal bleeding.  Differential WBC in normal ranges.     Interval History:  3/7/2024  Currently with platelet count of 121 K.  Denies any known abnormal bleeding,  WBC and RBC normal.  Normal differential    Interval  History:  2024  States that she still cannot gain any weight.  States she is continue to lose weight.  In 2024 weighed 125 and not unintentional weight loss currently 114 lbs. Has lost 11 lbs in 7 months. States that she bruises easily.      Interval History:  2024  Has gained 13 lbs back.  Abdominal US done showed normal liver with normal spleen.  Stable thrombocytopenia - currently 140 K. Macrocytosis without anemia.     Interval History:  2025 With slight macrocytosis without anemia. Platelet count low but stable at 138K.  Has no complaints today except for hemorrhoidal pain. Has been taking folvite 1 mg PO daily for folate deficiency with elevated homocysteine. Denies etoh use. Found with elevated MMA not currently taking B12    I have reviewed all of the patient's relevant lab work available in the medical record and have utilized this in my evaluation and management recommendations today.      Social History     Socioeconomic History    Marital status:    Occupational History     Employer: [employer]   Tobacco Use    Smoking status: Former     Current packs/day: 0.00     Average packs/day: 0.5 packs/day for 10.0 years (5.0 ttl pk-yrs)     Types: Cigarettes     Start date: 3/3/1969     Quit date: 3/3/1979     Years since quittin.2    Smokeless tobacco: Never   Substance and Sexual Activity    Alcohol use: Not Currently     Alcohol/week: 0.0 standard drinks of alcohol     Comment: occasionally    Drug use: No    Sexual activity: Not Currently     Partners: Male   Social History Narrative    Live with      Social Drivers of Health     Financial Resource Strain: Patient Declined (2024)    Overall Financial Resource Strain (CARDIA)     Difficulty of Paying Living Expenses: Patient declined   Food Insecurity: No Food Insecurity (2024)    Hunger Vital Sign     Worried About Running Out of Food in the Last Year: Never true     Ran Out of Food in the Last Year:  Never true   Transportation Needs: No Transportation Needs (2023)    PRAPARE - Transportation     Lack of Transportation (Medical): No     Lack of Transportation (Non-Medical): No   Physical Activity: Unknown (2024)    Exercise Vital Sign     Days of Exercise per Week: Patient declined   Stress: Patient Declined (2024)    [employer] [place] [employer] - Occupational Stress Questionnaire     Feeling of Stress : Patient declined   Housing Stability: Low Risk  (2023)    Housing Stability Vital Sign     Unable to Pay for Housing in the Last Year: No     Number of Places Lived in the Last Year: 1     Unstable Housing in the Last Year: No       Family History   Adopted: Yes   Family history unknown: Yes       Past Surgical History:   Procedure Laterality Date     SECTION, CLASSIC      COLONOSCOPY N/A 2018    Procedure: COLONOSCOPY;  Surgeon: Jose Royal MD;  Location: [facility];  Service: Endoscopy;  Laterality: N/A;    WRIST FRACTURE SURGERY      , Dr. Bunch.       Past Medical History:   Diagnosis Date    Anxiety     Benzodiazepine dependence 2020    Hematuria, microscopic     negative CT    Hepatitis C     slow progression/ Dr. Otoole    Hypertension     Migraines     OP (osteoporosis)     on drug holiday.    Osteoarthritis     Radius fracture     Radius fracture     Restless legs syndrome (RLS)     Thrombocytopenia 2019    Vitamin D deficiency disease        Review of Systems   Constitutional:  Negative for appetite change and unexpected weight change.   HENT:  Negative for mouth sores and nosebleeds.    Eyes:  Negative for visual disturbance.   Respiratory:  Negative for cough and shortness of breath.    Cardiovascular:  Negative for chest pain.   Gastrointestinal:  Negative for abdominal pain, anal bleeding, blood in stool and diarrhea.        Hemorrhoidal pain   Endocrine: Negative.    Genitourinary:  Negative for frequency, hematuria and vaginal  bleeding.   Musculoskeletal:  Negative for back pain.   Skin:  Negative for rash.   Allergic/Immunologic: Negative.    Neurological:  Negative for headaches.   Hematological:  Negative for adenopathy. Bruises/bleeds easily.   Psychiatric/Behavioral:  The patient is not nervous/anxious.           Medication List with Changes/Refills   Current Medications    ATORVASTATIN (LIPITOR) 40 MG TABLET    TAKE ONE TABLET BY MOUTH ONE TIME DAILY    CHOLECALCIFEROL, VITAMIN D3, (VITAMIN D3) 50 MCG (2,000 UNIT) CAP    Take 1 capsule by mouth once daily.    CLONAZEPAM (KLONOPIN) 0.5 MG TABLET    TAKE 1 TO 2 TABLETS BY MOUTH EVERY EVENING AS NEEDED    ESCITALOPRAM OXALATE (LEXAPRO) 10 MG TABLET    Take 1 tablet (10 mg total) by mouth once daily.    FLUTICASONE PROPIONATE (FLONASE) 50 MCG/ACTUATION NASAL SPRAY    SHAKE LIQUID AND USE 2 SPRAYS(100 MCG) IN EACH NOSTRIL DAILY    FOLIC ACID (FOLVITE) 1 MG TABLET    Take 1 tablet (1 mg total) by mouth once daily.    LEVOTHYROXINE (SYNTHROID) 50 MCG TABLET    TAKE 1 TABLET BY MOUTH EVERY DAY    NORTRIPTYLINE (PAMELOR) 50 MG CAPSULE    Take 1 capsule (50 mg total) by mouth every evening. Begin this prescription AFTER completion of initial 5 days starter dose.        Objective:     Vitals:    05/16/25 0829   BP: 139/85   Pulse: 74   Temp: 97.9 °F (36.6 °C)       Physical Exam  Vitals reviewed.   Constitutional:       Appearance: Normal appearance.   HENT:      Head: Normocephalic and atraumatic.      Right Ear: External ear normal.      Left Ear: External ear normal.   Cardiovascular:      Rate and Rhythm: Normal rate and regular rhythm.      Heart sounds: Normal heart sounds, S1 normal and S2 normal.   Pulmonary:      Effort: Pulmonary effort is normal.      Breath sounds: Normal breath sounds.   Abdominal:      General: There is no distension.   Musculoskeletal:         General: Normal range of motion.      Cervical back: Normal range of motion.   Skin:     General: Skin is warm and dry.    Neurological:      General: No focal deficit present.      Mental Status: She is alert and oriented to person, place, and time.   Psychiatric:         Attention and Perception: Attention and perception normal.         Mood and Affect: Mood and affect normal.         Speech: Speech normal.         Behavior: Behavior normal. Behavior is cooperative.         Thought Content: Thought content normal.         Cognition and Memory: Cognition and memory normal.         Judgment: Judgment normal.         Assessment:     Problem List Items Addressed This Visit       Thrombocytopenia (Chronic)    Relevant Orders    CBC Auto Differential    Comprehensive Metabolic Panel     Other Visit Diagnoses         Macrocytosis    -  Primary    Relevant Orders    CBC Auto Differential    Comprehensive Metabolic Panel      B12 deficiency        Relevant Orders    CBC Auto Differential      Folate deficiency        Relevant Orders    CBC Auto Differential                Lab Results   Component Value Date    WBC 4.07 05/16/2025    RBC 4.38 05/16/2025    HGB 14.6 05/16/2025    HCT 43.2 05/16/2025    MCV 99 (H) 05/16/2025    MCH 33.3 (H) 05/16/2025    MCHC 33.8 05/16/2025    RDW 12.6 05/16/2025     (L) 05/16/2025    MPV 8.8 (L) 05/16/2025    GRAN 4.1 11/25/2024    GRAN 69.1 11/25/2024    LYMPH 43.2 05/16/2025    LYMPH 1.76 05/16/2025    MONO 9.6 05/16/2025    MONO 0.39 05/16/2025    EOS 1.0 05/16/2025    EOS 0.04 05/16/2025    BASO 0.05 11/25/2024    EOSINOPHIL 0.7 11/25/2024    BASOPHIL 0.7 05/16/2025    BASOPHIL 0.03 05/16/2025      Lab Results   Component Value Date     05/16/2025    K 5.2 (H) 05/16/2025     05/16/2025    CO2 25 05/16/2025    BUN 10 05/16/2025    CREATININE 0.9 05/16/2025    CALCIUM 9.8 05/16/2025    ANIONGAP 12 05/16/2025    ESTGFRAFRICA >60 05/19/2022    EGFRNONAA >60 05/19/2022     Lab Results   Component Value Date    ALT 15 05/16/2025    AST 22 05/16/2025    GGT 10 04/18/2016    ALKPHOS 39 (L)  05/16/2025    BILITOT 0.8 05/16/2025     Lab Results   Component Value Date    IRON 115 08/14/2024    TRANSFERRIN 270 08/14/2024    TIBC 400 08/14/2024    FESATURATED 29 08/14/2024    FERRITIN 178 08/14/2024     Lab Results   Component Value Date    UGVGGVQD84 700 11/29/2024     Lab Results   Component Value Date    FOLATE 8.1 11/29/2024     Homocysteine  MMA     Plan:   Macrocytosis  -     CBC Auto Differential; Future; Expected date: 05/16/2025  -     Comprehensive Metabolic Panel; Future; Expected date: 05/16/2025    Thrombocytopenia  -     CBC Auto Differential; Future; Expected date: 05/16/2025  -     Comprehensive Metabolic Panel; Future; Expected date: 05/16/2025    B12 deficiency  -     CBC Auto Differential; Future; Expected date: 05/16/2025    Folate deficiency  -     CBC Auto Differential; Future; Expected date: 05/16/2025          Med Onc Chart Routing      Follow up with physician    Follow up with [facility] 6 months. VV - labs prior   Infusion scheduling note   n/a   Injection scheduling note n/a   Labs   Scheduling:  Preferred lab: [facility]  Lab interval:  cbc, cmp   Imaging   N/a   Pharmacy appointment No pharmacy appointment needed      Other referrals No nutrition appointment needed -        No additional referrals needed  n/a            Continue folvite 1 mg PO daily.   Start B12 0446-3625 mcg SL daily.    Total time spent on encounter: 30 minutes    Collaborating Provider:  Dr. Saw Becerra    Thank You,  WILLIAM ElizabethP-C  Ben[facility] Hematology

## 2025-05-20 ENCOUNTER — PATIENT MESSAGE (OUTPATIENT)
Dept: HEMATOLOGY/ONCOLOGY | Facility: CLINIC | Age: 75
End: 2025-05-20
Payer: MEDICARE

## 2025-05-20 ENCOUNTER — PATIENT MESSAGE (OUTPATIENT)
Dept: PRIMARY CARE CLINIC | Facility: CLINIC | Age: 75
End: 2025-05-20
Payer: MEDICARE

## 2025-05-20 RX ORDER — LEVOTHYROXINE SODIUM 50 UG/1
50 TABLET ORAL DAILY
Qty: 90 TABLET | Refills: 1 | Status: SHIPPED | OUTPATIENT
Start: 2025-05-20

## 2025-05-22 DIAGNOSIS — R79.89 ELEVATED HOMOCYSTEINE: ICD-10-CM

## 2025-05-22 DIAGNOSIS — D75.89 MACROCYTOSIS WITHOUT ANEMIA: ICD-10-CM

## 2025-05-22 DIAGNOSIS — E53.8 FOLIC ACID DEFICIENCY: ICD-10-CM

## 2025-05-22 RX ORDER — FOLIC ACID 1 MG/1
1 TABLET ORAL DAILY
Qty: 90 TABLET | Refills: 1 | Status: SHIPPED | OUTPATIENT
Start: 2025-05-22 | End: 2026-05-22

## 2025-06-09 ENCOUNTER — PATIENT MESSAGE (OUTPATIENT)
Dept: PRIMARY CARE CLINIC | Facility: CLINIC | Age: 75
End: 2025-06-09
Payer: MEDICARE

## 2025-06-09 DIAGNOSIS — I70.0 AORTIC ATHEROSCLEROSIS: ICD-10-CM

## 2025-06-09 RX ORDER — ATORVASTATIN CALCIUM 40 MG/1
40 TABLET, FILM COATED ORAL DAILY
Qty: 90 TABLET | Refills: 0 | Status: SHIPPED | OUTPATIENT
Start: 2025-06-09

## 2025-06-26 ENCOUNTER — PATIENT MESSAGE (OUTPATIENT)
Dept: PRIMARY CARE CLINIC | Facility: CLINIC | Age: 75
End: 2025-06-26

## 2025-06-26 ENCOUNTER — CLINICAL SUPPORT (OUTPATIENT)
Dept: PRIMARY CARE CLINIC | Facility: CLINIC | Age: 75
End: 2025-06-26
Payer: MEDICARE

## 2025-06-26 DIAGNOSIS — E78.49 OTHER HYPERLIPIDEMIA: ICD-10-CM

## 2025-06-26 DIAGNOSIS — E03.9 ACQUIRED HYPOTHYROIDISM: ICD-10-CM

## 2025-06-26 LAB
ABSOLUTE EOSINOPHIL (OHS): 0.02 K/UL
ABSOLUTE MONOCYTE (OHS): 0.4 K/UL (ref 0.3–1)
ABSOLUTE NEUTROPHIL COUNT (OHS): 2.23 K/UL (ref 1.8–7.7)
ALBUMIN SERPL BCP-MCNC: 5 G/DL (ref 3.5–5.2)
ALP SERPL-CCNC: 40 UNIT/L (ref 40–150)
ALT SERPL W/O P-5'-P-CCNC: 15 UNIT/L (ref 10–44)
ANION GAP (OHS): 14 MMOL/L (ref 8–16)
AST SERPL-CCNC: 21 UNIT/L (ref 11–45)
BASOPHILS # BLD AUTO: 0.02 K/UL
BASOPHILS NFR BLD AUTO: 0.5 %
BILIRUB SERPL-MCNC: 0.7 MG/DL (ref 0.1–1)
BUN SERPL-MCNC: 11 MG/DL (ref 8–23)
CALCIUM SERPL-MCNC: 9.6 MG/DL (ref 8.7–10.5)
CHLORIDE SERPL-SCNC: 101 MMOL/L (ref 95–110)
CHOLEST SERPL-MCNC: 137 MG/DL (ref 120–199)
CHOLEST/HDLC SERPL: 2 {RATIO} (ref 2–5)
CO2 SERPL-SCNC: 25 MMOL/L (ref 23–29)
CREAT SERPL-MCNC: 0.9 MG/DL (ref 0.5–1.4)
ERYTHROCYTE [DISTWIDTH] IN BLOOD BY AUTOMATED COUNT: 12.8 % (ref 11.5–14.5)
GFR SERPLBLD CREATININE-BSD FMLA CKD-EPI: >60 ML/MIN/1.73/M2
GLUCOSE SERPL-MCNC: 105 MG/DL (ref 70–110)
HCT VFR BLD AUTO: 42.2 % (ref 37–48.5)
HDLC SERPL-MCNC: 70 MG/DL (ref 40–75)
HDLC SERPL: 51.1 % (ref 20–50)
HGB BLD-MCNC: 14.1 GM/DL (ref 12–16)
IMM GRANULOCYTES # BLD AUTO: 0.01 K/UL (ref 0–0.04)
IMM GRANULOCYTES NFR BLD AUTO: 0.2 % (ref 0–0.5)
LDLC SERPL CALC-MCNC: 58 MG/DL (ref 63–159)
LYMPHOCYTES # BLD AUTO: 1.36 K/UL (ref 1–4.8)
MCH RBC QN AUTO: 33 PG (ref 27–31)
MCHC RBC AUTO-ENTMCNC: 33.4 G/DL (ref 32–36)
MCV RBC AUTO: 99 FL (ref 82–98)
NONHDLC SERPL-MCNC: 67 MG/DL
NUCLEATED RBC (/100WBC) (OHS): 0 /100 WBC
PLATELET # BLD AUTO: 131 K/UL (ref 150–450)
PMV BLD AUTO: 10.3 FL (ref 9.2–12.9)
POTASSIUM SERPL-SCNC: 4.8 MMOL/L (ref 3.5–5.1)
PROT SERPL-MCNC: 8.1 GM/DL (ref 6–8.4)
RBC # BLD AUTO: 4.27 M/UL (ref 4–5.4)
RELATIVE EOSINOPHIL (OHS): 0.5 %
RELATIVE LYMPHOCYTE (OHS): 33.7 % (ref 18–48)
RELATIVE MONOCYTE (OHS): 9.9 % (ref 4–15)
RELATIVE NEUTROPHIL (OHS): 55.2 % (ref 38–73)
SODIUM SERPL-SCNC: 140 MMOL/L (ref 136–145)
TRIGL SERPL-MCNC: 45 MG/DL (ref 30–150)
TSH SERPL-ACNC: 1.34 UIU/ML (ref 0.4–4)
WBC # BLD AUTO: 4.04 K/UL (ref 3.9–12.7)

## 2025-06-26 PROCEDURE — 99999 PR PBB SHADOW E&M-EST. PATIENT-LVL II: CPT | Mod: PBBFAC,,,

## 2025-06-26 PROCEDURE — 85025 COMPLETE CBC W/AUTO DIFF WBC: CPT

## 2025-06-26 PROCEDURE — 80061 LIPID PANEL: CPT

## 2025-06-26 PROCEDURE — 84443 ASSAY THYROID STIM HORMONE: CPT

## 2025-06-26 PROCEDURE — 99212 OFFICE O/P EST SF 10 MIN: CPT | Mod: PBBFAC,PN

## 2025-06-26 PROCEDURE — 80053 COMPREHEN METABOLIC PANEL: CPT

## 2025-07-02 NOTE — PROGRESS NOTES
Primary Care Telemedicine Note  The patient location is:  Patient Home   The chief complaint leading to consultation is: Follow up of medical problems.      Visit type: Virtual visit with synchronous audio and video  Each patient to whom he or she provides medical services by telemedicine is:  (1) informed of the relationship between the physician and patient and the respective role of any other health care provider with respect to management of the patient; and (2) notified that he or she may decline to receive medical services by telemedicine and may withdraw from such care at any time.      HPI:  History of Present Illness      Takes miralax at night, good BMs lately.  But always has hemorroids and pain with them.  Anxiety and depression doing well, sleeping well.  Weight is stable.        Advance Care Planning     Date: 07/03/2025    ACP Reviewed/No Changes  Voluntary advance care planning discussion had today with patient. Previously completed HCPOA in electronic medical record is current, no changes made.      A total of 5 min was spent on advance care planning, goals of care discussion, emotional support, formulating and communicating prognosis and exploring burden/benefit of various approaches of treatment. This discussion occurred on a fully voluntary basis with the verbal consent of the patient and/or family.           Updated/ annual due 7/25:  HM: 10/24 fluvax, 1/21 covid vaccines/booster, 11/24 RSV, 8/19 HAV, 3/15 [id number], 2/14 [id number], 7/18 Td, 1/10 Tdap, 8/12 zoster, 2020 Shingrix x2, 10/23 BMD stable/hx fosamax will restart 10/23 rep 2y, 8/18 Cscope rep 10y, 9/24 MMG, 5/18 pelvic u/s neg.           Problem List Items Addressed This Visit       SARAHY (generalized anxiety disorder)    Overview   Lexapro 10mg daily,  Nortriptyline and Clonazepam at hs.         Current Assessment & Plan   Doing well on this regimen, cont.         Irritable bowel syndrome with constipation (Chronic)    Overview   Miralax  daily.         Recurrent major depressive disorder, in partial remission - Primary    Overview   Lexapro 10mg daily,  Nortriptyline 50mg nightly.         Current Assessment & Plan   Doing well on lower dose lexapro.         Restless legs syndrome (RLS)    Overview   Clonazepam 0.5mg qhs prn.         Current Assessment & Plan   Doing well, cont rx.         Thrombocytopenia (Chronic)    Current Assessment & Plan   No s/s, cont to follow.          Other Visit Diagnoses         Hemorrhoids, unspecified hemorrhoid type        Relevant Orders    Ambulatory referral/consult to Colorectal Surgery            The patient's Health Maintenance was reviewed and the following appears to be due at this time:  Health Maintenance Due   Topic Date Due    COVID-19 Vaccine (7 - 2024-25 season) 09/01/2024       [unfilled]  Medications Prior to Visit[1]   ROS   Physical Exam        No data to display                   No data to display                  There were no vitals taken for this visit.    Constitutional: The patient appears well-developed and well-nourished. No distress.   Psychiatric: The mood appears not anxious and the affect is not angry, not blunt, not labile and not inappropriate. Speech is not rapid and/or pressured, not delayed, not tangential and not slurred. The patient is not agitated, not aggressive, is not hyperactive, not slowed, not withdrawn, not actively hallucinating and not combative. Thought content is not paranoid and not delusional. Cognition and memory are not impaired. The patient does not express impulsivity or inappropriate judgment and does not exhibit a depressed mood. The patient expresses no homicidal and no suicidal ideation and has no suicidal plans and no homicidal plans. The patient is communicative and exhibits a normal recent memory and normal remote memory. The patient is attentive.        Latest Reference Range & Units 06/26/25 10:18   WBC 3.90 - 12.70 K/uL 4.04   RBC 4.00 - 5.40 M/uL  4.27   Hemoglobin 12.0 - 16.0 gm/dL 14.1   Hematocrit 37.0 - 48.5 % 42.2   MCV 82 - 98 fL 99 (H)   MCH 27.0 - 31.0 pg 33.0 (H)   MCHC 32.0 - 36.0 g/dL 33.4   RDW 11.5 - 14.5 % 12.8   Platelet Count 150 - 450 K/uL 131 (L)   MPV 9.2 - 12.9 fL 10.3   Neut % 38 - 73 % 55.2   Lymph % 18 - 48 % 33.7   Mono % 4 - 15 % 9.9   Eos % <=8 % 0.5   Basophil % <=1.9 % 0.5   Immature Granulocytes 0.0 - 0.5 % 0.2   Gran # (ANC) 1.8 - 7.7 K/uL 2.23   Lymph # 1 - 4.8 K/uL 1.36   Mono # 0.3 - 1 K/uL 0.40   Eos # <=0.5 K/uL 0.02   Baso # <=0.2 K/uL 0.02   Immature Grans (Abs) 0.00 - 0.04 K/uL 0.01   nRBC <=0 /100 WBC 0   Sodium 136 - 145 mmol/L 140   Potassium 3.5 - 5.1 mmol/L 4.8   Chloride 95 - 110 mmol/L 101   CO2 23 - 29 mmol/L 25   Anion Gap 8 - 16 mmol/L 14   BUN 8 - 23 mg/dL 11   Creatinine 0.5 - 1.4 mg/dL 0.9   eGFR >60 mL/min/1.73/m2 >60   Glucose 70 - 110 mg/dL 105   Calcium 8.7 - 10.5 mg/dL 9.6   ALP 40 - 150 unit/L 40   PROTEIN TOTAL 6.0 - 8.4 gm/dL 8.1   Albumin 3.5 - 5.2 g/dL 5.0   BILIRUBIN TOTAL 0.1 - 1.0 mg/dL 0.7   AST 11 - 45 unit/L 21   ALT 10 - 44 unit/L 15   Cholesterol Total 120 - 199 mg/dL 137   HDL 40 - 75 mg/dL 70   HDL/Cholesterol Ratio 20.0 - 50.0 % 51.1 (H)   Non-HDL Cholesterol mg/dL 67   Total Cholesterol/HDL Ratio 2.0 - 5.0  2.0   Triglycerides 30 - 150 mg/dL 45   LDL Cholesterol 63.0 - 159.0 mg/dL 58.0 (L)   TSH 0.400 - 4.000 uIU/mL 1.339     Encounter Diagnoses   Name Primary?    Recurrent major depressive disorder, in partial remission Yes    SARAHY (generalized anxiety disorder)     Irritable bowel syndrome with constipation     Restless legs syndrome (RLS)     Hemorrhoids, unspecified hemorrhoid type     Thrombocytopenia        PLAN:    Assessment & Plan            I am having Maggie Díaz maintain her cholecalciferol (vitamin D3), fluticasone propionate, EScitalopram oxalate, nortriptyline, clonazePAM, levothyroxine, folic acid, and atorvastatin.  Recurrent major depressive disorder, in partial  remission  Doing well on lower dose lexapro.    SARAHY (generalized anxiety disorder)  Doing well on this regimen, cont.    Restless legs syndrome (RLS)  Doing well, cont rx.    Thrombocytopenia  No s/s, cont to follow.      Follow up in about 3 months (around 10/3/2025).    Diagnoses and all orders for this visit:    Recurrent major depressive disorder, in partial remission    SARAHY (generalized anxiety disorder)    Irritable bowel syndrome with constipation    Restless legs syndrome (RLS)    Hemorrhoids, unspecified hemorrhoid type  -     Ambulatory referral/consult to Colorectal Surgery; Future    Thrombocytopenia         [unfilled]  Orders Placed This Encounter   Procedures    Ambulatory referral/consult to Colorectal Surgery     Standing Status:   Future     Expected Date:   7/10/2025     Expiration Date:   8/3/2026     Referral Priority:   Routine     Referral Type:   Consultation     Referral Reason:   Specialty Services Required     Requested Specialty:   Colon and Rectal Surgery     Number of Visits Requested:   1       Medication List with Changes/Refills   Current Medications    ATORVASTATIN (LIPITOR) 40 MG TABLET    Take 1 tablet (40 mg total) by mouth once daily.    CHOLECALCIFEROL, VITAMIN D3, (VITAMIN D3) 50 MCG (2,000 UNIT) CAP    Take 1 capsule by mouth once daily.    CLONAZEPAM (KLONOPIN) 0.5 MG TABLET    TAKE 1 TO 2 TABLETS BY MOUTH EVERY EVENING AS NEEDED    ESCITALOPRAM OXALATE (LEXAPRO) 10 MG TABLET    Take 1 tablet (10 mg total) by mouth once daily.    FLUTICASONE PROPIONATE (FLONASE) 50 MCG/ACTUATION NASAL SPRAY    SHAKE LIQUID AND USE 2 SPRAYS(100 MCG) IN EACH NOSTRIL DAILY    FOLIC ACID (FOLVITE) 1 MG TABLET    Take 1 tablet (1 mg total) by mouth once daily.    LEVOTHYROXINE (SYNTHROID) 50 MCG TABLET    Take 1 tablet (50 mcg total) by mouth once daily.    NORTRIPTYLINE (PAMELOR) 50 MG CAPSULE    Take 1 capsule (50 mg total) by mouth every evening. Begin this prescription AFTER completion of  initial 5 days starter dose.      Medication List with Changes/Refills   Current Medications    ATORVASTATIN (LIPITOR) 40 MG TABLET    Take 1 tablet (40 mg total) by mouth once daily.       Start Date: 6/9/2025  End Date: --    CHOLECALCIFEROL, VITAMIN D3, (VITAMIN D3) 50 MCG (2,000 UNIT) CAP    Take 1 capsule by mouth once daily.       Start Date: --        End Date: --    CLONAZEPAM (KLONOPIN) 0.5 MG TABLET    TAKE 1 TO 2 TABLETS BY MOUTH EVERY EVENING AS NEEDED       Start Date: 1/27/2025 End Date: --    ESCITALOPRAM OXALATE (LEXAPRO) 10 MG TABLET    Take 1 tablet (10 mg total) by mouth once daily.       Start Date: 8/8/2024  End Date: 8/8/2025    FLUTICASONE PROPIONATE (FLONASE) 50 MCG/ACTUATION NASAL SPRAY    SHAKE LIQUID AND USE 2 SPRAYS(100 MCG) IN EACH NOSTRIL DAILY       Start Date: 3/13/2024 End Date: --    FOLIC ACID (FOLVITE) 1 MG TABLET    Take 1 tablet (1 mg total) by mouth once daily.       Start Date: 5/22/2025 End Date: 5/22/2026    LEVOTHYROXINE (SYNTHROID) 50 MCG TABLET    Take 1 tablet (50 mcg total) by mouth once daily.       Start Date: 5/20/2025 End Date: --    NORTRIPTYLINE (PAMELOR) 50 MG CAPSULE    Take 1 capsule (50 mg total) by mouth every evening. Begin this prescription AFTER completion of initial 5 days starter dose.       Start Date: 9/6/2024  End Date: --            The patient location is: [place]  The chief complaint leading to consultation is: anxiety, constipation.    Visit type: audiovisual    Face to Face time with patient: 15min  20 minutes of total time spent on the encounter, which includes face to face time and non-face to face time preparing to see the patient (eg, review of tests), Obtaining and/or reviewing separately obtained history, Documenting clinical information in the electronic or other health record, Independently interpreting results (not separately reported) and communicating results to the patient/family/caregiver, or Care coordination (not separately  reported).         Each patient to whom he or she provides medical services by telemedicine is:  (1) informed of the relationship between the physician and patient and the respective role of any other health care provider with respect to management of the patient; and (2) notified that he or she may decline to receive medical services by telemedicine and may withdraw from such care at any time.    Notes:                                [1]   Outpatient Medications Prior to Visit   Medication Sig Dispense Refill    atorvastatin (LIPITOR) 40 MG tablet Take 1 tablet (40 mg total) by mouth once daily. 90 tablet 0    cholecalciferol, vitamin D3, (VITAMIN D3) 50 mcg (2,000 unit) Cap Take 1 capsule by mouth once daily.      clonazePAM (KLONOPIN) 0.5 MG tablet TAKE 1 TO 2 TABLETS BY MOUTH EVERY EVENING AS NEEDED 60 tablet 5    EScitalopram oxalate (LEXAPRO) 10 MG tablet Take 1 tablet (10 mg total) by mouth once daily. 90 tablet 3    fluticasone propionate (FLONASE) 50 mcg/actuation nasal spray SHAKE LIQUID AND USE 2 SPRAYS(100 MCG) IN EACH NOSTRIL DAILY 48 g 11    folic acid (FOLVITE) 1 MG tablet Take 1 tablet (1 mg total) by mouth once daily. 90 tablet 1    levothyroxine (SYNTHROID) 50 MCG tablet Take 1 tablet (50 mcg total) by mouth once daily. 90 tablet 1    nortriptyline (PAMELOR) 50 MG capsule Take 1 capsule (50 mg total) by mouth every evening. Begin this prescription AFTER completion of initial 5 days starter dose. 90 capsule 3     No facility-administered medications prior to visit.

## 2025-07-03 ENCOUNTER — TELEPHONE (OUTPATIENT)
Dept: SURGERY | Facility: CLINIC | Age: 75
End: 2025-07-03
Payer: MEDICARE

## 2025-07-03 ENCOUNTER — OFFICE VISIT (OUTPATIENT)
Dept: PRIMARY CARE CLINIC | Facility: CLINIC | Age: 75
End: 2025-07-03
Payer: MEDICARE

## 2025-07-03 DIAGNOSIS — G25.81 RESTLESS LEGS SYNDROME (RLS): ICD-10-CM

## 2025-07-03 DIAGNOSIS — K58.1 IRRITABLE BOWEL SYNDROME WITH CONSTIPATION: Chronic | ICD-10-CM

## 2025-07-03 DIAGNOSIS — D69.6 THROMBOCYTOPENIA: Chronic | ICD-10-CM

## 2025-07-03 DIAGNOSIS — F41.1 GAD (GENERALIZED ANXIETY DISORDER): ICD-10-CM

## 2025-07-03 DIAGNOSIS — F33.41 RECURRENT MAJOR DEPRESSIVE DISORDER, IN PARTIAL REMISSION: Primary | ICD-10-CM

## 2025-07-03 DIAGNOSIS — K64.9 HEMORRHOIDS, UNSPECIFIED HEMORRHOID TYPE: ICD-10-CM

## 2025-07-03 NOTE — TELEPHONE ENCOUNTER
Called pt per message below and referral in workque. Successfully scheduled pt at next available appt date and time, added to waitlist. Date and time of appointment and location of clinic given. Verbalized understanding, no further questions at this time.     ----- Message from Joe Cartagena sent at 7/3/2025 11:06 AM CDT -----  Regarding: appointment  Good Morning, can you guys assist with scheduling an appointment for the above pt for Colon Rectal Surgery?   Thanks

## 2025-07-15 ENCOUNTER — PATIENT MESSAGE (OUTPATIENT)
Dept: PRIMARY CARE CLINIC | Facility: CLINIC | Age: 75
End: 2025-07-15
Payer: MEDICARE

## 2025-07-15 DIAGNOSIS — J30.1 SEASONAL ALLERGIC RHINITIS DUE TO POLLEN: ICD-10-CM

## 2025-07-15 RX ORDER — FLUTICASONE PROPIONATE 50 MCG
2 SPRAY, SUSPENSION (ML) NASAL DAILY
Qty: 48 G | Refills: 11 | Status: SHIPPED | OUTPATIENT
Start: 2025-07-15

## 2025-07-30 ENCOUNTER — PATIENT MESSAGE (OUTPATIENT)
Dept: PRIMARY CARE CLINIC | Facility: CLINIC | Age: 75
End: 2025-07-30
Payer: MEDICARE

## 2025-07-30 DIAGNOSIS — F41.9 ANXIETY: ICD-10-CM

## 2025-07-30 RX ORDER — CLONAZEPAM 0.5 MG/1
TABLET ORAL
Qty: 60 TABLET | Refills: 5 | Status: SHIPPED | OUTPATIENT
Start: 2025-07-30

## 2025-08-12 ENCOUNTER — PATIENT MESSAGE (OUTPATIENT)
Dept: HEMATOLOGY/ONCOLOGY | Facility: CLINIC | Age: 75
End: 2025-08-12
Payer: MEDICARE

## 2025-08-25 ENCOUNTER — PATIENT MESSAGE (OUTPATIENT)
Dept: PRIMARY CARE CLINIC | Facility: CLINIC | Age: 75
End: 2025-08-25
Payer: MEDICARE

## 2025-08-25 DIAGNOSIS — K58.1 IRRITABLE BOWEL SYNDROME WITH CONSTIPATION: Chronic | ICD-10-CM

## 2025-08-25 RX ORDER — NORTRIPTYLINE HYDROCHLORIDE 50 MG/1
50 CAPSULE ORAL NIGHTLY
Qty: 90 CAPSULE | Refills: 3 | Status: SHIPPED | OUTPATIENT
Start: 2025-08-25